# Patient Record
Sex: FEMALE | Race: WHITE | NOT HISPANIC OR LATINO | URBAN - METROPOLITAN AREA
[De-identification: names, ages, dates, MRNs, and addresses within clinical notes are randomized per-mention and may not be internally consistent; named-entity substitution may affect disease eponyms.]

---

## 2020-07-25 ENCOUNTER — TELEPHONE ENCOUNTER (OUTPATIENT)
Dept: URBAN - METROPOLITAN AREA CLINIC 13 | Facility: CLINIC | Age: 80
End: 2020-07-25

## 2020-07-25 RX ORDER — HYDROCODONE BITARTRATE AND ACETAMINOPHEN 7.5; 325 MG/1; MG/1
TAKE 1 TABLET EVERY 6 HOURS AS NEEDED FOR PAIN TABLET ORAL
Refills: 0 | OUTPATIENT
End: 2017-05-04

## 2020-07-25 RX ORDER — POLYETHYLENE GLYCOL 3350, SODIUM CHLORIDE, SODIUM BICARBONATE AND POTASSIUM CHLORIDE WITH LEMON FLAVOR 420; 11.2; 5.72; 1.48 G/4L; G/4L; G/4L; G/4L
TAKE AS DIRECTED POWDER, FOR SOLUTION ORAL
Qty: 1 | Refills: 0 | OUTPATIENT
Start: 2017-04-03 | End: 2017-05-04

## 2020-07-25 RX ORDER — DUREZOL 0.5 MG/ML
1 DROP EVERY NIGHT HS EMULSION OPHTHALMIC
Refills: 0 | OUTPATIENT
End: 2017-03-21

## 2020-07-25 RX ORDER — BESIFLOXACIN HCL 0.6 %
INSTILL 1 DROP 4 TIMES DAILY SUSPENSION, DROPS(FINAL DOSAGE FORM)(ML) OPHTHALMIC (EYE)
Refills: 0 | OUTPATIENT
End: 2017-03-21

## 2020-07-25 RX ORDER — PRIMIDONE 50 MG/1
TAKE 2 TABLETS TWICE DAILY TABLET ORAL
Refills: 0 | OUTPATIENT
End: 2017-05-04

## 2020-07-25 RX ORDER — CARVEDILOL 25 MG/1
TAKE 0.5 TABLET TWICE DAILY TABLET, FILM COATED ORAL
Refills: 0 | OUTPATIENT
End: 2017-03-21

## 2020-07-25 RX ORDER — PROMETHAZINE HYDROCHLORIDE 25 MG/1
TAKE 1 TABLET EVERY 6 HOURS AS NEEDED FOR NAUSEA TABLET ORAL
Qty: 60 | Refills: 1 | OUTPATIENT
End: 2017-03-21

## 2020-07-25 RX ORDER — CLINDAMYCIN HYDROCHLORIDE 300 MG/1
TAKE 1 CAPSULE 3 TIMES DAILY CAPSULE ORAL
Refills: 0 | OUTPATIENT
End: 2017-03-21

## 2020-07-25 RX ORDER — CHLORHEXIDINE GLUCONATE 0.12% ORAL RINSE 1.2 MG/ML
RINSE MOUTH WITH 15ML (1 CAPFUL) FOR 30 SECONDS AM AND PM AFTER TOOTHBRUSHING. EXPECTORATE AFTER RINSING, DO NOT SWALLOW SOLUTION ORAL
Refills: 0 | OUTPATIENT
End: 2017-03-21

## 2020-07-25 RX ORDER — DIPHENHYDRAMINE HCL 25 MG/1
TAKE 1 TABLET DAILY AS NEEDED TABLET ORAL
Refills: 0 | OUTPATIENT
End: 2017-05-04

## 2020-07-25 RX ORDER — OXYCODONE AND ACETAMINOPHEN 5; 325 MG/1; MG/1
TAKE 1 TABLET EVERY 6 HOURS PRN TABLET ORAL
Qty: 30 | Refills: 0 | OUTPATIENT
End: 2017-05-04

## 2020-07-26 ENCOUNTER — TELEPHONE ENCOUNTER (OUTPATIENT)
Dept: URBAN - METROPOLITAN AREA CLINIC 13 | Facility: CLINIC | Age: 80
End: 2020-07-26

## 2020-07-26 RX ORDER — FUROSEMIDE 40 MG/1
TAKE 1 TABLET TWICE DAILY TABLET ORAL
Qty: 60 | Refills: 3 | Status: ACTIVE | COMMUNITY

## 2020-07-26 RX ORDER — CHOLECALCIFEROL (VITAMIN D3) 50 MCG
TAKE 1 TABLET PO DAILY TABLET ORAL
Refills: 0 | Status: ACTIVE | COMMUNITY

## 2020-07-26 RX ORDER — ASPIRIN 81 MG/1
TAKE 1 TABLET DAILY TABLET, DELAYED RELEASE ORAL
Refills: 0 | Status: ACTIVE | COMMUNITY

## 2020-07-26 RX ORDER — LOSARTAN POTASSIUM 50 MG/1
TAKE 1 TABLET DAILY TABLET, FILM COATED ORAL
Refills: 0 | Status: ACTIVE | COMMUNITY

## 2020-07-26 RX ORDER — SIMVASTATIN 40 MG/1
TAKE 1 TABLET DAILY TABLET, FILM COATED ORAL
Refills: 0 | Status: ACTIVE | COMMUNITY

## 2020-07-26 RX ORDER — POTASSIUM CHLORIDE 750 MG/1
TAKE 2 TABLET 4 TIMES DAILY TABLET, EXTENDED RELEASE ORAL
Refills: 0 | Status: ACTIVE | COMMUNITY

## 2020-07-26 RX ORDER — APIXABAN 5 MG/1
TAKE 1 TABLET TWICE DAILY TABLET, FILM COATED ORAL
Refills: 0 | Status: ACTIVE | COMMUNITY

## 2020-07-26 RX ORDER — CARVEDILOL 25 MG/1
TAKE 0.5 TABLET TWICE DAILY TABLET, FILM COATED ORAL
Refills: 0 | Status: ACTIVE | COMMUNITY

## 2020-07-26 RX ORDER — CELECOXIB 200 MG/1
TAKE 1 CAPSULE DAILY AS NEEDED CAPSULE ORAL
Refills: 0 | Status: ACTIVE | COMMUNITY

## 2020-07-26 RX ORDER — FEBUXOSTAT 40 MG/1
TAKE 1 TABLET DAILY TABLET ORAL
Refills: 0 | Status: ACTIVE | COMMUNITY

## 2020-07-26 RX ORDER — LEVOTHYROXINE SODIUM 0.07 MG/1
TAKE 1 TABLET DAILY TABLET ORAL
Refills: 0 | Status: ACTIVE | COMMUNITY

## 2022-07-04 ENCOUNTER — HOSPITAL ENCOUNTER (EMERGENCY)
Facility: HOSPITAL | Age: 82
Discharge: HOME | End: 2022-07-04
Attending: EMERGENCY MEDICINE
Payer: MEDICARE

## 2022-07-04 VITALS
WEIGHT: 240 LBS | HEIGHT: 63 IN | RESPIRATION RATE: 16 BRPM | OXYGEN SATURATION: 94 % | TEMPERATURE: 98 F | HEART RATE: 108 BPM | SYSTOLIC BLOOD PRESSURE: 151 MMHG | DIASTOLIC BLOOD PRESSURE: 78 MMHG | BODY MASS INDEX: 42.52 KG/M2

## 2022-07-04 DIAGNOSIS — M54.50 BACK PAIN, LUMBOSACRAL: Primary | ICD-10-CM

## 2022-07-04 PROCEDURE — 99283 EMERGENCY DEPT VISIT LOW MDM: CPT

## 2022-07-04 RX ORDER — HYDROCODONE BITARTRATE AND ACETAMINOPHEN 5; 325 MG/1; MG/1
2 TABLET ORAL ONCE
Status: COMPLETED | OUTPATIENT
Start: 2022-07-04 | End: 2022-07-04

## 2022-07-04 RX ORDER — HYDROCODONE BITARTRATE AND ACETAMINOPHEN 5; 325 MG/1; MG/1
1-2 TABLET ORAL EVERY 6 HOURS PRN
Qty: 15 TABLET | Refills: 0 | Status: SHIPPED | OUTPATIENT
Start: 2022-07-04 | End: 2022-07-11

## 2022-07-04 NOTE — ED INITIAL ASSESSMENT (HPI)
Pt c/o chronic back spasms, pt states episode started this AM. Pt states she has tried lidocaine patch and 1000mg of tylenol with no relief.

## 2022-07-04 NOTE — ED QUICK NOTES
Pt visiting from out of town. Pt unsure of medications, unable to complete medication reconciliation, pt states \"cant [facility] just send it to you. \"

## 2022-07-04 NOTE — ED QUICK NOTES
Pt denies any pain at this time, reports that she is comfortable sitting in the wheelchair with her daughter's purse behind her back. Pt states she does not want to move at this time because if she does move then she will have pain.

## 2023-04-22 ENCOUNTER — HOSPITAL ENCOUNTER (INPATIENT)
Facility: HOSPITAL | Age: 83
LOS: 2 days | Discharge: HOME | DRG: 291 | End: 2023-04-24
Attending: EMERGENCY MEDICINE | Admitting: HOSPITALIST
Payer: MEDICARE

## 2023-04-22 ENCOUNTER — APPOINTMENT (OUTPATIENT)
Dept: GENERAL RADIOLOGY | Facility: HOSPITAL | Age: 83
DRG: 291 | End: 2023-04-22
Attending: EMERGENCY MEDICINE
Payer: MEDICARE

## 2023-04-22 DIAGNOSIS — I50.9 CONGESTIVE HEART FAILURE, UNSPECIFIED HF CHRONICITY, UNSPECIFIED HEART FAILURE TYPE (HCC): Primary | ICD-10-CM

## 2023-04-22 DIAGNOSIS — R09.02 HYPOXIA: ICD-10-CM

## 2023-04-22 DIAGNOSIS — J98.01 ACUTE BRONCHOSPASM: ICD-10-CM

## 2023-04-22 LAB
ALBUMIN SERPL-MCNC: 3.3 G/DL (ref 3.4–5)
ALBUMIN/GLOB SERPL: 0.8 {RATIO} (ref 1–2)
ALP LIVER SERPL-CCNC: 108 U/L
ALT SERPL-CCNC: 20 U/L
ANION GAP SERPL CALC-SCNC: 3 MMOL/L (ref 0–18)
AST SERPL-CCNC: 24 U/L (ref 15–37)
BASOPHILS # BLD AUTO: 0.08 X10(3) UL (ref 0–0.2)
BASOPHILS NFR BLD AUTO: 0.7 %
BILIRUB SERPL-MCNC: 0.8 MG/DL (ref 0.1–2)
BILIRUB UR QL STRIP.AUTO: NEGATIVE
BUN BLD-MCNC: 20 MG/DL (ref 7–18)
CALCIUM BLD-MCNC: 10.1 MG/DL (ref 8.5–10.1)
CHLORIDE SERPL-SCNC: 109 MMOL/L (ref 98–112)
CLARITY UR REFRACT.AUTO: CLEAR
CO2 SERPL-SCNC: 31 MMOL/L (ref 21–32)
CREAT BLD-MCNC: 1.32 MG/DL
EOSINOPHIL # BLD AUTO: 0.12 X10(3) UL (ref 0–0.7)
EOSINOPHIL NFR BLD AUTO: 1.1 %
ERYTHROCYTE [DISTWIDTH] IN BLOOD BY AUTOMATED COUNT: 13.6 %
EST. AVERAGE GLUCOSE BLD GHB EST-MCNC: 108 MG/DL (ref 68–126)
GFR SERPLBLD BASED ON 1.73 SQ M-ARVRAT: 41 ML/MIN/1.73M2 (ref 60–?)
GLOBULIN PLAS-MCNC: 3.9 G/DL (ref 2.8–4.4)
GLUCOSE BLD-MCNC: 120 MG/DL (ref 70–99)
GLUCOSE BLD-MCNC: 88 MG/DL (ref 70–99)
GLUCOSE UR STRIP.AUTO-MCNC: 50 MG/DL
HBA1C MFR BLD: 5.4 % (ref ?–5.7)
HCT VFR BLD AUTO: 39.9 %
HGB BLD-MCNC: 13 G/DL
IMM GRANULOCYTES # BLD AUTO: 0.04 X10(3) UL (ref 0–1)
IMM GRANULOCYTES NFR BLD: 0.4 %
KETONES UR STRIP.AUTO-MCNC: NEGATIVE MG/DL
LYMPHOCYTES # BLD AUTO: 1.34 X10(3) UL (ref 1–4)
LYMPHOCYTES NFR BLD AUTO: 12.3 %
MCH RBC QN AUTO: 31 PG (ref 26–34)
MCHC RBC AUTO-ENTMCNC: 32.6 G/DL (ref 31–37)
MCV RBC AUTO: 95 FL
MONOCYTES # BLD AUTO: 0.71 X10(3) UL (ref 0.1–1)
MONOCYTES NFR BLD AUTO: 6.5 %
NEUTROPHILS # BLD AUTO: 8.64 X10 (3) UL (ref 1.5–7.7)
NEUTROPHILS # BLD AUTO: 8.64 X10(3) UL (ref 1.5–7.7)
NEUTROPHILS NFR BLD AUTO: 79 %
NITRITE UR QL STRIP.AUTO: NEGATIVE
NT-PROBNP SERPL-MCNC: 4782 PG/ML (ref ?–450)
OSMOLALITY SERPL CALC.SUM OF ELEC: 298 MOSM/KG (ref 275–295)
PH UR STRIP.AUTO: 8 [PH] (ref 5–8)
PLATELET # BLD AUTO: 173 10(3)UL (ref 150–450)
POTASSIUM SERPL-SCNC: 3.8 MMOL/L (ref 3.5–5.1)
PROT SERPL-MCNC: 7.2 G/DL (ref 6.4–8.2)
PROT UR STRIP.AUTO-MCNC: NEGATIVE MG/DL
RBC # BLD AUTO: 4.2 X10(6)UL
RBC UR QL AUTO: NEGATIVE
SARS-COV-2 RNA RESP QL NAA+PROBE: NOT DETECTED
SODIUM SERPL-SCNC: 143 MMOL/L (ref 136–145)
SP GR UR STRIP.AUTO: 1.01 (ref 1–1.03)
TROPONIN I HIGH SENSITIVITY: 16 NG/L
UROBILINOGEN UR STRIP.AUTO-MCNC: <2 MG/DL
WBC # BLD AUTO: 10.9 X10(3) UL (ref 4–11)

## 2023-04-22 PROCEDURE — 99223 1ST HOSP IP/OBS HIGH 75: CPT | Performed by: HOSPITALIST

## 2023-04-22 PROCEDURE — 71045 X-RAY EXAM CHEST 1 VIEW: CPT | Performed by: EMERGENCY MEDICINE

## 2023-04-22 RX ORDER — NICOTINE POLACRILEX 4 MG
15 LOZENGE BUCCAL
Status: DISCONTINUED | OUTPATIENT
Start: 2023-04-22 | End: 2023-04-24

## 2023-04-22 RX ORDER — SENNOSIDES 8.6 MG
17.2 TABLET ORAL NIGHTLY PRN
Status: DISCONTINUED | OUTPATIENT
Start: 2023-04-22 | End: 2023-04-24

## 2023-04-22 RX ORDER — GABAPENTIN 300 MG/1
300 CAPSULE ORAL 3 TIMES DAILY
COMMUNITY

## 2023-04-22 RX ORDER — DEXTROSE MONOHYDRATE 25 G/50ML
50 INJECTION, SOLUTION INTRAVENOUS
Status: DISCONTINUED | OUTPATIENT
Start: 2023-04-22 | End: 2023-04-24

## 2023-04-22 RX ORDER — ATORVASTATIN CALCIUM 20 MG/1
20 TABLET, FILM COATED ORAL NIGHTLY
Status: DISCONTINUED | OUTPATIENT
Start: 2023-04-22 | End: 2023-04-24

## 2023-04-22 RX ORDER — LEVOTHYROXINE SODIUM 0.07 MG/1
75 TABLET ORAL
Status: DISCONTINUED | OUTPATIENT
Start: 2023-04-23 | End: 2023-04-24

## 2023-04-22 RX ORDER — POLYETHYLENE GLYCOL 3350 17 G/17G
17 POWDER, FOR SOLUTION ORAL DAILY PRN
Status: DISCONTINUED | OUTPATIENT
Start: 2023-04-22 | End: 2023-04-24

## 2023-04-22 RX ORDER — BISACODYL 10 MG
10 SUPPOSITORY, RECTAL RECTAL
Status: DISCONTINUED | OUTPATIENT
Start: 2023-04-22 | End: 2023-04-24

## 2023-04-22 RX ORDER — BACLOFEN 10 MG/1
10 TABLET ORAL 2 TIMES DAILY PRN
COMMUNITY

## 2023-04-22 RX ORDER — MELATONIN
3 NIGHTLY PRN
Status: DISCONTINUED | OUTPATIENT
Start: 2023-04-22 | End: 2023-04-24

## 2023-04-22 RX ORDER — FUROSEMIDE 10 MG/ML
40 INJECTION INTRAMUSCULAR; INTRAVENOUS ONCE
Status: COMPLETED | OUTPATIENT
Start: 2023-04-22 | End: 2023-04-22

## 2023-04-22 RX ORDER — CETIRIZINE HYDROCHLORIDE 10 MG/1
10 TABLET ORAL DAILY
Status: DISCONTINUED | OUTPATIENT
Start: 2023-04-23 | End: 2023-04-24

## 2023-04-22 RX ORDER — ONDANSETRON 2 MG/ML
4 INJECTION INTRAMUSCULAR; INTRAVENOUS EVERY 4 HOURS PRN
Status: ACTIVE | OUTPATIENT
Start: 2023-04-22 | End: 2023-04-23

## 2023-04-22 RX ORDER — NICOTINE POLACRILEX 4 MG
30 LOZENGE BUCCAL
Status: DISCONTINUED | OUTPATIENT
Start: 2023-04-22 | End: 2023-04-24

## 2023-04-22 RX ORDER — GABAPENTIN 300 MG/1
300 CAPSULE ORAL 2 TIMES DAILY
Status: DISCONTINUED | OUTPATIENT
Start: 2023-04-23 | End: 2023-04-24

## 2023-04-22 RX ORDER — CARVEDILOL 12.5 MG/1
12.5 TABLET ORAL 2 TIMES DAILY WITH MEALS
Status: DISCONTINUED | OUTPATIENT
Start: 2023-04-23 | End: 2023-04-23

## 2023-04-22 RX ORDER — FUROSEMIDE 10 MG/ML
40 INJECTION INTRAMUSCULAR; INTRAVENOUS DAILY
Status: DISCONTINUED | OUTPATIENT
Start: 2023-04-23 | End: 2023-04-24

## 2023-04-22 RX ORDER — HYDROCODONE BITARTRATE AND ACETAMINOPHEN 5; 325 MG/1; MG/1
1 TABLET ORAL EVERY 8 HOURS PRN
Status: DISCONTINUED | OUTPATIENT
Start: 2023-04-22 | End: 2023-04-24

## 2023-04-22 RX ORDER — ACETAMINOPHEN 500 MG
500 TABLET ORAL EVERY 4 HOURS PRN
Status: DISCONTINUED | OUTPATIENT
Start: 2023-04-22 | End: 2023-04-24

## 2023-04-22 RX ORDER — CARVEDILOL 12.5 MG/1
12.5 TABLET ORAL 2 TIMES DAILY WITH MEALS
COMMUNITY

## 2023-04-22 RX ORDER — FUROSEMIDE 20 MG/1
20 TABLET ORAL DAILY
COMMUNITY

## 2023-04-22 RX ORDER — LEVOTHYROXINE SODIUM 0.07 MG/1
75 TABLET ORAL
COMMUNITY

## 2023-04-22 RX ORDER — CETIRIZINE HYDROCHLORIDE 10 MG/1
10 TABLET ORAL DAILY
COMMUNITY

## 2023-04-22 RX ORDER — ALBUTEROL SULFATE 90 UG/1
8 AEROSOL, METERED RESPIRATORY (INHALATION) 4 TIMES DAILY
Status: DISCONTINUED | OUTPATIENT
Start: 2023-04-22 | End: 2023-04-23

## 2023-04-22 RX ORDER — IRBESARTAN 150 MG/1
150 TABLET ORAL NIGHTLY
COMMUNITY

## 2023-04-22 RX ORDER — ONDANSETRON 2 MG/ML
8 INJECTION INTRAMUSCULAR; INTRAVENOUS EVERY 6 HOURS PRN
Status: DISCONTINUED | OUTPATIENT
Start: 2023-04-22 | End: 2023-04-24

## 2023-04-22 RX ORDER — ACETAMINOPHEN 500 MG
1000 TABLET ORAL EVERY 6 HOURS PRN
COMMUNITY

## 2023-04-22 RX ORDER — HYDROCODONE BITARTRATE AND ACETAMINOPHEN 5; 325 MG/1; MG/1
1 TABLET ORAL EVERY 8 HOURS PRN
COMMUNITY

## 2023-04-22 RX ORDER — PRAVASTATIN SODIUM 80 MG/1
80 TABLET ORAL NIGHTLY
COMMUNITY

## 2023-04-22 RX ORDER — ESCITALOPRAM OXALATE 5 MG/1
5 TABLET ORAL DAILY
Status: DISCONTINUED | OUTPATIENT
Start: 2023-04-23 | End: 2023-04-24

## 2023-04-22 RX ORDER — BACLOFEN 10 MG/1
10 TABLET ORAL 2 TIMES DAILY PRN
Status: DISCONTINUED | OUTPATIENT
Start: 2023-04-22 | End: 2023-04-24

## 2023-04-22 RX ORDER — ESCITALOPRAM OXALATE 5 MG/1
5 TABLET ORAL DAILY
COMMUNITY

## 2023-04-22 RX ORDER — LOSARTAN POTASSIUM 50 MG/1
50 TABLET ORAL NIGHTLY
Status: DISCONTINUED | OUTPATIENT
Start: 2023-04-23 | End: 2023-04-24

## 2023-04-22 RX ORDER — METHYLPREDNISOLONE SODIUM SUCCINATE 125 MG/2ML
125 INJECTION, POWDER, LYOPHILIZED, FOR SOLUTION INTRAMUSCULAR; INTRAVENOUS ONCE
Status: COMPLETED | OUTPATIENT
Start: 2023-04-22 | End: 2023-04-22

## 2023-04-22 RX ORDER — ECHINACEA PURPUREA EXTRACT 125 MG
1 TABLET ORAL
Status: DISCONTINUED | OUTPATIENT
Start: 2023-04-22 | End: 2023-04-24

## 2023-04-22 RX ORDER — BENZONATATE 100 MG/1
200 CAPSULE ORAL 3 TIMES DAILY PRN
Status: DISCONTINUED | OUTPATIENT
Start: 2023-04-22 | End: 2023-04-24

## 2023-04-23 ENCOUNTER — APPOINTMENT (OUTPATIENT)
Dept: CV DIAGNOSTICS | Facility: HOSPITAL | Age: 83
DRG: 291 | End: 2023-04-23
Attending: HOSPITALIST
Payer: MEDICARE

## 2023-04-23 LAB
ANION GAP SERPL CALC-SCNC: 5 MMOL/L (ref 0–18)
BUN BLD-MCNC: 19 MG/DL (ref 7–18)
CALCIUM BLD-MCNC: 10 MG/DL (ref 8.5–10.1)
CHLORIDE SERPL-SCNC: 108 MMOL/L (ref 98–112)
CO2 SERPL-SCNC: 29 MMOL/L (ref 21–32)
CREAT BLD-MCNC: 1.25 MG/DL
ERYTHROCYTE [DISTWIDTH] IN BLOOD BY AUTOMATED COUNT: 13.4 %
GFR SERPLBLD BASED ON 1.73 SQ M-ARVRAT: 44 ML/MIN/1.73M2 (ref 60–?)
GLUCOSE BLD-MCNC: 148 MG/DL (ref 70–99)
GLUCOSE BLD-MCNC: 154 MG/DL (ref 70–99)
GLUCOSE BLD-MCNC: 162 MG/DL (ref 70–99)
GLUCOSE BLD-MCNC: 165 MG/DL (ref 70–99)
GLUCOSE BLD-MCNC: 188 MG/DL (ref 70–99)
HCT VFR BLD AUTO: 39.4 %
HGB BLD-MCNC: 12.8 G/DL
MAGNESIUM SERPL-MCNC: 2.5 MG/DL (ref 1.6–2.6)
MCH RBC QN AUTO: 30.9 PG (ref 26–34)
MCHC RBC AUTO-ENTMCNC: 32.5 G/DL (ref 31–37)
MCV RBC AUTO: 95.2 FL
OSMOLALITY SERPL CALC.SUM OF ELEC: 299 MOSM/KG (ref 275–295)
PHOSPHATE SERPL-MCNC: 2.5 MG/DL (ref 2.5–4.9)
PLATELET # BLD AUTO: 141 10(3)UL (ref 150–450)
POTASSIUM SERPL-SCNC: 3.6 MMOL/L (ref 3.5–5.1)
RBC # BLD AUTO: 4.14 X10(6)UL
SODIUM SERPL-SCNC: 142 MMOL/L (ref 136–145)
WBC # BLD AUTO: 8.7 X10(3) UL (ref 4–11)

## 2023-04-23 PROCEDURE — 99232 SBSQ HOSP IP/OBS MODERATE 35: CPT | Performed by: HOSPITALIST

## 2023-04-23 PROCEDURE — 93306 TTE W/DOPPLER COMPLETE: CPT | Performed by: HOSPITALIST

## 2023-04-23 RX ORDER — SPIRONOLACTONE 25 MG/1
12.5 TABLET ORAL DAILY
Status: DISCONTINUED | OUTPATIENT
Start: 2023-04-23 | End: 2023-04-24

## 2023-04-23 RX ORDER — CARVEDILOL 12.5 MG/1
12.5 TABLET ORAL 2 TIMES DAILY WITH MEALS
Status: DISCONTINUED | OUTPATIENT
Start: 2023-04-23 | End: 2023-04-24

## 2023-04-23 RX ORDER — GABAPENTIN 300 MG/1
600 CAPSULE ORAL EVERY EVENING
Status: DISCONTINUED | OUTPATIENT
Start: 2023-04-23 | End: 2023-04-24

## 2023-04-23 RX ORDER — POTASSIUM CHLORIDE 14.9 MG/ML
20 INJECTION INTRAVENOUS ONCE
Status: COMPLETED | OUTPATIENT
Start: 2023-04-23 | End: 2023-04-23

## 2023-04-23 NOTE — PROGRESS NOTES
04/22/23 2320 04/22/23 2321 04/22/23 2323   Vital Signs   BP (!) 162/96 (!) 158/107 (!) 157/107   MAP (mmHg) 111 117 117   BP Location Left arm Left arm  --    BP Method Automatic Automatic  --    Patient Position Lying Sitting Standing     Orthostatic BP

## 2023-04-23 NOTE — PROGRESS NOTES
NURSING ADMISSION NOTE      Patient admitted via Cart  Oriented to room. Safety precautions initiated. Bed in low position. Call light in reach. Admission navigator completed with patient. Patient is alert and oriented x4. Received on 5L, able to wean down to 3L, adequate O2 saturations. Afib on tele with rates controlled. Continent of bowels and bladder. Denies pain. States that her breathing is also feeling better. Up SBA. Bed locked and in lowest position, call light within reach, needs met at this time. Plan of care reviewed with patient, verbalized understanding.     Plan:  Cardiology to see  Echo  Diurese  Daily weight/I&O

## 2023-04-23 NOTE — ED QUICK NOTES
Orders for admission, patient is aware of plan and ready to go upstairs. Any questions, please call ED RN Mattie García at extension [phone].      Patient Covid vaccination status: Fully vaccinated     COVID Test Ordered in ED: Rapid SARS-CoV-2 by PCR    COVID Suspicion at Admission: N/A    Running Infusions:  None    Mental Status/LOC at time of transport: x4    Other pertinent information: Pt is on lasix, does not like to use bedpan  CIWA score: N/A   NIH score:  N/A

## 2023-04-23 NOTE — ED INITIAL ASSESSMENT (HPI)
LORENZO starting this morning, states its hard for her to catch her breath. On 5 liters via ems. Hx CHF.

## 2023-04-23 NOTE — PLAN OF CARE
Received pt at 0730. AxOx4. 3L O2 w stats maintaining >90%. Denies pain/SOB. Vitals stable. Afib w controlled rates on tele. Plan to wean O2 as able, IV lasix, echo today. Plan of care updated with pt, questions answered, verbalized understanding. Safety precautions in place. Instructed to call staff for help. Will continue to monitor.     Problem: Diabetes/Glucose Control  Goal: Glucose maintained within prescribed range  Description: INTERVENTIONS:  - Monitor Blood Glucose as ordered  - Assess for signs and symptoms of hyperglycemia and hypoglycemia  - Administer ordered medications to maintain glucose within target range  - Assess barriers to adequate nutritional intake and initiate nutrition consult as needed  - Instruct patient on self management of diabetes  Outcome: Progressing     Problem: Patient/Family Goals  Goal: Patient/Family Long Term Goal  Description: Patient's Long Term Goal: go home    Interventions:  - wean O2, IV lasix  - See additional Care Plan goals for specific interventions  Outcome: Progressing  Goal: Patient/Family Short Term Goal  Description: Patient's Short Term Goal: feel beter    Interventions:   - wean O2, IV lasix  - See additional Care Plan goals for specific interventions  Outcome: Progressing

## 2023-04-24 VITALS
SYSTOLIC BLOOD PRESSURE: 147 MMHG | OXYGEN SATURATION: 96 % | DIASTOLIC BLOOD PRESSURE: 69 MMHG | WEIGHT: 225.31 LBS | RESPIRATION RATE: 18 BRPM | BODY MASS INDEX: 40 KG/M2 | TEMPERATURE: 98 F | HEART RATE: 83 BPM

## 2023-04-24 LAB
ANION GAP SERPL CALC-SCNC: 3 MMOL/L (ref 0–18)
BUN BLD-MCNC: 30 MG/DL (ref 7–18)
CALCIUM BLD-MCNC: 10.5 MG/DL (ref 8.5–10.1)
CHLORIDE SERPL-SCNC: 110 MMOL/L (ref 98–112)
CO2 SERPL-SCNC: 29 MMOL/L (ref 21–32)
CREAT BLD-MCNC: 1.15 MG/DL
GFR SERPLBLD BASED ON 1.73 SQ M-ARVRAT: 48 ML/MIN/1.73M2 (ref 60–?)
GLUCOSE BLD-MCNC: 122 MG/DL (ref 70–99)
GLUCOSE BLD-MCNC: 124 MG/DL (ref 70–99)
OSMOLALITY SERPL CALC.SUM OF ELEC: 302 MOSM/KG (ref 275–295)
PHOSPHATE SERPL-MCNC: 2.9 MG/DL (ref 2.5–4.9)
POTASSIUM SERPL-SCNC: 4.3 MMOL/L (ref 3.5–5.1)
POTASSIUM SERPL-SCNC: 4.3 MMOL/L (ref 3.5–5.1)
Q-T INTERVAL: 324 MS
Q-T INTERVAL: 332 MS
QRS DURATION: 90 MS
QRS DURATION: 92 MS
QTC CALCULATION (BEZET): 406 MS
QTC CALCULATION (BEZET): 413 MS
R AXIS: 44 DEGREES
R AXIS: 46 DEGREES
SODIUM SERPL-SCNC: 142 MMOL/L (ref 136–145)
T AXIS: 45 DEGREES
T AXIS: 60 DEGREES
T3FREE SERPL-MCNC: 1.34 PG/ML (ref 2.4–4.2)
T4 FREE SERPL-MCNC: 1.1 NG/DL (ref 0.8–1.7)
TSI SER-ACNC: 0.35 MIU/ML (ref 0.36–3.74)
VENTRICULAR RATE: 90 BPM
VENTRICULAR RATE: 98 BPM

## 2023-04-24 PROCEDURE — 99239 HOSP IP/OBS DSCHRG MGMT >30: CPT | Performed by: HOSPITALIST

## 2023-04-24 RX ORDER — SPIRONOLACTONE 25 MG/1
12.5 TABLET ORAL DAILY
Qty: 30 TABLET | Refills: 3 | Status: SHIPPED | OUTPATIENT
Start: 2023-04-25

## 2023-04-24 NOTE — PLAN OF CARE
Patient tele D/C, IV discontinued with catheter intact. Patient denies CP, SOB, dizziness, or palpitations. Patient denies calf tenderness. Patient discharge instructions reviewed with patient, verbalize understanding. Patient medication and their side effects reviewed with patient and sent to pharmacy of choice. Patient escorted via wheelchair to lobby by transport. Problem: Diabetes/Glucose Control  Goal: Glucose maintained within prescribed range  Description: INTERVENTIONS:  - Monitor Blood Glucose as ordered  - Assess for signs and symptoms of hyperglycemia and hypoglycemia  - Administer ordered medications to maintain glucose within target range  - Assess barriers to adequate nutritional intake and initiate nutrition consult as needed  - Instruct patient on self management of diabetes  Outcome: Adequate for Discharge     Problem: Patient/Family Goals  Goal: Patient/Family Long Term Goal  Description: Patient's Long Term Goal: go home    Interventions:  - wean O2, IV lasix  - See additional Care Plan goals for specific interventions  Outcome: Adequate for Discharge  Goal: Patient/Family Short Term Goal  Description: Patient's Short Term Goal: feel beter    Interventions:   - wean O2, IV lasix  - See additional Care Plan goals for specific interventions  Outcome: Adequate for Discharge     Problem: CARDIOVASCULAR - ADULT  Goal: Maintains optimal cardiac output and hemodynamic stability  Description: INTERVENTIONS:  - Monitor vital signs, rhythm, and trends  - Monitor for bleeding, hypotension and signs of decreased cardiac output  - Evaluate effectiveness of vasoactive medications to optimize hemodynamic stability  - Monitor arterial and/or venous puncture sites for bleeding and/or hematoma  - Assess quality of pulses, skin color and temperature  - Assess for signs of decreased coronary artery perfusion - ex.  Angina  - Evaluate fluid balance, assess for edema, trend weights  Outcome: Adequate for Discharge  Goal: Absence of cardiac arrhythmias or at baseline  Description: INTERVENTIONS:  - Continuous cardiac monitoring, monitor vital signs, obtain 12 lead EKG if indicated  - Evaluate effectiveness of antiarrhythmic and heart rate control medications as ordered  - Initiate emergency measures for life threatening arrhythmias  - Monitor electrolytes and administer replacement therapy as ordered  Outcome: Adequate for Discharge

## 2023-04-24 NOTE — PLAN OF CARE
Patient here for increased shortness of breath. Pbnp elevated. Iv lasix BID. Daily weight. She remains in controlled afib on eliquis. Plan of care updated with patient. Problem: Diabetes/Glucose Control  Goal: Glucose maintained within prescribed range  Description: INTERVENTIONS:  - Monitor Blood Glucose as ordered  - Assess for signs and symptoms of hyperglycemia and hypoglycemia  - Administer ordered medications to maintain glucose within target range  - Assess barriers to adequate nutritional intake and initiate nutrition consult as needed  - Instruct patient on self management of diabetes  Outcome: Progressing     Problem: CARDIOVASCULAR - ADULT  Goal: Maintains optimal cardiac output and hemodynamic stability  Description: INTERVENTIONS:  - Monitor vital signs, rhythm, and trends  - Monitor for bleeding, hypotension and signs of decreased cardiac output  - Evaluate effectiveness of vasoactive medications to optimize hemodynamic stability  - Monitor arterial and/or venous puncture sites for bleeding and/or hematoma  - Assess quality of pulses, skin color and temperature  - Assess for signs of decreased coronary artery perfusion - ex.  Angina  - Evaluate fluid balance, assess for edema, trend weights  Outcome: Progressing  Goal: Absence of cardiac arrhythmias or at baseline  Description: INTERVENTIONS:  - Continuous cardiac monitoring, monitor vital signs, obtain 12 lead EKG if indicated  - Evaluate effectiveness of antiarrhythmic and heart rate control medications as ordered  - Initiate emergency measures for life threatening arrhythmias  - Monitor electrolytes and administer replacement therapy as ordered  Outcome: Progressing

## 2023-04-27 NOTE — PAYOR COMM NOTE
--------------  DISCHARGE REVIEW    Payor: Lurdes John #:  [id number]  Authorization Number: [id number]    Admit date: 4/22/23  Admit time:  10:55 PM  Discharge Date: 4/24/2023  5:49 PM     Admitting Physician: Drea Santos MD  Attending Physician:  No att. providers found  Primary Care Physician: Rasheeda Covington       Discharge Summary Notes    No notes of this type exist for this encounter.

## 2023-05-12 ENCOUNTER — LAB ENCOUNTER (OUTPATIENT)
Dept: LAB | Facility: HOSPITAL | Age: 83
End: 2023-05-12
Payer: MEDICARE

## 2023-05-12 DIAGNOSIS — Z11.1 SCREENING EXAMINATION FOR PULMONARY TUBERCULOSIS: Primary | ICD-10-CM

## 2023-05-12 PROCEDURE — 86480 TB TEST CELL IMMUN MEASURE: CPT

## 2023-05-12 PROCEDURE — 36415 COLL VENOUS BLD VENIPUNCTURE: CPT

## 2023-05-13 ENCOUNTER — APPOINTMENT (OUTPATIENT)
Dept: CT IMAGING | Facility: HOSPITAL | Age: 83
End: 2023-05-13
Attending: EMERGENCY MEDICINE
Payer: MEDICARE

## 2023-05-13 ENCOUNTER — HOSPITAL ENCOUNTER (OUTPATIENT)
Facility: HOSPITAL | Age: 83
Setting detail: OBSERVATION
Discharge: HOME | End: 2023-05-14
Attending: EMERGENCY MEDICINE | Admitting: INTERNAL MEDICINE
Payer: MEDICARE

## 2023-05-13 ENCOUNTER — APPOINTMENT (OUTPATIENT)
Dept: GENERAL RADIOLOGY | Facility: HOSPITAL | Age: 83
End: 2023-05-13
Attending: EMERGENCY MEDICINE
Payer: MEDICARE

## 2023-05-13 DIAGNOSIS — R00.1 BRADYCARDIA: ICD-10-CM

## 2023-05-13 DIAGNOSIS — R06.00 DYSPNEA, UNSPECIFIED TYPE: Primary | ICD-10-CM

## 2023-05-13 LAB
ALBUMIN SERPL-MCNC: 3.3 G/DL (ref 3.4–5)
ALBUMIN/GLOB SERPL: 0.9 {RATIO} (ref 1–2)
ALP LIVER SERPL-CCNC: 107 U/L
ALT SERPL-CCNC: 20 U/L
ANION GAP SERPL CALC-SCNC: 0 MMOL/L (ref 0–18)
AST SERPL-CCNC: 17 U/L (ref 15–37)
BASOPHILS # BLD AUTO: 0.08 X10(3) UL (ref 0–0.2)
BASOPHILS NFR BLD AUTO: 0.8 %
BILIRUB SERPL-MCNC: 0.8 MG/DL (ref 0.1–2)
BUN BLD-MCNC: 20 MG/DL (ref 7–18)
CALCIUM BLD-MCNC: 10.8 MG/DL (ref 8.5–10.1)
CHLORIDE SERPL-SCNC: 111 MMOL/L (ref 98–112)
CO2 SERPL-SCNC: 29 MMOL/L (ref 21–32)
CREAT BLD-MCNC: 1.16 MG/DL
EOSINOPHIL # BLD AUTO: 0.23 X10(3) UL (ref 0–0.7)
EOSINOPHIL NFR BLD AUTO: 2.3 %
ERYTHROCYTE [DISTWIDTH] IN BLOOD BY AUTOMATED COUNT: 13.7 %
GFR SERPLBLD BASED ON 1.73 SQ M-ARVRAT: 48 ML/MIN/1.73M2 (ref 60–?)
GLOBULIN PLAS-MCNC: 3.7 G/DL (ref 2.8–4.4)
GLUCOSE BLD-MCNC: 119 MG/DL (ref 70–99)
GLUCOSE BLD-MCNC: 122 MG/DL (ref 70–99)
HCT VFR BLD AUTO: 38.2 %
HGB BLD-MCNC: 12.6 G/DL
IMM GRANULOCYTES # BLD AUTO: 0.02 X10(3) UL (ref 0–1)
IMM GRANULOCYTES NFR BLD: 0.2 %
LYMPHOCYTES # BLD AUTO: 1.68 X10(3) UL (ref 1–4)
LYMPHOCYTES NFR BLD AUTO: 16.7 %
MCH RBC QN AUTO: 31 PG (ref 26–34)
MCHC RBC AUTO-ENTMCNC: 33 G/DL (ref 31–37)
MCV RBC AUTO: 93.9 FL
MONOCYTES # BLD AUTO: 0.62 X10(3) UL (ref 0.1–1)
MONOCYTES NFR BLD AUTO: 6.1 %
NEUTROPHILS # BLD AUTO: 7.46 X10 (3) UL (ref 1.5–7.7)
NEUTROPHILS # BLD AUTO: 7.46 X10(3) UL (ref 1.5–7.7)
NEUTROPHILS NFR BLD AUTO: 73.9 %
NT-PROBNP SERPL-MCNC: 2949 PG/ML (ref ?–450)
OSMOLALITY SERPL CALC.SUM OF ELEC: 294 MOSM/KG (ref 275–295)
PLATELET # BLD AUTO: 172 10(3)UL (ref 150–450)
POTASSIUM SERPL-SCNC: 3.7 MMOL/L (ref 3.5–5.1)
PROT SERPL-MCNC: 7 G/DL (ref 6.4–8.2)
Q-T INTERVAL: 348 MS
QRS DURATION: 86 MS
QTC CALCULATION (BEZET): 432 MS
R AXIS: 60 DEGREES
RBC # BLD AUTO: 4.07 X10(6)UL
SODIUM SERPL-SCNC: 140 MMOL/L (ref 136–145)
T AXIS: 102 DEGREES
TROPONIN I HIGH SENSITIVITY: 21 NG/L
VENTRICULAR RATE: 93 BPM
WBC # BLD AUTO: 10.1 X10(3) UL (ref 4–11)

## 2023-05-13 PROCEDURE — 71275 CT ANGIOGRAPHY CHEST: CPT | Performed by: EMERGENCY MEDICINE

## 2023-05-13 PROCEDURE — 99223 1ST HOSP IP/OBS HIGH 75: CPT | Performed by: INTERNAL MEDICINE

## 2023-05-13 PROCEDURE — 71045 X-RAY EXAM CHEST 1 VIEW: CPT | Performed by: EMERGENCY MEDICINE

## 2023-05-13 RX ORDER — ATORVASTATIN CALCIUM 20 MG/1
20 TABLET, FILM COATED ORAL NIGHTLY
Status: DISCONTINUED | OUTPATIENT
Start: 2023-05-13 | End: 2023-05-14

## 2023-05-13 RX ORDER — MELATONIN
3 NIGHTLY PRN
Status: DISCONTINUED | OUTPATIENT
Start: 2023-05-13 | End: 2023-05-14

## 2023-05-13 RX ORDER — LOSARTAN POTASSIUM 50 MG/1
50 TABLET ORAL NIGHTLY
Status: DISCONTINUED | OUTPATIENT
Start: 2023-05-13 | End: 2023-05-14

## 2023-05-13 RX ORDER — METOCLOPRAMIDE HYDROCHLORIDE 5 MG/ML
5 INJECTION INTRAMUSCULAR; INTRAVENOUS EVERY 8 HOURS PRN
Status: DISCONTINUED | OUTPATIENT
Start: 2023-05-13 | End: 2023-05-14

## 2023-05-13 RX ORDER — BENZONATATE 200 MG/1
200 CAPSULE ORAL 3 TIMES DAILY PRN
Status: DISCONTINUED | OUTPATIENT
Start: 2023-05-13 | End: 2023-05-14

## 2023-05-13 RX ORDER — ENEMA 19; 7 G/133ML; G/133ML
1 ENEMA RECTAL ONCE AS NEEDED
Status: DISCONTINUED | OUTPATIENT
Start: 2023-05-13 | End: 2023-05-14

## 2023-05-13 RX ORDER — SPIRONOLACTONE 25 MG/1
12.5 TABLET ORAL DAILY
Status: DISCONTINUED | OUTPATIENT
Start: 2023-05-13 | End: 2023-05-14

## 2023-05-13 RX ORDER — ESCITALOPRAM OXALATE 5 MG/1
5 TABLET ORAL DAILY
Status: DISCONTINUED | OUTPATIENT
Start: 2023-05-13 | End: 2023-05-14

## 2023-05-13 RX ORDER — CARVEDILOL 12.5 MG/1
12.5 TABLET ORAL 2 TIMES DAILY WITH MEALS
Status: DISCONTINUED | OUTPATIENT
Start: 2023-05-13 | End: 2023-05-13

## 2023-05-13 RX ORDER — CARVEDILOL 6.25 MG/1
6.25 TABLET ORAL 2 TIMES DAILY WITH MEALS
Status: DISCONTINUED | OUTPATIENT
Start: 2023-05-13 | End: 2023-05-14

## 2023-05-13 RX ORDER — FUROSEMIDE 20 MG/1
20 TABLET ORAL DAILY
Status: DISCONTINUED | OUTPATIENT
Start: 2023-05-14 | End: 2023-05-14

## 2023-05-13 RX ORDER — POTASSIUM CHLORIDE 20 MEQ/1
40 TABLET, EXTENDED RELEASE ORAL ONCE
Status: COMPLETED | OUTPATIENT
Start: 2023-05-13 | End: 2023-05-13

## 2023-05-13 RX ORDER — ONDANSETRON 2 MG/ML
4 INJECTION INTRAMUSCULAR; INTRAVENOUS EVERY 6 HOURS PRN
Status: DISCONTINUED | OUTPATIENT
Start: 2023-05-13 | End: 2023-05-14

## 2023-05-13 RX ORDER — SENNOSIDES 8.6 MG
17.2 TABLET ORAL NIGHTLY PRN
Status: DISCONTINUED | OUTPATIENT
Start: 2023-05-13 | End: 2023-05-14

## 2023-05-13 RX ORDER — BISACODYL 10 MG
10 SUPPOSITORY, RECTAL RECTAL
Status: DISCONTINUED | OUTPATIENT
Start: 2023-05-13 | End: 2023-05-14

## 2023-05-13 RX ORDER — ACETAMINOPHEN 500 MG
500 TABLET ORAL EVERY 4 HOURS PRN
Status: DISCONTINUED | OUTPATIENT
Start: 2023-05-13 | End: 2023-05-14

## 2023-05-13 RX ORDER — HYDROCODONE BITARTRATE AND ACETAMINOPHEN 5; 325 MG/1; MG/1
1 TABLET ORAL EVERY 8 HOURS PRN
Status: DISCONTINUED | OUTPATIENT
Start: 2023-05-13 | End: 2023-05-14

## 2023-05-13 RX ORDER — CETIRIZINE HYDROCHLORIDE 10 MG/1
10 TABLET ORAL DAILY
Status: DISCONTINUED | OUTPATIENT
Start: 2023-05-13 | End: 2023-05-14

## 2023-05-13 RX ORDER — POLYETHYLENE GLYCOL 3350 17 G/17G
17 POWDER, FOR SOLUTION ORAL DAILY PRN
Status: DISCONTINUED | OUTPATIENT
Start: 2023-05-13 | End: 2023-05-14

## 2023-05-13 RX ORDER — ECHINACEA PURPUREA EXTRACT 125 MG
1 TABLET ORAL
Status: DISCONTINUED | OUTPATIENT
Start: 2023-05-13 | End: 2023-05-14

## 2023-05-13 RX ORDER — FUROSEMIDE 10 MG/ML
40 INJECTION INTRAMUSCULAR; INTRAVENOUS ONCE
Status: COMPLETED | OUTPATIENT
Start: 2023-05-13 | End: 2023-05-13

## 2023-05-13 RX ORDER — BACLOFEN 10 MG/1
10 TABLET ORAL 2 TIMES DAILY PRN
Status: DISCONTINUED | OUTPATIENT
Start: 2023-05-13 | End: 2023-05-14

## 2023-05-13 RX ORDER — LEVOTHYROXINE SODIUM 0.07 MG/1
75 TABLET ORAL
Status: DISCONTINUED | OUTPATIENT
Start: 2023-05-13 | End: 2023-05-14

## 2023-05-13 RX ORDER — GABAPENTIN 300 MG/1
300 CAPSULE ORAL 3 TIMES DAILY
Status: DISCONTINUED | OUTPATIENT
Start: 2023-05-13 | End: 2023-05-14

## 2023-05-13 NOTE — PROGRESS NOTES
[de-identified] y/o female with HTN, recently hospitalized with HFpeF, visiting from [facility] presents with  CHF as she did not pack enough diuretics. Improving. Continue to diurese. Chronic afib and found to have 3 second paurse. monitor overnight and then 7 day monitor as outpatient when she returns to [facility]. Discharge in morning.  Chronic afib, CKD, HTN

## 2023-05-13 NOTE — ED INITIAL ASSESSMENT (HPI)
Pt is down from [facility] and staying with daughter temporarily while getting her  settled into a new memory care unit and ran out of her meds 2 days ago. She started becoming short of breath when she had to skip some of her meds including lasix. Daughter was able to get her lasix and gave 20 mg PO before coming here. O2 Sats were in the 80s upon EMS arrival but 94% on RA now.

## 2023-05-13 NOTE — ED QUICK NOTES
Rounding Completed    Plan of Care reviewed. Waiting for room assignment. Elimination needs assessed. Pt resting comfortably on cot. Bed is locked and in lowest position. Call light within reach.

## 2023-05-13 NOTE — PLAN OF CARE
Assumed care of patient @ 0730 patient resting in bed, AOX4, reports no pain. Lung sounds clear but diminished bilaterally, O2 saturation maintained on room air. Some dyspnea on exertion. No complaints of chest pain. Afib on tele. Bowel sounds present and active in all quadrants. Patient voiding with no issue. Pt ambulating with steady gait. 2+ edema to BL lower extremities. Plan of Care: Diuretics. Ambulate as tolerated. Discussed plan of care with patient, verbalized understanding. Call light within reach. Approx 1140: Pt had 3.46 sec pause while asleep, asymptomatic, MD made aware, see new orders. Approx 1200 cardiology directed hold coreg.        Problem: Diabetes/Glucose Control  Goal: Glucose maintained within prescribed range  Description: INTERVENTIONS:  - Monitor Blood Glucose as ordered  - Assess for signs and symptoms of hyperglycemia and hypoglycemia  - Administer ordered medications to maintain glucose within target range  - Assess barriers to adequate nutritional intake and initiate nutrition consult as needed  - Instruct patient on self management of diabetes  Outcome: Progressing     Problem: Patient/Family Goals  Goal: Patient/Family Long Term Goal  Description: Patient's Long Term Goal: Stay out of the hospital    Interventions:  - Attend follow up appointments  - Take medications as ordered  - See additional Care Plan goals for specific interventions  Outcome: Progressing  Goal: Patient/Family Short Term Goal  Description: Patient's Short Term Goal: Get rid of swelling    Interventions:   - Labs, tele, medications as ordered  - Consults, imaging  - See additional Care Plan goals for specific interventions  Outcome: Progressing

## 2023-05-13 NOTE — RESPIRATORY THERAPY NOTE
Patient does have a history of ELY but states she does not use CPAP but instead uses a nasal strip to help here. ELY protocol in place.

## 2023-05-13 NOTE — ED QUICK NOTES
Orders for admission, patient is aware of plan and ready to go upstairs. Any questions, please call ED RN [address] at extension [phone].      Patient Covid vaccination status: Fully vaccinated     COVID Test Ordered in ED: None    COVID Suspicion at Admission: N/A    Running Infusions:  None    Mental Status/LOC at time of transport: A&Ox4    Other pertinent information:   CIWA score: N/A   NIH score:  N/A

## 2023-05-14 VITALS
BODY MASS INDEX: 40 KG/M2 | OXYGEN SATURATION: 91 % | DIASTOLIC BLOOD PRESSURE: 84 MMHG | RESPIRATION RATE: 18 BRPM | WEIGHT: 228.19 LBS | TEMPERATURE: 98 F | HEART RATE: 101 BPM | SYSTOLIC BLOOD PRESSURE: 144 MMHG

## 2023-05-14 LAB
ANION GAP SERPL CALC-SCNC: <0 MMOL/L (ref 0–18)
BASOPHILS # BLD AUTO: 0.08 X10(3) UL (ref 0–0.2)
BASOPHILS NFR BLD AUTO: 1 %
BUN BLD-MCNC: 23 MG/DL (ref 7–18)
CALCIUM BLD-MCNC: 10.4 MG/DL (ref 8.5–10.1)
CHLORIDE SERPL-SCNC: 110 MMOL/L (ref 98–112)
CO2 SERPL-SCNC: 29 MMOL/L (ref 21–32)
CREAT BLD-MCNC: 1.2 MG/DL
EOSINOPHIL # BLD AUTO: 0.26 X10(3) UL (ref 0–0.7)
EOSINOPHIL NFR BLD AUTO: 3.3 %
ERYTHROCYTE [DISTWIDTH] IN BLOOD BY AUTOMATED COUNT: 13.6 %
GFR SERPLBLD BASED ON 1.73 SQ M-ARVRAT: 46 ML/MIN/1.73M2 (ref 60–?)
GLUCOSE BLD-MCNC: 105 MG/DL (ref 70–99)
GLUCOSE BLD-MCNC: 97 MG/DL (ref 70–99)
HCT VFR BLD AUTO: 38.7 %
HGB BLD-MCNC: 12.3 G/DL
IMM GRANULOCYTES # BLD AUTO: 0.03 X10(3) UL (ref 0–1)
IMM GRANULOCYTES NFR BLD: 0.4 %
LYMPHOCYTES # BLD AUTO: 1.92 X10(3) UL (ref 1–4)
LYMPHOCYTES NFR BLD AUTO: 24.4 %
MCH RBC QN AUTO: 30.7 PG (ref 26–34)
MCHC RBC AUTO-ENTMCNC: 31.8 G/DL (ref 31–37)
MCV RBC AUTO: 96.5 FL
MONOCYTES # BLD AUTO: 0.59 X10(3) UL (ref 0.1–1)
MONOCYTES NFR BLD AUTO: 7.5 %
NEUTROPHILS # BLD AUTO: 4.99 X10 (3) UL (ref 1.5–7.7)
NEUTROPHILS # BLD AUTO: 4.99 X10(3) UL (ref 1.5–7.7)
NEUTROPHILS NFR BLD AUTO: 63.4 %
OSMOLALITY SERPL CALC.SUM OF ELEC: 290 MOSM/KG (ref 275–295)
PLATELET # BLD AUTO: 168 10(3)UL (ref 150–450)
POTASSIUM SERPL-SCNC: 3.9 MMOL/L (ref 3.5–5.1)
POTASSIUM SERPL-SCNC: 3.9 MMOL/L (ref 3.5–5.1)
RBC # BLD AUTO: 4.01 X10(6)UL
SODIUM SERPL-SCNC: 138 MMOL/L (ref 136–145)
WBC # BLD AUTO: 7.9 X10(3) UL (ref 4–11)

## 2023-05-14 RX ORDER — CARVEDILOL 6.25 MG/1
6.25 TABLET ORAL 2 TIMES DAILY WITH MEALS
Qty: 60 TABLET | Refills: 3 | Status: SHIPPED | OUTPATIENT
Start: 2023-05-14

## 2023-05-14 NOTE — PLAN OF CARE
Assumed pt care at [address] [facility]. A&O x4. Tele rhythm Atrial fibrillation. SPO2 92% on room air. Breath sounds clear and diminished bilaterally. Pt voiding with no issues. No c/o chest pain or shortness of breath. Skin dry and intact. Bed is locked and in low position. Call light and personal items within reach. Reviewed plan of care and patient verbalizes understanding. POC: Possible discharge tomorrow 5/14.   Problem: Diabetes/Glucose Control  Goal: Glucose maintained within prescribed range  Description: INTERVENTIONS:  - Monitor Blood Glucose as ordered  - Assess for signs and symptoms of hyperglycemia and hypoglycemia  - Administer ordered medications to maintain glucose within target range  - Assess barriers to adequate nutritional intake and initiate nutrition consult as needed  - Instruct patient on self management of diabetes  5/14/2023 0250 by Lala Rosenberg RN  Outcome: Progressing  5/14/2023 0249 by Lala Rosenberg RN  Outcome: Progressing

## 2023-05-14 NOTE — PLAN OF CARE
Assumed care of patient @ 0730 patient resting in bed, AOX4, reports no pain. Lung sounds clear but diminished bilaterally, O2 saturation maintained on room air. Some dyspnea on exertion. No complaints of chest pain. Afib on tele. Bowel sounds present and active in all quadrants. Patient voiding with no issue. Pt ambulating with steady gait. 2+ edema to BL lower extremities. Plan of Care: Diuretics. Ambulate as tolerated. Discharge home today. Discussed plan of care with patient, verbalized understanding. Call light within reach.          Problem: Diabetes/Glucose Control  Goal: Glucose maintained within prescribed range  Description: INTERVENTIONS:  - Monitor Blood Glucose as ordered  - Assess for signs and symptoms of hyperglycemia and hypoglycemia  - Administer ordered medications to maintain glucose within target range  - Assess barriers to adequate nutritional intake and initiate nutrition consult as needed  - Instruct patient on self management of diabetes  Outcome: Progressing     Problem: Patient/Family Goals  Goal: Patient/Family Long Term Goal  Description: Patient's Long Term Goal: Stay out of the hospital    Interventions:  - Attend follow up appointments  - Take medications as ordered  - See additional Care Plan goals for specific interventions  Outcome: Progressing  Goal: Patient/Family Short Term Goal  Description: Patient's Short Term Goal: Get rid of swelling    Interventions:   - Labs, tele, medications as ordered  - Consults, imaging  - See additional Care Plan goals for specific interventions  Outcome: Progressing

## 2023-05-14 NOTE — DISCHARGE INSTRUCTIONS
WE would like you to get a 14 day heart monitor. You can get from our office or your own local cardiologist.   Please reduce your Coreg dose, as your heart rate was too slow. [address] [place] [address] Avenue: [phone].

## 2023-05-14 NOTE — PLAN OF CARE
Pt okay to discharge per hospitalist and consults. Discharge AVS including medication information, follow-ups, and prescriptions given, including information about MCT monitor and coreg dosing. Patient verbalized understanding and is agreeable with discharge. IV removed, telemetry discontinued. All belongings taken with patient. Transported off unit via wheelchair.      Problem: Diabetes/Glucose Control  Goal: Glucose maintained within prescribed range  Description: INTERVENTIONS:  - Monitor Blood Glucose as ordered  - Assess for signs and symptoms of hyperglycemia and hypoglycemia  - Administer ordered medications to maintain glucose within target range  - Assess barriers to adequate nutritional intake and initiate nutrition consult as needed  - Instruct patient on self management of diabetes  5/14/2023 1515 by Jaleesa Allen, RN  Outcome: Completed  5/14/2023 1155 by Jaleesa Allen, RN  Outcome: Progressing

## 2023-05-15 LAB
M TB IFN-G CD4+ T-CELLS BLD-ACNC: 0.49 IU/ML
M TB TUBERC IFN-G BLD QL: NEGATIVE
M TB TUBERC IGNF/MITOGEN IGNF CONTROL: >10 IU/ML
QFT TB1 AG MINUS NIL: 0.05 IU/ML
QFT TB2 AG MINUS NIL: 0.07 IU/ML

## 2023-05-15 NOTE — CONSULTS
[facility]    PATIENT'S NAME: Aleksandra Galan I   ATTENDING PHYSICIAN: Krishna Spencer D.O.   CONSULTING PHYSICIAN: Kelle Eid M.D. PATIENT ACCOUNT#:   [de-identified]    LOCATION:  [address] [place] [address]  MEDICAL RECORD #:   [id number]       YOB: 1940  ADMISSION DATE:       05/13/2023      CONSULT DATE:  05/13/2023    REPORT OF CONSULTATION    HISTORY OF PRESENT ILLNESS:  An 51-year-old female with hypertension, recently hospitalized with heart failure with preserved ejection fraction. She is visiting from [facility]. Patient has had 2 days of increasing shortness of breath. She did not bring enough diuretics with her and she is visiting from [facility]. Patient was admitted and diuresis initiated. The patient has known chronic atrial fibrillation. She was noticed to have a 3-second pause. She denies any history of syncope, lightheadedness or dizziness. PAST MEDICAL HISTORY:  Atrial fibrillation, hypertension, heart failure with preserved ejection fraction, pulmonary embolism, chronic atrial fibrillation, chronic kidney disease. MEDICATIONS:  As listed. SOCIAL HISTORY:  Patient is . REVIEW OF SYSTEMS:  Denies hemoptysis. Denies hematemesis. Denies hematuria. Rest of review of systems negative except as per HPI. PHYSICAL EXAMINATION:    VITAL SIGNS:  Blood pressure 120/70, pulse 68, irregularly, irregular. HEENT:  Pupils equal, round and reactive to light and accommodation. LUNGS:  Clear. HEART:  S1, S2. Faint systolic murmur. ABDOMEN:  Soft. No hepatosplenomegaly. EXTREMITIES:  No clubbing, cyanosis, or edema. NEUROLOGIC:  Alert to person, place and time. SKIN:  No rashes. LABORATORY DATA:  BUN 20, creatinine 1.6. BNP 2900. IMPRESSION:    1. Congestive heart failure with preserved ejection fraction. At this time, will continue diuresis. Resume all home meds. 2.   Atrial fibrillation with a 3-second pause.   At the present time, I am not concerned about the pause. Will observe her on telemetry overnight and if no further pauses, then can go home and have a monitor as an outpatient in [facility]. 3.   Chronic atrial fibrillation, on anticoagulation. 4.   History of heart failure with preserved ejection fraction. 5.   Chronic kidney disease.      Dictated By Elizabeth Gilbert M.D.  d: 05/13/2023 15:12:34  t: 05/13/2023 16:51:56  [facility] [id number]  [facility]/

## 2023-06-02 ENCOUNTER — OFFICE VISIT (OUTPATIENT)
Dept: FAMILY MEDICINE CLINIC | Facility: CLINIC | Age: 83
End: 2023-06-02
Payer: MEDICARE

## 2023-06-02 VITALS
SYSTOLIC BLOOD PRESSURE: 122 MMHG | DIASTOLIC BLOOD PRESSURE: 68 MMHG | TEMPERATURE: 98 F | WEIGHT: 229.25 LBS | BODY MASS INDEX: 40.62 KG/M2 | HEIGHT: 63 IN | RESPIRATION RATE: 18 BRPM | HEART RATE: 92 BPM

## 2023-06-02 DIAGNOSIS — E83.52 HYPERCALCEMIA: ICD-10-CM

## 2023-06-02 DIAGNOSIS — M51.36 LUMBAR DEGENERATIVE DISC DISEASE: ICD-10-CM

## 2023-06-02 DIAGNOSIS — I10 ESSENTIAL HYPERTENSION: Chronic | ICD-10-CM

## 2023-06-02 DIAGNOSIS — N95.2 ATROPHY OF VAGINA: ICD-10-CM

## 2023-06-02 DIAGNOSIS — N25.81 SECONDARY HYPERPARATHYROIDISM (HCC): ICD-10-CM

## 2023-06-02 DIAGNOSIS — B35.1 ONYCHOMYCOSIS: ICD-10-CM

## 2023-06-02 DIAGNOSIS — M85.852 OSTEOPENIA OF NECK OF LEFT FEMUR: ICD-10-CM

## 2023-06-02 DIAGNOSIS — I10 HYPERTENSION, ESSENTIAL, BENIGN: ICD-10-CM

## 2023-06-02 DIAGNOSIS — M25.511 CHRONIC RIGHT SHOULDER PAIN: ICD-10-CM

## 2023-06-02 DIAGNOSIS — E03.9 PRIMARY HYPOTHYROIDISM: ICD-10-CM

## 2023-06-02 DIAGNOSIS — H43.391 VITREOUS FLOATERS OF RIGHT EYE: ICD-10-CM

## 2023-06-02 DIAGNOSIS — L29.9 PRURITUS: ICD-10-CM

## 2023-06-02 DIAGNOSIS — I50.23 ACUTE ON CHRONIC SYSTOLIC CONGESTIVE HEART FAILURE (HCC): ICD-10-CM

## 2023-06-02 DIAGNOSIS — E11.22 TYPE 2 DIABETES MELLITUS WITH STAGE 3A CHRONIC KIDNEY DISEASE, WITHOUT LONG-TERM CURRENT USE OF INSULIN (HCC): ICD-10-CM

## 2023-06-02 DIAGNOSIS — M48.061 SPINAL STENOSIS AT L4-L5 LEVEL: ICD-10-CM

## 2023-06-02 DIAGNOSIS — E78.6 LOW HDL (UNDER 40): ICD-10-CM

## 2023-06-02 DIAGNOSIS — R82.998 HYPERURICURIA: ICD-10-CM

## 2023-06-02 DIAGNOSIS — F32.A ANXIETY AND DEPRESSION: ICD-10-CM

## 2023-06-02 DIAGNOSIS — E66.01 CLASS 3 SEVERE OBESITY DUE TO EXCESS CALORIES WITH SERIOUS COMORBIDITY AND BODY MASS INDEX (BMI) OF 40.0 TO 44.9 IN ADULT (HCC): ICD-10-CM

## 2023-06-02 DIAGNOSIS — Z97.3 WEARS GLASSES: ICD-10-CM

## 2023-06-02 DIAGNOSIS — Z86.718 HISTORY OF DVT (DEEP VEIN THROMBOSIS): ICD-10-CM

## 2023-06-02 DIAGNOSIS — F41.9 ANXIETY: ICD-10-CM

## 2023-06-02 DIAGNOSIS — E78.00 HYPERCHOLESTEROLEMIA: ICD-10-CM

## 2023-06-02 DIAGNOSIS — F41.9 ANXIETY AND DEPRESSION: ICD-10-CM

## 2023-06-02 DIAGNOSIS — G89.29 CHRONIC RIGHT SHOULDER PAIN: ICD-10-CM

## 2023-06-02 DIAGNOSIS — N18.31 TYPE 2 DIABETES MELLITUS WITH STAGE 3A CHRONIC KIDNEY DISEASE, WITHOUT LONG-TERM CURRENT USE OF INSULIN (HCC): ICD-10-CM

## 2023-06-02 DIAGNOSIS — K80.20 GALLSTONES: ICD-10-CM

## 2023-06-02 DIAGNOSIS — I48.21 PERMANENT ATRIAL FIBRILLATION (HCC): ICD-10-CM

## 2023-06-02 DIAGNOSIS — M16.11 PRIMARY OSTEOARTHRITIS OF RIGHT HIP: ICD-10-CM

## 2023-06-02 DIAGNOSIS — Z86.73 HISTORY OF TIA (TRANSIENT ISCHEMIC ATTACK): ICD-10-CM

## 2023-06-02 DIAGNOSIS — Z86.711 HISTORY OF PULMONARY EMBOLUS (PE): ICD-10-CM

## 2023-06-02 DIAGNOSIS — J30.9 ALLERGIC RHINITIS, UNSPECIFIED SEASONALITY, UNSPECIFIED TRIGGER: ICD-10-CM

## 2023-06-02 DIAGNOSIS — I89.0 LYMPHEDEMA: ICD-10-CM

## 2023-06-02 DIAGNOSIS — M75.111 NONTRAUMATIC INCOMPLETE TEAR OF RIGHT ROTATOR CUFF: ICD-10-CM

## 2023-06-02 DIAGNOSIS — Z00.00 ENCOUNTER FOR MEDICARE ANNUAL WELLNESS EXAM: Primary | ICD-10-CM

## 2023-06-02 DIAGNOSIS — R54 SENILE TREMOR: ICD-10-CM

## 2023-06-02 DIAGNOSIS — G47.33 OSA (OBSTRUCTIVE SLEEP APNEA): ICD-10-CM

## 2023-06-02 DIAGNOSIS — Z78.0 POSTMENOPAUSAL: ICD-10-CM

## 2023-06-02 DIAGNOSIS — N18.31 CHRONIC RENAL IMPAIRMENT, STAGE 3A (HCC): ICD-10-CM

## 2023-06-02 PROBLEM — M81.0 AGE-RELATED OSTEOPOROSIS WITHOUT CURRENT PATHOLOGICAL FRACTURE: Status: ACTIVE | Noted: 2019-05-30

## 2023-06-02 PROBLEM — G45.9 TIA (TRANSIENT ISCHEMIC ATTACK): Status: ACTIVE | Noted: 2022-11-18

## 2023-06-02 PROBLEM — N95.0 POSTMENOPAUSAL BLEEDING: Status: ACTIVE | Noted: 2023-06-02

## 2023-06-02 PROBLEM — G45.9 TIA (TRANSIENT ISCHEMIC ATTACK): Status: RESOLVED | Noted: 2022-11-18 | Resolved: 2023-06-02

## 2023-06-02 PROBLEM — M48.062 SPINAL STENOSIS OF LUMBAR REGION WITH NEUROGENIC CLAUDICATION: Status: ACTIVE | Noted: 2019-11-14

## 2023-06-02 PROBLEM — E66.9 OBESITY: Status: ACTIVE | Noted: 2018-03-16

## 2023-06-02 PROBLEM — J98.01 ACUTE BRONCHOSPASM: Status: RESOLVED | Noted: 2023-04-22 | Resolved: 2023-06-02

## 2023-06-02 PROBLEM — E11.9 TYPE 2 DIABETES MELLITUS (HCC): Status: ACTIVE | Noted: 2019-10-21

## 2023-06-02 PROBLEM — N18.30 CHRONIC RENAL INSUFFICIENCY, STAGE III (MODERATE) (HCC): Status: ACTIVE | Noted: 2018-05-29

## 2023-06-02 PROBLEM — E11.9 DIET-CONTROLLED DIABETES MELLITUS (HCC): Status: ACTIVE | Noted: 2019-10-21

## 2023-06-02 PROBLEM — M51.369 LUMBAR DEGENERATIVE DISC DISEASE: Status: ACTIVE | Noted: 2021-08-20

## 2023-06-02 PROCEDURE — 1126F AMNT PAIN NOTED NONE PRSNT: CPT | Performed by: STUDENT IN AN ORGANIZED HEALTH CARE EDUCATION/TRAINING PROGRAM

## 2023-06-02 PROCEDURE — 1160F RVW MEDS BY RX/DR IN RCRD: CPT | Performed by: STUDENT IN AN ORGANIZED HEALTH CARE EDUCATION/TRAINING PROGRAM

## 2023-06-02 PROCEDURE — 3008F BODY MASS INDEX DOCD: CPT | Performed by: STUDENT IN AN ORGANIZED HEALTH CARE EDUCATION/TRAINING PROGRAM

## 2023-06-02 PROCEDURE — 3074F SYST BP LT 130 MM HG: CPT | Performed by: STUDENT IN AN ORGANIZED HEALTH CARE EDUCATION/TRAINING PROGRAM

## 2023-06-02 PROCEDURE — 1170F FXNL STATUS ASSESSED: CPT | Performed by: STUDENT IN AN ORGANIZED HEALTH CARE EDUCATION/TRAINING PROGRAM

## 2023-06-02 PROCEDURE — 1159F MED LIST DOCD IN RCRD: CPT | Performed by: STUDENT IN AN ORGANIZED HEALTH CARE EDUCATION/TRAINING PROGRAM

## 2023-06-02 PROCEDURE — 3078F DIAST BP <80 MM HG: CPT | Performed by: STUDENT IN AN ORGANIZED HEALTH CARE EDUCATION/TRAINING PROGRAM

## 2023-06-02 PROCEDURE — 99214 OFFICE O/P EST MOD 30 MIN: CPT | Performed by: STUDENT IN AN ORGANIZED HEALTH CARE EDUCATION/TRAINING PROGRAM

## 2023-06-02 PROCEDURE — 96160 PT-FOCUSED HLTH RISK ASSMT: CPT | Performed by: STUDENT IN AN ORGANIZED HEALTH CARE EDUCATION/TRAINING PROGRAM

## 2023-06-02 PROCEDURE — 1111F DSCHRG MED/CURRENT MED MERGE: CPT | Performed by: STUDENT IN AN ORGANIZED HEALTH CARE EDUCATION/TRAINING PROGRAM

## 2023-06-02 PROCEDURE — G0439 PPPS, SUBSEQ VISIT: HCPCS | Performed by: STUDENT IN AN ORGANIZED HEALTH CARE EDUCATION/TRAINING PROGRAM

## 2023-06-02 RX ORDER — ALENDRONATE SODIUM 35 MG/1
35 TABLET ORAL
Qty: 25 TABLET | Refills: 1 | Status: SHIPPED | OUTPATIENT
Start: 2023-06-02

## 2023-06-02 RX ORDER — DULAGLUTIDE 0.75 MG/.5ML
0.75 INJECTION, SOLUTION SUBCUTANEOUS WEEKLY
Qty: 2 ML | Refills: 1 | Status: SHIPPED | OUTPATIENT
Start: 2023-06-02

## 2023-06-02 RX ORDER — CETIRIZINE HYDROCHLORIDE 10 MG/1
10 TABLET ORAL DAILY
COMMUNITY

## 2023-06-02 RX ORDER — MELATONIN
1 AS DIRECTED
COMMUNITY

## 2023-06-02 RX ORDER — INSULIN GLARGINE 100 [IU]/ML
100 INJECTION, SOLUTION SUBCUTANEOUS 2 TIMES DAILY
COMMUNITY
Start: 2023-05-26 | End: 2023-06-02

## 2023-06-02 RX ORDER — INSULIN GLARGINE 100 [IU]/ML
15 INJECTION, SOLUTION SUBCUTANEOUS 2 TIMES DAILY
Qty: 15 ML | Refills: 0 | Status: SHIPPED | OUTPATIENT
Start: 2023-06-02

## 2023-06-02 RX ORDER — BLOOD SUGAR DIAGNOSTIC
STRIP MISCELLANEOUS
COMMUNITY
Start: 2021-01-19

## 2023-06-05 ENCOUNTER — TELEPHONE (OUTPATIENT)
Dept: FAMILY MEDICINE CLINIC | Facility: CLINIC | Age: 83
End: 2023-06-05

## 2023-06-05 NOTE — TELEPHONE ENCOUNTER
Pt daughter calling to speak with doctor nurse. Her mom have  Congestive heart failure. She's, lethargic, short of breathe and have  fluid in legs. Daughter is concerned and want to see if she need a higher dosage of medication and will like for her be looked at.

## 2023-06-05 NOTE — TELEPHONE ENCOUNTER
If legs are swollen she can take an additional dose of lasix but only if BP is >110/50. To ER if lethargy, confusion, shortness of breath occurs.

## 2023-06-05 NOTE — TELEPHONE ENCOUNTER
Called daughter back. Per dtr, pt was lethargic and SOB earlier, legs a little bit swollen. Asked if they are getting pt's weight daily, per dtr, their weighing scale is broken. Educated on importance of taking the pt's weight daily. Pt is more alert now, she's conversant, alert/oriented x3, and currently \"paying her bills\". Pulse ox at home 93% on room air. Not short of breath. BP today= 134/95, pulse=74. Cardiology appointment scheduled on 6/9/23. Took Furosemide 20 mg today and takes it once a day. Daughter wondering if Dr. Lesley Gonzalez can she the pt and adjust her medications. Instructed daughter to call 911 or bring the pt to the nearest ER if symptoms persist. Daughter understood teachings.

## 2023-06-05 NOTE — TELEPHONE ENCOUNTER
S/w Cherdina/ daughter. On HIPAA consent. Advised her that Joni may take an additional dose of lasix if legs are swollen, but only if BP is >110/50. Reinforced to go to the ER if lethargy, confusion, shortness of breath occurs. Charito verbalized understanding and is agreeable to plan. No further questions at this time.

## 2023-06-09 ENCOUNTER — OFFICE VISIT (OUTPATIENT)
Dept: FAMILY MEDICINE CLINIC | Facility: CLINIC | Age: 83
End: 2023-06-09
Payer: MEDICARE

## 2023-06-09 VITALS
TEMPERATURE: 98 F | DIASTOLIC BLOOD PRESSURE: 84 MMHG | HEART RATE: 72 BPM | HEIGHT: 63 IN | BODY MASS INDEX: 40.33 KG/M2 | RESPIRATION RATE: 16 BRPM | WEIGHT: 227.63 LBS | SYSTOLIC BLOOD PRESSURE: 128 MMHG

## 2023-06-09 DIAGNOSIS — I50.22 CHRONIC SYSTOLIC CONGESTIVE HEART FAILURE (HCC): ICD-10-CM

## 2023-06-09 DIAGNOSIS — N18.31 CHRONIC RENAL IMPAIRMENT, STAGE 3A (HCC): ICD-10-CM

## 2023-06-09 DIAGNOSIS — Z02.89 ENCOUNTER FOR COMPLETION OF FORM WITH PATIENT: ICD-10-CM

## 2023-06-09 DIAGNOSIS — M85.852 OSTEOPENIA OF NECK OF LEFT FEMUR: ICD-10-CM

## 2023-06-09 DIAGNOSIS — M16.11 PRIMARY OSTEOARTHRITIS OF RIGHT HIP: ICD-10-CM

## 2023-06-09 DIAGNOSIS — Z71.89 ADVANCED DIRECTIVES, COUNSELING/DISCUSSION: Primary | ICD-10-CM

## 2023-06-09 PROCEDURE — 3008F BODY MASS INDEX DOCD: CPT | Performed by: STUDENT IN AN ORGANIZED HEALTH CARE EDUCATION/TRAINING PROGRAM

## 2023-06-09 PROCEDURE — 3079F DIAST BP 80-89 MM HG: CPT | Performed by: STUDENT IN AN ORGANIZED HEALTH CARE EDUCATION/TRAINING PROGRAM

## 2023-06-09 PROCEDURE — 99497 ADVNCD CARE PLAN 30 MIN: CPT | Performed by: STUDENT IN AN ORGANIZED HEALTH CARE EDUCATION/TRAINING PROGRAM

## 2023-06-09 PROCEDURE — 3074F SYST BP LT 130 MM HG: CPT | Performed by: STUDENT IN AN ORGANIZED HEALTH CARE EDUCATION/TRAINING PROGRAM

## 2023-06-09 PROCEDURE — 99213 OFFICE O/P EST LOW 20 MIN: CPT | Performed by: STUDENT IN AN ORGANIZED HEALTH CARE EDUCATION/TRAINING PROGRAM

## 2023-06-09 PROCEDURE — 1111F DSCHRG MED/CURRENT MED MERGE: CPT | Performed by: STUDENT IN AN ORGANIZED HEALTH CARE EDUCATION/TRAINING PROGRAM

## 2023-06-09 PROCEDURE — 1160F RVW MEDS BY RX/DR IN RCRD: CPT | Performed by: STUDENT IN AN ORGANIZED HEALTH CARE EDUCATION/TRAINING PROGRAM

## 2023-06-09 PROCEDURE — 1159F MED LIST DOCD IN RCRD: CPT | Performed by: STUDENT IN AN ORGANIZED HEALTH CARE EDUCATION/TRAINING PROGRAM

## 2023-06-09 RX ORDER — SPIRONOLACTONE 25 MG/1
25 TABLET ORAL DAILY
COMMUNITY
Start: 2023-06-07

## 2023-06-11 PROBLEM — M85.852 OSTEOPENIA OF NECK OF LEFT FEMUR: Status: ACTIVE | Noted: 2023-06-11

## 2023-06-17 ENCOUNTER — APPOINTMENT (OUTPATIENT)
Dept: GENERAL RADIOLOGY | Facility: HOSPITAL | Age: 83
End: 2023-06-17
Payer: MEDICARE

## 2023-06-17 ENCOUNTER — HOSPITAL ENCOUNTER (INPATIENT)
Facility: HOSPITAL | Age: 83
LOS: 2 days | Discharge: HOME | End: 2023-06-19
Attending: EMERGENCY MEDICINE | Admitting: HOSPITALIST
Payer: MEDICARE

## 2023-06-17 DIAGNOSIS — R06.00 DYSPNEA, UNSPECIFIED TYPE: ICD-10-CM

## 2023-06-17 DIAGNOSIS — I50.9 ACUTE ON CHRONIC CONGESTIVE HEART FAILURE, UNSPECIFIED HEART FAILURE TYPE (HCC): Primary | ICD-10-CM

## 2023-06-17 LAB
ALBUMIN SERPL-MCNC: 3.3 G/DL (ref 3.4–5)
ALBUMIN/GLOB SERPL: 0.9 {RATIO} (ref 1–2)
ALP LIVER SERPL-CCNC: 106 U/L
ALT SERPL-CCNC: 17 U/L
ANION GAP SERPL CALC-SCNC: 4 MMOL/L (ref 0–18)
AST SERPL-CCNC: 23 U/L (ref 15–37)
BASOPHILS # BLD AUTO: 0.06 X10(3) UL (ref 0–0.2)
BASOPHILS NFR BLD AUTO: 0.8 %
BILIRUB SERPL-MCNC: 1 MG/DL (ref 0.1–2)
BUN BLD-MCNC: 18 MG/DL (ref 7–18)
CALCIUM BLD-MCNC: 9.8 MG/DL (ref 8.5–10.1)
CHLORIDE SERPL-SCNC: 113 MMOL/L (ref 98–112)
CO2 SERPL-SCNC: 27 MMOL/L (ref 21–32)
CREAT BLD-MCNC: 1.17 MG/DL
D DIMER PPP FEU-MCNC: 0.36 UG/ML FEU (ref ?–0.8)
EOSINOPHIL # BLD AUTO: 0.24 X10(3) UL (ref 0–0.7)
EOSINOPHIL NFR BLD AUTO: 3.1 %
ERYTHROCYTE [DISTWIDTH] IN BLOOD BY AUTOMATED COUNT: 13.6 %
FLUAV + FLUBV RNA SPEC NAA+PROBE: NEGATIVE
FLUAV + FLUBV RNA SPEC NAA+PROBE: NEGATIVE
GFR SERPLBLD BASED ON 1.73 SQ M-ARVRAT: 47 ML/MIN/1.73M2 (ref 60–?)
GLOBULIN PLAS-MCNC: 3.6 G/DL (ref 2.8–4.4)
GLUCOSE BLD-MCNC: 107 MG/DL (ref 70–99)
GLUCOSE BLD-MCNC: 82 MG/DL (ref 70–99)
GLUCOSE BLD-MCNC: 94 MG/DL (ref 70–99)
HCT VFR BLD AUTO: 37.7 %
HGB BLD-MCNC: 12.2 G/DL
IMM GRANULOCYTES # BLD AUTO: 0.02 X10(3) UL (ref 0–1)
IMM GRANULOCYTES NFR BLD: 0.3 %
LYMPHOCYTES # BLD AUTO: 1.61 X10(3) UL (ref 1–4)
LYMPHOCYTES NFR BLD AUTO: 21.1 %
MCH RBC QN AUTO: 30.3 PG (ref 26–34)
MCHC RBC AUTO-ENTMCNC: 32.4 G/DL (ref 31–37)
MCV RBC AUTO: 93.8 FL
MONOCYTES # BLD AUTO: 0.51 X10(3) UL (ref 0.1–1)
MONOCYTES NFR BLD AUTO: 6.7 %
NEUTROPHILS # BLD AUTO: 5.2 X10 (3) UL (ref 1.5–7.7)
NEUTROPHILS # BLD AUTO: 5.2 X10(3) UL (ref 1.5–7.7)
NEUTROPHILS NFR BLD AUTO: 68 %
NT-PROBNP SERPL-MCNC: 2542 PG/ML (ref ?–450)
OSMOLALITY SERPL CALC.SUM OF ELEC: 300 MOSM/KG (ref 275–295)
PLATELET # BLD AUTO: 171 10(3)UL (ref 150–450)
POTASSIUM SERPL-SCNC: 3.7 MMOL/L (ref 3.5–5.1)
PROT SERPL-MCNC: 6.9 G/DL (ref 6.4–8.2)
Q-T INTERVAL: 374 MS
QRS DURATION: 98 MS
QTC CALCULATION (BEZET): 439 MS
R AXIS: 45 DEGREES
RBC # BLD AUTO: 4.02 X10(6)UL
RSV RNA SPEC NAA+PROBE: NEGATIVE
SARS-COV-2 RNA RESP QL NAA+PROBE: NOT DETECTED
SODIUM SERPL-SCNC: 144 MMOL/L (ref 136–145)
T AXIS: 15 DEGREES
TROPONIN I HIGH SENSITIVITY: 23 NG/L
VENTRICULAR RATE: 83 BPM
WBC # BLD AUTO: 7.6 X10(3) UL (ref 4–11)

## 2023-06-17 PROCEDURE — 99222 1ST HOSP IP/OBS MODERATE 55: CPT | Performed by: STUDENT IN AN ORGANIZED HEALTH CARE EDUCATION/TRAINING PROGRAM

## 2023-06-17 PROCEDURE — 71045 X-RAY EXAM CHEST 1 VIEW: CPT

## 2023-06-17 RX ORDER — SENNOSIDES 8.6 MG
17.2 TABLET ORAL NIGHTLY PRN
Status: DISCONTINUED | OUTPATIENT
Start: 2023-06-17 | End: 2023-06-19

## 2023-06-17 RX ORDER — FUROSEMIDE 10 MG/ML
40 INJECTION INTRAMUSCULAR; INTRAVENOUS
Status: DISCONTINUED | OUTPATIENT
Start: 2023-06-17 | End: 2023-06-19

## 2023-06-17 RX ORDER — CETIRIZINE HYDROCHLORIDE 5 MG/1
5 TABLET ORAL DAILY
Status: DISCONTINUED | OUTPATIENT
Start: 2023-06-17 | End: 2023-06-19

## 2023-06-17 RX ORDER — METOCLOPRAMIDE HYDROCHLORIDE 5 MG/ML
5 INJECTION INTRAMUSCULAR; INTRAVENOUS EVERY 8 HOURS PRN
Status: DISCONTINUED | OUTPATIENT
Start: 2023-06-17 | End: 2023-06-19

## 2023-06-17 RX ORDER — ESCITALOPRAM OXALATE 5 MG/1
5 TABLET ORAL DAILY
Status: DISCONTINUED | OUTPATIENT
Start: 2023-06-18 | End: 2023-06-19

## 2023-06-17 RX ORDER — CARVEDILOL 12.5 MG/1
12.5 TABLET ORAL 2 TIMES DAILY WITH MEALS
Status: DISCONTINUED | OUTPATIENT
Start: 2023-06-17 | End: 2023-06-19

## 2023-06-17 RX ORDER — GABAPENTIN 300 MG/1
300 CAPSULE ORAL 3 TIMES DAILY
Status: DISCONTINUED | OUTPATIENT
Start: 2023-06-17 | End: 2023-06-19

## 2023-06-17 RX ORDER — FUROSEMIDE 10 MG/ML
40 INJECTION INTRAMUSCULAR; INTRAVENOUS ONCE
Status: COMPLETED | OUTPATIENT
Start: 2023-06-17 | End: 2023-06-17

## 2023-06-17 RX ORDER — MELATONIN
1000 DAILY
Status: DISCONTINUED | OUTPATIENT
Start: 2023-06-18 | End: 2023-06-19

## 2023-06-17 RX ORDER — ATORVASTATIN CALCIUM 20 MG/1
20 TABLET, FILM COATED ORAL NIGHTLY
Status: DISCONTINUED | OUTPATIENT
Start: 2023-06-17 | End: 2023-06-19

## 2023-06-17 RX ORDER — POLYETHYLENE GLYCOL 3350 17 G/17G
17 POWDER, FOR SOLUTION ORAL DAILY PRN
Status: DISCONTINUED | OUTPATIENT
Start: 2023-06-17 | End: 2023-06-19

## 2023-06-17 RX ORDER — MELATONIN
3 NIGHTLY PRN
Status: DISCONTINUED | OUTPATIENT
Start: 2023-06-17 | End: 2023-06-19

## 2023-06-17 RX ORDER — CETIRIZINE HYDROCHLORIDE 10 MG/1
10 TABLET ORAL DAILY
Status: DISCONTINUED | OUTPATIENT
Start: 2023-06-17 | End: 2023-06-17 | Stop reason: DRUGHIGH

## 2023-06-17 RX ORDER — BISACODYL 10 MG
10 SUPPOSITORY, RECTAL RECTAL
Status: DISCONTINUED | OUTPATIENT
Start: 2023-06-17 | End: 2023-06-19

## 2023-06-17 RX ORDER — ECHINACEA PURPUREA EXTRACT 125 MG
1 TABLET ORAL
Status: DISCONTINUED | OUTPATIENT
Start: 2023-06-17 | End: 2023-06-19

## 2023-06-17 RX ORDER — ACETAMINOPHEN 500 MG
500 TABLET ORAL EVERY 4 HOURS PRN
Status: DISCONTINUED | OUTPATIENT
Start: 2023-06-17 | End: 2023-06-19

## 2023-06-17 RX ORDER — LEVOTHYROXINE SODIUM 0.07 MG/1
75 TABLET ORAL
Status: DISCONTINUED | OUTPATIENT
Start: 2023-06-18 | End: 2023-06-19

## 2023-06-17 RX ORDER — ENEMA 19; 7 G/133ML; G/133ML
1 ENEMA RECTAL ONCE AS NEEDED
Status: DISCONTINUED | OUTPATIENT
Start: 2023-06-17 | End: 2023-06-19

## 2023-06-17 RX ORDER — LOSARTAN POTASSIUM 50 MG/1
50 TABLET ORAL DAILY
Status: DISCONTINUED | OUTPATIENT
Start: 2023-06-17 | End: 2023-06-19

## 2023-06-17 RX ORDER — ONDANSETRON 2 MG/ML
4 INJECTION INTRAMUSCULAR; INTRAVENOUS EVERY 6 HOURS PRN
Status: DISCONTINUED | OUTPATIENT
Start: 2023-06-17 | End: 2023-06-19

## 2023-06-17 RX ORDER — SPIRONOLACTONE 25 MG/1
25 TABLET ORAL DAILY
Status: DISCONTINUED | OUTPATIENT
Start: 2023-06-17 | End: 2023-06-18

## 2023-06-17 NOTE — PROGRESS NOTES
NURSING ADMISSION NOTE      Patient admitted via Cart  Oriented to room. Safety precautions initiated. Bed in low position. Call light in reach. Pt. Alert and oriented times 4. 2L of oxygen. Afib on tele. Pt. Denies chest pain. Some shortness of breath with ambulation.  Pt. Is standby assist.

## 2023-06-17 NOTE — CONSULTS
[facility] Note:  Renal Dose Adjustment for Cetirizine (ZYRTEC)    Joni Christiansen has been prescribed Cetirizine (Zyrtec) 10 mg orally daily. Estimated Creatinine Clearance: 30.3 mL/min (A) (based on SCr of 1.17 mg/dL (H)). The dose has been changed to 5 mg orally daily per P&T approved protocol. Pharmacy will follow and resume original order if renal function improves.       Thank you,  Anita Martinez, PharmD  6/17/2023  2:53 PM  [address] [place] [address]: [phone]

## 2023-06-17 NOTE — ED QUICK NOTES
Assisted pt on bedside camode, provided pt with 2 more warm blankets for comfort. Pt back in bed, on monitor, call light within reach and family member at bedside. Patient is mostly isolated and withdrawn to her room much of the shift. She was out of her room for meals, and phone calls. Patient is taking her medication and she denies side effects. Her insight is limited, affect is somewhat blunted, mood appears sad and depressed. She denies SI/SIB.

## 2023-06-17 NOTE — ED QUICK NOTES
Orders for admission, patient is aware of plan and ready to go upstairs. Any questions, please call ED RN Roly Barreto at extension [phone].      Patient Covid vaccination status: Fully vaccinated     COVID Test Ordered in ED: SARS-CoV-2/Flu A and B/RSV by PCR ([facility]pert)    COVID Suspicion at Admission: N/A    Running Infusions:  None    Mental Status/LOC at time of transport: Alert     Other pertinent information:   CIWA score: N/A   NIH score:  N/A

## 2023-06-17 NOTE — ED INITIAL ASSESSMENT (HPI)
PT states that she began to feel \"short of breath\", unable to complete a sentence, chest pain this morning prior to arrival. PT states that she is currently being evaluated for possible CHF by a cardiologist but he gave her a referral for a pulmonology consult.

## 2023-06-18 LAB
ANION GAP SERPL CALC-SCNC: 3 MMOL/L (ref 0–18)
BUN BLD-MCNC: 18 MG/DL (ref 7–18)
CALCIUM BLD-MCNC: 10.2 MG/DL (ref 8.5–10.1)
CHLORIDE SERPL-SCNC: 109 MMOL/L (ref 98–112)
CO2 SERPL-SCNC: 31 MMOL/L (ref 21–32)
CREAT BLD-MCNC: 1 MG/DL
GFR SERPLBLD BASED ON 1.73 SQ M-ARVRAT: 57 ML/MIN/1.73M2 (ref 60–?)
GLUCOSE BLD-MCNC: 105 MG/DL (ref 70–99)
GLUCOSE BLD-MCNC: 106 MG/DL (ref 70–99)
GLUCOSE BLD-MCNC: 85 MG/DL (ref 70–99)
GLUCOSE BLD-MCNC: 89 MG/DL (ref 70–99)
GLUCOSE BLD-MCNC: 96 MG/DL (ref 70–99)
OSMOLALITY SERPL CALC.SUM OF ELEC: 298 MOSM/KG (ref 275–295)
POTASSIUM SERPL-SCNC: 3.2 MMOL/L (ref 3.5–5.1)
POTASSIUM SERPL-SCNC: 4.4 MMOL/L (ref 3.5–5.1)
SODIUM SERPL-SCNC: 143 MMOL/L (ref 136–145)

## 2023-06-18 PROCEDURE — 99232 SBSQ HOSP IP/OBS MODERATE 35: CPT | Performed by: STUDENT IN AN ORGANIZED HEALTH CARE EDUCATION/TRAINING PROGRAM

## 2023-06-18 RX ORDER — SPIRONOLACTONE 25 MG/1
50 TABLET ORAL DAILY
Status: DISCONTINUED | OUTPATIENT
Start: 2023-06-19 | End: 2023-06-19

## 2023-06-18 RX ORDER — POTASSIUM CHLORIDE 20 MEQ/1
40 TABLET, EXTENDED RELEASE ORAL EVERY 4 HOURS
Status: COMPLETED | OUTPATIENT
Start: 2023-06-18 | End: 2023-06-18

## 2023-06-18 RX ORDER — SPIRONOLACTONE 25 MG/1
12.5 TABLET ORAL DAILY
COMMUNITY

## 2023-06-18 NOTE — PLAN OF CARE
Patient is alert and oriented x 4. Maintaining O2 saturation WNL on 2L/min vias NC. Afib on tele monitor HR controlled. Patient ambulating with stand by assist. No complains of pain. Safety precautions in place, call light within reach, bed alarm on. Will continue to monitor.      Problem: Patient/Family Goals  Goal: Patient/Family Long Term Goal  Description: Patient's Long Term Goal: Stay out of hospital     Interventions:  - Medications and follow up appointments  - Lifestyle changes   - See additional Care Plan goals for specific interventions  Outcome: Progressing  Goal: Patient/Family Short Term Goal  Description: Patient's Short Term Goal: go home     Interventions:   - Medications   - See additional Care Plan goals for specific interventions  Outcome: Progressing     Problem: Diabetes/Glucose Control  Goal: Glucose maintained within prescribed range  Description: INTERVENTIONS:  - Monitor Blood Glucose as ordered  - Assess for signs and symptoms of hyperglycemia and hypoglycemia  - Administer ordered medications to maintain glucose within target range  - Assess barriers to adequate nutritional intake and initiate nutrition consult as needed  - Instruct patient on self management of diabetes  Outcome: Progressing

## 2023-06-18 NOTE — PROGRESS NOTES
Feels better today    Good diuresis overnight    Afebrile  129/82  70    Lungs clear  Ht irreg  abd soft, obese  Ext - no real edema  Neuro intact    K+ 3.2      A/P: Acute exacerbation of chronic HFpEF    Replace K+    Ongoing medication adjustment as required to control blood pressure    Would appear prudent to keep at least another 24 hours to assure stability given recent readmission    CPAP therapy would really help if she is willing to try again      L2

## 2023-06-18 NOTE — PLAN OF CARE
Assumed care of patient 0730 resting in bed. AO x4. Afib on tele monitor. O2 sat adequate on 2L nasal cannula. Attempted to wean to RA, but desatted to low 80s when sleeping. Denies chest pain and shortness of breath. Plan of care updated. Bed locked in lowest position. Call light and personal items in reach. All needs met.     Problem: Diabetes/Glucose Control  Goal: Glucose maintained within prescribed range  Description: INTERVENTIONS:  - Monitor Blood Glucose as ordered  - Assess for signs and symptoms of hyperglycemia and hypoglycemia  - Administer ordered medications to maintain glucose within target range  - Assess barriers to adequate nutritional intake and initiate nutrition consult as needed  - Instruct patient on self management of diabetes  Outcome: Progressing     Problem: Patient/Family Goals  Goal: Patient/Family Long Term Goal  Description: Patient's Long Term Goal: Stay out of hospital     Interventions:  - Medications and follow up appointments  - Lifestyle changes   - See additional Care Plan goals for specific interventions  Outcome: Progressing  Goal: Patient/Family Short Term Goal  Description: Patient's Short Term Goal: go home     Interventions:   - Medications   - See additional Care Plan goals for specific interventions  Outcome: Progressing

## 2023-06-19 VITALS
HEART RATE: 77 BPM | SYSTOLIC BLOOD PRESSURE: 98 MMHG | OXYGEN SATURATION: 88 % | RESPIRATION RATE: 18 BRPM | WEIGHT: 219 LBS | DIASTOLIC BLOOD PRESSURE: 54 MMHG | TEMPERATURE: 98 F | HEIGHT: 62 IN | BODY MASS INDEX: 40.3 KG/M2

## 2023-06-19 LAB
ANION GAP SERPL CALC-SCNC: 4 MMOL/L (ref 0–18)
BUN BLD-MCNC: 30 MG/DL (ref 7–18)
CALCIUM BLD-MCNC: 10.2 MG/DL (ref 8.5–10.1)
CHLORIDE SERPL-SCNC: 107 MMOL/L (ref 98–112)
CO2 SERPL-SCNC: 27 MMOL/L (ref 21–32)
CREAT BLD-MCNC: 1.22 MG/DL
GFR SERPLBLD BASED ON 1.73 SQ M-ARVRAT: 45 ML/MIN/1.73M2 (ref 60–?)
GLUCOSE BLD-MCNC: 112 MG/DL (ref 70–99)
GLUCOSE BLD-MCNC: 89 MG/DL (ref 70–99)
GLUCOSE BLD-MCNC: 91 MG/DL (ref 70–99)
OSMOLALITY SERPL CALC.SUM OF ELEC: 292 MOSM/KG (ref 275–295)
POTASSIUM SERPL-SCNC: 4 MMOL/L (ref 3.5–5.1)
SODIUM SERPL-SCNC: 138 MMOL/L (ref 136–145)

## 2023-06-19 PROCEDURE — 99239 HOSP IP/OBS DSCHRG MGMT >30: CPT | Performed by: STUDENT IN AN ORGANIZED HEALTH CARE EDUCATION/TRAINING PROGRAM

## 2023-06-19 RX ORDER — TORSEMIDE 20 MG/1
20 TABLET ORAL DAILY
Qty: 30 TABLET | Refills: 3 | Status: ON HOLD | OUTPATIENT
Start: 2023-06-20 | End: 2023-06-23

## 2023-06-19 RX ORDER — TORSEMIDE 20 MG/1
20 TABLET ORAL DAILY
Status: DISCONTINUED | OUTPATIENT
Start: 2023-06-20 | End: 2023-06-19

## 2023-06-19 RX ORDER — TORSEMIDE 20 MG/1
20 TABLET ORAL DAILY
Status: DISCONTINUED | OUTPATIENT
Start: 2023-06-19 | End: 2023-06-19

## 2023-06-19 RX ORDER — CARVEDILOL 12.5 MG/1
12.5 TABLET ORAL 2 TIMES DAILY WITH MEALS
Qty: 30 TABLET | Refills: 3 | Status: SHIPPED | OUTPATIENT
Start: 2023-06-19 | End: 2023-06-19

## 2023-06-19 RX ORDER — SPIRONOLACTONE 25 MG/1
25 TABLET ORAL DAILY
Status: DISCONTINUED | OUTPATIENT
Start: 2023-06-20 | End: 2023-06-19

## 2023-06-19 RX ORDER — CARVEDILOL 6.25 MG/1
6.25 TABLET ORAL 2 TIMES DAILY WITH MEALS
Status: DISCONTINUED | OUTPATIENT
Start: 2023-06-19 | End: 2023-06-19

## 2023-06-19 NOTE — PLAN OF CARE
Assumed care of pt at [address] [place] [address]. Pt A&Ox 4. On 1-2L NC; weaning as tolerated. A-fib on tele; no c/o cardiac symptoms. Pt denies chest pain or discomfort; seems to be resting comfortably at this time. Pt up with SBA, tolerating well. Plan of care reviewed with pt; all questions answered at this time. Bed in lowest position; call light within reach. High fall risk precautions are in place per unit protocol. Problem: Diabetes/Glucose Control  Goal: Glucose maintained within prescribed range  Description: INTERVENTIONS:  - Monitor Blood Glucose as ordered  - Assess for signs and symptoms of hyperglycemia and hypoglycemia  - Administer ordered medications to maintain glucose within target range  - Assess barriers to adequate nutritional intake and initiate nutrition consult as needed  - Instruct patient on self management of diabetes  Outcome: Progressing     Problem: CARDIOVASCULAR - ADULT  Goal: Maintains optimal cardiac output and hemodynamic stability  Description: INTERVENTIONS:  - Monitor vital signs, rhythm, and trends  - Monitor for bleeding, hypotension and signs of decreased cardiac output  - Evaluate effectiveness of vasoactive medications to optimize hemodynamic stability  - Monitor arterial and/or venous puncture sites for bleeding and/or hematoma  - Assess quality of pulses, skin color and temperature  - Assess for signs of decreased coronary artery perfusion - ex.  Angina  - Evaluate fluid balance, assess for edema, trend weights  Outcome: Progressing  Goal: Absence of cardiac arrhythmias or at baseline  Description: INTERVENTIONS:  - Continuous cardiac monitoring, monitor vital signs, obtain 12 lead EKG if indicated  - Evaluate effectiveness of antiarrhythmic and heart rate control medications as ordered  - Initiate emergency measures for life threatening arrhythmias  - Monitor electrolytes and administer replacement therapy as ordered  Outcome: Progressing     Problem: RESPIRATORY - ADULT  Goal: Achieves optimal ventilation and oxygenation  Description: INTERVENTIONS:  - Assess for changes in respiratory status  - Assess for changes in mentation and behavior  - Position to facilitate oxygenation and minimize respiratory effort  - Oxygen supplementation based on oxygen saturation or ABGs  - Provide Smoking Cessation handout, if applicable  - Encourage broncho-pulmonary hygiene including cough, deep breathe, Incentive Spirometry  - Assess the need for suctioning and perform as needed  - Assess and instruct to report SOB or any respiratory difficulty  - Respiratory Therapy support as indicated  - Manage/alleviate anxiety  - Monitor for signs/symptoms of CO2 retention  Outcome: Progressing     Problem: METABOLIC/FLUID AND ELECTROLYTES - ADULT  Goal: Glucose maintained within prescribed range  Description: INTERVENTIONS:  - Monitor Blood Glucose as ordered  - Assess for signs and symptoms of hyperglycemia and hypoglycemia  - Administer ordered medications to maintain glucose within target range  - Assess barriers to adequate nutritional intake and initiate nutrition consult as needed  - Instruct patient on self management of diabetes  Outcome: Progressing  Goal: Electrolytes maintained within normal limits  Description: INTERVENTIONS:  - Monitor labs and rhythm and assess patient for signs and symptoms of electrolyte imbalances  - Administer electrolyte replacement as ordered  - Monitor response to electrolyte replacements, including rhythm and repeat lab results as appropriate  - Fluid restriction as ordered  - Instruct patient on fluid and nutrition restrictions as appropriate  Outcome: Progressing     Problem: SKIN/TISSUE INTEGRITY - ADULT  Goal: Skin integrity remains intact  Description: INTERVENTIONS  - Assess and document risk factors for pressure ulcer development  - Assess and document skin integrity  - Monitor for areas of redness and/or skin breakdown  - Initiate interventions, skin care algorithm/standards of care as needed  Outcome: Progressing     Problem: PAIN - ADULT  Goal: Verbalizes/displays adequate comfort level or patient's stated pain goal  Description: INTERVENTIONS:  - Encourage pt to monitor pain and request assistance  - Assess pain using appropriate pain scale  - Administer analgesics based on type and severity of pain and evaluate response  - Implement non-pharmacological measures as appropriate and evaluate response  - Consider cultural and social influences on pain and pain management  - Manage/alleviate anxiety  - Utilize distraction and/or relaxation techniques  - Monitor for opioid side effects  - Notify MD/LIP if interventions unsuccessful or patient reports new pain  - Anticipate increased pain with activity and pre-medicate as appropriate  Outcome: Progressing     Problem: PAIN - ADULT  Goal: Verbalizes/displays adequate comfort level or patient's stated pain goal  Description: INTERVENTIONS:  - Encourage pt to monitor pain and request assistance  - Assess pain using appropriate pain scale  - Administer analgesics based on type and severity of pain and evaluate response  - Implement non-pharmacological measures as appropriate and evaluate response  - Consider cultural and social influences on pain and pain management  - Manage/alleviate anxiety  - Utilize distraction and/or relaxation techniques  - Monitor for opioid side effects  - Notify MD/LIP if interventions unsuccessful or patient reports new pain  - Anticipate increased pain with activity and pre-medicate as appropriate  Outcome: Progressing     Problem: RISK FOR INFECTION - ADULT  Goal: Absence of fever/infection during anticipated neutropenic period  Description: INTERVENTIONS  - Monitor WBC  - Administer growth factors as ordered  - Implement neutropenic guidelines  Outcome: Progressing     Problem: SAFETY ADULT - FALL  Goal: Free from fall injury  Description: INTERVENTIONS:  - Assess pt frequently for physical needs  - Identify cognitive and physical deficits and behaviors that affect risk of falls.   - [place] fall precautions as indicated by assessment.  - Educate pt/family on patient safety including physical limitations  - Instruct pt to call for assistance with activity based on assessment  - Modify environment to reduce risk of injury  - Provide assistive devices as appropriate  - Consider OT/PT consult to assist with strengthening/mobility  - Encourage toileting schedule  Outcome: Progressing     Problem: DISCHARGE PLANNING  Goal: Discharge to home or other facility with appropriate resources  Description: INTERVENTIONS:  - Identify barriers to discharge w/pt and caregiver  - Include patient/family/discharge partner in discharge planning  - Arrange for needed discharge resources and transportation as appropriate  - Identify discharge learning needs (meds, wound care, etc)  - Arrange for interpreters to assist at discharge as needed  - Consider post-discharge preferences of patient/family/discharge partner  - Complete POLST form as appropriate  - Assess patient's ability to be responsible for managing their own health  - Refer to Case Management Department for coordinating discharge planning if the patient needs post-hospital services based on physician/LIP order or complex needs related to functional status, cognitive ability or social support system  Outcome: Progressing

## 2023-06-19 NOTE — CARDIAC REHAB
CHF teaching complete with Pt. States she has had education in past and has CHF binder. At home emergency management sheet given for reinforcement.

## 2023-06-19 NOTE — PROGRESS NOTES
Nursing discharge note  Pt discharge home, IV removed, Tele DC and returned to monitor tech. F/U instructions provided and discussed. Pt and family verbalized understanding. Discussed adverse reactions and side effects of all new medications, and provided appropriate handouts. Pt and family verbalized understanding. Pt wheeled down with all belongings. Pt denies C/O pain, malaise , or cardiac S/S. All needs met by staff.

## 2023-06-19 NOTE — PLAN OF CARE
Patient BP low this morning. She remained asymptomatic, Had received coreg 12.5 mg, lasix 40 mg IV, losartan 50 mg. Will decrease coreg to 6. .25 mg BID. If BP is stable, will consider discharge home on lower dose coreg 6.25 mg BID and follow up in office.

## 2023-06-19 NOTE — CM/SW NOTE
06/19/23 1300   CM/SW Referral Data   Referral Source Nurse   Reason for Referral Discharge planning   Informant Patient;EMR;Clinical Staff Member   Medical Hx   Does patient have an established PCP? Yes   Patient Info   Patient's Current Mental Status at Time of Assessment Alert;Oriented   Patient's [address]   Patient lives with Daughter   Patient Status Prior to Admission   Independent with ADLs and Mobility Yes   Services in place prior to admission DME/Supplies at home   Type of DME/Supplies Straight [facility]; [address]   Discharge Needs   Anticipated D/C needs No anticipated discharge needs     Pt is a [de-identified] y/o female admitted with CHF. SW received order for discharge planning. Pt is A/Ox4 and able to answer questions appropriately. SW met with pt at bedside to discuss discharge planning. Pt is currently staying with her dtr, but is originally from [place] with her spouse. Pt stated her  is moving into [facility] today, and she will be residing with her dtr for a while. Pt has a standard walker and cane at home. Pt stated she is interested in a rollator, SW referred pt to [facility]. Pt stated she is normally independent with ADLs and denied any further needs at this time. SW will continue to remain available for any additional discharge needs.     YOVANA Vences  Discharge Planner

## 2023-06-19 NOTE — PLAN OF CARE
Assumed pt care at 0730. Pt is A & O x4. Lung sounds are clear on RA. A-fib on tele. Pt has no chest pain and shortness of breath. Abdomen is soft, non-tendered, bowel sounds are active in all four quadrants. Fall precaution in place, pt needs are met by staff. POC: possible DC later today. 1200: Pt BP was low in sitting position, NP kizzy went to the room and talked to pt. RN did assessments and rechecked BP. Pt had no dizziness or lightheaded. Pt was sitting in the chair and eating lunch comfortably.      Problem: Diabetes/Glucose Control  Goal: Glucose maintained within prescribed range  Description: INTERVENTIONS:  - Monitor Blood Glucose as ordered  - Assess for signs and symptoms of hyperglycemia and hypoglycemia  - Administer ordered medications to maintain glucose within target range  - Assess barriers to adequate nutritional intake and initiate nutrition consult as needed  - Instruct patient on self management of diabetes  Outcome: Progressing     Problem: Patient/Family Goals  Goal: Patient/Family Long Term Goal  Description: Patient's Long Term Goal: Stay out of hospital     Interventions:  - Medications and follow up appointments  - Lifestyle changes   - See additional Care Plan goals for specific interventions  Outcome: Progressing  Goal: Patient/Family Short Term Goal  Description: Patient's Short Term Goal: go home     Interventions:   - Medications   - See additional Care Plan goals for specific interventions  Outcome: Progressing     Problem: CARDIOVASCULAR - ADULT  Goal: Maintains optimal cardiac output and hemodynamic stability  Description: INTERVENTIONS:  - Monitor vital signs, rhythm, and trends  - Monitor for bleeding, hypotension and signs of decreased cardiac output  - Evaluate effectiveness of vasoactive medications to optimize hemodynamic stability  - Monitor arterial and/or venous puncture sites for bleeding and/or hematoma  - Assess quality of pulses, skin color and temperature  - Assess for signs of decreased coronary artery perfusion - ex.  Angina  - Evaluate fluid balance, assess for edema, trend weights  Outcome: Progressing  Goal: Absence of cardiac arrhythmias or at baseline  Description: INTERVENTIONS:  - Continuous cardiac monitoring, monitor vital signs, obtain 12 lead EKG if indicated  - Evaluate effectiveness of antiarrhythmic and heart rate control medications as ordered  - Initiate emergency measures for life threatening arrhythmias  - Monitor electrolytes and administer replacement therapy as ordered  Outcome: Progressing     Problem: RESPIRATORY - ADULT  Goal: Achieves optimal ventilation and oxygenation  Description: INTERVENTIONS:  - Assess for changes in respiratory status  - Assess for changes in mentation and behavior  - Position to facilitate oxygenation and minimize respiratory effort  - Oxygen supplementation based on oxygen saturation or ABGs  - Provide Smoking Cessation handout, if applicable  - Encourage broncho-pulmonary hygiene including cough, deep breathe, Incentive Spirometry  - Assess the need for suctioning and perform as needed  - Assess and instruct to report SOB or any respiratory difficulty  - Respiratory Therapy support as indicated  - Manage/alleviate anxiety  - Monitor for signs/symptoms of CO2 retention  Outcome: Progressing     Problem: METABOLIC/FLUID AND ELECTROLYTES - ADULT  Goal: Glucose maintained within prescribed range  Description: INTERVENTIONS:  - Monitor Blood Glucose as ordered  - Assess for signs and symptoms of hyperglycemia and hypoglycemia  - Administer ordered medications to maintain glucose within target range  - Assess barriers to adequate nutritional intake and initiate nutrition consult as needed  - Instruct patient on self management of diabetes  Outcome: Progressing  Goal: Electrolytes maintained within normal limits  Description: INTERVENTIONS:  - Monitor labs and rhythm and assess patient for signs and symptoms of electrolyte imbalances  - Administer electrolyte replacement as ordered  - Monitor response to electrolyte replacements, including rhythm and repeat lab results as appropriate  - Fluid restriction as ordered  - Instruct patient on fluid and nutrition restrictions as appropriate  Outcome: Progressing     Problem: SKIN/TISSUE INTEGRITY - ADULT  Goal: Skin integrity remains intact  Description: INTERVENTIONS  - Assess and document risk factors for pressure ulcer development  - Assess and document skin integrity  - Monitor for areas of redness and/or skin breakdown  - Initiate interventions, skin care algorithm/standards of care as needed  Outcome: Progressing     Problem: PAIN - ADULT  Goal: Verbalizes/displays adequate comfort level or patient's stated pain goal  Description: INTERVENTIONS:  - Encourage pt to monitor pain and request assistance  - Assess pain using appropriate pain scale  - Administer analgesics based on type and severity of pain and evaluate response  - Implement non-pharmacological measures as appropriate and evaluate response  - Consider cultural and social influences on pain and pain management  - Manage/alleviate anxiety  - Utilize distraction and/or relaxation techniques  - Monitor for opioid side effects  - Notify MD/LIP if interventions unsuccessful or patient reports new pain  - Anticipate increased pain with activity and pre-medicate as appropriate  Outcome: Progressing     Problem: RISK FOR INFECTION - ADULT  Goal: Absence of fever/infection during anticipated neutropenic period  Description: INTERVENTIONS  - Monitor WBC  - Administer growth factors as ordered  - Implement neutropenic guidelines  Outcome: Progressing     Problem: SAFETY ADULT - FALL  Goal: Free from fall injury  Description: INTERVENTIONS:  - Assess pt frequently for physical needs  - Identify cognitive and physical deficits and behaviors that affect risk of falls.   - [place] fall precautions as indicated by assessment.  - Educate pt/family on patient safety including physical limitations  - Instruct pt to call for assistance with activity based on assessment  - Modify environment to reduce risk of injury  - Provide assistive devices as appropriate  - Consider OT/PT consult to assist with strengthening/mobility  - Encourage toileting schedule  Outcome: Progressing     Problem: DISCHARGE PLANNING  Goal: Discharge to home or other facility with appropriate resources  Description: INTERVENTIONS:  - Identify barriers to discharge w/pt and caregiver  - Include patient/family/discharge partner in discharge planning  - Arrange for needed discharge resources and transportation as appropriate  - Identify discharge learning needs (meds, wound care, etc)  - Arrange for interpreters to assist at discharge as needed  - Consider post-discharge preferences of patient/family/discharge partner  - Complete POLST form as appropriate  - Assess patient's ability to be responsible for managing their own health  - Refer to Case Management Department for coordinating discharge planning if the patient needs post-hospital services based on physician/LIP order or complex needs related to functional status, cognitive ability or social support system  Outcome: Progressing

## 2023-06-20 ENCOUNTER — PATIENT OUTREACH (OUTPATIENT)
Dept: CASE MANAGEMENT | Age: 83
End: 2023-06-20

## 2023-06-20 NOTE — PROGRESS NOTES
AUBREYJOSE G for post hospital follow up. [facility] contact information provided as well as [facility] office number, [phone].

## 2023-06-20 NOTE — PROGRESS NOTES
[facility]. [facility] contact information provided as well as [facility] office number, [phone].

## 2023-06-20 NOTE — PAYOR COMM NOTE
Discharge Notification    Patient Name: Nevaeh Litter: Meryle Acton #: [id number]  Authorization Number: [id number]  Admit Date/Time: 6/17/2023 10:14 AM  Discharge Date/Time: 6/19/2023 5:44 PM

## 2023-06-21 ENCOUNTER — APPOINTMENT (OUTPATIENT)
Dept: GENERAL RADIOLOGY | Facility: HOSPITAL | Age: 83
End: 2023-06-21
Attending: EMERGENCY MEDICINE
Payer: MEDICARE

## 2023-06-21 ENCOUNTER — HOSPITAL ENCOUNTER (INPATIENT)
Facility: HOSPITAL | Age: 83
LOS: 2 days | Discharge: HOME | End: 2023-06-23
Attending: EMERGENCY MEDICINE | Admitting: HOSPITALIST
Payer: MEDICARE

## 2023-06-21 ENCOUNTER — TELEPHONE (OUTPATIENT)
Dept: FAMILY MEDICINE CLINIC | Facility: CLINIC | Age: 83
End: 2023-06-21

## 2023-06-21 ENCOUNTER — HOSPITAL ENCOUNTER (OUTPATIENT)
Facility: HOSPITAL | Age: 83
Setting detail: OBSERVATION
Discharge: HOME | DRG: 641 | End: 2023-06-23
Attending: EMERGENCY MEDICINE | Admitting: HOSPITALIST
Payer: MEDICARE

## 2023-06-21 ENCOUNTER — APPOINTMENT (OUTPATIENT)
Dept: GENERAL RADIOLOGY | Facility: HOSPITAL | Age: 83
DRG: 641 | End: 2023-06-21
Attending: EMERGENCY MEDICINE
Payer: MEDICARE

## 2023-06-21 DIAGNOSIS — I50.9 ACUTE ON CHRONIC CONGESTIVE HEART FAILURE, UNSPECIFIED HEART FAILURE TYPE (HCC): ICD-10-CM

## 2023-06-21 DIAGNOSIS — I95.9 HYPOTENSION, UNSPECIFIED HYPOTENSION TYPE: Primary | ICD-10-CM

## 2023-06-21 DIAGNOSIS — R06.00 DYSPNEA, UNSPECIFIED TYPE: ICD-10-CM

## 2023-06-21 PROBLEM — I48.91 ATRIAL FIBRILLATION (HCC): Status: ACTIVE | Noted: 2019-10-21

## 2023-06-21 PROBLEM — I50.32 CHRONIC DIASTOLIC HEART FAILURE (HCC): Status: ACTIVE | Noted: 2023-06-21

## 2023-06-21 PROBLEM — N28.9 ACUTE RENAL INSUFFICIENCY: Status: ACTIVE | Noted: 2023-06-21

## 2023-06-21 LAB
ALBUMIN SERPL-MCNC: 3.1 G/DL (ref 3.4–5)
ALBUMIN/GLOB SERPL: 0.8 {RATIO} (ref 1–2)
ALP LIVER SERPL-CCNC: 103 U/L
ALT SERPL-CCNC: 18 U/L
ANION GAP SERPL CALC-SCNC: 2 MMOL/L (ref 0–18)
AST SERPL-CCNC: 19 U/L (ref 15–37)
BASOPHILS # BLD AUTO: 0.1 X10(3) UL (ref 0–0.2)
BASOPHILS NFR BLD AUTO: 1.2 %
BILIRUB SERPL-MCNC: 0.6 MG/DL (ref 0.1–2)
BILIRUB UR QL STRIP.AUTO: NEGATIVE
BUN BLD-MCNC: 44 MG/DL (ref 7–18)
CALCIUM BLD-MCNC: 10.2 MG/DL (ref 8.5–10.1)
CHLORIDE SERPL-SCNC: 106 MMOL/L (ref 98–112)
CLARITY UR REFRACT.AUTO: CLEAR
CO2 SERPL-SCNC: 29 MMOL/L (ref 21–32)
COLOR UR AUTO: YELLOW
CREAT BLD-MCNC: 1.67 MG/DL
EOSINOPHIL # BLD AUTO: 0.29 X10(3) UL (ref 0–0.7)
EOSINOPHIL NFR BLD AUTO: 3.4 %
ERYTHROCYTE [DISTWIDTH] IN BLOOD BY AUTOMATED COUNT: 13.6 %
GFR SERPLBLD BASED ON 1.73 SQ M-ARVRAT: 31 ML/MIN/1.73M2 (ref 60–?)
GLOBULIN PLAS-MCNC: 3.9 G/DL (ref 2.8–4.4)
GLUCOSE BLD-MCNC: 104 MG/DL (ref 70–99)
GLUCOSE BLD-MCNC: 110 MG/DL (ref 70–99)
GLUCOSE BLD-MCNC: 79 MG/DL (ref 70–99)
GLUCOSE UR STRIP.AUTO-MCNC: NEGATIVE MG/DL
HCT VFR BLD AUTO: 40.7 %
HGB BLD-MCNC: 12.9 G/DL
IMM GRANULOCYTES # BLD AUTO: 0.03 X10(3) UL (ref 0–1)
IMM GRANULOCYTES NFR BLD: 0.4 %
KETONES UR STRIP.AUTO-MCNC: NEGATIVE MG/DL
LACTATE SERPL-SCNC: 1.1 MMOL/L (ref 0.4–2)
LEUKOCYTE ESTERASE UR QL STRIP.AUTO: NEGATIVE
LYMPHOCYTES # BLD AUTO: 1.93 X10(3) UL (ref 1–4)
LYMPHOCYTES NFR BLD AUTO: 22.7 %
MCH RBC QN AUTO: 30 PG (ref 26–34)
MCHC RBC AUTO-ENTMCNC: 31.7 G/DL (ref 31–37)
MCV RBC AUTO: 94.7 FL
MONOCYTES # BLD AUTO: 0.65 X10(3) UL (ref 0.1–1)
MONOCYTES NFR BLD AUTO: 7.6 %
NEUTROPHILS # BLD AUTO: 5.52 X10 (3) UL (ref 1.5–7.7)
NEUTROPHILS # BLD AUTO: 5.52 X10(3) UL (ref 1.5–7.7)
NEUTROPHILS NFR BLD AUTO: 64.7 %
NITRITE UR QL STRIP.AUTO: NEGATIVE
NT-PROBNP SERPL-MCNC: 894 PG/ML (ref ?–450)
OSMOLALITY SERPL CALC.SUM OF ELEC: 296 MOSM/KG (ref 275–295)
PH UR STRIP.AUTO: 5 [PH] (ref 5–8)
PLATELET # BLD AUTO: 185 10(3)UL (ref 150–450)
POTASSIUM SERPL-SCNC: 3.6 MMOL/L (ref 3.5–5.1)
PROT SERPL-MCNC: 7 G/DL (ref 6.4–8.2)
PROT UR STRIP.AUTO-MCNC: NEGATIVE MG/DL
Q-T INTERVAL: 412 MS
QRS DURATION: 100 MS
QTC CALCULATION (BEZET): 451 MS
R AXIS: 34 DEGREES
RBC # BLD AUTO: 4.3 X10(6)UL
RBC UR QL AUTO: NEGATIVE
SODIUM SERPL-SCNC: 137 MMOL/L (ref 136–145)
SP GR UR STRIP.AUTO: 1.01 (ref 1–1.03)
T AXIS: 46 DEGREES
TROPONIN I HIGH SENSITIVITY: 18 NG/L
TROPONIN I HIGH SENSITIVITY: 20 NG/L
UROBILINOGEN UR STRIP.AUTO-MCNC: <2 MG/DL
VENTRICULAR RATE: 72 BPM
WBC # BLD AUTO: 8.5 X10(3) UL (ref 4–11)

## 2023-06-21 PROCEDURE — 71045 X-RAY EXAM CHEST 1 VIEW: CPT | Performed by: EMERGENCY MEDICINE

## 2023-06-21 PROCEDURE — 99223 1ST HOSP IP/OBS HIGH 75: CPT | Performed by: INTERNAL MEDICINE

## 2023-06-21 RX ORDER — NICOTINE POLACRILEX 4 MG
30 LOZENGE BUCCAL
Status: DISCONTINUED | OUTPATIENT
Start: 2023-06-21 | End: 2023-06-23

## 2023-06-21 RX ORDER — ATORVASTATIN CALCIUM 20 MG/1
20 TABLET, FILM COATED ORAL NIGHTLY
Status: DISCONTINUED | OUTPATIENT
Start: 2023-06-21 | End: 2023-06-23

## 2023-06-21 RX ORDER — ONDANSETRON 2 MG/ML
4 INJECTION INTRAMUSCULAR; INTRAVENOUS EVERY 4 HOURS PRN
Status: DISCONTINUED | OUTPATIENT
Start: 2023-06-21 | End: 2023-06-21 | Stop reason: ALTCHOICE

## 2023-06-21 RX ORDER — NICOTINE POLACRILEX 4 MG
15 LOZENGE BUCCAL
Status: DISCONTINUED | OUTPATIENT
Start: 2023-06-21 | End: 2023-06-23

## 2023-06-21 RX ORDER — GABAPENTIN 300 MG/1
300 CAPSULE ORAL 3 TIMES DAILY
Status: DISCONTINUED | OUTPATIENT
Start: 2023-06-21 | End: 2023-06-23

## 2023-06-21 RX ORDER — SODIUM CHLORIDE 9 MG/ML
INJECTION, SOLUTION INTRAVENOUS CONTINUOUS
Status: DISCONTINUED | OUTPATIENT
Start: 2023-06-21 | End: 2023-06-22

## 2023-06-21 RX ORDER — DEXTROSE MONOHYDRATE 25 G/50ML
50 INJECTION, SOLUTION INTRAVENOUS
Status: DISCONTINUED | OUTPATIENT
Start: 2023-06-21 | End: 2023-06-23

## 2023-06-21 RX ORDER — CARVEDILOL 6.25 MG/1
6.25 TABLET ORAL 2 TIMES DAILY WITH MEALS
Status: DISCONTINUED | OUTPATIENT
Start: 2023-06-21 | End: 2023-06-23

## 2023-06-21 RX ORDER — SODIUM CHLORIDE 9 MG/ML
INJECTION, SOLUTION INTRAVENOUS CONTINUOUS
Status: DISCONTINUED | OUTPATIENT
Start: 2023-06-21 | End: 2023-06-21

## 2023-06-21 RX ORDER — ONDANSETRON 2 MG/ML
4 INJECTION INTRAMUSCULAR; INTRAVENOUS EVERY 6 HOURS PRN
Status: DISCONTINUED | OUTPATIENT
Start: 2023-06-21 | End: 2023-06-23

## 2023-06-21 RX ORDER — LEVOTHYROXINE SODIUM 0.07 MG/1
75 TABLET ORAL
Status: DISCONTINUED | OUTPATIENT
Start: 2023-06-22 | End: 2023-06-23

## 2023-06-21 RX ORDER — ACETAMINOPHEN 500 MG
500 TABLET ORAL EVERY 4 HOURS PRN
Status: DISCONTINUED | OUTPATIENT
Start: 2023-06-21 | End: 2023-06-23

## 2023-06-21 RX ORDER — BACLOFEN 10 MG/1
10 TABLET ORAL 2 TIMES DAILY PRN
Status: DISCONTINUED | OUTPATIENT
Start: 2023-06-21 | End: 2023-06-23

## 2023-06-21 RX ORDER — POTASSIUM CHLORIDE 20 MEQ/1
40 TABLET, EXTENDED RELEASE ORAL EVERY 4 HOURS
Status: COMPLETED | OUTPATIENT
Start: 2023-06-21 | End: 2023-06-21

## 2023-06-21 RX ORDER — ESCITALOPRAM OXALATE 5 MG/1
5 TABLET ORAL DAILY
Status: DISCONTINUED | OUTPATIENT
Start: 2023-06-21 | End: 2023-06-23

## 2023-06-21 RX ORDER — METOCLOPRAMIDE HYDROCHLORIDE 5 MG/ML
5 INJECTION INTRAMUSCULAR; INTRAVENOUS EVERY 8 HOURS PRN
Status: DISCONTINUED | OUTPATIENT
Start: 2023-06-21 | End: 2023-06-23

## 2023-06-21 RX ORDER — CETIRIZINE HYDROCHLORIDE 10 MG/1
10 TABLET ORAL NIGHTLY
Status: DISCONTINUED | OUTPATIENT
Start: 2023-06-21 | End: 2023-06-23

## 2023-06-21 NOTE — TELEPHONE ENCOUNTER
Daughter calling for patient regarding blood pressure results. Daughter states she was advised to call with blood pressure reading. 81/67 at [address] on 6/21/2023  101/77 at 9:13am 6/21/2023  87/65 at 9:20am 6/21/2023. Daughter wondering which blood pressure medication should be given. Please advise. Thank you.         [id number] - [address] , [phone], [phone]

## 2023-06-21 NOTE — TELEPHONE ENCOUNTER
Called Pt's daughter Rylie Rodriguez and she reported that she is about to take her mother to the ER. Agreed with Pt's daughter and advised to send to ED for further evaluation. No further questions at this time.

## 2023-06-21 NOTE — ED QUICK NOTES
mulitple attempts for IV AC line; MD Hanh Zimmerman attempting line via [address]

## 2023-06-21 NOTE — PLAN OF CARE
Vitals stable, no complaints of pain or shortness of breath, weaned off oxygen. Gentle IVF until midnight. CRE 1.67, baseline 1-1.2. Potassium replaced. Holding diuretics. CXR doesn't show edema. Continue home eliquis. Orthostatics negative. No complaints of dizziness or lightheadedness. Gets up okay with one to standby assist and FWW for safety. Patient states that at home only uses walker for long distances. Cards consulted and saw. Pulm consulted, hasn't seen yet. All questions answered, bed alarm on, call light within reach.   Family here during admission and fully updated on POC        Problem: Diabetes/Glucose Control  Goal: Glucose maintained within prescribed range  Description: INTERVENTIONS:  - Monitor Blood Glucose as ordered  - Assess for signs and symptoms of hyperglycemia and hypoglycemia  - Administer ordered medications to maintain glucose within target range  - Assess barriers to adequate nutritional intake and initiate nutrition consult as needed  - Instruct patient on self management of diabetes  Outcome: Progressing

## 2023-06-21 NOTE — ED INITIAL ASSESSMENT (HPI)
C/o of weakness and SOB; dc'd from hospital 2xdays ago for low BP; hx of CHF, blood thinners; no trauma or fall

## 2023-06-21 NOTE — ED QUICK NOTES
Orders for admission, patient is aware of plan and ready to go upstairs. Any questions, please call ED RN Isabelle Gregory at extension [phone].      Patient Covid vaccination status: Fully vaccinated     COVID Test Ordered in ED: None    COVID Suspicion at Admission: N/A    Running Infusions:  None    NC 2L when needed for O2 in low 90s    Mental Status/LOC at time of transport: aox4    Other pertinent information: Daughter and PAPA at bedside; pt hx of CHF; DC'd 2 days ago for hypotension; blood thinners for hx of Afib    PT'S  IS ADMITTED CURRENTLY IN ROOM 8618 UNDER SAME LAST NAME    CIWA score: N/A   NIH score:  0

## 2023-06-22 ENCOUNTER — TELEPHONE (OUTPATIENT)
Dept: FAMILY MEDICINE CLINIC | Facility: CLINIC | Age: 83
End: 2023-06-22

## 2023-06-22 LAB
ANION GAP SERPL CALC-SCNC: <0 MMOL/L (ref 0–18)
BASOPHILS # BLD AUTO: 0.09 X10(3) UL (ref 0–0.2)
BASOPHILS NFR BLD AUTO: 1.3 %
BUN BLD-MCNC: 31 MG/DL (ref 7–18)
CALCIUM BLD-MCNC: 10 MG/DL (ref 8.5–10.1)
CHLORIDE SERPL-SCNC: 114 MMOL/L (ref 98–112)
CO2 SERPL-SCNC: 26 MMOL/L (ref 21–32)
CREAT BLD-MCNC: 1.22 MG/DL
EOSINOPHIL # BLD AUTO: 0.23 X10(3) UL (ref 0–0.7)
EOSINOPHIL NFR BLD AUTO: 3.3 %
ERYTHROCYTE [DISTWIDTH] IN BLOOD BY AUTOMATED COUNT: 13.6 %
GFR SERPLBLD BASED ON 1.73 SQ M-ARVRAT: 45 ML/MIN/1.73M2 (ref 60–?)
GLUCOSE BLD-MCNC: 107 MG/DL (ref 70–99)
GLUCOSE BLD-MCNC: 90 MG/DL (ref 70–99)
GLUCOSE BLD-MCNC: 91 MG/DL (ref 70–99)
GLUCOSE BLD-MCNC: 92 MG/DL (ref 70–99)
GLUCOSE BLD-MCNC: 93 MG/DL (ref 70–99)
HCT VFR BLD AUTO: 39.3 %
HGB BLD-MCNC: 12.5 G/DL
IMM GRANULOCYTES # BLD AUTO: 0.01 X10(3) UL (ref 0–1)
IMM GRANULOCYTES NFR BLD: 0.1 %
LYMPHOCYTES # BLD AUTO: 1.92 X10(3) UL (ref 1–4)
LYMPHOCYTES NFR BLD AUTO: 27.6 %
MCH RBC QN AUTO: 30.3 PG (ref 26–34)
MCHC RBC AUTO-ENTMCNC: 31.8 G/DL (ref 31–37)
MCV RBC AUTO: 95.2 FL
MONOCYTES # BLD AUTO: 0.61 X10(3) UL (ref 0.1–1)
MONOCYTES NFR BLD AUTO: 8.8 %
NEUTROPHILS # BLD AUTO: 4.1 X10 (3) UL (ref 1.5–7.7)
NEUTROPHILS # BLD AUTO: 4.1 X10(3) UL (ref 1.5–7.7)
NEUTROPHILS NFR BLD AUTO: 58.9 %
OSMOLALITY SERPL CALC.SUM OF ELEC: 294 MOSM/KG (ref 275–295)
PLATELET # BLD AUTO: 178 10(3)UL (ref 150–450)
POTASSIUM SERPL-SCNC: 4.8 MMOL/L (ref 3.5–5.1)
POTASSIUM SERPL-SCNC: 4.8 MMOL/L (ref 3.5–5.1)
RBC # BLD AUTO: 4.13 X10(6)UL
SODIUM SERPL-SCNC: 139 MMOL/L (ref 136–145)
WBC # BLD AUTO: 7 X10(3) UL (ref 4–11)

## 2023-06-22 PROCEDURE — 99232 SBSQ HOSP IP/OBS MODERATE 35: CPT | Performed by: HOSPITALIST

## 2023-06-22 RX ORDER — LOSARTAN POTASSIUM 25 MG/1
25 TABLET ORAL DAILY
Status: DISCONTINUED | OUTPATIENT
Start: 2023-06-22 | End: 2023-06-23

## 2023-06-22 RX ORDER — TORSEMIDE 5 MG/1
10 TABLET ORAL DAILY
Status: DISCONTINUED | OUTPATIENT
Start: 2023-06-22 | End: 2023-06-23

## 2023-06-22 NOTE — TELEPHONE ENCOUNTER
Bianca GÓMEZ called from [address] [place] [address]. They need to know how many times you want the pt to check her blood sugars. It can not say 3-4 times daily. It must be specific. They also need parameters for her BP meds.

## 2023-06-22 NOTE — PLAN OF CARE
Problem: Diabetes/Glucose Control  Goal: Glucose maintained within prescribed range  Description: INTERVENTIONS:  - Monitor Blood Glucose as ordered  - Assess for signs and symptoms of hyperglycemia and hypoglycemia  - Administer ordered medications to maintain glucose within target range  - Assess barriers to adequate nutritional intake and initiate nutrition consult as needed  - Instruct patient on self management of diabetes  Outcome: Progressing     Problem: CARDIOVASCULAR - ADULT  Goal: Maintains optimal cardiac output and hemodynamic stability  Description: INTERVENTIONS:  - Monitor vital signs, rhythm, and trends  - Monitor for bleeding, hypotension and signs of decreased cardiac output  - Evaluate effectiveness of vasoactive medications to optimize hemodynamic stability  - Monitor arterial and/or venous puncture sites for bleeding and/or hematoma  - Assess quality of pulses, skin color and temperature  - Assess for signs of decreased coronary artery perfusion - ex.  Angina  - Evaluate fluid balance, assess for edema, trend weights  Outcome: Progressing  Goal: Absence of cardiac arrhythmias or at baseline  Description: INTERVENTIONS:  - Continuous cardiac monitoring, monitor vital signs, obtain 12 lead EKG if indicated  - Evaluate effectiveness of antiarrhythmic and heart rate control medications as ordered  - Initiate emergency measures for life threatening arrhythmias  - Monitor electrolytes and administer replacement therapy as ordered  Outcome: Progressing

## 2023-06-22 NOTE — TELEPHONE ENCOUNTER
Check blood sugar fasting, 2 hours after lunch and at bedtime. Hold BP meds if BP <110/<50. Thank you.

## 2023-06-22 NOTE — TELEPHONE ENCOUNTER
I called Bianca and gave her the instructions below per Dr. Derrick Raymond. Bianca verbalized understanding.

## 2023-06-22 NOTE — PAYOR COMM NOTE
--------------  DISCHARGE REVIEW    Payor: Liliana Hassan #:  [id number]  Authorization Number: [id number]    Admit date: 6/17/23  Admit time:   1:26 PM  Discharge Date: 6/19/2023  5:44 PM     Admitting Physician: Tomas Elizondo MD  Attending Physician:  Rommel Spivey MD  Primary Care Physician: Sadny Moody MD       Discharge Summary Notes    No notes of this type exist for this encounter.

## 2023-06-22 NOTE — PLAN OF CARE
Received pt at 0730. A/Ox4, Afib on monitor, Eliquis controlled. Lung sounds diminished bilaterally, room air, 2L PRN. Active bowel sounds to all 4 quadrants. Edema in lower extremities, SCDs on. Call light within reach. 1514 - pt taken to CTU 8 via wheelchair to see  admitted on floor. MD aware and approved visit    1600 - pt returned to CTU 2    Problem: Diabetes/Glucose Control  Goal: Glucose maintained within prescribed range  Description: INTERVENTIONS:  - Monitor Blood Glucose as ordered  - Assess for signs and symptoms of hyperglycemia and hypoglycemia  - Administer ordered medications to maintain glucose within target range  - Assess barriers to adequate nutritional intake and initiate nutrition consult as needed  - Instruct patient on self management of diabetes  Outcome: Progressing     Problem: Patient/Family Goals  Goal: Patient/Family Long Term Goal  Description: Patient's Long Term Goal: go home    Interventions:  - MD to see, medication  - See additional Care Plan goals for specific interventions  Outcome: Progressing  Goal: Patient/Family Short Term Goal  Description: Patient's Short Term Goal: stable vital signs    Interventions:   - medication, ambulation  - See additional Care Plan goals for specific interventions  Outcome: Progressing     Problem: CARDIOVASCULAR - ADULT  Goal: Maintains optimal cardiac output and hemodynamic stability  Description: INTERVENTIONS:  - Monitor vital signs, rhythm, and trends  - Monitor for bleeding, hypotension and signs of decreased cardiac output  - Evaluate effectiveness of vasoactive medications to optimize hemodynamic stability  - Monitor arterial and/or venous puncture sites for bleeding and/or hematoma  - Assess quality of pulses, skin color and temperature  - Assess for signs of decreased coronary artery perfusion - ex.  Angina  - Evaluate fluid balance, assess for edema, trend weights  Outcome: Progressing  Goal: Absence of cardiac arrhythmias or at baseline  Description: INTERVENTIONS:  - Continuous cardiac monitoring, monitor vital signs, obtain 12 lead EKG if indicated  - Evaluate effectiveness of antiarrhythmic and heart rate control medications as ordered  - Initiate emergency measures for life threatening arrhythmias  - Monitor electrolytes and administer replacement therapy as ordered  Outcome: Progressing

## 2023-06-23 VITALS
DIASTOLIC BLOOD PRESSURE: 65 MMHG | TEMPERATURE: 98 F | WEIGHT: 218.69 LBS | HEART RATE: 56 BPM | BODY MASS INDEX: 40.25 KG/M2 | SYSTOLIC BLOOD PRESSURE: 121 MMHG | OXYGEN SATURATION: 94 % | HEIGHT: 62 IN | RESPIRATION RATE: 17 BRPM

## 2023-06-23 LAB
ANION GAP SERPL CALC-SCNC: 1 MMOL/L (ref 0–18)
BUN BLD-MCNC: 34 MG/DL (ref 7–18)
CALCIUM BLD-MCNC: 10.5 MG/DL (ref 8.5–10.1)
CHLORIDE SERPL-SCNC: 111 MMOL/L (ref 98–112)
CO2 SERPL-SCNC: 28 MMOL/L (ref 21–32)
CREAT BLD-MCNC: 1.36 MG/DL
GFR SERPLBLD BASED ON 1.73 SQ M-ARVRAT: 39 ML/MIN/1.73M2 (ref 60–?)
GLUCOSE BLD-MCNC: 77 MG/DL (ref 70–99)
GLUCOSE BLD-MCNC: 87 MG/DL (ref 70–99)
GLUCOSE BLD-MCNC: 87 MG/DL (ref 70–99)
OSMOLALITY SERPL CALC.SUM OF ELEC: 296 MOSM/KG (ref 275–295)
POTASSIUM SERPL-SCNC: 4.3 MMOL/L (ref 3.5–5.1)
SODIUM SERPL-SCNC: 140 MMOL/L (ref 136–145)

## 2023-06-23 PROCEDURE — 99239 HOSP IP/OBS DSCHRG MGMT >30: CPT | Performed by: HOSPITALIST

## 2023-06-23 RX ORDER — CETIRIZINE HYDROCHLORIDE 5 MG/1
5 TABLET ORAL NIGHTLY
Status: DISCONTINUED | OUTPATIENT
Start: 2023-06-23 | End: 2023-06-23

## 2023-06-23 RX ORDER — TORSEMIDE 20 MG/1
10 TABLET ORAL DAILY
Qty: 30 TABLET | Refills: 3 | Status: SHIPPED | OUTPATIENT
Start: 2023-06-23

## 2023-06-23 NOTE — PLAN OF CARE
Received pt at 0730. A/Ox4. NSR on monitor on room air. Pt on torsemide to diurese. Active bowel sounds to all 4 quadrants. No complaints of pain, shortness of breath or cardiac symptoms. Continent to bladder and bowel. Call light within reach. Problem: Diabetes/Glucose Control  Goal: Glucose maintained within prescribed range  Description: INTERVENTIONS:  - Monitor Blood Glucose as ordered  - Assess for signs and symptoms of hyperglycemia and hypoglycemia  - Administer ordered medications to maintain glucose within target range  - Assess barriers to adequate nutritional intake and initiate nutrition consult as needed  - Instruct patient on self management of diabetes  Outcome: Progressing     Problem: Patient/Family Goals  Goal: Patient/Family Long Term Goal  Description: Patient's Long Term Goal: go home    Interventions:  - MD to see, medications  - See additional Care Plan goals for specific interventions  Outcome: Progressing  Goal: Patient/Family Short Term Goal  Description: Patient's Short Term Goal: stable vital signs    Interventions:   - medications, ambulation  - See additional Care Plan goals for specific interventions  Outcome: Progressing     Problem: CARDIOVASCULAR - ADULT  Goal: Maintains optimal cardiac output and hemodynamic stability  Description: INTERVENTIONS:  - Monitor vital signs, rhythm, and trends  - Monitor for bleeding, hypotension and signs of decreased cardiac output  - Evaluate effectiveness of vasoactive medications to optimize hemodynamic stability  - Monitor arterial and/or venous puncture sites for bleeding and/or hematoma  - Assess quality of pulses, skin color and temperature  - Assess for signs of decreased coronary artery perfusion - ex.  Angina  - Evaluate fluid balance, assess for edema, trend weights  Outcome: Progressing  Goal: Absence of cardiac arrhythmias or at baseline  Description: INTERVENTIONS:  - Continuous cardiac monitoring, monitor vital signs, obtain 12 lead EKG if indicated  - Evaluate effectiveness of antiarrhythmic and heart rate control medications as ordered  - Initiate emergency measures for life threatening arrhythmias  - Monitor electrolytes and administer replacement therapy as ordered  Outcome: Progressing

## 2023-06-23 NOTE — PLAN OF CARE
Patient received in chair, alert and oriented x 4. Up x1 and walker. On RA. Afib on tele. Continent of bowel and bladder. No complaints of pain, shortness of breath or chest pain/discomfort. Patient updated on plan of care. Fall precautions in place. Call light within reach. Problem: Diabetes/Glucose Control  Goal: Glucose maintained within prescribed range  Description: INTERVENTIONS:  - Monitor Blood Glucose as ordered  - Assess for signs and symptoms of hyperglycemia and hypoglycemia  - Administer ordered medications to maintain glucose within target range  - Assess barriers to adequate nutritional intake and initiate nutrition consult as needed  - Instruct patient on self management of diabetes  Outcome: Progressing      Problem: CARDIOVASCULAR - ADULT  Goal: Maintains optimal cardiac output and hemodynamic stability  Description: INTERVENTIONS:  - Monitor vital signs, rhythm, and trends  - Monitor for bleeding, hypotension and signs of decreased cardiac output  - Evaluate effectiveness of vasoactive medications to optimize hemodynamic stability  - Monitor arterial and/or venous puncture sites for bleeding and/or hematoma  - Assess quality of pulses, skin color and temperature  - Assess for signs of decreased coronary artery perfusion - ex.  Angina  - Evaluate fluid balance, assess for edema, trend weights  Outcome: Progressing  Goal: Absence of cardiac arrhythmias or at baseline  Description: INTERVENTIONS:  - Continuous cardiac monitoring, monitor vital signs, obtain 12 lead EKG if indicated  - Evaluate effectiveness of antiarrhythmic and heart rate control medications as ordered  - Initiate emergency measures for life threatening arrhythmias  - Monitor electrolytes and administer replacement therapy as ordered  Outcome: Progressing

## 2023-06-23 NOTE — CONSULTS
[facility] Pharmacy Note:  Renal Dose Adjustment for Cetirizine (ZYRTEC)    Joni Christiansen has been prescribed Cetirizine (Zyrtec) 10 mg orally daily. Estimated Creatinine Clearance: 26.1 mL/min (A) (based on SCr of 1.36 mg/dL (H)). The dose has been changed to 5 mg orally daily per P&T approved protocol. Pharmacy will follow and resume original order if renal function improves.       Thank you,  Brandon Worrell, PharmD  6/23/2023  2:54 PM  [address] [place] [address] Extension: [phone]

## 2023-06-23 NOTE — PLAN OF CARE
Problem: Diabetes/Glucose Control  Goal: Glucose maintained within prescribed range  Description: INTERVENTIONS:  - Monitor Blood Glucose as ordered  - Assess for signs and symptoms of hyperglycemia and hypoglycemia  - Administer ordered medications to maintain glucose within target range  - Assess barriers to adequate nutritional intake and initiate nutrition consult as needed  - Instruct patient on self management of diabetes  6/23/2023 1723 by Adithya Alves RN  Outcome: Adequate for Discharge  6/23/2023 0925 by Adithya Alves RN  Outcome: Progressing     Problem: Patient/Family Goals  Goal: Patient/Family Long Term Goal  Description: Patient's Long Term Goal: discharge home    Interventions:  - MD to see, medications  - See additional Care Plan goals for specific interventions  6/23/2023 1723 by Adithya Alves RN  Outcome: Adequate for Discharge  6/23/2023 0925 by Adithya Alves RN  Outcome: Progressing  Goal: Patient/Family Short Term Goal  Description: Patient's Short Term Goal: stable vital signs    Interventions:   - medications, ambulation  - See additional Care Plan goals for specific interventions  6/23/2023 1723 by Adithya Alves RN  Outcome: Adequate for Discharge  6/23/2023 0925 by Adithya Alves RN  Outcome: Progressing     Problem: CARDIOVASCULAR - ADULT  Goal: Maintains optimal cardiac output and hemodynamic stability  Description: INTERVENTIONS:  - Monitor vital signs, rhythm, and trends  - Monitor for bleeding, hypotension and signs of decreased cardiac output  - Evaluate effectiveness of vasoactive medications to optimize hemodynamic stability  - Monitor arterial and/or venous puncture sites for bleeding and/or hematoma  - Assess quality of pulses, skin color and temperature  - Assess for signs of decreased coronary artery perfusion - ex.  Angina  - Evaluate fluid balance, assess for edema, trend weights  6/23/2023 1723 by Adithya Alves RN  Outcome: Adequate for Discharge  6/23/2023 0925 by Roselyn Habermann, RN  Outcome: Progressing  Goal: Absence of cardiac arrhythmias or at baseline  Description: INTERVENTIONS:  - Continuous cardiac monitoring, monitor vital signs, obtain 12 lead EKG if indicated  - Evaluate effectiveness of antiarrhythmic and heart rate control medications as ordered  - Initiate emergency measures for life threatening arrhythmias  - Monitor electrolytes and administer replacement therapy as ordered  6/23/2023 1723 by Roselyn Habermann, RN  Outcome: Adequate for Discharge  6/23/2023 0925 by Roselyn Habermann, RN  Outcome: Progressing

## 2023-06-24 NOTE — PLAN OF CARE
Pt discharged to CTU 8  with  who is admitted. Wheeled up. IV removed, tele d/c'd and returned to monitor tech. Follow up instructions provided and discussed. Pt and family verbalized understanding. Rx given. Discussed adverse reactions and side effects of all new medications and provided appropriate handouts. Pt and family voiced understanding. Pt wheeled down by staff with all belongings. Pt denies pain, malaise, or cardiac symptoms. All needs met by staff.

## 2023-06-26 ENCOUNTER — PATIENT OUTREACH (OUTPATIENT)
Dept: CASE MANAGEMENT | Age: 83
End: 2023-06-26

## 2023-06-26 DIAGNOSIS — I95.9 HYPOTENSION, UNSPECIFIED HYPOTENSION TYPE: ICD-10-CM

## 2023-06-26 DIAGNOSIS — Z02.9 ENCOUNTERS FOR ADMINISTRATIVE PURPOSE: Primary | ICD-10-CM

## 2023-06-26 PROCEDURE — 1159F MED LIST DOCD IN RCRD: CPT

## 2023-06-26 PROCEDURE — 1111F DSCHRG MED/CURRENT MED MERGE: CPT

## 2023-06-26 NOTE — PROGRESS NOTES
Initial Post Discharge Follow Up   Discharge Date: 6/23/23  Contact Date: 6/26/2023    Consent Verification:  Assessment Completed With: Patient  HIPAA Verified? Yes    Discharge Dx:   Hypotension, unspecified hypotension type     Was TCC ordered: yes, pt had appt schedule but cancelled as she is seeing PCP on 6/28        General:   How have you been since your discharge from the hospital?  I'm better than I was. Breathing is fine. Do you have any pain since discharge?  yes   If Yes:  Where back-not new   Rating on pain scale 1-10, 10 being the worst pain you have ever experienced -8/10, just took tylenol. Alleviating factors none  Aggravating factors none  Is the pain manageable at home? yes  How well was your pain managed while in the hospital?   On a scale of 1-5   1- Very Poor and 5- Very well   Very well  When you were leaving the hospital were your discharge instructions reviewed with you? yes  How well were your discharge instructions explained to you? On a scale of 1-5   1- Very Poor and 5- Very well   Very well  Do you have any questions about your discharge instructions? No  Before leaving the hospital was your diagnoses explained to you? Yes  Do you have any questions about your diagnoses? No  Are you able to perform normal daily activities of living as you have prior to your hospital stay (dressing, bathing, ambulating to the bathroom, etc)? yes  (NCM) Was patient given a different diet per AVS? no      Medications:   Current Outpatient Medications   Medication Sig Dispense Refill    torsemide 20 MG Oral Tab Take 0.5 tablets (10 mg total) by mouth daily. 30 tablet 3    spironolactone 25 MG Oral Tab Take 0.5 tablets (12.5 mg total) by mouth daily. escitalopram 5 MG Oral Tab Take 1 tablet (5 mg total) by mouth daily.  90 tablet 1    Glucose Blood (ONETOUCH VERIO) In Vitro Strip Check 3-4 times/day for sugar check      Cyanocobalamin ER 1000 MCG Oral Tab CR Take 1 tablet (1,000 mcg total) by mouth daily. Cholecalciferol (VITAMIN D3) 25 MCG (1000 UT) Oral Cap Take 1 capsule by mouth daily. alendronate 35 MG Oral Tab Take 1 tablet (35 mg total) by mouth every 7 days. 25 tablet 1    Dulaglutide (TRULICITY) 6.23 QH/3.0UQ Subcutaneous Solution Pen-injector Inject 0.75 mg into the skin once a week. 2 mL 1    LANTUS SOLOSTAR 100 UNIT/ML Subcutaneous Solution Pen-injector Inject 15 Units into the skin in the morning and 15 Units before bedtime. 15 mL 0    carvedilol 6.25 MG Oral Tab Take 1 tablet (6.25 mg total) by mouth 2 (two) times daily with meals. 60 tablet 3    acetaminophen 500 MG Oral Tab Take 2 tablets (1,000 mg total) by mouth every 6 (six) hours as needed for Pain. baclofen 10 MG Oral Tab Take 1 tablet (10 mg total) by mouth 2 (two) times daily as needed (for muscle spasms). cetirizine 10 MG Oral Tab Take 1 tablet (10 mg total) by mouth daily. gabapentin 300 MG Oral Cap Take 1 capsule (300 mg total) by mouth in the morning, at noon, and at bedtime. HYDROcodone-acetaminophen 5-325 MG Oral Tab Take 1 tablet by mouth every 8 (eight) hours as needed for Pain. Irbesartan 150 MG Oral Tab Take 1 tablet (150 mg total) by mouth nightly. levothyroxine 75 MCG Oral Tab Take 1 tablet (75 mcg total) by mouth before breakfast.      Pravastatin Sodium 80 MG Oral Tab Take 1 tablet (80 mg total) by mouth nightly. apixaban 5 MG Oral Tab Take 1 tablet (5 mg total) by mouth 2 (two) times daily.       (NCM)  Were there any medication changes noted on AVS?  yes  If so, were these medication changes discussed with you prior to leaving the hospital? yes  (NCM) If a new medication was prescribed:  na  May I go over your medications with you to make sure we are not missing anything?yes  Are there any reasons that keep you from taking your medication as prescribed? No  Are you having any concerns with constipation? No    Referrals/orders at D/C:  Home Health/Services ordered at D/C? No, Pt is moving to [facility] in [place]      DME ordered at D/C? No           Needs post D/C:   Now that you are home, are there any needs or concerns you need addressed before your next visit with your PCP?  (DME, meds, disease concerns, Etc): No     Follow up appointments:      Your appointments       Date & Time Appointment Department [facility] [place])    Jun 28, 2023  9:20 AM [facility] Follow Up with Justino Crigler., MD [address] ([address] [facility] [address])          Jul 05, 2023  1:00 PM [facility] Follow Up with Cathryn Maravilla NP [facility] ([place] [address])                [address], [facility] [address]  [address]  Samantha Mckeon 33 [phone] 31 [facility]  [address] [address] [phone]            PCP TCM/HFU appointment: scheduled at D/C within 7-14 days  yes     NCM Reviewed/scheduled/rescheduled PCP TCM/HFU appointment with pt:  Yes      Have you made all of your follow up appointments? no, pt states that they will schedule with Cards and Pulm    Is there any reason as to why you cannot make your appointments? No     NCM Reviewed upcoming Specialist Appt with patient     Not Applicable       Interventions by NCM: NCM reviewed discharge instructions and when to seek medical attention with the patient. Se states that she is feeling better. Only complaint is back pain which is not new. She states that Tylenol usually does the trick but will take Norco if it does not. She states that her breathing is fine. She has not checked her bp or bs stating that her son in law is a nurse and will check it when he gets home. She states that the swelling in her legs is gone and she can now see her ankles.  She denies having any fever, n/v/c/d, HA, lightheadedness or any other new or worsening symptoms. Med review completed. She denied having any questions or concerns at this time     CCM referral placed:  No        [x]  Discharge Summary, Discharge medications reviewed/discussed/and reconciled against outpatient medications with patient,  and orders reviewed and discussed. Any changes or updates to medications and or orders sent to PCP.

## 2023-06-27 ENCOUNTER — LAB ENCOUNTER (OUTPATIENT)
Dept: LAB | Facility: HOSPITAL | Age: 83
End: 2023-06-27
Attending: NURSE PRACTITIONER
Payer: MEDICARE

## 2023-06-27 DIAGNOSIS — R06.02 SHORTNESS OF BREATH: ICD-10-CM

## 2023-06-27 LAB
ALBUMIN SERPL-MCNC: 3.8 G/DL (ref 3.4–5)
ALBUMIN/GLOB SERPL: 0.9 {RATIO} (ref 1–2)
ALP LIVER SERPL-CCNC: 103 U/L
ALT SERPL-CCNC: 23 U/L
ANION GAP SERPL CALC-SCNC: 6 MMOL/L (ref 0–18)
AST SERPL-CCNC: 28 U/L (ref 15–37)
BASOPHILS # BLD AUTO: 0.09 X10(3) UL (ref 0–0.2)
BASOPHILS NFR BLD AUTO: 0.9 %
BILIRUB SERPL-MCNC: 0.6 MG/DL (ref 0.1–2)
BUN BLD-MCNC: 33 MG/DL (ref 7–18)
CALCIUM BLD-MCNC: 10.2 MG/DL (ref 8.5–10.1)
CHLORIDE SERPL-SCNC: 109 MMOL/L (ref 98–112)
CO2 SERPL-SCNC: 25 MMOL/L (ref 21–32)
CREAT BLD-MCNC: 1.57 MG/DL
EOSINOPHIL # BLD AUTO: 0.15 X10(3) UL (ref 0–0.7)
EOSINOPHIL NFR BLD AUTO: 1.5 %
ERYTHROCYTE [DISTWIDTH] IN BLOOD BY AUTOMATED COUNT: 13.4 %
FASTING STATUS PATIENT QL REPORTED: YES
GFR SERPLBLD BASED ON 1.73 SQ M-ARVRAT: 33 ML/MIN/1.73M2 (ref 60–?)
GLOBULIN PLAS-MCNC: 4.2 G/DL (ref 2.8–4.4)
GLUCOSE BLD-MCNC: 74 MG/DL (ref 70–99)
HCT VFR BLD AUTO: 42.9 %
HGB BLD-MCNC: 13.5 G/DL
IMM GRANULOCYTES # BLD AUTO: 0.03 X10(3) UL (ref 0–1)
IMM GRANULOCYTES NFR BLD: 0.3 %
LYMPHOCYTES # BLD AUTO: 1.87 X10(3) UL (ref 1–4)
LYMPHOCYTES NFR BLD AUTO: 18.7 %
MCH RBC QN AUTO: 30.5 PG (ref 26–34)
MCHC RBC AUTO-ENTMCNC: 31.5 G/DL (ref 31–37)
MCV RBC AUTO: 97.1 FL
MONOCYTES # BLD AUTO: 0.48 X10(3) UL (ref 0.1–1)
MONOCYTES NFR BLD AUTO: 4.8 %
NEUTROPHILS # BLD AUTO: 7.38 X10 (3) UL (ref 1.5–7.7)
NEUTROPHILS # BLD AUTO: 7.38 X10(3) UL (ref 1.5–7.7)
NEUTROPHILS NFR BLD AUTO: 73.8 %
NT-PROBNP SERPL-MCNC: 2204 PG/ML (ref ?–450)
OSMOLALITY SERPL CALC.SUM OF ELEC: 296 MOSM/KG (ref 275–295)
PLATELET # BLD AUTO: 180 10(3)UL (ref 150–450)
POTASSIUM SERPL-SCNC: 4.3 MMOL/L (ref 3.5–5.1)
PROT SERPL-MCNC: 8 G/DL (ref 6.4–8.2)
RBC # BLD AUTO: 4.42 X10(6)UL
SODIUM SERPL-SCNC: 140 MMOL/L (ref 136–145)
WBC # BLD AUTO: 10 X10(3) UL (ref 4–11)

## 2023-06-27 PROCEDURE — 36415 COLL VENOUS BLD VENIPUNCTURE: CPT

## 2023-06-27 PROCEDURE — 80053 COMPREHEN METABOLIC PANEL: CPT

## 2023-06-27 PROCEDURE — 83880 ASSAY OF NATRIURETIC PEPTIDE: CPT

## 2023-06-27 PROCEDURE — 85025 COMPLETE CBC W/AUTO DIFF WBC: CPT

## 2023-06-28 ENCOUNTER — APPOINTMENT (OUTPATIENT)
Dept: CT IMAGING | Facility: HOSPITAL | Age: 83
End: 2023-06-28
Attending: EMERGENCY MEDICINE
Payer: MEDICARE

## 2023-06-28 ENCOUNTER — HOSPITAL ENCOUNTER (EMERGENCY)
Facility: HOSPITAL | Age: 83
Discharge: HOME | End: 2023-06-28
Attending: EMERGENCY MEDICINE
Payer: MEDICARE

## 2023-06-28 ENCOUNTER — TELEPHONE (OUTPATIENT)
Dept: FAMILY MEDICINE CLINIC | Facility: CLINIC | Age: 83
End: 2023-06-28

## 2023-06-28 VITALS
SYSTOLIC BLOOD PRESSURE: 98 MMHG | TEMPERATURE: 98 F | DIASTOLIC BLOOD PRESSURE: 45 MMHG | BODY MASS INDEX: 40.48 KG/M2 | OXYGEN SATURATION: 87 % | HEART RATE: 52 BPM | HEIGHT: 62 IN | WEIGHT: 220 LBS | RESPIRATION RATE: 20 BRPM

## 2023-06-28 DIAGNOSIS — M54.9 BACK PAIN WITHOUT RADIATION: Primary | ICD-10-CM

## 2023-06-28 LAB
ALBUMIN SERPL-MCNC: 3.7 G/DL (ref 3.4–5)
ALBUMIN/GLOB SERPL: 0.9 {RATIO} (ref 1–2)
ALP LIVER SERPL-CCNC: 103 U/L
ALT SERPL-CCNC: 24 U/L
ANION GAP SERPL CALC-SCNC: 3 MMOL/L (ref 0–18)
AST SERPL-CCNC: 32 U/L (ref 15–37)
BASOPHILS # BLD AUTO: 0.1 X10(3) UL (ref 0–0.2)
BASOPHILS NFR BLD AUTO: 1 %
BILIRUB SERPL-MCNC: 0.6 MG/DL (ref 0.1–2)
BILIRUB UR QL STRIP.AUTO: NEGATIVE
BUN BLD-MCNC: 36 MG/DL (ref 7–18)
CALCIUM BLD-MCNC: 11.2 MG/DL (ref 8.5–10.1)
CHLORIDE SERPL-SCNC: 108 MMOL/L (ref 98–112)
CLARITY UR REFRACT.AUTO: CLEAR
CO2 SERPL-SCNC: 30 MMOL/L (ref 21–32)
COLOR UR AUTO: YELLOW
CREAT BLD-MCNC: 1.52 MG/DL
EOSINOPHIL # BLD AUTO: 0.24 X10(3) UL (ref 0–0.7)
EOSINOPHIL NFR BLD AUTO: 2.5 %
ERYTHROCYTE [DISTWIDTH] IN BLOOD BY AUTOMATED COUNT: 13.2 %
GFR SERPLBLD BASED ON 1.73 SQ M-ARVRAT: 34 ML/MIN/1.73M2 (ref 60–?)
GLOBULIN PLAS-MCNC: 3.9 G/DL (ref 2.8–4.4)
GLUCOSE BLD-MCNC: 96 MG/DL (ref 70–99)
GLUCOSE UR STRIP.AUTO-MCNC: NEGATIVE MG/DL
HCT VFR BLD AUTO: 42.6 %
HGB BLD-MCNC: 13.7 G/DL
HYALINE CASTS #/AREA URNS AUTO: PRESENT /LPF
IMM GRANULOCYTES # BLD AUTO: 0.03 X10(3) UL (ref 0–1)
IMM GRANULOCYTES NFR BLD: 0.3 %
KETONES UR STRIP.AUTO-MCNC: NEGATIVE MG/DL
LIPASE SERPL-CCNC: 42 U/L (ref 13–75)
LYMPHOCYTES # BLD AUTO: 2.23 X10(3) UL (ref 1–4)
LYMPHOCYTES NFR BLD AUTO: 23 %
MCH RBC QN AUTO: 30.4 PG (ref 26–34)
MCHC RBC AUTO-ENTMCNC: 32.2 G/DL (ref 31–37)
MCV RBC AUTO: 94.7 FL
MONOCYTES # BLD AUTO: 0.72 X10(3) UL (ref 0.1–1)
MONOCYTES NFR BLD AUTO: 7.4 %
NEUTROPHILS # BLD AUTO: 6.39 X10 (3) UL (ref 1.5–7.7)
NEUTROPHILS # BLD AUTO: 6.39 X10(3) UL (ref 1.5–7.7)
NEUTROPHILS NFR BLD AUTO: 65.8 %
NITRITE UR QL STRIP.AUTO: NEGATIVE
OSMOLALITY SERPL CALC.SUM OF ELEC: 300 MOSM/KG (ref 275–295)
PH UR STRIP.AUTO: 6 [PH] (ref 5–8)
PLATELET # BLD AUTO: 177 10(3)UL (ref 150–450)
POTASSIUM SERPL-SCNC: 4.4 MMOL/L (ref 3.5–5.1)
PROT SERPL-MCNC: 7.6 G/DL (ref 6.4–8.2)
PROT UR STRIP.AUTO-MCNC: NEGATIVE MG/DL
RBC # BLD AUTO: 4.5 X10(6)UL
RBC UR QL AUTO: NEGATIVE
SODIUM SERPL-SCNC: 141 MMOL/L (ref 136–145)
SP GR UR STRIP.AUTO: 1.01 (ref 1–1.03)
UROBILINOGEN UR STRIP.AUTO-MCNC: <2 MG/DL
WBC # BLD AUTO: 9.7 X10(3) UL (ref 4–11)

## 2023-06-28 PROCEDURE — 80053 COMPREHEN METABOLIC PANEL: CPT | Performed by: EMERGENCY MEDICINE

## 2023-06-28 PROCEDURE — 85025 COMPLETE CBC W/AUTO DIFF WBC: CPT | Performed by: EMERGENCY MEDICINE

## 2023-06-28 PROCEDURE — 87086 URINE CULTURE/COLONY COUNT: CPT | Performed by: EMERGENCY MEDICINE

## 2023-06-28 PROCEDURE — 99284 EMERGENCY DEPT VISIT MOD MDM: CPT

## 2023-06-28 PROCEDURE — 96365 THER/PROPH/DIAG IV INF INIT: CPT

## 2023-06-28 PROCEDURE — 83690 ASSAY OF LIPASE: CPT | Performed by: EMERGENCY MEDICINE

## 2023-06-28 PROCEDURE — 74176 CT ABD & PELVIS W/O CONTRAST: CPT | Performed by: EMERGENCY MEDICINE

## 2023-06-28 PROCEDURE — 96375 TX/PRO/DX INJ NEW DRUG ADDON: CPT

## 2023-06-28 PROCEDURE — 81001 URINALYSIS AUTO W/SCOPE: CPT | Performed by: EMERGENCY MEDICINE

## 2023-06-28 PROCEDURE — 99285 EMERGENCY DEPT VISIT HI MDM: CPT

## 2023-06-28 RX ORDER — CEPHALEXIN 500 MG/1
500 CAPSULE ORAL 3 TIMES DAILY
Qty: 21 CAPSULE | Refills: 0 | Status: SHIPPED | OUTPATIENT
Start: 2023-06-28 | End: 2023-06-29

## 2023-06-28 RX ORDER — ONDANSETRON 2 MG/ML
4 INJECTION INTRAMUSCULAR; INTRAVENOUS ONCE
Status: COMPLETED | OUTPATIENT
Start: 2023-06-28 | End: 2023-06-28

## 2023-06-28 RX ORDER — HYDROMORPHONE HYDROCHLORIDE 1 MG/ML
0.5 INJECTION, SOLUTION INTRAMUSCULAR; INTRAVENOUS; SUBCUTANEOUS EVERY 30 MIN PRN
Status: DISCONTINUED | OUTPATIENT
Start: 2023-06-28 | End: 2023-06-28

## 2023-06-28 RX ORDER — TRAMADOL HYDROCHLORIDE 50 MG/1
TABLET ORAL EVERY 6 HOURS PRN
Qty: 10 TABLET | Refills: 0 | Status: SHIPPED | OUTPATIENT
Start: 2023-06-28 | End: 2023-07-03

## 2023-06-28 NOTE — ED INITIAL ASSESSMENT (HPI)
PT ARRIVED VIA EMS, CALLED FOR LOW, LEFT BACK PAIN/SPASMS. TYLENOL WITH LITTLE RELIEF. REPORTS BEING HERE FOR SAME FEW DAYS AGO. REPORTS THIS IS CHRONIC PAIN.

## 2023-06-28 NOTE — ED QUICK NOTES
PT SATS 86%, STATES SHE HAS SLEEP APNEA AND USES NASAL STRIPS. OXYGEN 2L NC APPLIED AFTER MEDS GIVEN.

## 2023-06-29 ENCOUNTER — APPOINTMENT (OUTPATIENT)
Dept: GENERAL RADIOLOGY | Facility: HOSPITAL | Age: 83
End: 2023-06-29
Attending: STUDENT IN AN ORGANIZED HEALTH CARE EDUCATION/TRAINING PROGRAM
Payer: MEDICARE

## 2023-06-29 ENCOUNTER — APPOINTMENT (OUTPATIENT)
Dept: CT IMAGING | Facility: HOSPITAL | Age: 83
End: 2023-06-29
Attending: STUDENT IN AN ORGANIZED HEALTH CARE EDUCATION/TRAINING PROGRAM
Payer: MEDICARE

## 2023-06-29 ENCOUNTER — HOSPITAL ENCOUNTER (INPATIENT)
Facility: HOSPITAL | Age: 83
LOS: 2 days | Discharge: HOME | End: 2023-07-01
Attending: STUDENT IN AN ORGANIZED HEALTH CARE EDUCATION/TRAINING PROGRAM | Admitting: INTERNAL MEDICINE
Payer: MEDICARE

## 2023-06-29 DIAGNOSIS — R09.02 HYPOXIA: Primary | ICD-10-CM

## 2023-06-29 LAB
ALBUMIN SERPL-MCNC: 3.2 G/DL (ref 3.4–5)
ALBUMIN/GLOB SERPL: 1 {RATIO} (ref 1–2)
ALP LIVER SERPL-CCNC: 89 U/L
ALT SERPL-CCNC: 16 U/L
ANION GAP SERPL CALC-SCNC: 2 MMOL/L (ref 0–18)
ARTERIAL PATENCY WRIST A: POSITIVE
AST SERPL-CCNC: 23 U/L (ref 15–37)
BASE EXCESS BLDA CALC-SCNC: 5.4 MMOL/L (ref ?–2)
BASOPHILS # BLD AUTO: 0.07 X10(3) UL (ref 0–0.2)
BASOPHILS NFR BLD AUTO: 1 %
BILIRUB SERPL-MCNC: 0.6 MG/DL (ref 0.1–2)
BILIRUB UR QL STRIP.AUTO: NEGATIVE
BODY TEMPERATURE: 98.6 F
BUN BLD-MCNC: 30 MG/DL (ref 7–18)
CA-I BLD-SCNC: 1.38 MMOL/L (ref 0.95–1.32)
CALCIUM BLD-MCNC: 10.1 MG/DL (ref 8.5–10.1)
CHLORIDE SERPL-SCNC: 108 MMOL/L (ref 98–112)
CLARITY UR REFRACT.AUTO: CLEAR
CO2 SERPL-SCNC: 32 MMOL/L (ref 21–32)
COHGB MFR BLD: 2 % SAT (ref 0–3)
CREAT BLD-MCNC: 1.43 MG/DL
EOSINOPHIL # BLD AUTO: 0.14 X10(3) UL (ref 0–0.7)
EOSINOPHIL NFR BLD AUTO: 2 %
ERYTHROCYTE [DISTWIDTH] IN BLOOD BY AUTOMATED COUNT: 13.2 %
GFR SERPLBLD BASED ON 1.73 SQ M-ARVRAT: 37 ML/MIN/1.73M2 (ref 60–?)
GLOBULIN PLAS-MCNC: 3.2 G/DL (ref 2.8–4.4)
GLUCOSE BLD-MCNC: 73 MG/DL (ref 70–99)
GLUCOSE BLD-MCNC: 74 MG/DL (ref 70–99)
GLUCOSE BLD-MCNC: 81 MG/DL (ref 70–99)
GLUCOSE UR STRIP.AUTO-MCNC: NEGATIVE MG/DL
HCO3 BLDA-SCNC: 29.1 MEQ/L (ref 21–27)
HCT VFR BLD AUTO: 39.9 %
HGB BLD-MCNC: 12.7 G/DL
HGB BLD-MCNC: 12.9 G/DL
HYALINE CASTS #/AREA URNS AUTO: PRESENT /LPF
IMM GRANULOCYTES # BLD AUTO: 0.01 X10(3) UL (ref 0–1)
IMM GRANULOCYTES NFR BLD: 0.1 %
KETONES UR STRIP.AUTO-MCNC: NEGATIVE MG/DL
LACTATE BLD-SCNC: 0.5 MMOL/L (ref 0.5–2)
LYMPHOCYTES # BLD AUTO: 2.27 X10(3) UL (ref 1–4)
LYMPHOCYTES NFR BLD AUTO: 32.3 %
MCH RBC QN AUTO: 31 PG (ref 26–34)
MCHC RBC AUTO-ENTMCNC: 31.8 G/DL (ref 31–37)
MCV RBC AUTO: 97.3 FL
METHGB MFR BLD: 0.5 % SAT (ref 0.4–1.5)
MONOCYTES # BLD AUTO: 0.54 X10(3) UL (ref 0.1–1)
MONOCYTES NFR BLD AUTO: 7.7 %
NEUTROPHILS # BLD AUTO: 3.99 X10 (3) UL (ref 1.5–7.7)
NEUTROPHILS # BLD AUTO: 3.99 X10(3) UL (ref 1.5–7.7)
NEUTROPHILS NFR BLD AUTO: 56.9 %
NITRITE UR QL STRIP.AUTO: NEGATIVE
NT-PROBNP SERPL-MCNC: 2199 PG/ML (ref ?–450)
OSMOLALITY SERPL CALC.SUM OF ELEC: 299 MOSM/KG (ref 275–295)
OXYHGB MFR BLDA: 96.1 % (ref 92–100)
PCO2 BLDA: 54 MM HG (ref 35–45)
PH BLDA: 7.38 [PH] (ref 7.35–7.45)
PH UR STRIP.AUTO: 6 [PH] (ref 5–8)
PLATELET # BLD AUTO: 156 10(3)UL (ref 150–450)
PO2 BLDA: 95 MM HG (ref 80–100)
POTASSIUM BLD-SCNC: 4.3 MMOL/L (ref 3.6–5.1)
POTASSIUM SERPL-SCNC: 4.6 MMOL/L (ref 3.5–5.1)
PROT SERPL-MCNC: 6.4 G/DL (ref 6.4–8.2)
PROT UR STRIP.AUTO-MCNC: NEGATIVE MG/DL
RBC # BLD AUTO: 4.1 X10(6)UL
RBC UR QL AUTO: NEGATIVE
SARS-COV-2 RNA RESP QL NAA+PROBE: NOT DETECTED
SODIUM BLD-SCNC: 138 MMOL/L (ref 135–145)
SODIUM SERPL-SCNC: 142 MMOL/L (ref 136–145)
SP GR UR STRIP.AUTO: 1.01 (ref 1–1.03)
TROPONIN I HIGH SENSITIVITY: 16 NG/L
UROBILINOGEN UR STRIP.AUTO-MCNC: <2 MG/DL
WBC # BLD AUTO: 7 X10(3) UL (ref 4–11)

## 2023-06-29 PROCEDURE — 71045 X-RAY EXAM CHEST 1 VIEW: CPT | Performed by: STUDENT IN AN ORGANIZED HEALTH CARE EDUCATION/TRAINING PROGRAM

## 2023-06-29 PROCEDURE — 70450 CT HEAD/BRAIN W/O DYE: CPT | Performed by: STUDENT IN AN ORGANIZED HEALTH CARE EDUCATION/TRAINING PROGRAM

## 2023-06-29 PROCEDURE — 99223 1ST HOSP IP/OBS HIGH 75: CPT | Performed by: INTERNAL MEDICINE

## 2023-06-29 RX ORDER — NICOTINE POLACRILEX 4 MG
30 LOZENGE BUCCAL
Status: DISCONTINUED | OUTPATIENT
Start: 2023-06-29 | End: 2023-07-01

## 2023-06-29 RX ORDER — TORSEMIDE 5 MG/1
10 TABLET ORAL DAILY
Status: DISCONTINUED | OUTPATIENT
Start: 2023-06-30 | End: 2023-07-01

## 2023-06-29 RX ORDER — HYDROCODONE BITARTRATE AND ACETAMINOPHEN 5; 325 MG/1; MG/1
1 TABLET ORAL EVERY 8 HOURS PRN
Status: DISCONTINUED | OUTPATIENT
Start: 2023-06-29 | End: 2023-07-01

## 2023-06-29 RX ORDER — POLYETHYLENE GLYCOL 3350 17 G/17G
17 POWDER, FOR SOLUTION ORAL DAILY PRN
Status: DISCONTINUED | OUTPATIENT
Start: 2023-06-29 | End: 2023-07-01

## 2023-06-29 RX ORDER — ONDANSETRON 2 MG/ML
4 INJECTION INTRAMUSCULAR; INTRAVENOUS EVERY 6 HOURS PRN
Status: DISCONTINUED | OUTPATIENT
Start: 2023-06-29 | End: 2023-07-01

## 2023-06-29 RX ORDER — NICOTINE POLACRILEX 4 MG
15 LOZENGE BUCCAL
Status: DISCONTINUED | OUTPATIENT
Start: 2023-06-29 | End: 2023-07-01

## 2023-06-29 RX ORDER — LOSARTAN POTASSIUM 50 MG/1
50 TABLET ORAL NIGHTLY
Status: DISCONTINUED | OUTPATIENT
Start: 2023-06-29 | End: 2023-07-01

## 2023-06-29 RX ORDER — CARVEDILOL 6.25 MG/1
6.25 TABLET ORAL 2 TIMES DAILY WITH MEALS
Status: DISCONTINUED | OUTPATIENT
Start: 2023-06-29 | End: 2023-07-01

## 2023-06-29 RX ORDER — PRAVASTATIN SODIUM 20 MG
80 TABLET ORAL NIGHTLY
Status: DISCONTINUED | OUTPATIENT
Start: 2023-06-29 | End: 2023-07-01

## 2023-06-29 RX ORDER — SPIRONOLACTONE 25 MG/1
12.5 TABLET ORAL DAILY
Status: DISCONTINUED | OUTPATIENT
Start: 2023-06-30 | End: 2023-07-01

## 2023-06-29 RX ORDER — BENZONATATE 200 MG/1
200 CAPSULE ORAL 3 TIMES DAILY PRN
Status: DISCONTINUED | OUTPATIENT
Start: 2023-06-29 | End: 2023-07-01

## 2023-06-29 RX ORDER — GABAPENTIN 300 MG/1
300 CAPSULE ORAL 3 TIMES DAILY
Status: DISCONTINUED | OUTPATIENT
Start: 2023-06-29 | End: 2023-07-01

## 2023-06-29 RX ORDER — DEXTROSE MONOHYDRATE 25 G/50ML
50 INJECTION, SOLUTION INTRAVENOUS
Status: DISCONTINUED | OUTPATIENT
Start: 2023-06-29 | End: 2023-07-01

## 2023-06-29 RX ORDER — ECHINACEA PURPUREA EXTRACT 125 MG
1 TABLET ORAL
Status: DISCONTINUED | OUTPATIENT
Start: 2023-06-29 | End: 2023-07-01

## 2023-06-29 RX ORDER — ESCITALOPRAM OXALATE 5 MG/1
5 TABLET ORAL DAILY
Status: DISCONTINUED | OUTPATIENT
Start: 2023-06-30 | End: 2023-07-01

## 2023-06-29 RX ORDER — LEVOTHYROXINE SODIUM 0.07 MG/1
75 TABLET ORAL
Status: DISCONTINUED | OUTPATIENT
Start: 2023-06-30 | End: 2023-07-01

## 2023-06-29 RX ORDER — SENNOSIDES 8.6 MG
17.2 TABLET ORAL NIGHTLY PRN
Status: DISCONTINUED | OUTPATIENT
Start: 2023-06-29 | End: 2023-07-01

## 2023-06-29 RX ORDER — MELATONIN
3 NIGHTLY PRN
Status: DISCONTINUED | OUTPATIENT
Start: 2023-06-29 | End: 2023-07-01

## 2023-06-29 RX ORDER — CETIRIZINE HYDROCHLORIDE 5 MG/1
5 TABLET ORAL DAILY
Status: DISCONTINUED | OUTPATIENT
Start: 2023-06-30 | End: 2023-07-01

## 2023-06-29 RX ORDER — FUROSEMIDE 10 MG/ML
40 INJECTION INTRAMUSCULAR; INTRAVENOUS ONCE
Status: COMPLETED | OUTPATIENT
Start: 2023-06-29 | End: 2023-06-29

## 2023-06-29 RX ORDER — BISACODYL 10 MG
10 SUPPOSITORY, RECTAL RECTAL
Status: DISCONTINUED | OUTPATIENT
Start: 2023-06-29 | End: 2023-07-01

## 2023-06-29 RX ORDER — METOCLOPRAMIDE HYDROCHLORIDE 5 MG/ML
5 INJECTION INTRAMUSCULAR; INTRAVENOUS EVERY 8 HOURS PRN
Status: DISCONTINUED | OUTPATIENT
Start: 2023-06-29 | End: 2023-07-01

## 2023-06-29 RX ORDER — ACETAMINOPHEN 500 MG
500 TABLET ORAL EVERY 4 HOURS PRN
Status: DISCONTINUED | OUTPATIENT
Start: 2023-06-29 | End: 2023-07-01

## 2023-06-29 NOTE — ED QUICK NOTES
Pt ambulate to bathroom, pt did desat, and was placed back on her O2 upon returning to bed. Pt states she did feel SOB while ambulating but she did appear to be SOB.

## 2023-06-29 NOTE — ED QUICK NOTES
Orders for admission, patient is aware of plan and ready to go upstairs. Any questions, please call ED RN Helen Moreau at extension [phone].      Patient Covid vaccination status: Fully vaccinated     COVID Test Ordered in ED: None    COVID Suspicion at Admission: N/A    Running Infusions:  None    Mental Status/LOC at time of transport: A&Ox4    Other pertinent information:   CIWA score: N/A   NIH score:  N/A

## 2023-06-30 ENCOUNTER — APPOINTMENT (OUTPATIENT)
Dept: MRI IMAGING | Facility: HOSPITAL | Age: 83
End: 2023-06-30
Attending: INTERNAL MEDICINE
Payer: MEDICARE

## 2023-06-30 PROBLEM — R53.1 GENERAL WEAKNESS: Status: ACTIVE | Noted: 2023-06-30

## 2023-06-30 PROBLEM — I50.9 ACUTE ON CHRONIC HEART FAILURE (HCC): Status: ACTIVE | Noted: 2023-06-17

## 2023-06-30 PROBLEM — R29.6 FREQUENT FALLS: Status: ACTIVE | Noted: 2023-06-30

## 2023-06-30 LAB
ANION GAP SERPL CALC-SCNC: 4 MMOL/L (ref 0–18)
ARTERIAL PATENCY WRIST A: POSITIVE
BASE EXCESS BLDA CALC-SCNC: 7.5 MMOL/L (ref ?–2)
BASOPHILS # BLD AUTO: 0.08 X10(3) UL (ref 0–0.2)
BASOPHILS NFR BLD AUTO: 1.3 %
BODY TEMPERATURE: 98.6 F
BUN BLD-MCNC: 30 MG/DL (ref 7–18)
CALCIUM BLD-MCNC: 10.3 MG/DL (ref 8.5–10.1)
CHLORIDE SERPL-SCNC: 106 MMOL/L (ref 98–112)
CO2 SERPL-SCNC: 30 MMOL/L (ref 21–32)
COHGB MFR BLD: 2.9 % SAT (ref 0–3)
CREAT BLD-MCNC: 1.46 MG/DL
EOSINOPHIL # BLD AUTO: 0.16 X10(3) UL (ref 0–0.7)
EOSINOPHIL NFR BLD AUTO: 2.6 %
ERYTHROCYTE [DISTWIDTH] IN BLOOD BY AUTOMATED COUNT: 13.1 %
GFR SERPLBLD BASED ON 1.73 SQ M-ARVRAT: 36 ML/MIN/1.73M2 (ref 60–?)
GLUCOSE BLD-MCNC: 100 MG/DL (ref 70–99)
GLUCOSE BLD-MCNC: 112 MG/DL (ref 70–99)
GLUCOSE BLD-MCNC: 114 MG/DL (ref 70–99)
GLUCOSE BLD-MCNC: 119 MG/DL (ref 70–99)
GLUCOSE BLD-MCNC: 82 MG/DL (ref 70–99)
HCO3 BLDA-SCNC: 30.6 MEQ/L (ref 21–27)
HCT VFR BLD AUTO: 40.8 %
HGB BLD-MCNC: 12.8 G/DL
HGB BLD-MCNC: 12.9 G/DL
IMM GRANULOCYTES # BLD AUTO: 0.02 X10(3) UL (ref 0–1)
IMM GRANULOCYTES NFR BLD: 0.3 %
LYMPHOCYTES # BLD AUTO: 1.66 X10(3) UL (ref 1–4)
LYMPHOCYTES NFR BLD AUTO: 26.9 %
MCH RBC QN AUTO: 29.9 PG (ref 26–34)
MCHC RBC AUTO-ENTMCNC: 31.6 G/DL (ref 31–37)
MCV RBC AUTO: 94.4 FL
METHGB MFR BLD: 0.8 % SAT (ref 0.4–1.5)
MONOCYTES # BLD AUTO: 0.49 X10(3) UL (ref 0.1–1)
MONOCYTES NFR BLD AUTO: 7.9 %
NEUTROPHILS # BLD AUTO: 3.77 X10 (3) UL (ref 1.5–7.7)
NEUTROPHILS # BLD AUTO: 3.77 X10(3) UL (ref 1.5–7.7)
NEUTROPHILS NFR BLD AUTO: 61 %
OSMOLALITY SERPL CALC.SUM OF ELEC: 297 MOSM/KG (ref 275–295)
OXYHGB MFR BLDA: 88.7 % (ref 92–100)
PCO2 BLDA: 50 MM HG (ref 35–45)
PH BLDA: 7.43 [PH] (ref 7.35–7.45)
PLATELET # BLD AUTO: 155 10(3)UL (ref 150–450)
PO2 BLDA: 60 MM HG (ref 80–100)
POTASSIUM SERPL-SCNC: 3.6 MMOL/L (ref 3.5–5.1)
Q-T INTERVAL: 354 MS
QRS DURATION: 94 MS
QTC CALCULATION (BEZET): 403 MS
R AXIS: 7 DEGREES
RBC # BLD AUTO: 4.32 X10(6)UL
SODIUM SERPL-SCNC: 140 MMOL/L (ref 136–145)
T AXIS: 32 DEGREES
VENTRICULAR RATE: 78 BPM
WBC # BLD AUTO: 6.2 X10(3) UL (ref 4–11)

## 2023-06-30 PROCEDURE — 99232 SBSQ HOSP IP/OBS MODERATE 35: CPT | Performed by: INTERNAL MEDICINE

## 2023-06-30 PROCEDURE — 70551 MRI BRAIN STEM W/O DYE: CPT | Performed by: INTERNAL MEDICINE

## 2023-06-30 NOTE — PROGRESS NOTES
[facility] Pharmacy Note:  Renal Dose Adjustment for Cetirizine (ZYRTEC)    Joni Christiansen has been prescribed Cetirizine (Zyrtec) 10 mg orally daily. Estimated Creatinine Clearance: 24.8 mL/min (A) (based on SCr of 1.43 mg/dL (H)). The dose has been changed to 5 mg orally daily per P&T approved protocol. Pharmacy will follow and resume original order if renal function improves.       Thank you,  Jony Gregory, PharmD  6/29/2023  9:56 PM  [address] [place] [address] Extension: [phone]

## 2023-06-30 NOTE — PROGRESS NOTES
NURSING ADMISSION NOTE      Patient admitted via Cart  Oriented to room 524  Safety precautions initiated. Bed in low position. Admission navigator completed    Alert x4. Room air. NSR/ controlled afib on tele. Eliquis. Voids. Accuchecks QID. Up SBA. Denies any c/o pain. No n/v/d. Updated on POC, verbalized understanding, no questions at this time. All needs met. Call light in reach.

## 2023-06-30 NOTE — PLAN OF CARE
Pt is a&ox4. Glasses. RA sating >90%. Tele controlled a-fib on eliquis. EP (non-cardiac). Voids per restroom, up SBA. Daily weights. Tolerating a diabetic diet w/ QID accuchecks. Cards and pulm consulted. Pt and dtr updated on poc. Safety precautions in place. No further needs at this time.     Problem: RISK FOR INFECTION - ADULT  Goal: Absence of fever/infection during anticipated neutropenic period  Description: INTERVENTIONS  - Monitor WBC  - Administer growth factors as ordered  - Implement neutropenic guidelines  Outcome: Progressing

## 2023-06-30 NOTE — PHYSICAL THERAPY NOTE
PT order received, chart reviewed - discussed case with OT - no skilled IPPT needs at this time, will sign off.

## 2023-07-01 VITALS
WEIGHT: 214.06 LBS | OXYGEN SATURATION: 97 % | HEART RATE: 87 BPM | HEIGHT: 62 IN | TEMPERATURE: 98 F | DIASTOLIC BLOOD PRESSURE: 55 MMHG | SYSTOLIC BLOOD PRESSURE: 124 MMHG | BODY MASS INDEX: 39.39 KG/M2 | RESPIRATION RATE: 17 BRPM

## 2023-07-01 LAB
ANION GAP SERPL CALC-SCNC: 5 MMOL/L (ref 0–18)
BASOPHILS # BLD AUTO: 0.09 X10(3) UL (ref 0–0.2)
BASOPHILS NFR BLD AUTO: 1.3 %
BUN BLD-MCNC: 30 MG/DL (ref 7–18)
CALCIUM BLD-MCNC: 10.4 MG/DL (ref 8.5–10.1)
CHLORIDE SERPL-SCNC: 106 MMOL/L (ref 98–112)
CO2 SERPL-SCNC: 28 MMOL/L (ref 21–32)
CREAT BLD-MCNC: 1.27 MG/DL
EOSINOPHIL # BLD AUTO: 0.23 X10(3) UL (ref 0–0.7)
EOSINOPHIL NFR BLD AUTO: 3.4 %
ERYTHROCYTE [DISTWIDTH] IN BLOOD BY AUTOMATED COUNT: 13 %
GFR SERPLBLD BASED ON 1.73 SQ M-ARVRAT: 43 ML/MIN/1.73M2 (ref 60–?)
GLUCOSE BLD-MCNC: 81 MG/DL (ref 70–99)
GLUCOSE BLD-MCNC: 82 MG/DL (ref 70–99)
GLUCOSE BLD-MCNC: 84 MG/DL (ref 70–99)
HCT VFR BLD AUTO: 40.2 %
HGB BLD-MCNC: 12.8 G/DL
IMM GRANULOCYTES # BLD AUTO: 0.02 X10(3) UL (ref 0–1)
IMM GRANULOCYTES NFR BLD: 0.3 %
LYMPHOCYTES # BLD AUTO: 2.29 X10(3) UL (ref 1–4)
LYMPHOCYTES NFR BLD AUTO: 33.9 %
MCH RBC QN AUTO: 30.3 PG (ref 26–34)
MCHC RBC AUTO-ENTMCNC: 31.8 G/DL (ref 31–37)
MCV RBC AUTO: 95.3 FL
MONOCYTES # BLD AUTO: 0.64 X10(3) UL (ref 0.1–1)
MONOCYTES NFR BLD AUTO: 9.5 %
NEUTROPHILS # BLD AUTO: 3.49 X10 (3) UL (ref 1.5–7.7)
NEUTROPHILS # BLD AUTO: 3.49 X10(3) UL (ref 1.5–7.7)
NEUTROPHILS NFR BLD AUTO: 51.6 %
OSMOLALITY SERPL CALC.SUM OF ELEC: 293 MOSM/KG (ref 275–295)
PLATELET # BLD AUTO: 158 10(3)UL (ref 150–450)
POTASSIUM SERPL-SCNC: 3.8 MMOL/L (ref 3.5–5.1)
RBC # BLD AUTO: 4.22 X10(6)UL
SODIUM SERPL-SCNC: 139 MMOL/L (ref 136–145)
WBC # BLD AUTO: 6.8 X10(3) UL (ref 4–11)

## 2023-07-01 PROCEDURE — 99239 HOSP IP/OBS DSCHRG MGMT >30: CPT | Performed by: INTERNAL MEDICINE

## 2023-07-01 NOTE — DISCHARGE SUMMARY
[facility] HOSPITALIST  DISCHARGE SUMMARY     Regulo Christiansen Patient Status:  Inpatient    1940 MRN [id number]   [facility] 5NW-A Attending Don Rodas MD   Hosp Day # 2 PCP Glenn Garcia MD     Date of Admission: 2023  Date of Discharge:   2023      Discharge Disposition: Home or Self Care    Discharge Diagnosis:  Unsteadiness  Falls  Dyspnea  Presumed HFpEF with mild exacerbation  DVT/PE  Perm Afib  Ess HTN    History of Present Illness: Emily Jain is a [de-identified]year old female with PMHx DVT/PE on eliquis, permanent atrial fibrillation, HFpEF, untreated ELY, hypertension, hyperlipidemia, DM type II, CKD stage III and obesity who presents to the hospital with increased falls, generalized weakness, and SOB. Patient fell when visiting her  who is currently in the ICU. Daughter reports patient did not hit her head. When she arrived home from the hospital, she was unsteady and had a near fall. Family reports increased tremors. She had been taking baclofen as needed but was stopped a few days ago. She appeared dyspneic in ER with conversation. She was saturating 90% on room air and placed on 2L oxygen. Currently denies any shortness of breath CT head pending. She is wanting to go home from ER because she is in the process of moving and because her  is in the ICU. Daughter is at bedside. She reports they had a PCP appointment today planned today and were going to discuss neurology follow-up as an outpatient. Brief Synopsis: Pt was admitted and monitored on telemery. She had imaging that was negative for acute causes for her frequent falls. She plans to follow up with Neurology as outpatient. She was seen by Cardiology and Pulm for her dyspnea that is likely due to her untreated ELY. She wanted to DC given her  is also sick in the hospital. Pt will follow up with PCP as outpatient.      Lace+ Score: 72  59-90 High Risk  29-58 Medium Risk  0-28   Low Risk  Patient was referred to the [facility]. TCM Follow-Up Recommendation:  LACE > 58: High Risk of readmission after discharge from the hospital.      Procedures during hospitalization:   none    Incidental or significant findings and recommendations (brief descriptions):  none    Lab/Test results pending at Discharge:   none    Consultants:  Melissa Durand, Cardiology    Discharge Medication List:     Discharge Medications        CONTINUE taking these medications        Instructions Prescription details   acetaminophen 500 MG Tabs  Commonly known as: Tylenol Extra Strength      Take 2 tablets (1,000 mg total) by mouth every 6 (six) hours as needed for Pain. Refills: 0     alendronate 35 MG Tabs  Commonly known as: Fosamax      Take 1 tablet (35 mg total) by mouth every 7 days. Quantity: 25 tablet  Refills: 1     apixaban 5 MG Tabs  Commonly known as: Eliquis      Take 1 tablet (5 mg total) by mouth 2 (two) times daily. Refills: 0     carvedilol 6.25 MG Tabs  Commonly known as: Coreg      Take 1 tablet (6.25 mg total) by mouth 2 (two) times daily with meals. Quantity: 60 tablet  Refills: 3     cetirizine 10 MG Tabs  Commonly known as: ZyrTEC      Take 1 tablet (10 mg total) by mouth daily. Refills: 0     Cyanocobalamin ER 1000 MCG Tbcr      Take 1 tablet (1,000 mcg total) by mouth daily. Refills: 0     escitalopram 5 MG Tabs  Commonly known as: Lexapro      Take 1 tablet (5 mg total) by mouth daily. Quantity: 90 tablet  Refills: 1     gabapentin 300 MG Caps  Commonly known as: Neurontin      Take 1 capsule (300 mg total) by mouth in the morning, at noon, and at bedtime. Refills: 0     HYDROcodone-acetaminophen 5-325 MG Tabs  Commonly known as: Norco      Take 1 tablet by mouth every 8 (eight) hours as needed for Pain. Refills: 0     Irbesartan 150 MG Tabs      Take 1 tablet (150 mg total) by mouth nightly.    Refills: 0     levothyroxine 75 MCG Tabs  Commonly known as: Synthroid Take 1 tablet (75 mcg total) by mouth before breakfast.   Refills: 0     OneTouch Verio Strp      Check 3-4 times/day for sugar check   Refills: 0     Pravastatin Sodium 80 MG Tabs  Commonly known as: PRAVACHOL      Take 1 tablet (80 mg total) by mouth nightly. Refills: 0     spironolactone 25 MG Tabs  Commonly known as: Aldactone      Take 0.5 tablets (12.5 mg total) by mouth daily. Refills: 0     torsemide 20 MG Tabs  Commonly known as: Demadex      Take 0.5 tablets (10 mg total) by mouth daily. Quantity: 30 tablet  Refills: 3     Trulicity 5.26 XX/4.1OZ Sopn  Generic drug: Dulaglutide      Inject 0.75 mg into the skin once a week. Quantity: 2 mL  Refills: 1     Vitamin D3 25 MCG (1000 UT) Caps      Take 1 capsule by mouth daily. Refills: 0            ASK your doctor about these medications        Instructions Prescription details   traMADol 50 MG Tabs  Commonly known as: Ultram  Ask about: Should I take this medication? Take 1-2 tablets ( mg total) by mouth every 6 (six) hours as needed for Pain.    Stop taking on: July 3, 2023  Quantity: 10 tablet  Refills: 0              [place]    Follow-up appointment:   Hollie Jackson, [address] [place] [address]  [phone]    Follow up in 1 week(s)  Follow up    MD Gaye Armenta [place] [address]  [phone]    Follow up in 1 week(s)  Follow up 1-2 weeks    Angelica Horton MD  [facility]  [phone]    Follow up in 2 week(s)      Appointments for Next 30 Days 2023 - 2023      None            Vital signs:           -----------------------------------------------------------------------------------------------  PATIENT DISCHARGE INSTRUCTIONS: See electronic chart    Benson Bloch, MD    Total time spent on discharge plannin minutes     The [facility] makes medical notes like these available to patients in the interest of transparency. Please be advised this is a medical document. Medical documents are intended to carry relevant information, facts as evident, and the clinical opinion of the practitioner. The medical note is intended as peer to peer communication and may appear blunt or direct. It is written in medical language and may contain abbreviations or verbiage that are unfamiliar.

## 2023-07-01 NOTE — PLAN OF CARE
Problem: RISK FOR INFECTION - ADULT  Goal: Absence of fever/infection during anticipated neutropenic period  Description: INTERVENTIONS  - Monitor WBC  - Administer growth factors as ordered  - Implement neutropenic guidelines  Outcome: Progressing     Problem: SAFETY ADULT - FALL  Goal: Free from fall injury  Description: INTERVENTIONS:  - Assess pt frequently for physical needs  - Identify cognitive and physical deficits and behaviors that affect risk of falls.   - [place] fall precautions as indicated by assessment.  - Educate pt/family on patient safety including physical limitations  - Instruct pt to call for assistance with activity based on assessment  - Modify environment to reduce risk of injury  - Provide assistive devices as appropriate  - Consider OT/PT consult to assist with strengthening/mobility  - Encourage toileting schedule  Outcome: Progressing     Problem: DISCHARGE PLANNING  Goal: Discharge to home or other facility with appropriate resources  Description: INTERVENTIONS:  - Identify barriers to discharge w/pt and caregiver  - Include patient/family/discharge partner in discharge planning  - Arrange for needed discharge resources and transportation as appropriate  - Identify discharge learning needs (meds, wound care, etc)  - Arrange for interpreters to assist at discharge as needed  - Consider post-discharge preferences of patient/family/discharge partner  - Complete POLST form as appropriate  - Assess patient's ability to be responsible for managing their own health  - Refer to Case Management Department for coordinating discharge planning if the patient needs post-hospital services based on physician/LIP order or complex needs related to functional status, cognitive ability or social support system  Outcome: Progressing

## 2023-07-01 NOTE — PLAN OF CARE
Pt is A&Ox4, tremors noted in face. Wears glasses. VSS, afebrile. Maintaining O2 sats WDL on RA. ELY- no cpap Encouraged IS. Tele, a-fib/NSR. Eliquis. JANNIE. EP. Last BM 6/30. Carb controlled diet, QID accucheck. Voids. Up SB. Denies pain. PIV, SL. No further needs at this time, continue POC. Safety precautions in place. Problem: RISK FOR INFECTION - ADULT  Goal: Absence of fever/infection during anticipated neutropenic period  Description: INTERVENTIONS  - Monitor WBC  - Administer growth factors as ordered  - Implement neutropenic guidelines  Outcome: Progressing     Problem: SAFETY ADULT - FALL  Goal: Free from fall injury  Description: INTERVENTIONS:  - Assess pt frequently for physical needs  - Identify cognitive and physical deficits and behaviors that affect risk of falls.   - [place] fall precautions as indicated by assessment.  - Educate pt/family on patient safety including physical limitations  - Instruct pt to call for assistance with activity based on assessment  - Modify environment to reduce risk of injury  - Provide assistive devices as appropriate  - Consider OT/PT consult to assist with strengthening/mobility  - Encourage toileting schedule  Outcome: Progressing     Problem: DISCHARGE PLANNING  Goal: Discharge to home or other facility with appropriate resources  Description: INTERVENTIONS:  - Identify barriers to discharge w/pt and caregiver  - Include patient/family/discharge partner in discharge planning  - Arrange for needed discharge resources and transportation as appropriate  - Identify discharge learning needs (meds, wound care, etc)  - Arrange for interpreters to assist at discharge as needed  - Consider post-discharge preferences of patient/family/discharge partner  - Complete POLST form as appropriate  - Assess patient's ability to be responsible for managing their own health  - Refer to Case Management Department for coordinating discharge planning if the patient needs post-hospital services based on physician/LIP order or complex needs related to functional status, cognitive ability or social support system  Outcome: Progressing     Problem: Altered Communication/Language Barrier  Goal: Patient/Family is able to understand and participate in their care  Description: Interventions:  - Assess communication ability and preferred communication style  - Implement communication aides and strategies  - Use visual cues when possible  - Listen attentively, be patient, do not interrupt  - Minimize distractions  - Allow time for understanding and response  - Establish method for patient to ask for assistance (call light)  - Provide an  as needed  - Communicate barriers and strategies to overcome with those who interact with patient  Outcome: Progressing

## 2023-07-01 NOTE — PROGRESS NOTES
Discharged home via wheelchair. Accompanied by support staff and daughter. Belongings taken by patient/family. PIV removed. Tele box removed and placed in the drawer. Discharge Navigator completed. Discharge instructions reviewed with patient and daughter at bedside. All questions and concerns addressed at this time.

## 2023-07-03 ENCOUNTER — OFFICE VISIT (OUTPATIENT)
Dept: FAMILY MEDICINE CLINIC | Facility: CLINIC | Age: 83
End: 2023-07-03
Payer: MEDICARE

## 2023-07-03 ENCOUNTER — PATIENT OUTREACH (OUTPATIENT)
Dept: CASE MANAGEMENT | Age: 83
End: 2023-07-03

## 2023-07-03 VITALS
SYSTOLIC BLOOD PRESSURE: 122 MMHG | DIASTOLIC BLOOD PRESSURE: 64 MMHG | TEMPERATURE: 98 F | HEART RATE: 80 BPM | RESPIRATION RATE: 22 BRPM | WEIGHT: 214 LBS | HEIGHT: 62 IN | BODY MASS INDEX: 39.38 KG/M2

## 2023-07-03 DIAGNOSIS — E11.22 TYPE 2 DIABETES MELLITUS WITH STAGE 3A CHRONIC KIDNEY DISEASE, WITHOUT LONG-TERM CURRENT USE OF INSULIN (HCC): ICD-10-CM

## 2023-07-03 DIAGNOSIS — M41.86 LEVOSCOLIOSIS OF LUMBAR SPINE: ICD-10-CM

## 2023-07-03 DIAGNOSIS — F41.9 ANXIETY: ICD-10-CM

## 2023-07-03 DIAGNOSIS — Z02.9 ENCOUNTERS FOR UNSPECIFIED ADMINISTRATIVE PURPOSE: Primary | ICD-10-CM

## 2023-07-03 DIAGNOSIS — G47.33 OSA (OBSTRUCTIVE SLEEP APNEA): Primary | ICD-10-CM

## 2023-07-03 DIAGNOSIS — N18.31 TYPE 2 DIABETES MELLITUS WITH STAGE 3A CHRONIC KIDNEY DISEASE, WITHOUT LONG-TERM CURRENT USE OF INSULIN (HCC): ICD-10-CM

## 2023-07-03 DIAGNOSIS — M51.36 LUMBAR DEGENERATIVE DISC DISEASE: ICD-10-CM

## 2023-07-03 DIAGNOSIS — M70.61 TROCHANTERIC BURSITIS, RIGHT HIP: ICD-10-CM

## 2023-07-03 DIAGNOSIS — I10 ESSENTIAL HYPERTENSION: ICD-10-CM

## 2023-07-03 PROCEDURE — 99214 OFFICE O/P EST MOD 30 MIN: CPT | Performed by: STUDENT IN AN ORGANIZED HEALTH CARE EDUCATION/TRAINING PROGRAM

## 2023-07-03 PROCEDURE — 3008F BODY MASS INDEX DOCD: CPT | Performed by: STUDENT IN AN ORGANIZED HEALTH CARE EDUCATION/TRAINING PROGRAM

## 2023-07-03 PROCEDURE — 3074F SYST BP LT 130 MM HG: CPT | Performed by: STUDENT IN AN ORGANIZED HEALTH CARE EDUCATION/TRAINING PROGRAM

## 2023-07-03 PROCEDURE — 3078F DIAST BP <80 MM HG: CPT | Performed by: STUDENT IN AN ORGANIZED HEALTH CARE EDUCATION/TRAINING PROGRAM

## 2023-07-03 PROCEDURE — 1160F RVW MEDS BY RX/DR IN RCRD: CPT | Performed by: STUDENT IN AN ORGANIZED HEALTH CARE EDUCATION/TRAINING PROGRAM

## 2023-07-03 PROCEDURE — 1159F MED LIST DOCD IN RCRD: CPT | Performed by: STUDENT IN AN ORGANIZED HEALTH CARE EDUCATION/TRAINING PROGRAM

## 2023-07-03 RX ORDER — GABAPENTIN 100 MG/1
CAPSULE ORAL
Qty: 405 CAPSULE | Refills: 0 | Status: SHIPPED | OUTPATIENT
Start: 2023-07-03 | End: 2023-11-30

## 2023-07-03 RX ORDER — INSULIN GLARGINE 100 [IU]/ML
10 INJECTION, SOLUTION SUBCUTANEOUS DAILY
Qty: 15 ML | Refills: 0 | Status: SHIPPED | OUTPATIENT
Start: 2023-07-03

## 2023-07-14 ENCOUNTER — OFFICE VISIT (OUTPATIENT)
Dept: FAMILY MEDICINE CLINIC | Facility: CLINIC | Age: 83
End: 2023-07-14
Payer: MEDICARE

## 2023-07-14 VITALS
DIASTOLIC BLOOD PRESSURE: 58 MMHG | HEIGHT: 63.58 IN | TEMPERATURE: 97 F | BODY MASS INDEX: 38.32 KG/M2 | HEART RATE: 64 BPM | SYSTOLIC BLOOD PRESSURE: 100 MMHG | WEIGHT: 219 LBS | RESPIRATION RATE: 16 BRPM

## 2023-07-14 DIAGNOSIS — E11.22 TYPE 2 DIABETES MELLITUS WITH STAGE 3A CHRONIC KIDNEY DISEASE, WITHOUT LONG-TERM CURRENT USE OF INSULIN (HCC): ICD-10-CM

## 2023-07-14 DIAGNOSIS — N18.31 TYPE 2 DIABETES MELLITUS WITH STAGE 3A CHRONIC KIDNEY DISEASE, WITHOUT LONG-TERM CURRENT USE OF INSULIN (HCC): ICD-10-CM

## 2023-07-14 DIAGNOSIS — H91.93 DECREASED HEARING OF BOTH EARS: ICD-10-CM

## 2023-07-14 DIAGNOSIS — R09.89 GLOBUS SENSATION: Primary | ICD-10-CM

## 2023-07-14 DIAGNOSIS — R41.3 MEMORY CHANGES: ICD-10-CM

## 2023-07-14 PROBLEM — D69.6 THROMBOCYTOPENIA (HCC): Chronic | Status: ACTIVE | Noted: 2023-07-14

## 2023-07-14 PROBLEM — D69.6 THROMBOCYTOPENIA: Chronic | Status: ACTIVE | Noted: 2023-07-14

## 2023-07-14 PROCEDURE — 1111F DSCHRG MED/CURRENT MED MERGE: CPT | Performed by: STUDENT IN AN ORGANIZED HEALTH CARE EDUCATION/TRAINING PROGRAM

## 2023-07-14 PROCEDURE — 1126F AMNT PAIN NOTED NONE PRSNT: CPT | Performed by: STUDENT IN AN ORGANIZED HEALTH CARE EDUCATION/TRAINING PROGRAM

## 2023-07-14 PROCEDURE — 3078F DIAST BP <80 MM HG: CPT | Performed by: STUDENT IN AN ORGANIZED HEALTH CARE EDUCATION/TRAINING PROGRAM

## 2023-07-14 PROCEDURE — 1160F RVW MEDS BY RX/DR IN RCRD: CPT | Performed by: STUDENT IN AN ORGANIZED HEALTH CARE EDUCATION/TRAINING PROGRAM

## 2023-07-14 PROCEDURE — 3008F BODY MASS INDEX DOCD: CPT | Performed by: STUDENT IN AN ORGANIZED HEALTH CARE EDUCATION/TRAINING PROGRAM

## 2023-07-14 PROCEDURE — 1159F MED LIST DOCD IN RCRD: CPT | Performed by: STUDENT IN AN ORGANIZED HEALTH CARE EDUCATION/TRAINING PROGRAM

## 2023-07-14 PROCEDURE — 99214 OFFICE O/P EST MOD 30 MIN: CPT | Performed by: STUDENT IN AN ORGANIZED HEALTH CARE EDUCATION/TRAINING PROGRAM

## 2023-07-14 PROCEDURE — 3074F SYST BP LT 130 MM HG: CPT | Performed by: STUDENT IN AN ORGANIZED HEALTH CARE EDUCATION/TRAINING PROGRAM

## 2023-07-14 RX ORDER — HYDROCODONE BITARTRATE AND ACETAMINOPHEN 5; 325 MG/1; MG/1
1 TABLET ORAL EVERY 8 HOURS PRN
Refills: 0 | Status: CANCELLED | OUTPATIENT
Start: 2023-07-14

## 2023-07-18 ENCOUNTER — PATIENT MESSAGE (OUTPATIENT)
Dept: FAMILY MEDICINE CLINIC | Facility: CLINIC | Age: 83
End: 2023-07-18

## 2023-07-18 RX ORDER — IRBESARTAN 150 MG/1
150 TABLET ORAL NIGHTLY
Qty: 90 TABLET | Refills: 0 | Status: SHIPPED | OUTPATIENT
Start: 2023-07-18 | End: 2023-10-16

## 2023-07-18 NOTE — TELEPHONE ENCOUNTER
LOV: 07/14//2023  Next OV: None  Last Refill:  Medication Quantity Refills Start End   Irbesartan 150 MG Oral Tab       Sig:   Take 1 tablet (150 mg total) by mouth nightly.      Route:   Oral     Class:   Historical

## 2023-07-28 NOTE — TELEPHONE ENCOUNTER
Did she stop the Alendronate and have improvement in her symptoms? If yes, then we can stop that medication and ensure she is taking daily vitamin D 2000iu and calcium 400mg daily. Thank you.

## 2023-07-28 NOTE — TELEPHONE ENCOUNTER
I have faxed the pt's reviewed/signed medication list to [address] [place] [address] at fax # [phone] per Dr. Renee Mota request. Confirmation of success fax page was received.

## 2023-07-31 ENCOUNTER — TELEPHONE (OUTPATIENT)
Dept: FAMILY MEDICINE CLINIC | Facility: CLINIC | Age: 83
End: 2023-07-31

## 2023-07-31 ENCOUNTER — MED REC SCAN ONLY (OUTPATIENT)
Dept: FAMILY MEDICINE CLINIC | Facility: CLINIC | Age: 83
End: 2023-07-31

## 2023-07-31 NOTE — TELEPHONE ENCOUNTER
I would like to discontinue her insulin and see where her sugar land - because she will benefit from the weight loss effects of the Trulicity I would like to continue the Trulicity. I agree with the letter it can be faxed. Thank you.

## 2023-07-31 NOTE — TELEPHONE ENCOUNTER
Kenzie called from [employer]. Pt.s blood sugars have been on the low side. Her Lantus has been held if blood sugars are less than 100. Her lowest has been 67. The past week she hs been 102- the highest 109. She is due for her trulicity 1.20 mg today and Ulysses Catalina is concerned about her taking it because of her low blood sugar readings and she is not eating very well. Please advise. Kenzie also requested a letter stating Alendronate is discontinued and take vitamin D 2,000 units daily with Calcium 400 mg daily. Letter pended to you. I will fax to her of you are in agreement.

## 2023-07-31 NOTE — TELEPHONE ENCOUNTER
07/31 AUBREY Espino to [facility]. Letter was faxed to her regarding the discontinuation of the Alendronate and starting vitamin D 2,000 units and calcium 400 mg daily. Regarding the Trulicity Please see Dr. Coni Hernandez orders below.

## 2023-08-02 NOTE — TELEPHONE ENCOUNTER
Kenzie nurse from [facility] called back on this. Advised of notes below regarding insulin/Trulicity. She voiced understanding. She is questioning the recommendation for 400mg of calcium daily. Said the pharmacy told her the calcium does not come in that dose? Gilma Rojo is wondering if you meant 500 or 600mg??     Please advise thx port a cath left chest wall

## 2023-08-03 ENCOUNTER — PATIENT MESSAGE (OUTPATIENT)
Dept: ADMINISTRATIVE | Facility: HOSPITAL | Age: 83
End: 2023-08-03

## 2023-08-03 NOTE — TELEPHONE ENCOUNTER
I have faxed the pt's reviewed/signed Telephone Medication Order to [facility] ([address] [facility]) at fax # [phone] per Dr. Jaycee Paula request. Confirmation of success fax page was received. I also sent a copy of the original record to scanning so that it can be uploaded into the pt's chart.

## 2023-08-04 ENCOUNTER — MED REC SCAN ONLY (OUTPATIENT)
Dept: FAMILY MEDICINE CLINIC | Facility: CLINIC | Age: 83
End: 2023-08-04

## 2023-08-14 ENCOUNTER — IMMUNIZATION (OUTPATIENT)
Dept: LAB | Age: 83
End: 2023-08-14
Attending: EMERGENCY MEDICINE
Payer: MEDICARE

## 2023-08-14 DIAGNOSIS — Z23 NEED FOR VACCINATION: Primary | ICD-10-CM

## 2023-08-14 PROCEDURE — 0134A SARSCOV2 VAC BVL 50MCG/0.5ML: CPT

## 2023-08-16 ENCOUNTER — APPOINTMENT (OUTPATIENT)
Dept: GENERAL RADIOLOGY | Facility: HOSPITAL | Age: 83
End: 2023-08-16
Attending: EMERGENCY MEDICINE
Payer: MEDICARE

## 2023-08-16 ENCOUNTER — APPOINTMENT (OUTPATIENT)
Dept: ULTRASOUND IMAGING | Facility: HOSPITAL | Age: 83
End: 2023-08-16
Attending: EMERGENCY MEDICINE
Payer: MEDICARE

## 2023-08-16 ENCOUNTER — HOSPITAL ENCOUNTER (EMERGENCY)
Facility: HOSPITAL | Age: 83
Discharge: HOME | End: 2023-08-16
Attending: EMERGENCY MEDICINE
Payer: MEDICARE

## 2023-08-16 VITALS
TEMPERATURE: 98 F | SYSTOLIC BLOOD PRESSURE: 125 MMHG | OXYGEN SATURATION: 96 % | RESPIRATION RATE: 22 BRPM | DIASTOLIC BLOOD PRESSURE: 71 MMHG | HEART RATE: 72 BPM

## 2023-08-16 DIAGNOSIS — R29.898 BILATERAL LEG WEAKNESS: Primary | ICD-10-CM

## 2023-08-16 LAB
ALBUMIN SERPL-MCNC: 3.3 G/DL (ref 3.4–5)
ALBUMIN/GLOB SERPL: 0.8 {RATIO} (ref 1–2)
ALP LIVER SERPL-CCNC: 108 U/L
ALT SERPL-CCNC: 24 U/L
ANION GAP SERPL CALC-SCNC: 3 MMOL/L (ref 0–18)
AST SERPL-CCNC: 25 U/L (ref 15–37)
BASOPHILS # BLD AUTO: 0.07 X10(3) UL (ref 0–0.2)
BASOPHILS NFR BLD AUTO: 0.8 %
BILIRUB SERPL-MCNC: 0.4 MG/DL (ref 0.1–2)
BUN BLD-MCNC: 24 MG/DL (ref 7–18)
CALCIUM BLD-MCNC: 10.1 MG/DL (ref 8.5–10.1)
CHLORIDE SERPL-SCNC: 107 MMOL/L (ref 98–112)
CK SERPL-CCNC: 76 U/L
CO2 SERPL-SCNC: 28 MMOL/L (ref 21–32)
CREAT BLD-MCNC: 1.74 MG/DL
EGFRCR SERPLBLD CKD-EPI 2021: 29 ML/MIN/1.73M2 (ref 60–?)
EOSINOPHIL # BLD AUTO: 0.41 X10(3) UL (ref 0–0.7)
EOSINOPHIL NFR BLD AUTO: 4.4 %
ERYTHROCYTE [DISTWIDTH] IN BLOOD BY AUTOMATED COUNT: 13.1 %
GLOBULIN PLAS-MCNC: 4 G/DL (ref 2.8–4.4)
GLUCOSE BLD-MCNC: 80 MG/DL (ref 70–99)
HCT VFR BLD AUTO: 40.6 %
HGB BLD-MCNC: 13.4 G/DL
IMM GRANULOCYTES # BLD AUTO: 0.02 X10(3) UL (ref 0–1)
IMM GRANULOCYTES NFR BLD: 0.2 %
LYMPHOCYTES # BLD AUTO: 2.43 X10(3) UL (ref 1–4)
LYMPHOCYTES NFR BLD AUTO: 26.2 %
MCH RBC QN AUTO: 30.9 PG (ref 26–34)
MCHC RBC AUTO-ENTMCNC: 33 G/DL (ref 31–37)
MCV RBC AUTO: 93.8 FL
MONOCYTES # BLD AUTO: 0.73 X10(3) UL (ref 0.1–1)
MONOCYTES NFR BLD AUTO: 7.9 %
NEUTROPHILS # BLD AUTO: 5.61 X10 (3) UL (ref 1.5–7.7)
NEUTROPHILS # BLD AUTO: 5.61 X10(3) UL (ref 1.5–7.7)
NEUTROPHILS NFR BLD AUTO: 60.5 %
OSMOLALITY SERPL CALC.SUM OF ELEC: 289 MOSM/KG (ref 275–295)
PLATELET # BLD AUTO: 184 10(3)UL (ref 150–450)
POTASSIUM SERPL-SCNC: 3.9 MMOL/L (ref 3.5–5.1)
PROT SERPL-MCNC: 7.3 G/DL (ref 6.4–8.2)
RBC # BLD AUTO: 4.33 X10(6)UL
SODIUM SERPL-SCNC: 138 MMOL/L (ref 136–145)
TROPONIN I HIGH SENSITIVITY: 12 NG/L
WBC # BLD AUTO: 9.3 X10(3) UL (ref 4–11)

## 2023-08-16 PROCEDURE — 93970 EXTREMITY STUDY: CPT | Performed by: EMERGENCY MEDICINE

## 2023-08-16 PROCEDURE — 99285 EMERGENCY DEPT VISIT HI MDM: CPT

## 2023-08-16 PROCEDURE — 36415 COLL VENOUS BLD VENIPUNCTURE: CPT

## 2023-08-16 PROCEDURE — 73523 X-RAY EXAM HIPS BI 5/> VIEWS: CPT | Performed by: EMERGENCY MEDICINE

## 2023-08-16 PROCEDURE — 82550 ASSAY OF CK (CPK): CPT | Performed by: EMERGENCY MEDICINE

## 2023-08-16 PROCEDURE — 99284 EMERGENCY DEPT VISIT MOD MDM: CPT

## 2023-08-16 PROCEDURE — 80053 COMPREHEN METABOLIC PANEL: CPT | Performed by: EMERGENCY MEDICINE

## 2023-08-16 PROCEDURE — 84484 ASSAY OF TROPONIN QUANT: CPT | Performed by: EMERGENCY MEDICINE

## 2023-08-16 PROCEDURE — 85025 COMPLETE CBC W/AUTO DIFF WBC: CPT | Performed by: EMERGENCY MEDICINE

## 2023-08-16 PROCEDURE — 93010 ELECTROCARDIOGRAM REPORT: CPT

## 2023-08-16 PROCEDURE — 93005 ELECTROCARDIOGRAM TRACING: CPT

## 2023-08-16 NOTE — ED INITIAL ASSESSMENT (HPI)
Patient to the ER c/o leg heaviness or the last week. Patient reports feeling legs are heavy and sob. Takes all medications.  No fever no n/v/d no cough

## 2023-08-17 ENCOUNTER — OFFICE VISIT (OUTPATIENT)
Facility: CLINIC | Age: 83
End: 2023-08-17
Payer: MEDICARE

## 2023-08-17 VITALS
HEIGHT: 62 IN | WEIGHT: 220 LBS | BODY MASS INDEX: 40.48 KG/M2 | RESPIRATION RATE: 16 BRPM | HEART RATE: 72 BPM | SYSTOLIC BLOOD PRESSURE: 118 MMHG | OXYGEN SATURATION: 96 % | DIASTOLIC BLOOD PRESSURE: 60 MMHG

## 2023-08-17 DIAGNOSIS — J96.22 ACUTE AND CHRONIC RESPIRATORY FAILURE WITH HYPERCAPNIA (HCC): Primary | ICD-10-CM

## 2023-08-17 LAB
Q-T INTERVAL: 368 MS
QRS DURATION: 94 MS
QTC CALCULATION (BEZET): 434 MS
R AXIS: 15 DEGREES
T AXIS: 47 DEGREES
VENTRICULAR RATE: 84 BPM

## 2023-08-17 PROCEDURE — 3078F DIAST BP <80 MM HG: CPT | Performed by: INTERNAL MEDICINE

## 2023-08-17 PROCEDURE — 3074F SYST BP LT 130 MM HG: CPT | Performed by: INTERNAL MEDICINE

## 2023-08-17 PROCEDURE — 1160F RVW MEDS BY RX/DR IN RCRD: CPT | Performed by: INTERNAL MEDICINE

## 2023-08-17 PROCEDURE — 99204 OFFICE O/P NEW MOD 45 MIN: CPT | Performed by: INTERNAL MEDICINE

## 2023-08-17 PROCEDURE — 1159F MED LIST DOCD IN RCRD: CPT | Performed by: INTERNAL MEDICINE

## 2023-08-17 PROCEDURE — 3008F BODY MASS INDEX DOCD: CPT | Performed by: INTERNAL MEDICINE

## 2023-08-17 NOTE — PATIENT INSTRUCTIONS
Plan--to get PFTS           -- to get set up-- with machine            - see me in 6 weeks     Ryanne Rousseau MD  Pulmonary Medicine  8/17/2023

## 2023-08-17 NOTE — DISCHARGE INSTRUCTIONS
If leg weakness worsens return for reevaluation. Could consider diagnoses such as polymyalgia rheumatica, myositis. Follow-up with your primary care doctor.   Return if worsening symptoms or new complaints

## 2023-08-18 ENCOUNTER — HOSPITAL ENCOUNTER (OUTPATIENT)
Dept: MAMMOGRAPHY | Facility: HOSPITAL | Age: 83
Discharge: HOME | End: 2023-08-18
Attending: STUDENT IN AN ORGANIZED HEALTH CARE EDUCATION/TRAINING PROGRAM
Payer: MEDICARE

## 2023-08-18 DIAGNOSIS — Z12.31 ENCOUNTER FOR SCREENING MAMMOGRAM FOR MALIGNANT NEOPLASM OF BREAST: ICD-10-CM

## 2023-08-18 PROCEDURE — 77063 BREAST TOMOSYNTHESIS BI: CPT | Performed by: STUDENT IN AN ORGANIZED HEALTH CARE EDUCATION/TRAINING PROGRAM

## 2023-08-18 PROCEDURE — 77067 SCR MAMMO BI INCL CAD: CPT | Performed by: STUDENT IN AN ORGANIZED HEALTH CARE EDUCATION/TRAINING PROGRAM

## 2023-08-22 ENCOUNTER — TELEPHONE (OUTPATIENT)
Facility: CLINIC | Age: 83
End: 2023-08-22

## 2023-08-22 NOTE — TELEPHONE ENCOUNTER
Pt notified NIV (NON INVASIVE DEVICE)  machine ordered through [facility] . DME will verify insurance and once approved will contact pt to arrange delivery and instructions. Pt instructed to notify our office once machine is received.

## 2023-08-24 ENCOUNTER — TELEPHONE (OUTPATIENT)
Dept: FAMILY MEDICINE CLINIC | Facility: CLINIC | Age: 83
End: 2023-08-24

## 2023-08-24 NOTE — TELEPHONE ENCOUNTER
Received paperwork from [facility] regarding orders for patient's stay at the [facility] location. Phone #[phone] and the fax #[phone].  is Segundo Segovia #[phone]. Filled out. Will be faxed by MA.

## 2023-08-26 ENCOUNTER — LAB ENCOUNTER (OUTPATIENT)
Dept: LAB | Facility: HOSPITAL | Age: 83
End: 2023-08-26
Attending: STUDENT IN AN ORGANIZED HEALTH CARE EDUCATION/TRAINING PROGRAM
Payer: MEDICARE

## 2023-08-26 ENCOUNTER — OFFICE VISIT (OUTPATIENT)
Dept: FAMILY MEDICINE CLINIC | Facility: CLINIC | Age: 83
End: 2023-08-26
Payer: MEDICARE

## 2023-08-26 VITALS
RESPIRATION RATE: 16 BRPM | DIASTOLIC BLOOD PRESSURE: 54 MMHG | HEART RATE: 78 BPM | WEIGHT: 217 LBS | HEIGHT: 62.01 IN | TEMPERATURE: 98 F | SYSTOLIC BLOOD PRESSURE: 102 MMHG | BODY MASS INDEX: 39.43 KG/M2

## 2023-08-26 DIAGNOSIS — F43.21 GRIEF: ICD-10-CM

## 2023-08-26 DIAGNOSIS — R07.89 ATYPICAL CHEST PAIN: Primary | ICD-10-CM

## 2023-08-26 DIAGNOSIS — E11.22 TYPE 2 DIABETES MELLITUS WITH STAGE 3A CHRONIC KIDNEY DISEASE, WITHOUT LONG-TERM CURRENT USE OF INSULIN (HCC): ICD-10-CM

## 2023-08-26 DIAGNOSIS — M79.604 BILATERAL LOWER EXTREMITY PAIN: ICD-10-CM

## 2023-08-26 DIAGNOSIS — M79.605 BILATERAL LOWER EXTREMITY PAIN: ICD-10-CM

## 2023-08-26 DIAGNOSIS — F41.9 ANXIETY: ICD-10-CM

## 2023-08-26 DIAGNOSIS — N18.31 TYPE 2 DIABETES MELLITUS WITH STAGE 3A CHRONIC KIDNEY DISEASE, WITHOUT LONG-TERM CURRENT USE OF INSULIN (HCC): ICD-10-CM

## 2023-08-26 PROBLEM — N18.4 CKD (CHRONIC KIDNEY DISEASE) STAGE 4, GFR 15-29 ML/MIN (HCC): Chronic | Status: ACTIVE | Noted: 2023-08-26

## 2023-08-26 LAB
ALBUMIN SERPL-MCNC: 3.4 G/DL (ref 3.4–5)
ALBUMIN/GLOB SERPL: 0.9 {RATIO} (ref 1–2)
ALP LIVER SERPL-CCNC: 108 U/L
ALT SERPL-CCNC: 20 U/L
ANION GAP SERPL CALC-SCNC: 0 MMOL/L (ref 0–18)
AST SERPL-CCNC: 15 U/L (ref 15–37)
BILIRUB SERPL-MCNC: 0.9 MG/DL (ref 0.1–2)
BUN BLD-MCNC: 20 MG/DL (ref 7–18)
CALCIUM BLD-MCNC: 10.6 MG/DL (ref 8.5–10.1)
CHLORIDE SERPL-SCNC: 107 MMOL/L (ref 98–112)
CHOLEST SERPL-MCNC: 106 MG/DL (ref ?–200)
CO2 SERPL-SCNC: 32 MMOL/L (ref 21–32)
CREAT BLD-MCNC: 1.46 MG/DL
CREAT UR-SCNC: 62.4 MG/DL
EGFRCR SERPLBLD CKD-EPI 2021: 36 ML/MIN/1.73M2 (ref 60–?)
EST. AVERAGE GLUCOSE BLD GHB EST-MCNC: 111 MG/DL (ref 68–126)
FASTING PATIENT LIPID ANSWER: NO
FASTING STATUS PATIENT QL REPORTED: NO
GLOBULIN PLAS-MCNC: 3.8 G/DL (ref 2.8–4.4)
GLUCOSE BLD-MCNC: 74 MG/DL (ref 70–99)
HBA1C MFR BLD: 5.5 % (ref ?–5.7)
HDLC SERPL-MCNC: 39 MG/DL (ref 40–59)
LDLC SERPL CALC-MCNC: 46 MG/DL (ref ?–100)
MICROALBUMIN UR-MCNC: <0.5 MG/DL
NONHDLC SERPL-MCNC: 67 MG/DL (ref ?–130)
OSMOLALITY SERPL CALC.SUM OF ELEC: 289 MOSM/KG (ref 275–295)
POTASSIUM SERPL-SCNC: 4.3 MMOL/L (ref 3.5–5.1)
PROT SERPL-MCNC: 7.2 G/DL (ref 6.4–8.2)
SODIUM SERPL-SCNC: 139 MMOL/L (ref 136–145)
TRIGL SERPL-MCNC: 112 MG/DL (ref 30–149)
VLDLC SERPL CALC-MCNC: 16 MG/DL (ref 0–30)

## 2023-08-26 PROCEDURE — 80053 COMPREHEN METABOLIC PANEL: CPT

## 2023-08-26 PROCEDURE — 3074F SYST BP LT 130 MM HG: CPT | Performed by: STUDENT IN AN ORGANIZED HEALTH CARE EDUCATION/TRAINING PROGRAM

## 2023-08-26 PROCEDURE — 1126F AMNT PAIN NOTED NONE PRSNT: CPT | Performed by: STUDENT IN AN ORGANIZED HEALTH CARE EDUCATION/TRAINING PROGRAM

## 2023-08-26 PROCEDURE — 80061 LIPID PANEL: CPT

## 2023-08-26 PROCEDURE — 82043 UR ALBUMIN QUANTITATIVE: CPT

## 2023-08-26 PROCEDURE — 83036 HEMOGLOBIN GLYCOSYLATED A1C: CPT

## 2023-08-26 PROCEDURE — 1159F MED LIST DOCD IN RCRD: CPT | Performed by: STUDENT IN AN ORGANIZED HEALTH CARE EDUCATION/TRAINING PROGRAM

## 2023-08-26 PROCEDURE — 99214 OFFICE O/P EST MOD 30 MIN: CPT | Performed by: STUDENT IN AN ORGANIZED HEALTH CARE EDUCATION/TRAINING PROGRAM

## 2023-08-26 PROCEDURE — 1160F RVW MEDS BY RX/DR IN RCRD: CPT | Performed by: STUDENT IN AN ORGANIZED HEALTH CARE EDUCATION/TRAINING PROGRAM

## 2023-08-26 PROCEDURE — 82570 ASSAY OF URINE CREATININE: CPT

## 2023-08-26 PROCEDURE — 3078F DIAST BP <80 MM HG: CPT | Performed by: STUDENT IN AN ORGANIZED HEALTH CARE EDUCATION/TRAINING PROGRAM

## 2023-08-26 PROCEDURE — 3008F BODY MASS INDEX DOCD: CPT | Performed by: STUDENT IN AN ORGANIZED HEALTH CARE EDUCATION/TRAINING PROGRAM

## 2023-08-26 PROCEDURE — 36415 COLL VENOUS BLD VENIPUNCTURE: CPT

## 2023-08-27 DIAGNOSIS — N18.31 TYPE 2 DIABETES MELLITUS WITH STAGE 3A CHRONIC KIDNEY DISEASE, WITHOUT LONG-TERM CURRENT USE OF INSULIN (HCC): Primary | ICD-10-CM

## 2023-08-27 DIAGNOSIS — E11.22 TYPE 2 DIABETES MELLITUS WITH STAGE 3A CHRONIC KIDNEY DISEASE, WITHOUT LONG-TERM CURRENT USE OF INSULIN (HCC): Primary | ICD-10-CM

## 2023-08-30 ENCOUNTER — TELEPHONE (OUTPATIENT)
Dept: FAMILY MEDICINE CLINIC | Facility: CLINIC | Age: 83
End: 2023-08-30

## 2023-08-31 ENCOUNTER — APPOINTMENT (OUTPATIENT)
Dept: LAB | Facility: HOSPITAL | Age: 83
End: 2023-08-31
Attending: NURSE PRACTITIONER
Payer: MEDICARE

## 2023-08-31 ENCOUNTER — HOSPITAL ENCOUNTER (OUTPATIENT)
Dept: CV DIAGNOSTICS | Facility: HOSPITAL | Age: 83
Discharge: HOME | End: 2023-08-31
Attending: STUDENT IN AN ORGANIZED HEALTH CARE EDUCATION/TRAINING PROGRAM
Payer: MEDICARE

## 2023-08-31 ENCOUNTER — TELEPHONE (OUTPATIENT)
Dept: FAMILY MEDICINE CLINIC | Facility: CLINIC | Age: 83
End: 2023-08-31

## 2023-08-31 DIAGNOSIS — R07.89 ATYPICAL CHEST PAIN: ICD-10-CM

## 2023-08-31 PROCEDURE — 93017 CV STRESS TEST TRACING ONLY: CPT | Performed by: STUDENT IN AN ORGANIZED HEALTH CARE EDUCATION/TRAINING PROGRAM

## 2023-08-31 PROCEDURE — 93018 CV STRESS TEST I&R ONLY: CPT | Performed by: STUDENT IN AN ORGANIZED HEALTH CARE EDUCATION/TRAINING PROGRAM

## 2023-08-31 PROCEDURE — 78452 HT MUSCLE IMAGE SPECT MULT: CPT | Performed by: STUDENT IN AN ORGANIZED HEALTH CARE EDUCATION/TRAINING PROGRAM

## 2023-08-31 RX ORDER — REGADENOSON 0.08 MG/ML
INJECTION, SOLUTION INTRAVENOUS
Status: COMPLETED
Start: 2023-08-31 | End: 2023-08-31

## 2023-08-31 RX ADMIN — REGADENOSON 0.4 MG: 0.08 INJECTION, SOLUTION INTRAVENOUS at 10:45:00

## 2023-09-06 ENCOUNTER — PATIENT MESSAGE (OUTPATIENT)
Dept: FAMILY MEDICINE CLINIC | Facility: CLINIC | Age: 83
End: 2023-09-06

## 2023-09-06 NOTE — TELEPHONE ENCOUNTER
No need to check blood sugars daily since no longer on insulin or Trulicity. We will repeat the A1c in 3 months to re-assess the need for medication for diabetes. Thank you.

## 2023-09-09 ENCOUNTER — HOSPITAL ENCOUNTER (EMERGENCY)
Facility: HOSPITAL | Age: 83
Discharge: HOME | End: 2023-09-09
Attending: EMERGENCY MEDICINE
Payer: MEDICARE

## 2023-09-09 VITALS
WEIGHT: 220 LBS | HEART RATE: 92 BPM | RESPIRATION RATE: 18 BRPM | HEIGHT: 63 IN | SYSTOLIC BLOOD PRESSURE: 117 MMHG | DIASTOLIC BLOOD PRESSURE: 70 MMHG | BODY MASS INDEX: 38.98 KG/M2 | OXYGEN SATURATION: 97 % | TEMPERATURE: 99 F

## 2023-09-09 DIAGNOSIS — L02.91 ABSCESS: Primary | ICD-10-CM

## 2023-09-09 PROCEDURE — 99284 EMERGENCY DEPT VISIT MOD MDM: CPT

## 2023-09-09 PROCEDURE — 10060 I&D ABSCESS SIMPLE/SINGLE: CPT

## 2023-09-09 PROCEDURE — 99283 EMERGENCY DEPT VISIT LOW MDM: CPT

## 2023-09-09 RX ORDER — SULFAMETHOXAZOLE AND TRIMETHOPRIM 800; 160 MG/1; MG/1
1 TABLET ORAL 2 TIMES DAILY
Qty: 20 TABLET | Refills: 0 | Status: SHIPPED | OUTPATIENT
Start: 2023-09-09 | End: 2023-09-19

## 2023-09-09 NOTE — ED INITIAL ASSESSMENT (HPI)
Patient c/o possible sting or bug bite noted to right ear, hard, red and swollen and hot to touch. Noticed bug bite on Thursday.

## 2023-09-10 NOTE — DISCHARGE INSTRUCTIONS
KEEP AREA CLEAN AT HOME  NEOSPORIN TO WOUND AT HOME  RETURN TO THE ED IF SYMPTOMS WORSEN OR IF ANY OTHER PROBLEMS ARISE

## 2023-09-11 ENCOUNTER — MED REC SCAN ONLY (OUTPATIENT)
Dept: FAMILY MEDICINE CLINIC | Facility: CLINIC | Age: 83
End: 2023-09-11

## 2023-09-13 ENCOUNTER — TELEPHONE (OUTPATIENT)
Dept: FAMILY MEDICINE CLINIC | Facility: CLINIC | Age: 83
End: 2023-09-13

## 2023-09-25 ENCOUNTER — PATIENT MESSAGE (OUTPATIENT)
Facility: CLINIC | Age: 83
End: 2023-09-25

## 2023-09-26 ENCOUNTER — TELEPHONE (OUTPATIENT)
Dept: FAMILY MEDICINE CLINIC | Facility: CLINIC | Age: 83
End: 2023-09-26

## 2023-09-26 DIAGNOSIS — M70.61 TROCHANTERIC BURSITIS, RIGHT HIP: ICD-10-CM

## 2023-09-26 DIAGNOSIS — M41.86 LEVOSCOLIOSIS OF LUMBAR SPINE: Primary | ICD-10-CM

## 2023-09-26 DIAGNOSIS — M51.36 LUMBAR DEGENERATIVE DISC DISEASE: ICD-10-CM

## 2023-09-26 NOTE — TELEPHONE ENCOUNTER
Daughter paged. Mom has had hydrocodone previously for back pain (last written by previous doc in [place] it appears). Mom is having back pain and requesting hydrocodone. Advised I would send message to you to advise. To ER tonight if worsening pain.

## 2023-09-26 NOTE — TELEPHONE ENCOUNTER
PFT order is in chart. Called pt daughter and confirmed order in chart and provided central scheduling phone #. Daughter will attempt to schedule again and if not successful will call our office again and our office will reach out to central scheduling.

## 2023-09-27 ENCOUNTER — RT VISIT (OUTPATIENT)
Dept: RESPIRATORY THERAPY | Facility: HOSPITAL | Age: 83
End: 2023-09-27
Attending: INTERNAL MEDICINE
Payer: MEDICARE

## 2023-09-27 DIAGNOSIS — J96.22 ACUTE AND CHRONIC RESPIRATORY FAILURE WITH HYPERCAPNIA (HCC): ICD-10-CM

## 2023-09-27 PROCEDURE — 94729 DIFFUSING CAPACITY: CPT

## 2023-09-27 PROCEDURE — 94010 BREATHING CAPACITY TEST: CPT

## 2023-09-27 PROCEDURE — 94726 PLETHYSMOGRAPHY LUNG VOLUMES: CPT

## 2023-09-27 RX ORDER — HYDROCODONE BITARTRATE AND ACETAMINOPHEN 5; 325 MG/1; MG/1
1 TABLET ORAL EVERY 8 HOURS PRN
Qty: 60 TABLET | Refills: 0 | Status: SHIPPED | OUTPATIENT
Start: 2023-09-27

## 2023-09-27 NOTE — TELEPHONE ENCOUNTER
Daughter called back. Advised of below. She voiced understanding but sts it wasn't a refill it just needed to go to a different pharmacy.

## 2023-09-27 NOTE — TELEPHONE ENCOUNTER
Rx sent, please let patient/family know that after hours and weekends we do not send refills. Thank you.

## 2023-09-29 ENCOUNTER — OFFICE VISIT (OUTPATIENT)
Facility: CLINIC | Age: 83
End: 2023-09-29
Payer: MEDICARE

## 2023-09-29 VITALS
SYSTOLIC BLOOD PRESSURE: 118 MMHG | HEIGHT: 63 IN | HEART RATE: 70 BPM | RESPIRATION RATE: 12 BRPM | OXYGEN SATURATION: 95 % | BODY MASS INDEX: 38.98 KG/M2 | WEIGHT: 220 LBS | DIASTOLIC BLOOD PRESSURE: 60 MMHG

## 2023-09-29 DIAGNOSIS — J96.22 ACUTE AND CHRONIC RESPIRATORY FAILURE WITH HYPERCAPNIA (HCC): Primary | ICD-10-CM

## 2023-09-29 PROCEDURE — 3074F SYST BP LT 130 MM HG: CPT | Performed by: INTERNAL MEDICINE

## 2023-09-29 PROCEDURE — 3078F DIAST BP <80 MM HG: CPT | Performed by: INTERNAL MEDICINE

## 2023-09-29 PROCEDURE — 99214 OFFICE O/P EST MOD 30 MIN: CPT | Performed by: INTERNAL MEDICINE

## 2023-09-29 PROCEDURE — 3008F BODY MASS INDEX DOCD: CPT | Performed by: INTERNAL MEDICINE

## 2023-09-29 PROCEDURE — 1159F MED LIST DOCD IN RCRD: CPT | Performed by: INTERNAL MEDICINE

## 2023-09-29 PROCEDURE — 1160F RVW MEDS BY RX/DR IN RCRD: CPT | Performed by: INTERNAL MEDICINE

## 2023-09-29 NOTE — PATIENT INSTRUCTIONS
Plan--- we will change the pressures and try nasal pillows             - call with any changes              - vaccines - COVID and RSV --            - see me in 2 months     Billy Bhardwaj MD  Pulmonary Medicine  9/29/2023

## 2023-09-30 NOTE — PROCEDURES
Pulmonary Function Test:   Findings:  Spirometry: FEV1 is 1.84L, 79% predicted. FVC is 1.42L, 80% predicted and FEV1/ FVC ratio is 0.77. The flow-volume loop demonstrates a normal pattern. MVV is 43, 53% predicted  Lung Volumes: The TLC is 4.65L, 101% predicted. The residual volume 2.67L, 124% predicted. Diffusion Capacity:  The diffusion capacity is 10.83, 62% predicted and 76% predicted when corrected for alveolar volume. Impression:  There is no airway obstruction on spirometry and visualized on flow-volume loop. MVV is reduced that  may indicate insufficient neuromuscular reserve, abnormal respiratory mechanics, or an inadequate effort, clinical correlation is suggested    Lung volumes : show normal range total lung capacity. Despite the absence of airway obstruction, there is evidence of air trapping (residual volume of 2.67L, 124% predicted). Diffusion capacity is mildly reduced , This can be seen in emphysema, interstitial lung disease, pulmonary vascular disease (such as pulmonary hypertension), atelectasis and anemia. If not already performed, would suggest further evaluation as determined clinically. and anemia. If not already performed, would suggest further evaluation as determined clinically. There are no previous pulmonary function tests available for comparison.    Cory Sky MD

## 2023-10-03 RX ORDER — CARVEDILOL 6.25 MG/1
TABLET ORAL
Qty: 60 TABLET | Refills: 0 | Status: SHIPPED | OUTPATIENT
Start: 2023-10-03

## 2023-10-03 NOTE — TELEPHONE ENCOUNTER
Requested Prescriptions     Pending Prescriptions Disp Refills    CARVEDILOL 6.25 MG Oral Tab [Pharmacy Med Name: carvedilol 6.25 mg tablet] 60 tablet 0     Sig: TAKE 1 TABLET BY MOUTH TWICE DAILY WITH MEALS - HOLD FOR SBP < 110 OR DBP < 50     Last refill 5/14/23 #60 x 3  LOV 8/26/23  Future Appointments   Date Time Provider Sindy Emily   10/4/2023 11:00 AM Colton Whitehead DO [facility] [place]   10/11/2023 11:00 AM Marky Gray MD [id number]   12/1/2023  1:00 PM Gely Hess MD [facility] [place]       Hypertension Medications Protocol [id number]/02/2023 01:49 PM   Protocol Details CMP or BMP in past 12 months    Last serum creatinine< 2.0    Appointment in past 6 or next 3 months

## 2023-10-04 ENCOUNTER — OFFICE VISIT (OUTPATIENT)
Facility: CLINIC | Age: 83
End: 2023-10-04
Payer: MEDICARE

## 2023-10-04 VITALS
OXYGEN SATURATION: 96 % | HEIGHT: 63 IN | WEIGHT: 220 LBS | RESPIRATION RATE: 18 BRPM | HEART RATE: 76 BPM | SYSTOLIC BLOOD PRESSURE: 120 MMHG | BODY MASS INDEX: 38.98 KG/M2 | DIASTOLIC BLOOD PRESSURE: 70 MMHG

## 2023-10-04 DIAGNOSIS — J96.22 ACUTE AND CHRONIC RESPIRATORY FAILURE WITH HYPERCAPNIA (HCC): ICD-10-CM

## 2023-10-04 DIAGNOSIS — G47.33 OSA (OBSTRUCTIVE SLEEP APNEA): Primary | ICD-10-CM

## 2023-10-04 DIAGNOSIS — I50.22 CHRONIC SYSTOLIC CONGESTIVE HEART FAILURE (HCC): ICD-10-CM

## 2023-10-04 PROCEDURE — 3078F DIAST BP <80 MM HG: CPT | Performed by: OTHER

## 2023-10-04 PROCEDURE — 1160F RVW MEDS BY RX/DR IN RCRD: CPT | Performed by: OTHER

## 2023-10-04 PROCEDURE — 3074F SYST BP LT 130 MM HG: CPT | Performed by: OTHER

## 2023-10-04 PROCEDURE — 3008F BODY MASS INDEX DOCD: CPT | Performed by: OTHER

## 2023-10-04 PROCEDURE — 99204 OFFICE O/P NEW MOD 45 MIN: CPT | Performed by: OTHER

## 2023-10-04 PROCEDURE — 1159F MED LIST DOCD IN RCRD: CPT | Performed by: OTHER

## 2023-10-04 NOTE — PATIENT INSTRUCTIONS
liners ([email or web address] com)    Discussed the addition of a mask liner. Options reviewed including REMzzz, Snugz, or permanent mask liners ([email or web address] com). This should help address leak and skin irritation. Please be advised:   Do not drive while sleepy   Take CPAP/BiPAP machine to any procedure that requires sedation     When should I clean my machine & supplies? Clean mask cushions or nasal pillow, headgear, tubing, and humidifier chamber with mild soap (Yudelka) and water   If water chamber has hard water buildup (white crust), soak in warm water & vinegar mix 50/50. Rinse and hang dry     DAILY   Wipe mask cushions or nasal pillow   Empty & rinse water chamber- refill with distilled water     WEEKLY   Clean mask cushions or nasal pillow, headgear, tubing, and humidifier chamber with mild soap (Yudelka) and water   If water chamber has hard water buildup (white crust), soak in warm water & vinegar mix 50/50. Rinse and hang dry     When should supplies be replaced? Contact your home care company for replacement supplies, or if your machine is malfunctioning   *Below is a general guideline of what we recommend. Replacement of supplies differs depending on your insurance company*   MONTHLY: Replace filter and mask cushion   6 MONTHS: Replace headgear and tubing     Travel Tips   Keep CPAP/BiPAP in original bag when traveling, and place luggage tag on bag   Most airlines consider CPAP/BiPAP to be a medical device, therefore it is a free carry-on item   If unable to get distilled water, bottled water is safe while traveling.  DO NOT use tap water   When traveling outside the [place] only a power adapter is necessary (CPAP can operate without a converter), bring an extension cord   Consider purchasing or renting a travel CPAP (not covered by insurance)     Dry Mouth/Nose   Turn up the humidity on your machine (select \"Options\" from the home screen)   Place a cool mist humidifier at your bedside   Over-the-counter remedies: Biotene, XyliMelts, NasoGel     Air Leak   Try adjusting your mask/headgear while laying in sleeping position vs. sitting up   Wash and dry your face prior to putting your mask on   If applicable: shave facial hair at night (or before wearing CPAP)   Purchase \"RemZzzs\" (through [employer], or remzzzs. com)   100% cotton knit barrier that goes between your mask cushion and your skin   Replace your mask cushion (at least once per month) and/or headgear (every 3-6 months)     Nasal Congestion   CPAP therapy can cause nasal passages to dry out, & mucous membranes try to protect the nasal passages by producing excess mucous, so congestion results. Over-the counter remedies: Flonase, Nasacort, Sinus Rinses (Neti-Pot), DO NOT USE Afrin   Try a mask that goes over the nose and mouth     Skin Irritation   Clean supplies regularly (Citrus II Mask Wipes, Control III disinfectant solution)   Try over the counter creams such as hydrocortisone 1% (apply in the morning after showering)   Your headgear may be too tight, replace supplies so you don't need to adjust so tightly   Try RemZzzs (100% cotton knit barrier that goes between your mask cushion and your skin)     Gas/Bloating   Try a different sleeping position to keep air out of the stomach. Lay on the left side or rotate to the right side. Incline with pillows or lay flat.    Over-the-counter remedies: Simethicone

## 2023-10-04 NOTE — PROGRESS NOTES
This is a 82year old female who presents with the following symptoms, risk factors, behaviors or other items associated with sleep problems. Sleep Apnea:   overweight; high blood pressure; diabetes; age 72 or older; previous sleep study  Insomnia:  No data recorded  Restless Leg:  No data recorded  Parasomnias:   no symptoms of parasomnias  Daytime Problems:  napping on purpose    The patient's [place] Sleepiness score is 9/24.

## 2023-10-07 ENCOUNTER — MED REC SCAN ONLY (OUTPATIENT)
Dept: FAMILY MEDICINE CLINIC | Facility: CLINIC | Age: 83
End: 2023-10-07

## 2023-10-11 ENCOUNTER — OFFICE VISIT (OUTPATIENT)
Dept: FAMILY MEDICINE CLINIC | Facility: CLINIC | Age: 83
End: 2023-10-11
Payer: MEDICARE

## 2023-10-11 VITALS
BODY MASS INDEX: 38.62 KG/M2 | HEIGHT: 63 IN | SYSTOLIC BLOOD PRESSURE: 92 MMHG | WEIGHT: 218 LBS | HEART RATE: 60 BPM | RESPIRATION RATE: 16 BRPM | DIASTOLIC BLOOD PRESSURE: 52 MMHG | TEMPERATURE: 97 F

## 2023-10-11 DIAGNOSIS — M25.512 CHRONIC LEFT SHOULDER PAIN: ICD-10-CM

## 2023-10-11 DIAGNOSIS — R07.89 ATYPICAL CHEST PAIN: ICD-10-CM

## 2023-10-11 DIAGNOSIS — T84.84XA PAIN DUE TO RIGHT HIP JOINT PROSTHESIS, INITIAL ENCOUNTER: ICD-10-CM

## 2023-10-11 DIAGNOSIS — G89.29 CHRONIC RIGHT SHOULDER PAIN: ICD-10-CM

## 2023-10-11 DIAGNOSIS — E11.9 TYPE 2 DIABETES MELLITUS WITHOUT COMPLICATION, WITH LONG-TERM CURRENT USE OF INSULIN (HCC): ICD-10-CM

## 2023-10-11 DIAGNOSIS — Z96.641 PAIN DUE TO RIGHT HIP JOINT PROSTHESIS, INITIAL ENCOUNTER: ICD-10-CM

## 2023-10-11 DIAGNOSIS — Z23 NEED FOR VACCINATION: ICD-10-CM

## 2023-10-11 DIAGNOSIS — M25.511 CHRONIC RIGHT SHOULDER PAIN: ICD-10-CM

## 2023-10-11 DIAGNOSIS — G89.29 CHRONIC LEFT SHOULDER PAIN: ICD-10-CM

## 2023-10-11 DIAGNOSIS — R07.89 INTERMITTENT LEFT-SIDED CHEST PAIN: Primary | ICD-10-CM

## 2023-10-11 DIAGNOSIS — Z79.4 TYPE 2 DIABETES MELLITUS WITHOUT COMPLICATION, WITH LONG-TERM CURRENT USE OF INSULIN (HCC): ICD-10-CM

## 2023-10-11 PROCEDURE — 90662 IIV NO PRSV INCREASED AG IM: CPT | Performed by: STUDENT IN AN ORGANIZED HEALTH CARE EDUCATION/TRAINING PROGRAM

## 2023-10-11 PROCEDURE — 3078F DIAST BP <80 MM HG: CPT | Performed by: STUDENT IN AN ORGANIZED HEALTH CARE EDUCATION/TRAINING PROGRAM

## 2023-10-11 PROCEDURE — 1170F FXNL STATUS ASSESSED: CPT | Performed by: STUDENT IN AN ORGANIZED HEALTH CARE EDUCATION/TRAINING PROGRAM

## 2023-10-11 PROCEDURE — 1160F RVW MEDS BY RX/DR IN RCRD: CPT | Performed by: STUDENT IN AN ORGANIZED HEALTH CARE EDUCATION/TRAINING PROGRAM

## 2023-10-11 PROCEDURE — 3008F BODY MASS INDEX DOCD: CPT | Performed by: STUDENT IN AN ORGANIZED HEALTH CARE EDUCATION/TRAINING PROGRAM

## 2023-10-11 PROCEDURE — G0008 ADMIN INFLUENZA VIRUS VAC: HCPCS | Performed by: STUDENT IN AN ORGANIZED HEALTH CARE EDUCATION/TRAINING PROGRAM

## 2023-10-11 PROCEDURE — 3074F SYST BP LT 130 MM HG: CPT | Performed by: STUDENT IN AN ORGANIZED HEALTH CARE EDUCATION/TRAINING PROGRAM

## 2023-10-11 PROCEDURE — 1159F MED LIST DOCD IN RCRD: CPT | Performed by: STUDENT IN AN ORGANIZED HEALTH CARE EDUCATION/TRAINING PROGRAM

## 2023-10-11 PROCEDURE — 99214 OFFICE O/P EST MOD 30 MIN: CPT | Performed by: STUDENT IN AN ORGANIZED HEALTH CARE EDUCATION/TRAINING PROGRAM

## 2023-10-16 ENCOUNTER — LAB ENCOUNTER (OUTPATIENT)
Dept: LAB | Facility: HOSPITAL | Age: 83
End: 2023-10-16
Attending: INTERNAL MEDICINE
Payer: MEDICARE

## 2023-10-16 DIAGNOSIS — I10 HYPERTENSION: Primary | ICD-10-CM

## 2023-10-16 LAB
ANION GAP SERPL CALC-SCNC: 6 MMOL/L (ref 0–18)
BUN BLD-MCNC: 26 MG/DL (ref 7–18)
CALCIUM BLD-MCNC: 10.1 MG/DL (ref 8.5–10.1)
CHLORIDE SERPL-SCNC: 110 MMOL/L (ref 98–112)
CO2 SERPL-SCNC: 24 MMOL/L (ref 21–32)
CREAT BLD-MCNC: 1.55 MG/DL
EGFRCR SERPLBLD CKD-EPI 2021: 33 ML/MIN/1.73M2 (ref 60–?)
FASTING STATUS PATIENT QL REPORTED: NO
GLUCOSE BLD-MCNC: 117 MG/DL (ref 70–99)
OSMOLALITY SERPL CALC.SUM OF ELEC: 296 MOSM/KG (ref 275–295)
POTASSIUM SERPL-SCNC: 3.9 MMOL/L (ref 3.5–5.1)
SODIUM SERPL-SCNC: 140 MMOL/L (ref 136–145)

## 2023-10-16 PROCEDURE — 36415 COLL VENOUS BLD VENIPUNCTURE: CPT

## 2023-10-16 PROCEDURE — 80048 BASIC METABOLIC PNL TOTAL CA: CPT

## 2023-11-02 RX ORDER — CARVEDILOL 6.25 MG/1
TABLET ORAL
Qty: 60 TABLET | Refills: 0 | Status: SHIPPED | OUTPATIENT
Start: 2023-11-02

## 2023-11-02 NOTE — TELEPHONE ENCOUNTER
LOV: 10/11/23 Pain 7/14/23 (Med check)    Last Refill:  Medication Quantity Refills Start End   CARVEDILOL 6.25 MG Oral Tab 60 tablet 0 10/3/2023    Sig:   TAKE 1 TABLET BY MOUTH TWICE DAILY WITH MEALS - HOLD FOR SBP < 110 OR DBP < 50       Next Appointment: None scheduled. Rx sent.   Hypertension Medications Protocol Passed

## 2023-11-03 DIAGNOSIS — I10 HYPERTENSION, ESSENTIAL, BENIGN: ICD-10-CM

## 2023-11-03 RX ORDER — IRBESARTAN 150 MG/1
TABLET ORAL
Qty: 90 TABLET | Refills: 1 | OUTPATIENT
Start: 2023-11-03

## 2023-11-03 NOTE — TELEPHONE ENCOUNTER
LOV: 07/14/2023  for: Medication Follow-Up   Patient advised to RTC on: None stated     Medication Quantity Refills Start End   IRBESARTAN 150 MG Oral Tab 90 tablet 1 8/16/2023    Sig:   TAKE 1 TABLET BY MOUTH EVERY EVENING

## 2023-11-04 ENCOUNTER — HOSPITAL ENCOUNTER (OUTPATIENT)
Dept: GENERAL RADIOLOGY | Facility: HOSPITAL | Age: 83
Discharge: HOME | End: 2023-11-04
Attending: STUDENT IN AN ORGANIZED HEALTH CARE EDUCATION/TRAINING PROGRAM
Payer: MEDICARE

## 2023-11-04 DIAGNOSIS — R07.89 ATYPICAL CHEST PAIN: ICD-10-CM

## 2023-11-04 DIAGNOSIS — R07.89 INTERMITTENT LEFT-SIDED CHEST PAIN: ICD-10-CM

## 2023-11-04 PROCEDURE — 73030 X-RAY EXAM OF SHOULDER: CPT | Performed by: STUDENT IN AN ORGANIZED HEALTH CARE EDUCATION/TRAINING PROGRAM

## 2023-11-06 DIAGNOSIS — R07.89 ATYPICAL CHEST PAIN: ICD-10-CM

## 2023-11-06 DIAGNOSIS — M19.012 OSTEOARTHRITIS OF LEFT GLENOHUMERAL JOINT: ICD-10-CM

## 2023-11-06 DIAGNOSIS — M25.712 OSTEOPHYTE OF LEFT SHOULDER: ICD-10-CM

## 2023-11-06 DIAGNOSIS — M19.019 AC JOINT ARTHROPATHY: Primary | ICD-10-CM

## 2023-11-06 DIAGNOSIS — R07.89 INTERMITTENT LEFT-SIDED CHEST PAIN: ICD-10-CM

## 2023-11-07 ENCOUNTER — IMMUNIZATION (OUTPATIENT)
Dept: LAB | Age: 83
End: 2023-11-07
Attending: EMERGENCY MEDICINE
Payer: MEDICARE

## 2023-11-07 DIAGNOSIS — Z23 NEED FOR VACCINATION: Primary | ICD-10-CM

## 2023-11-07 PROCEDURE — 90480 ADMN SARSCOV2 VAC 1/ONLY CMP: CPT

## 2023-11-20 DIAGNOSIS — N18.31 CHRONIC RENAL IMPAIRMENT, STAGE 3A (HCC): ICD-10-CM

## 2023-11-20 DIAGNOSIS — M85.852 OSTEOPENIA OF NECK OF LEFT FEMUR: ICD-10-CM

## 2023-11-20 DIAGNOSIS — F41.9 ANXIETY: ICD-10-CM

## 2023-11-20 DIAGNOSIS — N18.31 TYPE 2 DIABETES MELLITUS WITH STAGE 3A CHRONIC KIDNEY DISEASE, WITHOUT LONG-TERM CURRENT USE OF INSULIN (HCC): ICD-10-CM

## 2023-11-20 DIAGNOSIS — E11.22 TYPE 2 DIABETES MELLITUS WITH STAGE 3A CHRONIC KIDNEY DISEASE, WITHOUT LONG-TERM CURRENT USE OF INSULIN (HCC): ICD-10-CM

## 2023-11-20 DIAGNOSIS — E03.9 PRIMARY HYPOTHYROIDISM: ICD-10-CM

## 2023-11-20 DIAGNOSIS — E78.00 HYPERCHOLESTEROLEMIA: ICD-10-CM

## 2023-11-20 DIAGNOSIS — I50.22 CHRONIC SYSTOLIC CONGESTIVE HEART FAILURE (HCC): ICD-10-CM

## 2023-11-20 DIAGNOSIS — I48.19 PERSISTENT ATRIAL FIBRILLATION (HCC): ICD-10-CM

## 2023-11-20 DIAGNOSIS — Z86.711 HISTORY OF PULMONARY EMBOLUS (PE): ICD-10-CM

## 2023-11-20 DIAGNOSIS — I10 HYPERTENSION, ESSENTIAL, BENIGN: ICD-10-CM

## 2023-11-20 DIAGNOSIS — R41.3 MEMORY CHANGES: ICD-10-CM

## 2023-11-20 DIAGNOSIS — Z86.718 HISTORY OF DVT (DEEP VEIN THROMBOSIS): ICD-10-CM

## 2023-11-20 DIAGNOSIS — J30.9 ALLERGIC RHINITIS, UNSPECIFIED SEASONALITY, UNSPECIFIED TRIGGER: ICD-10-CM

## 2023-11-21 NOTE — TELEPHONE ENCOUNTER
LOV:7-14-23    Last Refill:  Medication Quantity Refills Start End   CALCIUM CARBONATE 1500 (600 Ca) MG Oral Tab (Discontinued) 90 tablet 1 8/16/2023 10/11/2023   Sig:   TAKE 1 TABLET BY MOUTH DAILY       Protocol N/A    Last Refill :   Medication Quantity Refills Start End   apixaban (ELIQUIS) 5 MG Oral Tab 180 tablet 1 8/16/2023      Protocol: N/A    Last Refill:   Medication Quantity Refills Start End   ESCITALOPRAM 5 MG Oral Tab 90 tablet 1 8/16/2023      Protocol : N/A    Medication Quantity Refills Start End   B-12 1000 MCG Oral Tab CR 90 tablet 1 8/16/2023    Protocol:N/A    Medication Quantity Refills Start End   cholecalciferol 50 MCG (2000 UT) Oral Cap (Discontinued) 90 capsule 1 8/16/2023 10/11/2023     Protocol N/A

## 2023-11-22 RX ORDER — APIXABAN 5 MG/1
5 TABLET, FILM COATED ORAL 2 TIMES DAILY
Qty: 180 TABLET | Refills: 1 | Status: SHIPPED | OUTPATIENT
Start: 2023-11-22

## 2023-11-22 RX ORDER — ESCITALOPRAM OXALATE 5 MG/1
5 TABLET ORAL DAILY
Qty: 90 TABLET | Refills: 1 | Status: SHIPPED | OUTPATIENT
Start: 2023-11-22

## 2023-11-22 RX ORDER — TORSEMIDE 10 MG/1
10 TABLET ORAL DAILY
Qty: 90 TABLET | Refills: 1 | Status: SHIPPED | OUTPATIENT
Start: 2023-11-22

## 2023-11-22 RX ORDER — CYANOCOBALAMIN (VITAMIN B-12) 1000 MCG
1 TABLET, EXTENDED RELEASE ORAL DAILY
Qty: 90 TABLET | Refills: 1 | Status: SHIPPED | OUTPATIENT
Start: 2023-11-22

## 2023-11-22 RX ORDER — LEVOTHYROXINE SODIUM 0.07 MG/1
75 TABLET ORAL EVERY MORNING
Qty: 90 TABLET | Refills: 1 | Status: SHIPPED | OUTPATIENT
Start: 2023-11-22

## 2023-11-22 RX ORDER — PRAVASTATIN SODIUM 80 MG/1
80 TABLET ORAL EVERY EVENING
Qty: 90 TABLET | Refills: 1 | Status: SHIPPED | OUTPATIENT
Start: 2023-11-22

## 2023-11-22 RX ORDER — SPIRONOLACTONE 25 MG/1
12.5 TABLET ORAL DAILY
Qty: 45 TABLET | Refills: 1 | Status: SHIPPED | OUTPATIENT
Start: 2023-11-22

## 2023-11-22 RX ORDER — CETIRIZINE HYDROCHLORIDE 10 MG/1
10 TABLET ORAL DAILY
Qty: 90 TABLET | Refills: 1 | Status: SHIPPED | OUTPATIENT
Start: 2023-11-22 | End: 2023-12-06 | Stop reason: ALTCHOICE

## 2023-11-22 RX ORDER — ACETAMINOPHEN 160 MG
2000 TABLET,DISINTEGRATING ORAL DAILY
Qty: 90 CAPSULE | Refills: 1 | Status: SHIPPED | OUTPATIENT
Start: 2023-11-22

## 2023-12-01 ENCOUNTER — OFFICE VISIT (OUTPATIENT)
Facility: CLINIC | Age: 83
End: 2023-12-01
Payer: MEDICARE

## 2023-12-01 VITALS
SYSTOLIC BLOOD PRESSURE: 118 MMHG | OXYGEN SATURATION: 96 % | HEIGHT: 63 IN | HEART RATE: 89 BPM | RESPIRATION RATE: 16 BRPM | BODY MASS INDEX: 39 KG/M2 | DIASTOLIC BLOOD PRESSURE: 70 MMHG

## 2023-12-01 DIAGNOSIS — J96.22 ACUTE AND CHRONIC RESPIRATORY FAILURE WITH HYPERCAPNIA (HCC): Primary | ICD-10-CM

## 2023-12-01 PROCEDURE — 3078F DIAST BP <80 MM HG: CPT | Performed by: INTERNAL MEDICINE

## 2023-12-01 PROCEDURE — 3074F SYST BP LT 130 MM HG: CPT | Performed by: INTERNAL MEDICINE

## 2023-12-01 PROCEDURE — 3008F BODY MASS INDEX DOCD: CPT | Performed by: INTERNAL MEDICINE

## 2023-12-01 PROCEDURE — 1159F MED LIST DOCD IN RCRD: CPT | Performed by: INTERNAL MEDICINE

## 2023-12-01 PROCEDURE — 1160F RVW MEDS BY RX/DR IN RCRD: CPT | Performed by: INTERNAL MEDICINE

## 2023-12-01 PROCEDURE — 99214 OFFICE O/P EST MOD 30 MIN: CPT | Performed by: INTERNAL MEDICINE

## 2023-12-01 RX ORDER — GABAPENTIN 100 MG/1
CAPSULE ORAL
COMMUNITY
Start: 2023-11-15

## 2023-12-01 RX ORDER — ALENDRONATE SODIUM 35 MG/1
TABLET ORAL
COMMUNITY
Start: 2023-07-27

## 2023-12-01 NOTE — PATIENT INSTRUCTIONS
Plan---          -                        -Repeat sleep study as planned                        - see me in 4 months                            Lavetta Bumpers, MD  Pulmonary Medicine  12/1/2023

## 2023-12-04 RX ORDER — CARVEDILOL 6.25 MG/1
TABLET ORAL
Qty: 60 TABLET | Refills: 0 | Status: SHIPPED | OUTPATIENT
Start: 2023-12-04

## 2023-12-05 ENCOUNTER — TELEPHONE (OUTPATIENT)
Dept: FAMILY MEDICINE CLINIC | Facility: CLINIC | Age: 83
End: 2023-12-05

## 2023-12-05 DIAGNOSIS — J30.9 ALLERGIC RHINITIS, UNSPECIFIED SEASONALITY, UNSPECIFIED TRIGGER: ICD-10-CM

## 2023-12-05 DIAGNOSIS — M41.86 LEVOSCOLIOSIS OF LUMBAR SPINE: ICD-10-CM

## 2023-12-05 DIAGNOSIS — M48.061 SPINAL STENOSIS AT L4-L5 LEVEL: ICD-10-CM

## 2023-12-05 DIAGNOSIS — M51.36 LUMBAR DEGENERATIVE DISC DISEASE: Primary | ICD-10-CM

## 2023-12-05 DIAGNOSIS — M48.062 SPINAL STENOSIS OF LUMBAR REGION WITH NEUROGENIC CLAUDICATION: ICD-10-CM

## 2023-12-06 RX ORDER — FEXOFENADINE HCL 180 MG/1
180 TABLET ORAL DAILY
Qty: 90 TABLET | Refills: 0 | Status: SHIPPED | OUTPATIENT
Start: 2023-12-06

## 2023-12-06 RX ORDER — LIDOCAINE 50 MG/G
1 PATCH TOPICAL EVERY 24 HOURS
Qty: 30 EACH | Refills: 2 | Status: SHIPPED | OUTPATIENT
Start: 2023-12-06

## 2023-12-06 NOTE — TELEPHONE ENCOUNTER
Paged by nurse at the long term care facility. Patient is requesting a 5% lidocaine patch for her back and requesting a med other than cetirizine for her chronic hand itching since cetirizine no longer seems to be working well. Advised nurse I would have Dr. Xiomara Malave review tomorrow and advise.   Contact phone number for facility is

## 2023-12-11 DIAGNOSIS — M51.36 LUMBAR DEGENERATIVE DISC DISEASE: Primary | ICD-10-CM

## 2023-12-13 NOTE — TELEPHONE ENCOUNTER
LOV:n/a     Last Refill:11-15-23  Medication Quantity Refills Start End   gabapentin 100 MG Oral Cap -- -- 11/15/2023 --         RTC:n/a       Pharmacy [facility]       Protocol n/a         Please sign if appropriate

## 2023-12-14 ENCOUNTER — TELEPHONE (OUTPATIENT)
Dept: FAMILY MEDICINE CLINIC | Facility: CLINIC | Age: 83
End: 2023-12-14

## 2023-12-14 DIAGNOSIS — M48.061 SPINAL STENOSIS AT L4-L5 LEVEL: ICD-10-CM

## 2023-12-14 DIAGNOSIS — M51.36 LUMBAR DEGENERATIVE DISC DISEASE: ICD-10-CM

## 2023-12-14 DIAGNOSIS — M48.062 SPINAL STENOSIS OF LUMBAR REGION WITH NEUROGENIC CLAUDICATION: ICD-10-CM

## 2023-12-14 DIAGNOSIS — M41.86 LEVOSCOLIOSIS OF LUMBAR SPINE: ICD-10-CM

## 2023-12-14 RX ORDER — LIDOCAINE 50 MG/G
1 PATCH TOPICAL DAILY PRN
Qty: 30 EACH | Refills: 2 | Status: SHIPPED | OUTPATIENT
Start: 2023-12-14

## 2023-12-14 RX ORDER — GABAPENTIN 100 MG/1
100 CAPSULE ORAL EVERY OTHER DAY
Qty: 15 CAPSULE | Refills: 0 | Status: SHIPPED | OUTPATIENT
Start: 2023-12-14 | End: 2024-01-10

## 2023-12-14 NOTE — TELEPHONE ENCOUNTER
Cherry from [address] [place] [address] living called on pt's lidocaine prescribed last week. Lacho Sepulveda pt is refusing to wear it stating that she does not have any pain. Asking if we can change the order from one patch daily to daily prn? I advised this would most likely be fine, they said if so---we need to fax to them vs sending to pharmacy. I pended as RX printed. Please sign if you agree and I can fax?     Thx

## 2023-12-15 ENCOUNTER — TELEPHONE (OUTPATIENT)
Dept: FAMILY MEDICINE CLINIC | Facility: CLINIC | Age: 83
End: 2023-12-15

## 2023-12-15 NOTE — TELEPHONE ENCOUNTER
Dana Shah from pt's assisted living home requesting call back from nurses regarding pt's medication list and discontinuation of Cetirizine.

## 2023-12-15 NOTE — TELEPHONE ENCOUNTER
Called and spoke to Johann Daniel from assisted living. I informed her pt.was advised to discontinue Cetirizine and try Fexofenadine 180 mg 1 daily. Johann Daniel going forward (if asking about med changes )will have us speak with them directly regarding medication changes as they are currently managing pt. s meds. I advised Johann Daniel to make sure she specifies that when she calls so we are aware.  She agreed to plan and verbalized understanding

## 2024-01-03 ENCOUNTER — TELEPHONE (OUTPATIENT)
Dept: FAMILY MEDICINE CLINIC | Facility: CLINIC | Age: 84
End: 2024-01-03

## 2024-01-03 RX ORDER — CARVEDILOL 6.25 MG/1
TABLET ORAL
Qty: 60 TABLET | Refills: 0 | Status: SHIPPED | OUTPATIENT
Start: 2024-01-03

## 2024-01-03 NOTE — TELEPHONE ENCOUNTER
Last Refill: 12/04/23 #60tablets 0Refill    LOV: 7/14/23 Med follow up  NOV: 1/10/23    Hypertension Medications Protocol [id number]/03/2024 11:32 AM   Protocol Details CMP or BMP in past 12 months    Last serum creatinine< 2.0    Appointment in past 6 or next 3 months      Refill sent to pharmacy.

## 2024-01-03 NOTE — TELEPHONE ENCOUNTER
Called Christine Nurse ([facility]) and he is requesting us to process refill request for Carvedilol since patient completely ran out of medication.  Informed him Rx sent to pharmacy. He voiced understanding.

## 2024-01-03 NOTE — TELEPHONE ENCOUNTER
Received page from \"Christine\": [phone]  Page states\" trying to get refill for blood pressure medication carvedilol 6.25 mg twice a day, and pharmacy is waiting for the doctor to call in\"

## 2024-01-10 ENCOUNTER — OFFICE VISIT (OUTPATIENT)
Dept: FAMILY MEDICINE CLINIC | Facility: CLINIC | Age: 84
End: 2024-01-10
Payer: MEDICARE

## 2024-01-10 ENCOUNTER — OFFICE VISIT (OUTPATIENT)
Facility: LOCATION | Age: 84
End: 2024-01-10
Payer: MEDICARE

## 2024-01-10 VITALS
HEIGHT: 63 IN | BODY MASS INDEX: 38.98 KG/M2 | SYSTOLIC BLOOD PRESSURE: 112 MMHG | TEMPERATURE: 97 F | WEIGHT: 220 LBS | DIASTOLIC BLOOD PRESSURE: 60 MMHG | RESPIRATION RATE: 16 BRPM | HEART RATE: 72 BPM

## 2024-01-10 DIAGNOSIS — E11.22 TYPE 2 DIABETES MELLITUS WITH STAGE 4 CHRONIC KIDNEY DISEASE, WITHOUT LONG-TERM CURRENT USE OF INSULIN (HCC): ICD-10-CM

## 2024-01-10 DIAGNOSIS — F40.232 PHOBIA OF DENTAL PROCEDURE: ICD-10-CM

## 2024-01-10 DIAGNOSIS — R05.3 PERSISTENT COUGH FOR 3 WEEKS OR LONGER: ICD-10-CM

## 2024-01-10 DIAGNOSIS — N18.4 TYPE 2 DIABETES MELLITUS WITH STAGE 4 CHRONIC KIDNEY DISEASE, WITHOUT LONG-TERM CURRENT USE OF INSULIN (HCC): ICD-10-CM

## 2024-01-10 DIAGNOSIS — F11.90 CHRONIC, CONTINUOUS USE OF OPIOIDS: ICD-10-CM

## 2024-01-10 DIAGNOSIS — D69.6 THROMBOCYTOPENIA (HCC): ICD-10-CM

## 2024-01-10 DIAGNOSIS — L29.9 PRURITUS: ICD-10-CM

## 2024-01-10 DIAGNOSIS — M48.062 SPINAL STENOSIS OF LUMBAR REGION WITH NEUROGENIC CLAUDICATION: ICD-10-CM

## 2024-01-10 DIAGNOSIS — E03.9 PRIMARY HYPOTHYROIDISM: ICD-10-CM

## 2024-01-10 DIAGNOSIS — L85.3 DRY SKIN DERMATITIS: ICD-10-CM

## 2024-01-10 DIAGNOSIS — N18.4 CKD (CHRONIC KIDNEY DISEASE) STAGE 4, GFR 15-29 ML/MIN (HCC): ICD-10-CM

## 2024-01-10 DIAGNOSIS — R07.89 INTERMITTENT LEFT-SIDED CHEST PAIN: Primary | ICD-10-CM

## 2024-01-10 DIAGNOSIS — F41.9 ANXIETY: ICD-10-CM

## 2024-01-10 DIAGNOSIS — M48.061 SPINAL STENOSIS AT L4-L5 LEVEL: ICD-10-CM

## 2024-01-10 DIAGNOSIS — H90.3 SENSORINEURAL HEARING LOSS (SNHL) OF BOTH EARS: Primary | ICD-10-CM

## 2024-01-10 DIAGNOSIS — M41.86 LEVOSCOLIOSIS OF LUMBAR SPINE: ICD-10-CM

## 2024-01-10 DIAGNOSIS — H93.293 ABNORMAL AUDITORY PERCEPTION OF BOTH EARS: Primary | ICD-10-CM

## 2024-01-10 DIAGNOSIS — I50.32 CHRONIC DIASTOLIC HEART FAILURE (HCC): ICD-10-CM

## 2024-01-10 PROCEDURE — 1170F FXNL STATUS ASSESSED: CPT | Performed by: STUDENT IN AN ORGANIZED HEALTH CARE EDUCATION/TRAINING PROGRAM

## 2024-01-10 PROCEDURE — 99203 OFFICE O/P NEW LOW 30 MIN: CPT | Performed by: OTOLARYNGOLOGY

## 2024-01-10 PROCEDURE — 3078F DIAST BP <80 MM HG: CPT | Performed by: STUDENT IN AN ORGANIZED HEALTH CARE EDUCATION/TRAINING PROGRAM

## 2024-01-10 PROCEDURE — 99214 OFFICE O/P EST MOD 30 MIN: CPT | Performed by: STUDENT IN AN ORGANIZED HEALTH CARE EDUCATION/TRAINING PROGRAM

## 2024-01-10 PROCEDURE — 1160F RVW MEDS BY RX/DR IN RCRD: CPT | Performed by: OTOLARYNGOLOGY

## 2024-01-10 PROCEDURE — 1159F MED LIST DOCD IN RCRD: CPT | Performed by: OTOLARYNGOLOGY

## 2024-01-10 PROCEDURE — 92567 TYMPANOMETRY: CPT | Performed by: AUDIOLOGIST

## 2024-01-10 PROCEDURE — 99499 UNLISTED E&M SERVICE: CPT | Performed by: STUDENT IN AN ORGANIZED HEALTH CARE EDUCATION/TRAINING PROGRAM

## 2024-01-10 PROCEDURE — 3008F BODY MASS INDEX DOCD: CPT | Performed by: STUDENT IN AN ORGANIZED HEALTH CARE EDUCATION/TRAINING PROGRAM

## 2024-01-10 PROCEDURE — 1160F RVW MEDS BY RX/DR IN RCRD: CPT | Performed by: STUDENT IN AN ORGANIZED HEALTH CARE EDUCATION/TRAINING PROGRAM

## 2024-01-10 PROCEDURE — 3074F SYST BP LT 130 MM HG: CPT | Performed by: STUDENT IN AN ORGANIZED HEALTH CARE EDUCATION/TRAINING PROGRAM

## 2024-01-10 PROCEDURE — 92557 COMPREHENSIVE HEARING TEST: CPT | Performed by: AUDIOLOGIST

## 2024-01-10 PROCEDURE — 1159F MED LIST DOCD IN RCRD: CPT | Performed by: STUDENT IN AN ORGANIZED HEALTH CARE EDUCATION/TRAINING PROGRAM

## 2024-01-10 RX ORDER — DIGOXIN 125 MCG
1 TABLET ORAL DAILY
COMMUNITY

## 2024-01-10 RX ORDER — MONTELUKAST SODIUM 10 MG/1
10 TABLET ORAL NIGHTLY
Qty: 90 TABLET | Refills: 0 | Status: SHIPPED | OUTPATIENT
Start: 2024-01-10

## 2024-01-10 RX ORDER — TRIAMCINOLONE ACETONIDE 1 MG/G
CREAM TOPICAL 2 TIMES DAILY PRN
Qty: 80 G | Refills: 2 | Status: SHIPPED | OUTPATIENT
Start: 2024-01-10

## 2024-01-10 RX ORDER — DIAZEPAM 2 MG/1
TABLET ORAL
Qty: 2 TABLET | Refills: 0 | Status: SHIPPED | OUTPATIENT
Start: 2024-01-10

## 2024-01-10 NOTE — PROGRESS NOTES
[facility] Family Medicine Note  01/10/24    Chief complaint:   Chief Complaint   Patient presents with    Cough     Persistent cough, pain in pectoral muscle or chest, itchy skin on hands, fingers and arms  MRI of shoulder was denied by insurance.  Pt asking if order could be entered a different way.   Patient's daughter, Charito, is here and provides part of the history.  HPI:   Joni Christiansen is a 83 year old female who presents for follow up.    Tried allegra for itch without improvement.    Failed hearing test.    Still has left sided chest pain. Had negative cardiac workup mostly in the evening or with physical movements.     Has dry cough, has a spot that she feels that is dry. Will take a drink of water.     No fevers.    Does not feel like there is something in the way when she swallows.    Able to eat okay.    Staying hydrated overall.    Has a bottle of fish oil. Was wondering is she should take it.     She has chronic back pain, will flare at times.     Taking lexapro 5mg daily.     Patient has anxiety before dental appointments would like medication prior to the appointment      Wt Readings from Last 6 Encounters:   01/10/24 220 lb (99.8 kg)   10/11/23 218 lb (98.9 kg)   10/04/23 220 lb (99.8 kg)   09/29/23 220 lb (99.8 kg)   09/09/23 220 lb (99.8 kg)   08/26/23 217 lb (98.4 kg)       Past Medical History:   Diagnosis Date    (HFpEF) heart failure with preserved ejection fraction (HCC)     Atrial fibrillation (HCC)     Back pain     CKD (chronic kidney disease)     Colon cancer (HCC)     Congestive heart disease (HCC)     Deep vein thrombosis (HCC)     Diabetes (HCC)     Essential hypertension     History of blood clots     HTN (hypertension)     Hyperlipidemia     Hypothyroidism     Obesity     Pulmonary embolism (HCC)     Sleep apnea     TIA (transient ischemic attack) 11/18/2022    Visual impairment      Past Surgical History:   Procedure Laterality Date    BACK SURGERY       COLECTOMY      COLONOSCOPY      EXCIS LUMBAR DISK,ONE LEVEL      HIP REPLACEMENT SURGERY      KNEE REPLACEMENT SURGERY            TOTAL HIP REPLACEMENT      TOTAL KNEE REPLACEMENT Bilateral      Allergies   Allergen Reactions    Penicillins UNKNOWN and OTHER (SEE COMMENTS)     Other reaction(s): Unknown    Was told as a child it was an allergy     No current facility-administered medications for this visit.  Social History     Socioeconomic History    Marital status:    Tobacco Use    Smoking status: Never     Passive exposure: Never    Smokeless tobacco: Never   Vaping Use    Vaping Use: Never used   Substance and Sexual Activity    Alcohol use: Not Currently    Drug use: Never   Social History Narrative    Retired  for >30 years.  Carlos. Daughter Charito lives in [place].      Counseling given: Not Answered    Family History   Problem Relation Age of Onset    Other (other) Father         congestive heart failure    Hypertension Maternal Grandmother      Family Status   Relation Status    Fa (Not Specified)    MGMA (Not Specified)        REVIEW OF SYSTEMS:   See HPI    EXAM:   /60 (BP Location: Right arm, Patient Position: Sitting, Cuff Size: large)   Pulse 72   Temp 97 °F (36.1 °C) (Temporal)   Resp 16   Ht 5' 3\" (1.6 m)   Wt 220 lb (99.8 kg)   BMI 38.97 kg/m²  Estimated body mass index is 38.97 kg/m² as calculated from the following:    Height as of this encounter: 5' 3\" (1.6 m).    Weight as of this encounter: 220 lb (99.8 kg).   Vital signs reviewed. Appears stated age, well groomed.  Physical Exam:  GEN:  Patient is alert and oriented x3, no apparent distress  HEAD:  Normocephalic, atraumatic  HEENT:  no scleral icterus, conjunctivae clear bilaterally, EOMI, PERRLA, OP clear  LUNGS: clear to auscultation bilaterally, no rales/rhonchi/wheezing  HEART:  Regular rate and rhythm, normal S1/S2, no murmurs, rubs or gallops  ABDOMEN:  Bowel sounds normal, soft,  nondistended, nontender, no hepatosplenomegaly  EXTREMITIES:  Moves all extremities well  SKIN: dry skin on legs and hands   NEURO:  CN 2 - 12 grossly intact      ASSESSMENT AND PLAN:   1. Intermittent left-sided chest pain  Patient presents for follow up of left sided chest pain. Cardiac etiology ruled out. Will check MRI chest to evaluate for structural causes of pain.   - MRI CHEST (CPT=71550); Future    2. Pruritus  Will trial singulair 10mg at bedtime in addition to allegra daily.  - montelukast 10 MG Oral Tab; Take 1 tablet (10 mg total) by mouth nightly.  Dispense: 90 tablet; Refill: 0    3. Persistent cough for 3 weeks or longer  Anticipate improvement with singulair    4. Primary hypothyroidism  Will recheck labs, taking levothyroxine 75mcg daily  - TSH and Free T4 [E]; Future    5. Chronic diastolic heart failure (HCC)  Following with cardiology, appreciate evaluation and recommendations    6. Type 2 diabetes mellitus with stage 4 chronic kidney disease, without long-term current use of insulin ([facility])  Doing well with diet control. Will follow.  - Microalb/Creat Ratio, Random Urine Now; Future  - Lipids Now; Future    7. CKD (chronic kidney disease) stage 4, GFR 15-29 ml/min ([facility])  Will follow  - Microalb/Creat Ratio, Random Urine Now; Future  - CBC W Differential W Platelet [E]; Future    8. Thrombocytopenia (HCC)  Will recheck CBC  - CBC W Differential W Platelet [E]; Future    9. Spinal stenosis of lumbar region with neurogenic claudication  Will refer to pain management. Discussed risks associated with long term opioid use and the goal is to trial other pain management options for safer pain control options, appreciate evaluation and recommendations.  - Pain Management Referral - In Network    10. Levoscoliosis of lumbar spine  - Pain Management Referral - In Network    11. Spinal stenosis at L4-L5 level  - Pain Management Referral - In Network    12. Chronic, continuous use of opioids  - Pain  Management Referral - In Network    13. Anxiety  Stable on lexapro 5mg daily    14. Phobia of dental procedure  Patient has upcoming dental work, will send valium to take beforehand. To not take with opioids or other sedating medications.  - diazePAM (VALIUM) 2 MG Oral Tab; Take one tablet 60 minutes prior to procedure, may take and additional one tablet in 30 minutes if needed due to incomplete response.  Dispense: 2 tablet; Refill: 0    15. Dry skin dermatitis  Patient has dry skin on legs and hands, trial of triamcinolone cream twice daily for 1-2 weeks then take a 1-2 week break in between application cycles. Follow up if no improvement.   - triamcinolone 0.1 % External Cream; Apply topically 2 (two) times daily as needed. Can use on legs and hands  Dispense: 80 g; Refill: 2        Meds & Refills for this Visit:  Requested Prescriptions     Signed Prescriptions Disp Refills    montelukast 10 MG Oral Tab 90 tablet 0     Sig: Take 1 tablet (10 mg total) by mouth nightly.    diazePAM (VALIUM) 2 MG Oral Tab 2 tablet 0     Sig: Take one tablet 60 minutes prior to procedure, may take and additional one tablet in 30 minutes if needed due to incomplete response.    triamcinolone 0.1 % External Cream 80 g 2     Sig: Apply topically 2 (two) times daily as needed. Can use on legs and hands       Health Maintenance:  Health Maintenance Due   Topic Date Due    Diabetes Care Dilated Eye Exam  Never done    MA Annual Health Assessment  01/01/2024    Fall Risk Screening (Annual)  01/01/2024    Diabetes Care Foot Exam (Annual)  Never done       Patient/Caregiver Education: There are no barriers to learning. Medical education done.   Outcome: Patient verbalizes understanding. Patient is notified to call with any questions, complications, allergies, or worsening or changing symptoms.  Patient is to call with any side effects or complications from the treatments as a result of today.     Problem List:  Patient Active Problem List    Diagnosis    Congestive heart failure (HCC)    Congestive heart failure, unspecified HF chronicity, unspecified heart failure type (HCC)    Hypoxia    Dyspnea, unspecified type    Diabetes ([facility])    Essential hypertension    Hypercholesterolemia    Age-related osteoporosis without current pathological fracture    Allergic rhinitis    Atrophy of vagina    Chronic renal insufficiency, stage III (moderate) ([facility])    Chronic right shoulder pain    Gallstones    Hypercalcemia    Hyperuricuria    Low HDL (under 40)    Lumbar degenerative disc disease    Lymphedema    Nontraumatic incomplete tear of right rotator cuff    Obesity    Onychomycosis    ELY (obstructive sleep apnea)    Atrial fibrillation ([facility])    Postmenopausal bleeding    Primary hypothyroidism    Primary osteoarthritis of right hip    Secondary hyperparathyroidism ([facility])    Senile tremor    Spinal stenosis at L4-L5 level    Spinal stenosis of lumbar region with neurogenic claudication    Vitreous floaters of right eye    Diet-controlled diabetes mellitus ([facility])    Hypertension, essential, benign    History of TIA (transient ischemic attack)    History of DVT (deep vein thrombosis)    History of pulmonary embolus (PE)    Type 2 diabetes mellitus with diabetic chronic kidney disease ([facility])    Morbid (severe) obesity due to excess calories ([facility])    Body mass index (BMI) 40.0-44.9, adult ([facility])    Osteopenia of neck of left femur    Acute on chronic heart failure ([facility])    Hypotension    Chronic diastolic heart failure (HCC)    Acute renal insufficiency    Hypotension, unspecified hypotension type    General weakness    Frequent falls    Levoscoliosis of lumbar spine    Thrombocytopenia ([facility])    CKD (chronic kidney disease) stage 4, GFR 15-29 ml/min ([facility])    Acute and chronic respiratory failure with hypercapnia ([facility])    AC joint arthropathy    Osteophyte of left shoulder    Osteoarthritis of left glenohumeral joint       Return in about 3 months (around 4/10/2024)  for medicare annual wellness supervisit.    Amy Amaro MD  [facility] Family Medicine  01/10/24      Please note that portions of this note may have been completed with a voice recognition program. Efforts were made to edit the dictations but occasionally words are mis-transcribed. Thank you for your understanding.

## 2024-01-10 NOTE — PROGRESS NOTES
Joni Christiansen was seen for audiometric evaluation today.  Referred back to physician.     Diana Hassan

## 2024-01-10 NOTE — PROGRESS NOTES
Joni Christiansen is a 83 year old female.   Chief Complaint   Patient presents with    Hearing Check     HPI:   She has a history of slowly progressive hearing loss.  She denies any tinnitus or ear pain or vertigo.  She did work for years as a teacher.  Current Outpatient Medications   Medication Sig Dispense Refill    CARVEDILOL 6.25 MG Oral Tab TAKE 1 TABLET BY MOUTH TWICE DAILY WITH MEALS - HOLD FOR SBP < 110 OR DBP < 50 60 tablet 0    gabapentin 100 MG Oral Cap Take 1 capsule (100 mg total) by mouth every other day. Until completed then may stop use 15 capsule 0    lidocaine 5 % External Patch Place 1 patch onto the skin daily as needed. 30 each 2    fexofenadine 180 MG Oral Tab Take 1 tablet (180 mg total) by mouth daily. 90 tablet 0    alendronate 35 MG Oral Tab every 7 days.      ELIQUIS 5 MG Oral Tab TAKE 1 TABLET BY MOUTH TWICE DAILY 180 tablet 1    ESCITALOPRAM 5 MG Oral Tab TAKE 1 TABLET BY MOUTH DAILY 90 tablet 1    CALCIUM CARBONATE 1500 (600 Ca) MG Oral Tab TAKE 1 TABLET BY MOUTH DAILY 90 tablet 1    LEVOTHYROXINE 75 MCG Oral Tab TAKE 1 TABLET BY MOUTH EVERY MORNING 90 tablet 1    VITAMIN B-12 ER 1000 MCG Oral Tab CR TAKE 1 TABLET BY MOUTH DAILY 90 tablet 1    PRAVASTATIN SODIUM 80 MG Oral Tab TAKE 1 TABLET BY MOUTH EVERY EVENING 90 tablet 1    CHOLECALCIFEROL 50 MCG (2000 UT) Oral Cap TAKE 1 CAPSULE BY MOUTH DAILY 90 capsule 1    SPIRONOLACTONE 25 MG Oral Tab TAKE 1/2 TABLET BY MOUTH DAILY 45 tablet 1    TORSEMIDE 10 MG Oral Tab TAKE 1 TABLET BY MOUTH DAILY 90 tablet 1    HYDROcodone-acetaminophen 5-325 MG Oral Tab Take 1 tablet by mouth every 8 (eight) hours as needed for Pain. 60 tablet 0    IRBESARTAN 150 MG Oral Tab TAKE 1 TABLET BY MOUTH EVERY EVENING 90 tablet 1    Glucose Blood (ONETOUCH VERIO) In Vitro Strip Check 3-4 times/day for sugar check      Cholecalciferol (VITAMIN D3) 25 MCG (1000 UT) Oral Cap Take 1 capsule by mouth daily.      acetaminophen 500 MG Oral Tab Take 2 tablets (1,000 mg  total) by mouth every 6 (six) hours as needed for Pain.        Past Medical History:   Diagnosis Date    (HFpEF) heart failure with preserved ejection fraction (HCC)     Atrial fibrillation (HCC)     Back pain     CKD (chronic kidney disease)     Colon cancer (HCC)     Congestive heart disease (HCC)     Deep vein thrombosis (HCC)     Diabetes (HCC)     Essential hypertension     History of blood clots     HTN (hypertension)     Hyperlipidemia     Hypothyroidism     Obesity     Pulmonary embolism (HCC)     Sleep apnea     TIA (transient ischemic attack) 2022    Visual impairment       Social History:  Social History     Socioeconomic History    Marital status:    Tobacco Use    Smoking status: Never     Passive exposure: Never    Smokeless tobacco: Never   Vaping Use    Vaping Use: Never used   Substance and Sexual Activity    Alcohol use: Not Currently    Drug use: Never   Social History Narrative    Retired  for >30 years.  Carlos. Daughter Charito lives in [place].       Past Surgical History:   Procedure Laterality Date    BACK SURGERY      COLECTOMY      COLONOSCOPY      EXCIS LUMBAR DISK,ONE LEVEL      HIP REPLACEMENT SURGERY      KNEE REPLACEMENT SURGERY            TOTAL HIP REPLACEMENT      TOTAL KNEE REPLACEMENT           REVIEW OF SYSTEMS:   GENERAL HEALTH: feels well otherwise  GENERAL : denies fever, chills, sweats, weight loss, weight gain  SKIN: denies any unusual skin lesions or rashes  RESPIRATORY: denies shortness of breath with exertion  NEURO: denies headaches    EXAM:   There were no vitals taken for this visit.    System Findings Details   Constitutional  Overall appearance - Normal.   Psychiatric  Orientation - Oriented to time, place, person & situation. Appropriate mood and affect.   Head/Face  Facial features -- Normal. Skull - Normal.   Eyes  Pupils equal ,round ,react to light and accomidate   Ears, Nose, Throat, Neck  Ears clear pharynx clear  neck no masses   Neurological  Memory - Normal. Cranial nerves - Cranial nerves II through XII grossly intact.   Lymph Detail  Submental. Submandibular. Anterior cervical. Posterior cervical. Supraclavicular.     Latest Audiogram Result (Hz) Exam performed: 1/10/2024 11:40 AM Last edited by Yudelka Fried Au.D on 1/10/2024 11:54 AM        125 250  1500 2000 3000 4000 6000 8000    Right air:  20 15  20  30 55 65  85    Left air:  20 15  15  35 60 70  85    Right mastoid bone:   10  15  30  65      Left mastoid bone:   10  15  30  65         Reliability:  Good    Transducer:  Headphones    Technique:  Conventional Audiometry    Comments:            Latest Speech Audiometry  Last edited by Yudelka Fried Au.D on 1/10/2024 11:52 AM       Ear Method PTA SAT SRT [facility] Test/list Score (%) Intensity Mask/noise Notes    right live voice   20   W-22 84 60      left live voice   20   W-22 76 60                    Latest Tympanogram Result       Probe Tone (Hz): 226 Exam performed: 1/10/2024 11:40 AM Last edited by Yudelka Fried Au.D on 1/10/2024 11:54 AM      Tympanograms  These were drawn by a user, not generated from device data      Right Ear Left Ear                     Right Ear Left Ear    Tympanogram type: Type A Type A    Canal volume (mL): 1.1 0.9    Peak pressure (daPa):      Peak amplitude (mL):      Tympanogram width (daPa):        Comments:                    Latest Audiogram and Tympanogram Result Text  Last edited by Yudelka Fried Au.D on 1/10/2024 11:54 AM      Study Result                 Narrative & Impression  History: Pt reports reduced hearing sensitivity, bilaterally. Pt denies symptoms of tinnitus or dizziness.     Results:  Mild to severe SNHL 2-8kHz, bilaterally.     Rec:  Follow up with MD.   Binaural amplification pending MD clearance.                  ASSESSMENT AND PLAN:   1. Sensorineural hearing loss (SNHL) of both ears  Audiogram reviewed with the patient.  This shows high-frequency hearing  loss.  She is medically cleared for hearing aids.      The patient indicates understanding of these issues and agrees to the plan.    No follow-ups on file.    Dejuan Del Rio MD  1/10/2024  1:15 PM

## 2024-01-11 ENCOUNTER — TELEPHONE (OUTPATIENT)
Dept: FAMILY MEDICINE CLINIC | Facility: CLINIC | Age: 84
End: 2024-01-11

## 2024-01-12 NOTE — TELEPHONE ENCOUNTER
Patient received script for montelukast.  Nurse questioning if they are to continue Allegra also.  Advised that since different mechanism of action, most likely Dr. Amaro wants both continued but they have asked for Dr. Amaro to clarify with them - phone [phone].  They  also asked for an order to be faxed to them for montelukast - advised we couldn't do this until Monday.  They will try to obtain order from the pharmacy so med can be started over the weekend.

## 2024-01-12 NOTE — TELEPHONE ENCOUNTER
Called [facility] and s/w Nurse Mali.  Informed her of PCP's recommendation to have patient start Montelukast and continue Allegra.   She voiced understanding and agreed with plan.

## 2024-01-13 DIAGNOSIS — L29.9 PRURITUS: ICD-10-CM

## 2024-01-15 ENCOUNTER — TELEPHONE (OUTPATIENT)
Dept: FAMILY MEDICINE CLINIC | Facility: CLINIC | Age: 84
End: 2024-01-15

## 2024-01-15 RX ORDER — MONTELUKAST SODIUM 10 MG/1
10 TABLET ORAL EVERY EVENING
Qty: 90 TABLET | Refills: 0 | OUTPATIENT
Start: 2024-01-15

## 2024-01-15 NOTE — TELEPHONE ENCOUNTER
Faxed signed order for triamcinolone and instructions for use.    Faxed to [facility] where pt is a resident.

## 2024-01-17 ENCOUNTER — LAB ENCOUNTER (OUTPATIENT)
Dept: LAB | Facility: HOSPITAL | Age: 84
End: 2024-01-17
Attending: STUDENT IN AN ORGANIZED HEALTH CARE EDUCATION/TRAINING PROGRAM
Payer: MEDICARE

## 2024-01-17 ENCOUNTER — MED REC SCAN ONLY (OUTPATIENT)
Dept: FAMILY MEDICINE CLINIC | Facility: CLINIC | Age: 84
End: 2024-01-17

## 2024-01-17 DIAGNOSIS — E11.22 TYPE 2 DIABETES MELLITUS WITH STAGE 4 CHRONIC KIDNEY DISEASE, WITHOUT LONG-TERM CURRENT USE OF INSULIN (HCC): ICD-10-CM

## 2024-01-17 DIAGNOSIS — N18.4 TYPE 2 DIABETES MELLITUS WITH STAGE 4 CHRONIC KIDNEY DISEASE, WITHOUT LONG-TERM CURRENT USE OF INSULIN (HCC): ICD-10-CM

## 2024-01-17 DIAGNOSIS — E03.9 PRIMARY HYPOTHYROIDISM: ICD-10-CM

## 2024-01-17 DIAGNOSIS — D69.6 THROMBOCYTOPENIA (HCC): ICD-10-CM

## 2024-01-17 DIAGNOSIS — N18.4 CKD (CHRONIC KIDNEY DISEASE) STAGE 4, GFR 15-29 ML/MIN (HCC): ICD-10-CM

## 2024-01-17 DIAGNOSIS — I50.32 CHRONIC DIASTOLIC HEART FAILURE (HCC): Primary | ICD-10-CM

## 2024-01-17 DIAGNOSIS — R06.09 DYSPNEA ON EXERTION: ICD-10-CM

## 2024-01-17 LAB
ANION GAP SERPL CALC-SCNC: 5 MMOL/L (ref 0–18)
BASOPHILS # BLD AUTO: 0.06 X10(3) UL (ref 0–0.2)
BASOPHILS NFR BLD AUTO: 0.7 %
BUN BLD-MCNC: 20 MG/DL (ref 9–23)
CALCIUM BLD-MCNC: 10.5 MG/DL (ref 8.5–10.1)
CHLORIDE SERPL-SCNC: 108 MMOL/L (ref 98–112)
CHOLEST SERPL-MCNC: 114 MG/DL (ref ?–200)
CO2 SERPL-SCNC: 29 MMOL/L (ref 21–32)
CREAT BLD-MCNC: 1.22 MG/DL
CREAT UR-SCNC: 13 MG/DL
EGFRCR SERPLBLD CKD-EPI 2021: 45 ML/MIN/1.73M2 (ref 60–?)
EOSINOPHIL # BLD AUTO: 0.18 X10(3) UL (ref 0–0.7)
EOSINOPHIL NFR BLD AUTO: 2.2 %
ERYTHROCYTE [DISTWIDTH] IN BLOOD BY AUTOMATED COUNT: 12.2 %
FASTING PATIENT LIPID ANSWER: YES
FASTING STATUS PATIENT QL REPORTED: YES
GLUCOSE BLD-MCNC: 88 MG/DL (ref 70–99)
HCT VFR BLD AUTO: 40.4 %
HDLC SERPL-MCNC: 45 MG/DL (ref 40–59)
HGB BLD-MCNC: 13.4 G/DL
IMM GRANULOCYTES # BLD AUTO: 0.01 X10(3) UL (ref 0–1)
IMM GRANULOCYTES NFR BLD: 0.1 %
LDLC SERPL CALC-MCNC: 48 MG/DL (ref ?–100)
LYMPHOCYTES # BLD AUTO: 1.73 X10(3) UL (ref 1–4)
LYMPHOCYTES NFR BLD AUTO: 21.6 %
MCH RBC QN AUTO: 31.5 PG (ref 26–34)
MCHC RBC AUTO-ENTMCNC: 33.2 G/DL (ref 31–37)
MCV RBC AUTO: 95.1 FL
MICROALBUMIN UR-MCNC: <0.5 MG/DL
MONOCYTES # BLD AUTO: 0.59 X10(3) UL (ref 0.1–1)
MONOCYTES NFR BLD AUTO: 7.4 %
NEUTROPHILS # BLD AUTO: 5.45 X10 (3) UL (ref 1.5–7.7)
NEUTROPHILS # BLD AUTO: 5.45 X10(3) UL (ref 1.5–7.7)
NEUTROPHILS NFR BLD AUTO: 68 %
NONHDLC SERPL-MCNC: 69 MG/DL (ref ?–130)
OSMOLALITY SERPL CALC.SUM OF ELEC: 296 MOSM/KG (ref 275–295)
PLATELET # BLD AUTO: 173 10(3)UL (ref 150–450)
POTASSIUM SERPL-SCNC: 4.1 MMOL/L (ref 3.5–5.1)
RBC # BLD AUTO: 4.25 X10(6)UL
SODIUM SERPL-SCNC: 142 MMOL/L (ref 136–145)
T4 FREE SERPL-MCNC: 1.2 NG/DL (ref 0.8–1.7)
TRIGL SERPL-MCNC: 116 MG/DL (ref 30–149)
TSI SER-ACNC: 1.58 MIU/ML (ref 0.36–3.74)
VLDLC SERPL CALC-MCNC: 16 MG/DL (ref 0–30)
WBC # BLD AUTO: 8 X10(3) UL (ref 4–11)

## 2024-01-17 PROCEDURE — 82043 UR ALBUMIN QUANTITATIVE: CPT

## 2024-01-17 PROCEDURE — 36415 COLL VENOUS BLD VENIPUNCTURE: CPT

## 2024-01-17 PROCEDURE — 84443 ASSAY THYROID STIM HORMONE: CPT

## 2024-01-17 PROCEDURE — 80061 LIPID PANEL: CPT

## 2024-01-17 PROCEDURE — 84439 ASSAY OF FREE THYROXINE: CPT

## 2024-01-17 PROCEDURE — 80048 BASIC METABOLIC PNL TOTAL CA: CPT

## 2024-01-17 PROCEDURE — 82570 ASSAY OF URINE CREATININE: CPT

## 2024-01-17 PROCEDURE — 85025 COMPLETE CBC W/AUTO DIFF WBC: CPT

## 2024-01-18 RX ORDER — GABAPENTIN 100 MG/1
CAPSULE ORAL
Qty: 15 CAPSULE | Refills: 0 | OUTPATIENT
Start: 2024-01-18

## 2024-01-25 ENCOUNTER — TELEPHONE (OUTPATIENT)
Dept: FAMILY MEDICINE CLINIC | Facility: CLINIC | Age: 84
End: 2024-01-25

## 2024-01-25 DIAGNOSIS — R07.89 INTERMITTENT LEFT-SIDED CHEST PAIN: Primary | ICD-10-CM

## 2024-01-25 DIAGNOSIS — R59.9 ENLARGED LYMPH NODE: ICD-10-CM

## 2024-01-25 NOTE — TELEPHONE ENCOUNTER
Diana MRI tech called from [facility]    She has a few questions on this pt and her MRI chest order.    She wonders if she lives in assisted living? Reports review of chart indicates possibly. If so--pt needs to be scheduled at the hospital vs [facility] location? She reports this is scheduling issues, the  on their end can address.    2. She is wondering the reason for the MRI chest and wondering what you are looking for? Also notes pt has to be able to hold her breath and that this is not a quick exam.    3. Sts she wants to know you aren't looking for something that a CT can catch that an MRI wont?    I reviewed notes from 1/10 vs and advised her of:    \" Intermittent left-sided chest pain  Patient presents for follow up of left sided chest pain. Cardiac etiology ruled out. Will check MRI chest to evaluate for structural causes of pain.\"     She wonders if you are looking for something pulmonary??    I informed her you are out this week, she is OK for this awaiting your return, test is not booked until 2/13    Sts indicates she is not going to have pt rescheduled yet to hospital until there is more info on this exam.    Please advise, she advised no cb needed, she will follow the notes on her end.    thx

## 2024-01-28 NOTE — TELEPHONE ENCOUNTER
1.  Patient does live in an assisted living facility.  2.  The MRI is to evaluate for other causes of pain in the left upper chest.  Patient had a prior CTA on 5/13/2023 to rule out PE. It showed Ill-defined nodular area of   consolidation is also noted left upper lobe.  In light of this, lets actually switch to a CT of the chest with and without contrast to further evaluate because there was also an enlarged lymph node on that CT and patchy groundglass areas in the right lower lobe.  Please cancel the MRI chest order and notify patient of new CT chest ordered instead.  As we get more information, we may follow-up with an MRI but the CT would be a quicker exam in a good place to start.    Thank you for your help in coordinating this patient's care.

## 2024-01-29 NOTE — TELEPHONE ENCOUNTER
Pt calked back. I informed her the MRI of the cheat was cancelled. The order was changed to a CT of the chest with contrast. Dr. Amaro cancelled the order for the MRI and placed a new order for the CT of the chest with contrast. Pt was given the number for central scheduling to call and make the appt. Pt. Agreed to plan and verbalized understanding

## 2024-01-29 NOTE — TELEPHONE ENCOUNTER
I want to clarify the order for the CT of the chest. If you want it with and without contrast it falls under CT of the chest HI resolution with and without contrast , otherwise it is a CT of the chest with contrast. Please clarify. Thank you

## 2024-01-29 NOTE — TELEPHONE ENCOUNTER
LM for pt to cb.   I have cancelled her MRI chest she had originally scheduled and removed the order.    Pt just needs to set up CT instead. Order placed.    See below. thx

## 2024-02-08 DIAGNOSIS — I10 HYPERTENSION, ESSENTIAL, BENIGN: ICD-10-CM

## 2024-02-08 RX ORDER — CARVEDILOL 6.25 MG/1
TABLET ORAL
Qty: 60 TABLET | Refills: 1 | Status: SHIPPED | OUTPATIENT
Start: 2024-02-08 | End: 2024-04-22

## 2024-02-08 RX ORDER — IRBESARTAN 150 MG/1
TABLET ORAL
Qty: 90 TABLET | Refills: 1 | Status: SHIPPED | OUTPATIENT
Start: 2024-02-08

## 2024-02-08 NOTE — TELEPHONE ENCOUNTER
LOV:1-10-24 (sick visit)    Last Refill:  CARVEDILOL 6.25 MG Oral Tab 60 tablet 0 1/3/2024     IRBESARTAN 150 MG Oral Tab 90 tablet 1 8/16/2023       RTC:4/10/2024       Hypertension Medications Protocol [id number]/08/2024 02:00 PM   Protocol Details EGFRCR or GFRNAA > 50    CMP or BMP in past 12 months    Last BP reading less than 140/90    In person appointment or virtual visit in the past 12 mos or         Please sign if appropriate

## 2024-02-20 ENCOUNTER — TELEPHONE (OUTPATIENT)
Dept: FAMILY MEDICINE CLINIC | Facility: CLINIC | Age: 84
End: 2024-02-20

## 2024-02-20 DIAGNOSIS — J30.9 ALLERGIC RHINITIS, UNSPECIFIED SEASONALITY, UNSPECIFIED TRIGGER: ICD-10-CM

## 2024-02-20 RX ORDER — FEXOFENADINE HYDROCHLORIDE 180 MG/1
180 TABLET ORAL DAILY
Qty: 90 TABLET | Refills: 0 | Status: SHIPPED | OUTPATIENT
Start: 2024-02-20

## 2024-02-20 NOTE — TELEPHONE ENCOUNTER
Appointment For: Joni Christiansen ([id number])   Visit Type: MYCHART EXAM (2964)      3/21/2024    1:30 PM  30 mins.  Amy Amaro       [facility] [address]      Patient Comments:   Persistent cough     Ok for 3/21 or would you like to see sooner? Schedule w/ different provider?

## 2024-03-04 ENCOUNTER — TELEPHONE (OUTPATIENT)
Dept: FAMILY MEDICINE CLINIC | Facility: CLINIC | Age: 84
End: 2024-03-04

## 2024-03-04 NOTE — TELEPHONE ENCOUNTER
[facility] [employer] wants to come  the ventilator but was told they need an order from the doctor to pick it up.      The only number pt had to the company is the billing number which is [phone] ext [phone].

## 2024-03-05 NOTE — TELEPHONE ENCOUNTER
Thank you for the update.  Will await pulmonology and sleep medicine evaluations for further recommendations.

## 2024-03-05 NOTE — TELEPHONE ENCOUNTER
I called and spoke to the pt and her daughter, Charito. Pt stated \" I am not using the Bi pap because it is uncomfortable\" pt has appt for pulmonology and sleep medicine for 04/02 and 04/03. They are cancelling those appointments because the pt can not go at that time. I informed them that Dr. Amaro will not write a letter to discontinue the Bi-Pap because pt is under the care of pulmonology and sleep medicine and was to stay on the Bi-pap and repeat the sleep study. It is against medical advice to stop using the Bi-pap. I advised the pt and her daughter to call the sleep doctor and the pulmonologist for follow up. Pt. Agreed to plan and verbalized understanding

## 2024-03-05 NOTE — TELEPHONE ENCOUNTER
Patient has been following with pulm and sleep medicine regarding ELY.  Is patient not using the BiPAP anymore?  It was advised from pulmonology to continue to use the BiPAP and repeat the sleep study.  Has any of this happened yet?  I do not want to discontinue respiratory support overnight since it can improve patient's quality of life.  Thank you.

## 2024-03-05 NOTE — TELEPHONE ENCOUNTER
I called and spoke to Cora in respiratory with [facility]. They need a letter faxed to them stating pt no longer needs the ventilator and it has been discontinued so they can go  machine. Please fax the letter to [phone]

## 2024-03-11 ENCOUNTER — TELEPHONE (OUTPATIENT)
Dept: FAMILY MEDICINE CLINIC | Facility: CLINIC | Age: 84
End: 2024-03-11

## 2024-03-11 NOTE — TELEPHONE ENCOUNTER
[facility] Dr. Am[facility]

## 2024-03-11 NOTE — TELEPHONE ENCOUNTER
Pt is requesting \"pain killers\" prior to dental procedure on Wednesday, 3/13.    Please send to:  [facility], [facility] - [place], [place] - [address] [place] [address] [phone], [phone]    Please process if appropriate, thank you.

## 2024-03-13 NOTE — TELEPHONE ENCOUNTER
Called Pt and informed her of provider's recommendation. She states she only had Xray today but will have dental procedure in April. Advised to ask the dentistry for pain medication prior to procedure. Pt voiced understanding and agreed with plan of care.

## 2024-03-21 ENCOUNTER — OFFICE VISIT (OUTPATIENT)
Dept: FAMILY MEDICINE CLINIC | Facility: CLINIC | Age: 84
End: 2024-03-21
Payer: MEDICARE

## 2024-03-21 VITALS
RESPIRATION RATE: 16 BRPM | HEART RATE: 78 BPM | SYSTOLIC BLOOD PRESSURE: 114 MMHG | DIASTOLIC BLOOD PRESSURE: 68 MMHG | HEIGHT: 63 IN | BODY MASS INDEX: 38.98 KG/M2 | WEIGHT: 220 LBS | TEMPERATURE: 97 F

## 2024-03-21 DIAGNOSIS — N18.31 TYPE 2 DIABETES MELLITUS WITH STAGE 3A CHRONIC KIDNEY DISEASE, WITHOUT LONG-TERM CURRENT USE OF INSULIN (HCC): Primary | ICD-10-CM

## 2024-03-21 DIAGNOSIS — F41.9 ANXIETY: ICD-10-CM

## 2024-03-21 DIAGNOSIS — E03.9 PRIMARY HYPOTHYROIDISM: ICD-10-CM

## 2024-03-21 DIAGNOSIS — G47.33 OSA (OBSTRUCTIVE SLEEP APNEA): ICD-10-CM

## 2024-03-21 DIAGNOSIS — R05.3 CHRONIC COUGH: ICD-10-CM

## 2024-03-21 DIAGNOSIS — I10 HYPERTENSION, ESSENTIAL, BENIGN: ICD-10-CM

## 2024-03-21 DIAGNOSIS — E11.22 TYPE 2 DIABETES MELLITUS WITH STAGE 3A CHRONIC KIDNEY DISEASE, WITHOUT LONG-TERM CURRENT USE OF INSULIN (HCC): Primary | ICD-10-CM

## 2024-03-21 LAB
CARTRIDGE LOT#: 677 NUMERIC
HEMOGLOBIN A1C: 5.6 % (ref 4.3–5.6)

## 2024-03-21 RX ORDER — ALENDRONATE SODIUM 35 MG/1
35 TABLET ORAL
COMMUNITY
Start: 2023-07-27

## 2024-03-21 RX ORDER — ATORVASTATIN CALCIUM 40 MG/1
1 TABLET, FILM COATED ORAL DAILY
COMMUNITY
End: 2024-03-21

## 2024-03-21 RX ORDER — CHOLECALCIFEROL (VITAMIN D3) 10(400)/ML
DROPS ORAL
COMMUNITY
Start: 2023-07-27 | End: 2024-03-21

## 2024-03-21 NOTE — PROGRESS NOTES
[facility] Note  03/21/24    Chief complaint:   Chief Complaint   Patient presents with    Follow - Up     diabetes     HPI:   Joni Christiansen is a 83 year old female who presents for follow up.    Got new hearing aids. Has not needed pain medication in a while.    Following up with cardiology.    Patient has chronic cough, happens infrequently but responds well to drinking something. No heartburn. Does not have humidifier. Has fan going on for the dog overnight. Dry cough.    Mood has been fine. Doing well on lexapro 5mg.     Doing well on levothyroxine 75mcg daily.     Her dog helps boost morale.    Not using CPAP. Has to do sleep study. Would like it removed. Using breathe right strips.     In the process of moving.     Patient has to see oral surgeon for last molar.    Cholesterol Meds: Pravastatin Sodium Tabs - 80 MG     BP Meds: carvedilol Tabs - 6.25 MG; Irbesartan Tabs - 150 MG; spironolactone Tabs - 25 MG; torsemide Tabs - 10 MG Following with cardiology.     DM Meds:  none at present    Lab Results   Component Value Date    A1C 5.6 03/21/2024    A1C 5.5 08/26/2023    A1C 5.4 04/22/2023      Cholesterol: 114, done on 1/17/2024.  HDL Cholesterol: 45, done on 1/17/2024.  LDL Cholesterol: 48, done on 1/17/2024.  TriGlycerides 116, done on 1/17/2024.    Last Diabetic Eye Exam:   No data recorded  No data recorded    Wt Readings from Last 6 Encounters:   03/21/24 220 lb (99.8 kg)   01/10/24 220 lb (99.8 kg)   10/11/23 218 lb (98.9 kg)   10/04/23 220 lb (99.8 kg)   09/29/23 220 lb (99.8 kg)   09/09/23 220 lb (99.8 kg)       Past Medical History:   Diagnosis Date    (HFpEF) heart failure with preserved ejection fraction (HCC)     Atrial fibrillation (HCC)     Back pain     CKD (chronic kidney disease)     Colon cancer (HCC)     Congestive heart disease (HCC)     Deep vein thrombosis (HCC)     Diabetes (HCC)     Essential hypertension     History of blood clots     HTN  (hypertension)     Hyperlipidemia     Hypothyroidism     Obesity     Pulmonary embolism (HCC)     Sleep apnea     TIA (transient ischemic attack) 2022    Visual impairment      Past Surgical History:   Procedure Laterality Date    BACK SURGERY      COLECTOMY      COLONOSCOPY      EXCIS LUMBAR DISK,ONE LEVEL      HIP REPLACEMENT SURGERY      KNEE REPLACEMENT SURGERY            TOTAL HIP REPLACEMENT      TOTAL KNEE REPLACEMENT Bilateral      Allergies   Allergen Reactions    Penicillins UNKNOWN and OTHER (SEE COMMENTS)     Other reaction(s): Unknown    Was told as a child it was an allergy      alendronate 35 MG Oral Tab 1 tablet (35 mg total).      24HR ALLERGY RELIEF 180 MG Oral Tab TAKE 1 TABLET BY MOUTH DAILY 90 tablet 0    IRBESARTAN 150 MG Oral Tab TAKE 1 TABLET BY MOUTH EVERY EVENING -HOLD IF SBP LESS THAN 110 90 tablet 1    CARVEDILOL 6.25 MG Oral Tab TAKE 1 TABLET BY MOUTH TWICE DAILY WITH MEALS - HOLD FOR SBP < 110 OR DBP < 50 60 tablet 1    digoxin 0.125 MG Oral Tab Take 1 tablet (125 mcg total) by mouth daily.      montelukast 10 MG Oral Tab Take 1 tablet (10 mg total) by mouth nightly. 90 tablet 0    diazePAM (VALIUM) 2 MG Oral Tab Take one tablet 60 minutes prior to procedure, may take and additional one tablet in 30 minutes if needed due to incomplete response. 2 tablet 0    triamcinolone 0.1 % External Cream Apply topically 2 (two) times daily as needed. Can use on legs and hands 80 g 2    lidocaine 5 % External Patch Place 1 patch onto the skin daily as needed. 30 each 2    ELIQUIS 5 MG Oral Tab TAKE 1 TABLET BY MOUTH TWICE DAILY 180 tablet 1    ESCITALOPRAM 5 MG Oral Tab TAKE 1 TABLET BY MOUTH DAILY 90 tablet 1    CALCIUM CARBONATE 1500 (600 Ca) MG Oral Tab TAKE 1 TABLET BY MOUTH DAILY 90 tablet 1    LEVOTHYROXINE 75 MCG Oral Tab TAKE 1 TABLET BY MOUTH EVERY MORNING 90 tablet 1    VITAMIN B-12 ER 1000 MCG Oral Tab CR TAKE 1 TABLET BY MOUTH DAILY 90 tablet 1    PRAVASTATIN SODIUM 80 MG Oral  Tab TAKE 1 TABLET BY MOUTH EVERY EVENING 90 tablet 1    CHOLECALCIFEROL 50 MCG (2000 UT) Oral Cap TAKE 1 CAPSULE BY MOUTH DAILY 90 capsule 1    SPIRONOLACTONE 25 MG Oral Tab TAKE 1/2 TABLET BY MOUTH DAILY 45 tablet 1    TORSEMIDE 10 MG Oral Tab TAKE 1 TABLET BY MOUTH DAILY 90 tablet 1    HYDROcodone-acetaminophen 5-325 MG Oral Tab Take 1 tablet by mouth every 8 (eight) hours as needed for Pain. 60 tablet 0    Glucose Blood (ONETOUCH VERIO) In Vitro Strip Check 3-4 times/day for sugar check      acetaminophen 500 MG Oral Tab Take 2 tablets (1,000 mg total) by mouth every 6 (six) hours as needed for Pain.       Social History     Socioeconomic History    Marital status:    Tobacco Use    Smoking status: Never     Passive exposure: Never    Smokeless tobacco: Never   Vaping Use    Vaping Use: Never used   Substance and Sexual Activity    Alcohol use: Not Currently    Drug use: Never   Social History Narrative    Retired  for >30 years.  [employer]. Daughter Charito lives in [place].      Counseling given: Not Answered    Family History   Problem Relation Age of Onset    Other (other) Father         congestive heart failure    Hypertension Maternal Grandmother      Family Status   Relation Status    Fa (Not Specified)    MGMA (Not Specified)        REVIEW OF SYSTEMS:   See HPI    EXAM:   /68 (BP Location: Right arm, Patient Position: Sitting, Cuff Size: large)   Pulse 78   Temp 97 °F (36.1 °C) (Temporal)   Resp 16   Ht 5' 3\" (1.6 m)   Wt 220 lb (99.8 kg)   BMI 38.97 kg/m²  Estimated body mass index is 38.97 kg/m² as calculated from the following:    Height as of this encounter: 5' 3\" (1.6 m).    Weight as of this encounter: 220 lb (99.8 kg).   Vital signs reviewed. Appears stated age, well groomed.  Physical Exam:  GEN:  Patient is alert and oriented x3, no apparent distress  HEAD:  Normocephalic, atraumatic  HEENT:  no scleral icterus, conjunctivae clear bilaterally  LUNGS:  clear to auscultation bilaterally, no rales/rhonchi/wheezing  HEART:  Regular rate and rhythm, normal S1/S2, no murmurs, rubs or gallops  EXTREMITIES:  Moves all extremities well  NEURO:  CN 2 - 12 grossly intact, gait normal    Last A1c value was 5.6% done 3/21/2024.    ASSESSMENT AND PLAN:   1. Type 2 diabetes mellitus with stage 3a chronic kidney disease, without long-term current use of insulin (HCC)  Patient presents for diabetes follow up. Doing well. A1c is in the normal range. Will continue with diet controlled management. Eye exam referral ordered, appreciate evaluation and recommendations. Will recheck labs in 6 months.   - POC Hgb A1C  - Ophthalmology Referral - In Network  - CMP in 6 months; Future  - Lipid in 6 months; Future  - Hemoglobin A1C in 6 months; Future  - Microalb/Creat Ratio, Random Urine in 6 months; Future  - TSH W Reflex To Free T4; Future    2. Hypertension, essential, benign  Pt presents for follow up of HTN  - no red flag symptoms  - BP controlled  - continue current regimen  - RTC 6mo or sooner if needed    3. Chronic cough  Improving. Keep follow up with pulm, appreciate evaluation and recommendations    4. ELY (obstructive sleep apnea)  Not currently using CPAP. Discussed importance of CPAP to improve alertness during the day as well. Keep follow up with pulmonology, appreciate evaluation and recommendations.    5. Primary hypothyroidism  Patient presents for follow up of hypothyroidism  - doing well, TSH was normal 1/2024  - will continue current regimen of levothyroxine 75mcg daily  - repeat labs in 6mo  - TSH W Reflex To Free T4; Future    6. Anxiety  Doing well on lexapro 5mg daily, will continue at this time. Follow up in 6mo to reassess.        Meds & Refills for this Visit:  Requested Prescriptions      No prescriptions requested or ordered in this encounter       Stop Taking                Cholecalciferol (VITAMIN D) 10 MCG/ML Oral Liquid    cholecalciferol (vitamin D3) 25  mcg (1,000 unit) capsule, [RxNorm: 632474]     atorvastatin 40 MG Oral Tab    Take 1 tablet (40 mg total) by mouth daily.            Health Maintenance:  Health Maintenance Due   Topic Date Due    Diabetes Care Foot Exam  Never done    Diabetes Care Dilated Eye Exam  Never done    MA Annual Health Assessment  01/01/2024    Fall Risk Screening (Annual)  01/01/2024    Diabetes Care A1C  02/26/2024       Patient/Caregiver Education: There are no barriers to learning. Medical education done.   Outcome: Patient verbalizes understanding. Patient is notified to call with any questions, complications, allergies, or worsening or changing symptoms.  Patient is to call with any side effects or complications from the treatments as a result of today.     Problem List:  Patient Active Problem List   Diagnosis    Congestive heart failure (HCC)    Congestive heart failure, unspecified HF chronicity, unspecified heart failure type (HCC)    Hypoxia    Dyspnea, unspecified type    Diabetes (HCC)    Essential hypertension    Hypercholesterolemia    Age-related osteoporosis without current pathological fracture    Allergic rhinitis    Atrophy of vagina    Chronic renal insufficiency, stage III (moderate) (HCC)    Chronic right shoulder pain    Gallstones    Hypercalcemia    Hyperuricuria    Low HDL (under 40)    Lumbar degenerative disc disease    Lymphedema    Nontraumatic incomplete tear of right rotator cuff    Obesity    Onychomycosis    ELY (obstructive sleep apnea)    Atrial fibrillation (HCC)    Postmenopausal bleeding    Primary hypothyroidism    Primary osteoarthritis of right hip    Secondary hyperparathyroidism (HCC)    Senile tremor    Spinal stenosis at L4-L5 level    Spinal stenosis of lumbar region with neurogenic claudication    Vitreous floaters of right eye    Diet-controlled diabetes mellitus (HCC)    Hypertension, essential, benign    History of TIA (transient ischemic attack)    History of DVT (deep vein thrombosis)     History of pulmonary embolus (PE)    Type 2 diabetes mellitus with diabetic chronic kidney disease (HCC)    Morbid (severe) obesity due to excess calories (HCC)    Body mass index (BMI) 40.0-44.9, adult (HCC)    Osteopenia of neck of left femur    Acute on chronic heart failure (HCC)    Hypotension    Chronic diastolic heart failure (HCC)    Acute renal insufficiency    Hypotension, unspecified hypotension type    General weakness    Frequent falls    Levoscoliosis of lumbar spine    Thrombocytopenia (HCC)    CKD (chronic kidney disease) stage 4, GFR 15-29 ml/min (HCC)    Acute and chronic respiratory failure with hypercapnia (HCC)    AC joint arthropathy    Osteophyte of left shoulder    Osteoarthritis of left glenohumeral joint       Return in about 6 months (around 9/21/2024) for medication follow up, or sooner if needed.    Amy Amaro MD  [facility]  03/21/24      Please note that portions of this note may have been completed with a voice recognition program. Efforts were made to edit the dictations but occasionally words are mis-transcribed. Thank you for your understanding.

## 2024-04-08 ENCOUNTER — TELEPHONE (OUTPATIENT)
Facility: CLINIC | Age: 84
End: 2024-04-08

## 2024-04-08 NOTE — TELEPHONE ENCOUNTER
Spoke to pt regarding using her AVAPS -NIV machine.   I inquried why she was not using it.  She said she could not use the machine .  The pressure was too much and she could not sleep with it on.  Machine keeps her up when she tried using it.  I informed her to scchedule an appt with Dr Zambrano to discuss using NIV before returning machine.   She said she does not want to use at all and will still return machine.   Informed her if she returns the NIV machine that this will be against Dr Zambrano's advise.  She understood and has made up her mind about returning the NIV machine and is willing to sign AMA form from DME provider.    Informed her this message will be forwarded to Dr Zambrano.

## 2024-04-08 NOTE — TELEPHONE ENCOUNTER
Correspondence from [facility] is that   \"(Pt) keeps wanting to have her machine Noninvasive Ventilator picked up and we told her she needs a doctor's order.  She has been non-compliant for quite some time...\"    Please discuss with Dr. Zambrano and have someone from clinical contact the patient.    Steven would appreciate knowing how to proceed as well.

## 2024-04-09 DIAGNOSIS — L29.9 PRURITUS: ICD-10-CM

## 2024-04-10 RX ORDER — MONTELUKAST SODIUM 10 MG/1
10 TABLET ORAL EVERY EVENING
Qty: 90 TABLET | Refills: 0 | Status: SHIPPED | OUTPATIENT
Start: 2024-04-10 | End: 2024-07-15

## 2024-04-10 NOTE — TELEPHONE ENCOUNTER
Requested Prescriptions     Pending Prescriptions Disp Refills    MONTELUKAST 10 MG Oral Tab [Pharmacy Med Name: montelukast 10 mg tablet] 90 tablet 0     Sig: TAKE 1 TABLET BY MOUTH EVERY EVENING     Last refill 1/10/24 #90  LOV 3/21/24  RTC 9/21/24  No future appointments.    Asthma & COPD Medication Protocol [id number]/09/2024 02:32 PM   Protocol Details Asthma Action Score greater than or equal to 20    AAP/ACT given in last 12 months    Appointment in past 6 or next 3 months

## 2024-04-18 ENCOUNTER — PATIENT MESSAGE (OUTPATIENT)
Facility: CLINIC | Age: 84
End: 2024-04-18

## 2024-04-18 RX ORDER — ALBUTEROL SULFATE 90 UG/1
2 AEROSOL, METERED RESPIRATORY (INHALATION)
Qty: 1 EACH | Refills: 3 | Status: SHIPPED | OUTPATIENT
Start: 2024-04-18

## 2024-04-18 NOTE — TELEPHONE ENCOUNTER
Pt's dtr called. Per dtr Charito,  patient has been moving things around, pt's  past away. Has noticed pt become more sob. Pt hasn't been compliant with NIV machine, pt's dtr reports pt verbalized feeling like she suffocates. Pt open to trying other devices. Per dtr, no reports of s/s of respiratory infections, just wants albuterol inhaler prescribed so that pt can use it prn for shortness of breath. Dtr made aware I will discuss with Dr. Zambrano and will get back to her.

## 2024-04-18 NOTE — TELEPHONE ENCOUNTER
From: Joni Christiansen  To: Sofya Zambrano  Sent: 4/18/2024 3:36 PM CDT  Subject: Breathing    Hi Dr. Zambrano,    I just made an appointment for my mom because I’m wondering if she needs a prescription for a rescue inhaler when she’s short of breath. With mild daily activity she becomes short of breath. Not sure if she needs additional testing.     Thank you,  Charito Christiansen

## 2024-04-19 ENCOUNTER — TELEPHONE (OUTPATIENT)
Dept: FAMILY MEDICINE CLINIC | Facility: CLINIC | Age: 84
End: 2024-04-19

## 2024-04-19 NOTE — TELEPHONE ENCOUNTER
Discussed with Dr. Zambrano. MD agrees to send in albuterol rx 2 puffs q 4-6 hrs PRN for sob. MD wants pt to be seen sooner than June. [facility] sent to pt and dtr making aware of MD's request.

## 2024-04-19 NOTE — TELEPHONE ENCOUNTER
Daughter called on behalf of her mother wanting to see if her mom can come in for sick visit this upcoming Monday.    Please Advise.  Thank you

## 2024-04-20 ENCOUNTER — OFFICE VISIT (OUTPATIENT)
Dept: FAMILY MEDICINE CLINIC | Facility: CLINIC | Age: 84
End: 2024-04-20
Payer: MEDICARE

## 2024-04-20 ENCOUNTER — APPOINTMENT (OUTPATIENT)
Dept: GENERAL RADIOLOGY | Facility: HOSPITAL | Age: 84
End: 2024-04-20
Payer: MEDICARE

## 2024-04-20 ENCOUNTER — HOSPITAL ENCOUNTER (OUTPATIENT)
Facility: HOSPITAL | Age: 84
Setting detail: OBSERVATION
Discharge: HOME | End: 2024-04-22
Attending: EMERGENCY MEDICINE | Admitting: STUDENT IN AN ORGANIZED HEALTH CARE EDUCATION/TRAINING PROGRAM
Payer: MEDICARE

## 2024-04-20 VITALS
WEIGHT: 220 LBS | RESPIRATION RATE: 18 BRPM | HEIGHT: 63 IN | DIASTOLIC BLOOD PRESSURE: 72 MMHG | BODY MASS INDEX: 38.98 KG/M2 | OXYGEN SATURATION: 93 % | HEART RATE: 87 BPM | SYSTOLIC BLOOD PRESSURE: 137 MMHG | TEMPERATURE: 100 F

## 2024-04-20 DIAGNOSIS — J06.9 URI WITH COUGH AND CONGESTION: Primary | ICD-10-CM

## 2024-04-20 DIAGNOSIS — R79.81 BORDERLINE LOW OXYGEN SATURATION LEVEL: ICD-10-CM

## 2024-04-20 DIAGNOSIS — I50.9 ACUTE ON CHRONIC CONGESTIVE HEART FAILURE, UNSPECIFIED HEART FAILURE TYPE (HCC): Primary | ICD-10-CM

## 2024-04-20 DIAGNOSIS — R06.4 LABORED BREATHING: ICD-10-CM

## 2024-04-20 PROBLEM — I50.33 ACUTE ON CHRONIC HEART FAILURE WITH PRESERVED EJECTION FRACTION (HCC): Status: ACTIVE | Noted: 2024-04-20

## 2024-04-20 LAB
ALBUMIN SERPL-MCNC: 3.7 G/DL (ref 3.4–5)
ALBUMIN/GLOB SERPL: 0.9 {RATIO} (ref 1–2)
ALP LIVER SERPL-CCNC: 84 U/L
ALT SERPL-CCNC: 22 U/L
ANION GAP SERPL CALC-SCNC: 6 MMOL/L (ref 0–18)
AST SERPL-CCNC: 17 U/L (ref 15–37)
BASOPHILS # BLD AUTO: 0.05 X10(3) UL (ref 0–0.2)
BASOPHILS NFR BLD AUTO: 0.5 %
BILIRUB SERPL-MCNC: 0.8 MG/DL (ref 0.1–2)
BUN BLD-MCNC: 31 MG/DL (ref 9–23)
CALCIUM BLD-MCNC: 11.4 MG/DL (ref 8.5–10.1)
CHLORIDE SERPL-SCNC: 102 MMOL/L (ref 98–112)
CO2 SERPL-SCNC: 30 MMOL/L (ref 21–32)
CREAT BLD-MCNC: 1.43 MG/DL
EGFRCR SERPLBLD CKD-EPI 2021: 37 ML/MIN/1.73M2 (ref 60–?)
EOSINOPHIL # BLD AUTO: 0.03 X10(3) UL (ref 0–0.7)
EOSINOPHIL NFR BLD AUTO: 0.3 %
ERYTHROCYTE [DISTWIDTH] IN BLOOD BY AUTOMATED COUNT: 12.8 %
FLUAV + FLUBV RNA SPEC NAA+PROBE: NEGATIVE
FLUAV + FLUBV RNA SPEC NAA+PROBE: NEGATIVE
GLOBULIN PLAS-MCNC: 4.1 G/DL (ref 2.8–4.4)
GLUCOSE BLD-MCNC: 101 MG/DL (ref 70–99)
GLUCOSE BLD-MCNC: 86 MG/DL (ref 70–99)
HCT VFR BLD AUTO: 41.3 %
HGB BLD-MCNC: 13.7 G/DL
IMM GRANULOCYTES # BLD AUTO: 0.03 X10(3) UL (ref 0–1)
IMM GRANULOCYTES NFR BLD: 0.3 %
LYMPHOCYTES # BLD AUTO: 1.32 X10(3) UL (ref 1–4)
LYMPHOCYTES NFR BLD AUTO: 14.3 %
MCH RBC QN AUTO: 31.4 PG (ref 26–34)
MCHC RBC AUTO-ENTMCNC: 33.2 G/DL (ref 31–37)
MCV RBC AUTO: 94.5 FL
MONOCYTES # BLD AUTO: 1.3 X10(3) UL (ref 0.1–1)
MONOCYTES NFR BLD AUTO: 14.1 %
NEUTROPHILS # BLD AUTO: 6.48 X10 (3) UL (ref 1.5–7.7)
NEUTROPHILS # BLD AUTO: 6.48 X10(3) UL (ref 1.5–7.7)
NEUTROPHILS NFR BLD AUTO: 70.5 %
NT-PROBNP SERPL-MCNC: 3513 PG/ML (ref ?–450)
OSMOLALITY SERPL CALC.SUM OF ELEC: 292 MOSM/KG (ref 275–295)
PLATELET # BLD AUTO: 171 10(3)UL (ref 150–450)
POTASSIUM SERPL-SCNC: 3.9 MMOL/L (ref 3.5–5.1)
PROT SERPL-MCNC: 7.8 G/DL (ref 6.4–8.2)
RBC # BLD AUTO: 4.37 X10(6)UL
RSV RNA SPEC NAA+PROBE: NEGATIVE
SARS-COV-2 RNA RESP QL NAA+PROBE: NOT DETECTED
SODIUM SERPL-SCNC: 138 MMOL/L (ref 136–145)
TROPONIN I SERPL HS-MCNC: 17 NG/L
WBC # BLD AUTO: 9.2 X10(3) UL (ref 4–11)

## 2024-04-20 PROCEDURE — 71045 X-RAY EXAM CHEST 1 VIEW: CPT

## 2024-04-20 PROCEDURE — 99223 1ST HOSP IP/OBS HIGH 75: CPT | Performed by: STUDENT IN AN ORGANIZED HEALTH CARE EDUCATION/TRAINING PROGRAM

## 2024-04-20 RX ORDER — ENOXAPARIN SODIUM 100 MG/ML
30 INJECTION SUBCUTANEOUS DAILY
Status: DISCONTINUED | OUTPATIENT
Start: 2024-04-20 | End: 2024-04-20

## 2024-04-20 RX ORDER — SPIRONOLACTONE 25 MG/1
12.5 TABLET ORAL DAILY
Status: DISCONTINUED | OUTPATIENT
Start: 2024-04-21 | End: 2024-04-22

## 2024-04-20 RX ORDER — FUROSEMIDE 10 MG/ML
40 INJECTION INTRAMUSCULAR; INTRAVENOUS DAILY
Status: DISCONTINUED | OUTPATIENT
Start: 2024-04-21 | End: 2024-04-22

## 2024-04-20 RX ORDER — ATORVASTATIN CALCIUM 20 MG/1
20 TABLET, FILM COATED ORAL NIGHTLY
Status: DISCONTINUED | OUTPATIENT
Start: 2024-04-20 | End: 2024-04-22

## 2024-04-20 RX ORDER — ECHINACEA PURPUREA EXTRACT 125 MG
1 TABLET ORAL
Status: DISCONTINUED | OUTPATIENT
Start: 2024-04-20 | End: 2024-04-22

## 2024-04-20 RX ORDER — FUROSEMIDE 10 MG/ML
40 INJECTION INTRAMUSCULAR; INTRAVENOUS ONCE
Status: COMPLETED | OUTPATIENT
Start: 2024-04-20 | End: 2024-04-20

## 2024-04-20 RX ORDER — LEVOTHYROXINE SODIUM 0.07 MG/1
75 TABLET ORAL
Status: DISCONTINUED | OUTPATIENT
Start: 2024-04-21 | End: 2024-04-22

## 2024-04-20 RX ORDER — DIGOXIN 125 MCG
125 TABLET ORAL DAILY
Status: DISCONTINUED | OUTPATIENT
Start: 2024-04-20 | End: 2024-04-20

## 2024-04-20 RX ORDER — ONDANSETRON 2 MG/ML
4 INJECTION INTRAMUSCULAR; INTRAVENOUS EVERY 4 HOURS PRN
Status: CANCELLED | OUTPATIENT
Start: 2024-04-20 | End: 2024-04-20

## 2024-04-20 RX ORDER — MELATONIN
3 NIGHTLY PRN
Status: DISCONTINUED | OUTPATIENT
Start: 2024-04-20 | End: 2024-04-22

## 2024-04-20 RX ORDER — LOSARTAN POTASSIUM 50 MG/1
50 TABLET ORAL NIGHTLY
Status: DISCONTINUED | OUTPATIENT
Start: 2024-04-20 | End: 2024-04-22

## 2024-04-20 RX ORDER — GUAIFENESIN 600 MG/1
600 TABLET, EXTENDED RELEASE ORAL 2 TIMES DAILY PRN
Status: DISCONTINUED | OUTPATIENT
Start: 2024-04-20 | End: 2024-04-22

## 2024-04-20 RX ORDER — BISACODYL 10 MG
10 SUPPOSITORY, RECTAL RECTAL
Status: DISCONTINUED | OUTPATIENT
Start: 2024-04-20 | End: 2024-04-22

## 2024-04-20 RX ORDER — BENZONATATE 100 MG/1
200 CAPSULE ORAL 3 TIMES DAILY PRN
Status: DISCONTINUED | OUTPATIENT
Start: 2024-04-20 | End: 2024-04-22

## 2024-04-20 RX ORDER — SENNOSIDES 8.6 MG
17.2 TABLET ORAL NIGHTLY PRN
Status: DISCONTINUED | OUTPATIENT
Start: 2024-04-20 | End: 2024-04-22

## 2024-04-20 RX ORDER — ALBUTEROL SULFATE 90 UG/1
4 AEROSOL, METERED RESPIRATORY (INHALATION) ONCE
Status: COMPLETED | OUTPATIENT
Start: 2024-04-20 | End: 2024-04-20

## 2024-04-20 RX ORDER — NICOTINE POLACRILEX 4 MG
30 LOZENGE BUCCAL
Status: DISCONTINUED | OUTPATIENT
Start: 2024-04-20 | End: 2024-04-22

## 2024-04-20 RX ORDER — CARVEDILOL 6.25 MG/1
6.25 TABLET ORAL 2 TIMES DAILY WITH MEALS
Status: DISCONTINUED | OUTPATIENT
Start: 2024-04-20 | End: 2024-04-22

## 2024-04-20 RX ORDER — ACETAMINOPHEN 500 MG
500 TABLET ORAL EVERY 4 HOURS PRN
Status: DISCONTINUED | OUTPATIENT
Start: 2024-04-20 | End: 2024-04-22

## 2024-04-20 RX ORDER — METOCLOPRAMIDE HYDROCHLORIDE 5 MG/ML
5 INJECTION INTRAMUSCULAR; INTRAVENOUS EVERY 8 HOURS PRN
Status: DISCONTINUED | OUTPATIENT
Start: 2024-04-20 | End: 2024-04-22

## 2024-04-20 RX ORDER — MONTELUKAST SODIUM 10 MG/1
10 TABLET ORAL DAILY
Status: DISCONTINUED | OUTPATIENT
Start: 2024-04-21 | End: 2024-04-22

## 2024-04-20 RX ORDER — NICOTINE POLACRILEX 4 MG
15 LOZENGE BUCCAL
Status: DISCONTINUED | OUTPATIENT
Start: 2024-04-20 | End: 2024-04-22

## 2024-04-20 RX ORDER — ESCITALOPRAM OXALATE 5 MG/1
5 TABLET ORAL DAILY
Status: DISCONTINUED | OUTPATIENT
Start: 2024-04-21 | End: 2024-04-22

## 2024-04-20 RX ORDER — ONDANSETRON 2 MG/ML
4 INJECTION INTRAMUSCULAR; INTRAVENOUS EVERY 6 HOURS PRN
Status: DISCONTINUED | OUTPATIENT
Start: 2024-04-20 | End: 2024-04-22

## 2024-04-20 RX ORDER — POLYETHYLENE GLYCOL 3350 17 G/17G
17 POWDER, FOR SOLUTION ORAL DAILY PRN
Status: DISCONTINUED | OUTPATIENT
Start: 2024-04-20 | End: 2024-04-22

## 2024-04-20 RX ORDER — DEXTROSE MONOHYDRATE 25 G/50ML
50 INJECTION, SOLUTION INTRAVENOUS
Status: DISCONTINUED | OUTPATIENT
Start: 2024-04-20 | End: 2024-04-22

## 2024-04-20 NOTE — ED PROVIDER NOTES
Patient Seen in: [facility] Emergency Department      History     Chief Complaint   Patient presents with    Cough/URI    Difficulty Breathing     cough, labored breathing, borderline O2 sats     Stated Complaint: cough, labored breathing, borderline O2 sats- sent over from [facility]    Subjective:   HPI    83-year-old female with past medical history of A-fib on Eliquis, CHF, hypertension presents today for evaluation.  For the past 2 days, patient has had a cough, sneezing along with shortness of breath.  She also has bilateral leg swelling although states this is significantly better than her baseline.  Daughter called her pulmonologist yesterday and she was prescribed albuterol.  Because of her symptoms today, she went to a walk-in clinic for evaluation.  She was noted to appear short of breath and her O2 sat was borderline.  She took her albuterol for the first time that was prescribed.  Now, she states she feels significantly better and no longer feels short of breath.  She just had return from the washroom after ambulating and had no shortness of breath at all.  She denies any fevers or chest pain.    Objective:   Past Medical History:    (HFpEF) heart failure with preserved ejection fraction (HCC)    Atrial fibrillation (HCC)    Back pain    CKD (chronic kidney disease)    Colon cancer (HCC)    Congestive heart disease (HCC)    Deep vein thrombosis (HCC)    Diabetes (HCC)    Essential hypertension    History of blood clots    HTN (hypertension)    Hyperlipidemia    Hypothyroidism    Obesity    Pulmonary embolism (HCC)    Sleep apnea    TIA (transient ischemic attack)    Visual impairment              Past Surgical History:   Procedure Laterality Date    Back surgery      Colectomy      Colonoscopy      Excis lumbar disk,one level      Hip replacement surgery      Knee replacement surgery            Total hip replacement      Total knee replacement Bilateral                 Social History      Socioeconomic History    Marital status:    Tobacco Use    Smoking status: Never     Passive exposure: Never    Smokeless tobacco: Never   Vaping Use    Vaping status: Never Used   Substance and Sexual Activity    Alcohol use: Not Currently    Drug use: Never   Social History Narrative    Retired  for >30 years.  [employer]. Daughter Charito lives in [place].               Review of Systems    Positive for stated complaint: cough, labored breathing, borderline O2 sats- sent over from [facility]  Other systems are as noted in HPI.  Constitutional and vital signs reviewed.      All other systems reviewed and negative except as noted above.    Physical Exam     ED Triage Vitals [04/20/24 1225]   /89   Pulse 100   Resp 20   Temp 98.1 °F (36.7 °C)   Temp src Oral   SpO2 94 %   O2 Device None (Room air)       Current:BP (!) 158/98   Pulse 75   Temp 98.1 °F (36.7 °C) (Oral)   Resp 19   SpO2 97%         Physical Exam  Vitals and nursing note reviewed.   Constitutional:       Appearance: Normal appearance.   HENT:      Head: Normocephalic.      Nose: Nose normal.      Mouth/Throat:      Mouth: Mucous membranes are moist.   Eyes:      Extraocular Movements: Extraocular movements intact.   Cardiovascular:      Rate and Rhythm: Normal rate and regular rhythm.   Pulmonary:      Effort: Pulmonary effort is normal.      Breath sounds: Normal breath sounds.   Abdominal:      General: Abdomen is flat.   Musculoskeletal:         General: Normal range of motion.      Comments: Trace bilateral lower extremity edema   Skin:     General: Skin is warm.   Neurological:      General: No focal deficit present.      Mental Status: She is alert.   Psychiatric:         Mood and Affect: Mood normal.           ED Course     Labs Reviewed   COMP METABOLIC PANEL (14) - Abnormal; Notable for the following components:       Result Value    BUN 31 (*)     Creatinine 1.43 (*)     Calcium, Total 11.4 (*)     eGFR-Cr  37 (*)     A/G Ratio 0.9 (*)     All other components within normal limits   PRO BETA NATRIURETIC PEPTIDE - Abnormal; Notable for the following components:    Pro-Beta Natriuretic Peptide 3,513 (*)     All other components within normal limits   CBC W/ DIFFERENTIAL - Abnormal; Notable for the following components:    Monocyte Absolute 1.30 (*)     All other components within normal limits   TROPONIN I HIGH SENSITIVITY - Normal   SARS-COV-2/FLU A AND B/RSV BY PCR (GENEXPERT) - Normal    Narrative:     This test is intended for the qualitative detection and differentiation of SARS-CoV-2, influenza A, influenza B, and respiratory syncytial virus (RSV) viral RNA in nasopharyngeal or nares swabs from individuals suspected of respiratory viral infection consistent with COVID-19 by their healthcare provider. Signs and symptoms of respiratory viral infection due to SARS-CoV-2, influenza, and RSV can be similar.    Test performed using the Xpert Xpress SARS-CoV-2/FLU/RSV (real time RT-PCR)  assay on the [employer] instrument, [address], [place] [address].   This test is being used under the Food and Drug Administration's Emergency Use Authorization.    The authorized Fact Sheet for Healthcare Providers for this assay is available upon request from the laboratory.   CBC WITH DIFFERENTIAL WITH PLATELET    Narrative:     The following orders were created for panel order CBC With Differential With Platelet.  Procedure                               Abnormality         Status                     ---------                               -----------         ------                     CBC W/ DIFFERENTIAL[[id number]]          Abnormal            Final result                 Please view results for these tests on the individual orders.   RAINBOW DRAW LAVENDER   RAINBOW DRAW LIGHT GREEN   RAINBOW DRAW BLUE     EKG    Rate, intervals and axes as noted on EKG Report.  Rate: 89  Rhythm: Sinus Rhythm  Reading: No STEMI                 XR CHEST  AP PORTABLE  (CPT=71045)    Result Date: 4/20/2024  PROCEDURE:  XR CHEST AP PORTABLE  (CPT=71045)  TECHNIQUE:  AP chest radiograph was obtained.  COMPARISON:  [facility] , XR, XR CHEST AP PORTABLE  (CPT=71045), 6/29/2023, 3:06 PM.  INDICATIONS:  cough, labored breathing, borderline O2 sats- sent over from [facility]  PATIENT STATED HISTORY: (As transcribed by Technologist)  Patient shares everyone in her family has a bad cold and now since yesterday she has a bad cough with SOB starting.               CONCLUSION:   Stable cardiac and mediastinal contours.  Chronic interstitial thickening without pulmonary edema or focal airspace consolidation.  The pleural spaces are clear.   LOCATION:  [facility]      Dictated by (CST): Makayla Luis MD on 4/20/2024 at 1:01 PM     Finalized by (CST): Makayla Luis MD on 4/20/2024 at 1:01 PM               MDM      Differential Diagnosis  83-year-old female presents today for evaluation of 2 days of a cough and sneezing.  She also felt more short of breath as well.  She admits that she was more short of breath at the walk-in clinic, however after taking the albuterol, she states her shortness of breath has resolved.  She denies any fevers or chest pain.  Presently, her lungs are grossly clear and her breathing is nonlabored.  Differential would include bronchitis, pneumonia, CHF.  Patient is anticoagulated on Eliquis, with her presenting symptoms and history, I feel pulmonary embolism is less likely.  Plan for chest x-ray along with labs.  Will order albuterol with spacer to ensure patient is properly educated on taking the inhaler.    3:06 pm  Patient's proBNP is elevated.  With her shortness of breath, leg swelling along with this finding, I am suspicious that her symptoms may be related to a component of CHF.  A dose of Lasix was given in the ED.  Will admit for further care, I notified the hospitalist of need for admission.      External Chart Reviewed  A cardiac nuclear stress test demonstrated an  ejection fraction of 52% however left ventricular large blood was noted    Discussions of Management  I notified the hospitalist of need for admission  Admission disposition: 4/20/2024  3:22 PM                                        Medical Decision Making      Disposition and Plan     Clinical Impression:  1. Acute on chronic congestive heart failure, unspecified heart failure type (HCC)         Disposition:  Admit  4/20/2024  3:22 pm    Follow-up:  No follow-up provider specified.        Medications Prescribed:  Current Discharge Medication List                            Hospital Problems       Present on Admission  Date Reviewed: 4/20/2024            ICD-10-CM Noted POA    CHF (congestive heart failure) (HCC) I50.9 4/20/2024 Unknown

## 2024-04-20 NOTE — ED QUICK NOTES
Orders for admission, patient is aware of plan and ready to go upstairs. Any questions, please call ED RN Tg at extension [phone].     Patient Covid vaccination status: Fully vaccinated     COVID Test Ordered in ED: SARS-CoV-2/Flu A and B/RSV by PCR ([employer])    COVID Suspicion at Admission: N/A    Running Infusions:  None    Mental Status/LOC at time of transport: A&Ox4    Other pertinent information:   CIWA score: N/A   NIH score:  N/A        
Self

## 2024-04-20 NOTE — H&P
[facility]     Joni Christiansen Patient Status:  Observation    1940 MRN [id number]   Location [facility] 8NE-A Attending Chance Zhu,    Hosp Day # 0 PCP Amy Amaro MD     Chief Complaint: Cough, dyspnea    Subjective:    History of Present Illness:     Joni Christiansen is a 83 year old female with past medical history of A-fib on Eliquis, HFpEF, hypertension, CKD, diabetes, hyperlipidemia, hypothyroidism who presents ED for dyspnea.  Patient has had cough and dyspnea for the past few days.  She is also had bilateral leg swelling.  Patient's daughter called her pulmonologist today who prescribed albuterol.  Albuterol helped with dyspnea at times.  She denies any fevers, chills, nausea, vomiting, abdominal pain, chest pain, headaches, dyspnea.    History/Other:    Past Medical History:  Past Medical History:    (HFpEF) heart failure with preserved ejection fraction (HCC)    Atrial fibrillation (HCC)    Back pain    CKD (chronic kidney disease)    Colon cancer (HCC)    Congestive heart disease (HCC)    Deep vein thrombosis (HCC)    Diabetes (HCC)    Essential hypertension    History of blood clots    HTN (hypertension)    Hyperlipidemia    Hypothyroidism    Obesity    Pulmonary embolism (HCC)    Sleep apnea    TIA (transient ischemic attack)    Visual impairment     Past Surgical History:   Past Surgical History:   Procedure Laterality Date    Back surgery      Colectomy      Colonoscopy      Excis lumbar disk,one level      Hip replacement surgery      Knee replacement surgery            Total hip replacement      Total knee replacement Bilateral       Family History:   Family History   Problem Relation Age of Onset    Other (other) Father         congestive heart failure    Hypertension Maternal Grandmother      Social History:    reports that she has never smoked. She has never been exposed to tobacco smoke. She has never used smokeless tobacco. She  reports that she does not currently use alcohol. She reports that she does not use drugs.     Allergies:   Allergies   Allergen Reactions    Penicillins UNKNOWN and OTHER (SEE COMMENTS)     Other reaction(s): Unknown    Was told as a child it was an allergy       Medications:    No current facility-administered medications on file prior to encounter.     Current Outpatient Medications on File Prior to Encounter   Medication Sig Dispense Refill    albuterol 108 (90 Base) MCG/ACT Inhalation Aero Soln Inhale 2 puffs into the lungs every 4 to 6 hours as needed for Wheezing or Shortness of Breath. 1 each 3    MONTELUKAST 10 MG Oral Tab TAKE 1 TABLET BY MOUTH EVERY EVENING 90 tablet 0    alendronate 35 MG Oral Tab 1 tablet (35 mg total).      24HR ALLERGY RELIEF 180 MG Oral Tab TAKE 1 TABLET BY MOUTH DAILY 90 tablet 0    IRBESARTAN 150 MG Oral Tab TAKE 1 TABLET BY MOUTH EVERY EVENING -HOLD IF SBP LESS THAN 110 90 tablet 1    CARVEDILOL 6.25 MG Oral Tab TAKE 1 TABLET BY MOUTH TWICE DAILY WITH MEALS - HOLD FOR SBP < 110 OR DBP < 50 60 tablet 1    digoxin 0.125 MG Oral Tab Take 1 tablet (125 mcg total) by mouth daily.      diazePAM (VALIUM) 2 MG Oral Tab Take one tablet 60 minutes prior to procedure, may take and additional one tablet in 30 minutes if needed due to incomplete response. 2 tablet 0    triamcinolone 0.1 % External Cream Apply topically 2 (two) times daily as needed. Can use on legs and hands 80 g 2    lidocaine 5 % External Patch Place 1 patch onto the skin daily as needed. 30 each 2    ELIQUIS 5 MG Oral Tab TAKE 1 TABLET BY MOUTH TWICE DAILY 180 tablet 1    ESCITALOPRAM 5 MG Oral Tab TAKE 1 TABLET BY MOUTH DAILY 90 tablet 1    CALCIUM CARBONATE 1500 (600 Ca) MG Oral Tab TAKE 1 TABLET BY MOUTH DAILY 90 tablet 1    LEVOTHYROXINE 75 MCG Oral Tab TAKE 1 TABLET BY MOUTH EVERY MORNING 90 tablet 1    VITAMIN B-12 ER 1000 MCG Oral Tab CR TAKE 1 TABLET BY MOUTH DAILY 90 tablet 1    PRAVASTATIN SODIUM 80 MG Oral Tab  TAKE 1 TABLET BY MOUTH EVERY EVENING 90 tablet 1    CHOLECALCIFEROL 50 MCG (2000 UT) Oral Cap TAKE 1 CAPSULE BY MOUTH DAILY 90 capsule 1    SPIRONOLACTONE 25 MG Oral Tab TAKE 1/2 TABLET BY MOUTH DAILY 45 tablet 1    TORSEMIDE 10 MG Oral Tab TAKE 1 TABLET BY MOUTH DAILY 90 tablet 1    HYDROcodone-acetaminophen 5-325 MG Oral Tab Take 1 tablet by mouth every 8 (eight) hours as needed for Pain. 60 tablet 0    Glucose Blood (ONETOUCH VERIO) In Vitro Strip Check 3-4 times/day for sugar check      acetaminophen 500 MG Oral Tab Take 2 tablets (1,000 mg total) by mouth every 6 (six) hours as needed for Pain.         Review of Systems:   A comprehensive review of systems was completed.    Pertinent positives and negatives noted in the HPI.    Objective:   Physical Exam:    /77 (BP Location: Left arm)   Pulse 111   Temp 98.1 °F (36.7 °C) (Oral)   Resp 22   Wt 212 lb (96.2 kg)   SpO2 94%   BMI 37.55 kg/m²   General: No acute distress, Alert  Respiratory: No rhonchi, no wheezes  Cardiovascular: S1, S2. Regular rate and rhythm  Abdomen: Soft, Non-tender, non-distended, positive bowel sounds  Neuro: No new focal deficits  Extremities: Trace bilateral pedal edema    Results:    Labs:      Labs Last 24 Hours:    Recent Labs   Lab 04/20/24  1243   RBC 4.37   HGB 13.7   HCT 41.3   MCV 94.5   MCH 31.4   MCHC 33.2   RDW 12.8   NEPRELIM 6.48   WBC 9.2   .0       Recent Labs   Lab 04/20/24  1243   GLU 86   BUN 31*   CREATSERUM 1.43*   EGFRCR 37*   CA 11.4*   ALB 3.7      K 3.9      CO2 30.0   ALKPHO 84   AST 17   ALT 22   BILT 0.8   TP 7.8       No results found for: \"PT\", \"INR\"    Recent Labs   Lab 04/20/24  1243   TROPHS 17       Recent Labs   Lab 04/20/24  1243   PBNP 3,513*       No results for input(s): \"PCT\" in the last 168 hours.    Imaging: Imaging data reviewed in Epic.    Assessment & Plan:      #Acute on chronic HFpEF  -Chest x-ray reviewed  -proBNP elevated  -Echo ordered  -IV Lasix  -Strict I's  and O's, daily weights, fluid and salt restriction  -Cardiology consulted    #A-fib  -Carvedilol, Eliquis    #DVT and PE  -Eliquis    #Diabetes type 2  -Hyperglycemia protocol    #Hypothyroidism  -Levothyroxine    #Hypertension  -Carvedilol, ARB    #Hyperlipidemia  -Statin    #CKD      Plan of care discussed with patient    Chance Zhu, DO    Supplementary Documentation:     The 21st Century Cures Act makes medical notes like these available to patients in the interest of transparency. Please be advised this is a medical document. Medical documents are intended to carry relevant information, facts as evident, and the clinical opinion of the practitioner. The medical note is intended as peer to peer communication and may appear blunt or direct. It is written in medical language and may contain abbreviations or verbiage that are unfamiliar.

## 2024-04-20 NOTE — PROGRESS NOTES
Pt is admitted for cough and fatigue. Pt is AOX4.  [place] and bilateral HA. VSS, afebrile and denies any pain. SpO2 maintained on RA. . SOB with ambulation, Dry cough. Tele-Afib/PVC. Lovenox. Cardiac control diet/1800 ml fluid restriction . Denies any n/v/d. Voids. Up with SBA/walker .   WCTM. Pt is updated with plan of care.

## 2024-04-20 NOTE — PLAN OF CARE
NURSING ADMISSION NOTE      Patient admitted via Cart  Oriented to room.  Safety precautions initiated.  Bed in low position.  Call light in reach.      Problem: PAIN - ADULT  Goal: Verbalizes/displays adequate comfort level or patient's stated pain goal  Description: INTERVENTIONS:  - Encourage pt to monitor pain and request assistance  - Assess pain using appropriate pain scale  - Administer analgesics based on type and severity of pain and evaluate response  - Implement non-pharmacological measures as appropriate and evaluate response  - Consider cultural and social influences on pain and pain management  - Manage/alleviate anxiety  - Utilize distraction and/or relaxation techniques  - Monitor for opioid side effects  - Notify MD/LIP if interventions unsuccessful or patient reports new pain  - Anticipate increased pain with activity and pre-medicate as appropriate  Outcome: Progressing     Problem: SAFETY ADULT - FALL  Goal: Free from fall injury  Description: INTERVENTIONS:  - Assess pt frequently for physical needs  - Identify cognitive and physical deficits and behaviors that affect risk of falls.  - [place] fall precautions as indicated by assessment.  - Educate pt/family on patient safety including physical limitations  - Instruct pt to call for assistance with activity based on assessment  - Modify environment to reduce risk of injury  - Provide assistive devices as appropriate  - Consider OT/PT consult to assist with strengthening/mobility  - Encourage toileting schedule  Outcome: Progressing     Problem: DISCHARGE PLANNING  Goal: Discharge to home or other facility with appropriate resources  Description: INTERVENTIONS:  - Identify barriers to discharge w/pt and caregiver  - Include patient/family/discharge partner in discharge planning  - Arrange for needed discharge resources and transportation as appropriate  - Identify discharge learning needs (meds, wound care, etc)  - Arrange for interpreters  to assist at discharge as needed  - Consider post-discharge preferences of patient/family/discharge partner  - Complete POLST form as appropriate  - Assess patient's ability to be responsible for managing their own health  - Refer to Case Management Department for coordinating discharge planning if the patient needs post-hospital services based on physician/LIP order or complex needs related to functional status, cognitive ability or social support system  Outcome: Progressing     Problem: CARDIOVASCULAR - ADULT  Goal: Maintains optimal cardiac output and hemodynamic stability  Description: INTERVENTIONS:  - Monitor vital signs, rhythm, and trends  - Monitor for bleeding, hypotension and signs of decreased cardiac output  - Evaluate effectiveness of vasoactive medications to optimize hemodynamic stability  - Monitor arterial and/or venous puncture sites for bleeding and/or hematoma  - Assess quality of pulses, skin color and temperature  - Assess for signs of decreased coronary artery perfusion - ex. Angina  - Evaluate fluid balance, assess for edema, trend weights  Outcome: Progressing  Goal: Absence of cardiac arrhythmias or at baseline  Description: INTERVENTIONS:  - Continuous cardiac monitoring, monitor vital signs, obtain 12 lead EKG if indicated  - Evaluate effectiveness of antiarrhythmic and heart rate control medications as ordered  - Initiate emergency measures for life threatening arrhythmias  - Monitor electrolytes and administer replacement therapy as ordered  Outcome: Progressing     Problem: RESPIRATORY - ADULT  Goal: Achieves optimal ventilation and oxygenation  Description: INTERVENTIONS:  - Assess for changes in respiratory status  - Assess for changes in mentation and behavior  - Position to facilitate oxygenation and minimize respiratory effort  - Oxygen supplementation based on oxygen saturation or ABGs  - Provide Smoking Cessation handout, if applicable  - Encourage broncho-pulmonary hygiene  including cough, deep breathe, Incentive Spirometry  - Assess the need for suctioning and perform as needed  - Assess and instruct to report SOB or any respiratory difficulty  - Respiratory Therapy support as indicated  - Manage/alleviate anxiety  - Monitor for signs/symptoms of CO2 retention  Outcome: Progressing     Problem: Patient/Family Goals  Goal: Patient/Family Long Term Goal  Description: Patient's Long Term Goal: DC to home     Interventions:  - Follow plan of care     - See additional Care Plan goals for specific interventions  Outcome: Progressing  Goal: Patient/Family Short Term Goal  Description: Patient's Short Term Goal: 4/20:  get better     Interventions:   - meds, fall precaution   - See additional Care Plan goals for specific interventions  Outcome: Progressing

## 2024-04-20 NOTE — PROGRESS NOTES
CHIEF COMPLAINT:     Chief Complaint   Patient presents with    Cough     Sob, runny nose x 1 day        HPI:   Joni Christiansen is a 83 year old female who presents with daughter for upper respiratory symptoms for  1 days. Patient reports congestion, productive cough, and shortness of breath. Pt contacted PCP who offered an inhaler that pt tried this morning and did not feel much improvement. Family explains that the whole family has need sick with a viral and sinus infections. No viral testing has been completed at this time. PT has received influenza and COVID vaccines. PT has complicated PMH including CHF, CKD, and a-fib, Reviewed the limitations of the walk in clinic and the need for higher level of care.     Current Outpatient Medications   Medication Sig Dispense Refill    albuterol 108 (90 Base) MCG/ACT Inhalation Aero Soln Inhale 2 puffs into the lungs every 4 to 6 hours as needed for Wheezing or Shortness of Breath. 1 each 3    MONTELUKAST 10 MG Oral Tab TAKE 1 TABLET BY MOUTH EVERY EVENING 90 tablet 0    alendronate 35 MG Oral Tab 1 tablet (35 mg total).      24HR ALLERGY RELIEF 180 MG Oral Tab TAKE 1 TABLET BY MOUTH DAILY 90 tablet 0    IRBESARTAN 150 MG Oral Tab TAKE 1 TABLET BY MOUTH EVERY EVENING -HOLD IF SBP LESS THAN 110 90 tablet 1    CARVEDILOL 6.25 MG Oral Tab TAKE 1 TABLET BY MOUTH TWICE DAILY WITH MEALS - HOLD FOR SBP < 110 OR DBP < 50 60 tablet 1    digoxin 0.125 MG Oral Tab Take 1 tablet (125 mcg total) by mouth daily.      diazePAM (VALIUM) 2 MG Oral Tab Take one tablet 60 minutes prior to procedure, may take and additional one tablet in 30 minutes if needed due to incomplete response. 2 tablet 0    triamcinolone 0.1 % External Cream Apply topically 2 (two) times daily as needed. Can use on legs and hands 80 g 2    lidocaine 5 % External Patch Place 1 patch onto the skin daily as needed. 30 each 2    ELIQUIS 5 MG Oral Tab TAKE 1 TABLET BY MOUTH TWICE DAILY 180 tablet 1    ESCITALOPRAM 5 MG  Oral Tab TAKE 1 TABLET BY MOUTH DAILY 90 tablet 1    CALCIUM CARBONATE 1500 (600 Ca) MG Oral Tab TAKE 1 TABLET BY MOUTH DAILY 90 tablet 1    LEVOTHYROXINE 75 MCG Oral Tab TAKE 1 TABLET BY MOUTH EVERY MORNING 90 tablet 1    VITAMIN B-12 ER 1000 MCG Oral Tab CR TAKE 1 TABLET BY MOUTH DAILY 90 tablet 1    PRAVASTATIN SODIUM 80 MG Oral Tab TAKE 1 TABLET BY MOUTH EVERY EVENING 90 tablet 1    CHOLECALCIFEROL 50 MCG (2000 UT) Oral Cap TAKE 1 CAPSULE BY MOUTH DAILY 90 capsule 1    SPIRONOLACTONE 25 MG Oral Tab TAKE 1/2 TABLET BY MOUTH DAILY 45 tablet 1    TORSEMIDE 10 MG Oral Tab TAKE 1 TABLET BY MOUTH DAILY 90 tablet 1    HYDROcodone-acetaminophen 5-325 MG Oral Tab Take 1 tablet by mouth every 8 (eight) hours as needed for Pain. 60 tablet 0    Glucose Blood (ONETOUCH VERIO) In Vitro Strip Check 3-4 times/day for sugar check      acetaminophen 500 MG Oral Tab Take 2 tablets (1,000 mg total) by mouth every 6 (six) hours as needed for Pain.        Past Medical History:    (HFpEF) heart failure with preserved ejection fraction (HCC)    Atrial fibrillation (HCC)    Back pain    CKD (chronic kidney disease)    Colon cancer (HCC)    Congestive heart disease (HCC)    Deep vein thrombosis (HCC)    Diabetes (HCC)    Essential hypertension    History of blood clots    HTN (hypertension)    Hyperlipidemia    Hypothyroidism    Obesity    Pulmonary embolism (HCC)    Sleep apnea    TIA (transient ischemic attack)    Visual impairment      Past Surgical History:   Procedure Laterality Date    Back surgery      Colectomy      Colonoscopy      Excis lumbar disk,one level      Hip replacement surgery      Knee replacement surgery            Total hip replacement      Total knee replacement Bilateral          Social History     Socioeconomic History    Marital status:    Tobacco Use    Smoking status: Never     Passive exposure: Never    Smokeless tobacco: Never   Vaping Use    Vaping status: Never Used   Substance and Sexual  Activity    Alcohol use: Not Currently    Drug use: Never   Social History Narrative    Retired  for >30 years.  [employer]. Daughter Charito lives in [place].          REVIEW OF SYSTEMS:   Review of Systems   Constitutional:  Negative for chills and fever.   HENT:  Positive for congestion and rhinorrhea.    Respiratory:  Positive for cough and shortness of breath.    Cardiovascular:  Negative for chest pain and palpitations.   All other systems reviewed and are negative.         EXAM:   /72 (BP Location: Right arm, Patient Position: Sitting, Cuff Size: large)   Pulse 87   Temp 99.7 °F (37.6 °C) (Tympanic)   Resp 18   Ht 5' 3\" (1.6 m)   Wt 220 lb (99.8 kg)   SpO2 93%   BMI 38.97 kg/m²   Physical Exam  Vitals and nursing note reviewed.   HENT:      Head: Normocephalic and atraumatic.      Right Ear: Hearing and external ear normal. No drainage. Tympanic membrane is not injected, perforated, erythematous or bulging.      Left Ear: Hearing and external ear normal. No drainage. Tympanic membrane is not injected, perforated, erythematous or bulging.      Nose: Rhinorrhea present. No mucosal edema.      Right Sinus: No maxillary sinus tenderness or frontal sinus tenderness.      Left Sinus: No maxillary sinus tenderness or frontal sinus tenderness.      Mouth/Throat:      Lips: No lesions.      Mouth: Mucous membranes are moist. No oral lesions.      Palate: No lesions.      Pharynx: Uvula midline.      Tonsils: No tonsillar exudate or tonsillar abscesses.   Eyes:      General: No scleral icterus.        Right eye: No discharge.         Left eye: No discharge.   Cardiovascular:      Rate and Rhythm: Rhythm irregular.      Comments: Pt states that her lower extremity swelling is less than normal  Pulmonary:      Effort: Accessory muscle usage present.      Breath sounds: Decreased breath sounds present.      Comments: Productive cough present  Musculoskeletal:      Right lower leg:  Edema present.      Left lower leg: Edema present.   Skin:     General: Skin is warm and dry.   Neurological:      Mental Status: She is alert and oriented to person, place, and time.   Psychiatric:         Mood and Affect: Mood normal.         Behavior: Behavior normal. Behavior is cooperative.           ASSESSMENT AND PLAN:   Joni Christiansen is a 83 year old female who presents with upper respiratory symptoms that are consistent with    ASSESSMENT:   Encounter Diagnoses   Name Primary?    URI with cough and congestion Yes    Labored breathing     Borderline low oxygen saturation level        PLAN: Due to borderline O2 sats, increased work of breathing, cough, and complicated PMH, pt and daughter instructed to report to [facility] Emergency Room for prompt evaluation/further work up. Pt and daughter agree with plan, daughter prefers to drive to ER, EMS transport declined. Questions answered, family verbalizes understanding of plan.     Meds & Refills for this Visit:  Requested Prescriptions      No prescriptions requested or ordered in this encounter

## 2024-04-21 ENCOUNTER — APPOINTMENT (OUTPATIENT)
Dept: CV DIAGNOSTICS | Facility: HOSPITAL | Age: 84
End: 2024-04-21
Attending: STUDENT IN AN ORGANIZED HEALTH CARE EDUCATION/TRAINING PROGRAM
Payer: MEDICARE

## 2024-04-21 LAB
ALBUMIN SERPL-MCNC: 3.1 G/DL (ref 3.4–5)
ALBUMIN/GLOB SERPL: 0.8 {RATIO} (ref 1–2)
ALP LIVER SERPL-CCNC: 79 U/L
ALT SERPL-CCNC: 20 U/L
ANION GAP SERPL CALC-SCNC: 7 MMOL/L (ref 0–18)
AST SERPL-CCNC: 15 U/L (ref 15–37)
BASOPHILS # BLD AUTO: 0.05 X10(3) UL (ref 0–0.2)
BASOPHILS NFR BLD AUTO: 0.6 %
BILIRUB SERPL-MCNC: 0.9 MG/DL (ref 0.1–2)
BUN BLD-MCNC: 30 MG/DL (ref 9–23)
CALCIUM BLD-MCNC: 10.6 MG/DL (ref 8.5–10.1)
CHLORIDE SERPL-SCNC: 102 MMOL/L (ref 98–112)
CO2 SERPL-SCNC: 28 MMOL/L (ref 21–32)
CREAT BLD-MCNC: 1.29 MG/DL
EGFRCR SERPLBLD CKD-EPI 2021: 42 ML/MIN/1.73M2 (ref 60–?)
EOSINOPHIL # BLD AUTO: 0.06 X10(3) UL (ref 0–0.7)
EOSINOPHIL NFR BLD AUTO: 0.8 %
ERYTHROCYTE [DISTWIDTH] IN BLOOD BY AUTOMATED COUNT: 12.9 %
GLOBULIN PLAS-MCNC: 3.9 G/DL (ref 2.8–4.4)
GLUCOSE BLD-MCNC: 102 MG/DL (ref 70–99)
GLUCOSE BLD-MCNC: 154 MG/DL (ref 70–99)
GLUCOSE BLD-MCNC: 86 MG/DL (ref 70–99)
GLUCOSE BLD-MCNC: 87 MG/DL (ref 70–99)
GLUCOSE BLD-MCNC: 88 MG/DL (ref 70–99)
HCT VFR BLD AUTO: 42.5 %
HGB BLD-MCNC: 14.4 G/DL
IMM GRANULOCYTES # BLD AUTO: 0.03 X10(3) UL (ref 0–1)
IMM GRANULOCYTES NFR BLD: 0.4 %
LYMPHOCYTES # BLD AUTO: 1.76 X10(3) UL (ref 1–4)
LYMPHOCYTES NFR BLD AUTO: 22.8 %
MCH RBC QN AUTO: 32 PG (ref 26–34)
MCHC RBC AUTO-ENTMCNC: 33.9 G/DL (ref 31–37)
MCV RBC AUTO: 94.4 FL
MONOCYTES # BLD AUTO: 1.15 X10(3) UL (ref 0.1–1)
MONOCYTES NFR BLD AUTO: 14.9 %
NEUTROPHILS # BLD AUTO: 4.66 X10 (3) UL (ref 1.5–7.7)
NEUTROPHILS # BLD AUTO: 4.66 X10(3) UL (ref 1.5–7.7)
NEUTROPHILS NFR BLD AUTO: 60.5 %
OSMOLALITY SERPL CALC.SUM OF ELEC: 290 MOSM/KG (ref 275–295)
PLATELET # BLD AUTO: 156 10(3)UL (ref 150–450)
POTASSIUM SERPL-SCNC: 3.7 MMOL/L (ref 3.5–5.1)
PROT SERPL-MCNC: 7 G/DL (ref 6.4–8.2)
Q-T INTERVAL: 324 MS
QRS DURATION: 92 MS
QTC CALCULATION (BEZET): 394 MS
R AXIS: 18 DEGREES
RBC # BLD AUTO: 4.5 X10(6)UL
SODIUM SERPL-SCNC: 137 MMOL/L (ref 136–145)
T AXIS: 58 DEGREES
VENTRICULAR RATE: 89 BPM
WBC # BLD AUTO: 7.7 X10(3) UL (ref 4–11)

## 2024-04-21 PROCEDURE — 93306 TTE W/DOPPLER COMPLETE: CPT | Performed by: STUDENT IN AN ORGANIZED HEALTH CARE EDUCATION/TRAINING PROGRAM

## 2024-04-21 PROCEDURE — 99232 SBSQ HOSP IP/OBS MODERATE 35: CPT | Performed by: STUDENT IN AN ORGANIZED HEALTH CARE EDUCATION/TRAINING PROGRAM

## 2024-04-21 NOTE — PHYSICAL THERAPY NOTE
PHYSICAL THERAPY EVALUATION - INPATIENT     Room Number: 8620/8620-A  Evaluation Date: 4/21/2024  Type of Evaluation: Initial  Physician Order: PT Eval and Treat    Presenting Problem: SOB, CHF  Co-Morbidities : CHF,Afib, CKD, Afib,DM,  Reason for Therapy: Mobility Dysfunction and Discharge Planning    PHYSICAL THERAPY ASSESSMENT   Patient is currently functioning near baseline with bed mobility, transfers, gait, maintaining seated position, standing prolonged periods, and performing household tasks.  Prior to admission, patient's baseline is mod I and is amb to the dining [place] and [facility] s any use of an A.D..  Patient is requiring independent and modified independent as a result of the following impairments: decreased endurance/aerobic capacity.  Physical Therapy will continue to follow for duration of hospitalization.    Patient will benefit from continued skilled PT Services at discharge to promote functional independence in home.  Anticipate patient will return home with home health PT pending progress    PLAN  PT Treatment Plan: Bed mobility;Body mechanics;Patient education;Gait training;Strengthening;Stair training;Transfer training;Balance training;Range of motion;Family education  Rehab Potential : Good  Frequency (Obs): 3x/week  Number of Visits to Meet Established Goals:  (1-2 visit)      CURRENT GOALS    Goal #1 Patient is able to demonstrate supine - sit EOB @ level: independent     Goal #2 Patient is able to demonstrate transfers Sit to/from Stand at assistance level: independent     Goal #3 Patient is able to ambulate 500 feet with assist device: none at assistance level: independent     Goal #4    Goal #5    Goal #6    Goal Comments: Goals established on 4/21/2024      PHYSICAL THERAPY MEDICAL/SOCIAL HISTORY  History related to current admission: Patient is a 83 year old female admitted on 4/20/2024 from home for SOB.with hx of CHF      HOME SITUATION  Type of Home: Independent living  facility   Home Layout: One level        Stairs to Bedroom: 0       Lives With: Alone     Patient Owned Equipment: None       Prior Level of [place]:   Prior to admission, patient's baseline is mod I and is amb to the dining [place] and [facility] any use of an A.D..     SUBJECTIVE  I go for meals once a day      OBJECTIVE     Fall Risk: High fall risk    WEIGHT BEARING RESTRICTION  Weight Bearing Restriction: None                PAIN ASSESSMENT  Rating: Unable to rate          COGNITION  Overall Cognitive Status:  WFL - within functional limits    RANGE OF MOTION AND STRENGTH ASSESSMENT  Upper extremity ROM and strength are within functional limits     Lower extremity ROM is within functional limits     Lower extremity strength is within functional limits       BALANCE  Static Sitting: Good  Dynamic Sitting: Good  Static Standing: Good  Dynamic Standing: Fair +    ADDITIONAL TESTS     ACTIVITY TOLERANCE; Fair+          AM-PAC '6-Clicks' INPATIENT SHORT FORM - BASIC MOBILITY  How much difficulty does the patient currently have...  Patient Difficulty: Turning over in bed (including adjusting bedclothes, sheets and blankets)?: None   Patient Difficulty: Sitting down on and standing up from a chair with arms (e.g., wheelchair, bedside commode, etc.): None   Patient Difficulty: Moving from lying on back to sitting on the side of the bed?: None   How much help from another person does the patient currently need...   Help from Another: Moving to and from a bed to a chair (including a wheelchair)?: None   Help from Another: Need to walk in hospital room?: A Little   Help from Another: Climbing 3-5 steps with a railing?: A Little       AM-PAC Score:  Raw Score: 22   Approx Degree of Impairment: 20.91%   Standardized Score (AM-PAC Scale): 53.28   CMS Modifier (G-Code): CJ    FUNCTIONAL ABILITY STATUS  Gait Assessment   Functional Mobility/Gait Assessment  Distance (ft): 300  Assistive Device: None  Pattern: Within  Functional Limits    Skilled Therapy Provided     Bed Mobility:  Rolling: mod I  Supine to sit: mod I   Sit to supine: NT     Transfer Mobility:  Sit to stand: SBA   Stand to sit: mod I/SBA  Gait = gt is slow and steady with slight SOB when amb s any use of an A.D.    Therapist's Comments:   Left on the recliner and made comfortable  Instructed to walk with staff several times while in hosp  Nursing addressed all issues and concern  Amb to the restroom and is able to care for herself s any difficulty    Exercise/Education Provided:  Energy conservation  Functional activity tolerated  Gait training    Patient End of Session: Up in chair;Needs met;Call light within reach;RN aware of session/findings;All patient questions and concerns addressed      Patient Evaluation Complexity Level:  History Moderate - 1 or 2 personal factors and/or co-morbidities   Examination of body systems Low - addressing 1-2 elements   Clinical Presentation Low - Stable   Clinical Decision Making Low - Stable       PT Session Time: 30 minutes  Gait Training: 15 minutes  Therapeutic Activity: 10 minutes

## 2024-04-21 NOTE — CONSULTS
[facility]  Cardiology Consultation    Joni Christiansen Patient Status:  Observation    1940 MRN [id number]   Location [facility] 8NE-A Attending Chance Zhu, DO   Hosp Day # 0 PCP Amy Amaro MD     Reason for Consultation:  CHF    History of Present Illness:  Joni Christiansen is a a(n) 83 year old female followed by my partner Dr. Cruz for diastolic heart failure/ELY/ILD with prior CardioMems.  Has had frequent HF admissions prior.  This in setting usually of uncontrolled HTN.  She denies angina but does report mild MEDINA with orthopnea/PND.  No chest pain, syncope or palpitations. At home on torsemide 10 spironolactone 12.5 mg daily    Historically on oral anticoagulation for hx of afib/DVT/PE    On exam with mild volume overload  proBNP up at 3,513 - usually ~2,000  HS troponin negative  Echo ordered and pending    History:  Past Medical History:    (HFpEF) heart failure with preserved ejection fraction (HCC)    Atrial fibrillation (HCC)    Back pain    CKD (chronic kidney disease)    Colon cancer (HCC)    Congestive heart disease (HCC)    Deep vein thrombosis (HCC)    Diabetes (HCC)    Essential hypertension    History of blood clots    HTN (hypertension)    Hyperlipidemia    Hypothyroidism    Obesity    Pulmonary embolism (HCC)    Sleep apnea    TIA (transient ischemic attack)    Visual impairment     Past Surgical History:   Procedure Laterality Date    Back surgery      Colectomy      Colonoscopy      Excis lumbar disk,one level      Hip replacement surgery      Knee replacement surgery            Total hip replacement      Total knee replacement Bilateral      Family History   Problem Relation Age of Onset    Other (other) Father         congestive heart failure    Hypertension Maternal Grandmother       reports that she has never smoked. She has never been exposed to tobacco smoke. She has never used smokeless tobacco. She reports that she does not currently use alcohol. She  reports that she does not use drugs.    Allergies:  Allergies   Allergen Reactions    Penicillins UNKNOWN and OTHER (SEE COMMENTS)     Other reaction(s): Unknown    Was told as a child it was an allergy       Medications:    Current Facility-Administered Medications:     acetaminophen (Tylenol Extra Strength) tab 500 mg, 500 mg, Oral, Q4H PRN    melatonin tab 3 mg, 3 mg, Oral, Nightly PRN    polyethylene glycol (PEG 3350) (Miralax) 17 g oral packet 17 g, 17 g, Oral, Daily PRN    sennosides (Senokot) tab 17.2 mg, 17.2 mg, Oral, Nightly PRN    bisacodyl (Dulcolax) 10 MG rectal suppository 10 mg, 10 mg, Rectal, Daily PRN    guaiFENesin ER (Mucinex) 12 hr tab 600 mg, 600 mg, Oral, BID PRN    benzonatate (Tessalon) cap 200 mg, 200 mg, Oral, TID PRN    glycerin-hypromellose- (Artificial Tears) 0.2-0.2-1 % ophthalmic solution 1 drop, 1 drop, Both Eyes, QID PRN    sodium chloride (Saline Mist) 0.65 % nasal solution 1 spray, 1 spray, Each Nare, Q3H PRN    ondansetron (Zofran) 4 MG/2ML injection 4 mg, 4 mg, Intravenous, Q6H PRN    metoclopramide (Reglan) 5 mg/mL injection 5 mg, 5 mg, Intravenous, Q8H PRN    furosemide (Lasix) 10 mg/mL injection 40 mg, 40 mg, Intravenous, Daily    insulin aspart (NovoLOG) 100 Units/mL FlexPen 1-68 Units, 1-68 Units, Subcutaneous, TID CC    glucose (Dex4) 15 GM/59ML oral liquid 15 g, 15 g, Oral, Q15 Min PRN **OR** glucose (Glutose) 40% oral gel 15 g, 15 g, Oral, Q15 Min PRN **OR** glucose-vitamin C (Dex-4) chewable tab 4 tablet, 4 tablet, Oral, Q15 Min PRN **OR** dextrose 50% injection 50 mL, 50 mL, Intravenous, Q15 Min PRN **OR** glucose (Dex4) 15 GM/59ML oral liquid 30 g, 30 g, Oral, Q15 Min PRN **OR** glucose (Glutose) 40% oral gel 30 g, 30 g, Oral, Q15 Min PRN **OR** glucose-vitamin C (Dex-4) chewable tab 8 tablet, 8 tablet, Oral, Q15 Min PRN    insulin aspart (NovoLOG) 100 Units/mL FlexPen 1-30 Units, 1-30 Units, Subcutaneous, TID AC and HS    carvedilol (Coreg) tab 6.25 mg, 6.25  mg, Oral, BID with meals    apixaban (Eliquis) tab 5 mg, 5 mg, Oral, BID    escitalopram (Lexapro) tab 5 mg, 5 mg, Oral, Daily    losartan (Cozaar) tab 50 mg, 50 mg, Oral, Nightly    levothyroxine (Synthroid) tab 75 mcg, 75 mcg, Oral, QAM AC    montelukast (Singulair) tab 10 mg, 10 mg, Oral, Daily    atorvastatin (Lipitor) tab 20 mg, 20 mg, Oral, Nightly    spironolactone (Aldactone) partial tab 12.5 mg, 12.5 mg, Oral, Daily    Review of Systems:  A comprehensive review of systems was negative if not otherwise mention in above HPI.    /90 (BP Location: Left arm)   Pulse 111   Temp 98 °F (36.7 °C) (Oral)   Resp 20   Wt 209 lb 10.5 oz   SpO2 95%   BMI 37.14 kg/m²   Temp (24hrs), Av.4 °F (36.9 °C), Min:98 °F (36.7 °C), Max:99.7 °F (37.6 °C)       Intake/Output Summary (Last 24 hours) at 2024 0710  Last data filed at 2024 0552  Gross per 24 hour   Intake 240 ml   Output 2001 ml   Net -1761 ml     Wt Readings from Last 3 Encounters:   24 209 lb 10.5 oz   24 220 lb   24 220 lb       Physical Exam:   General: Alert and oriented x 3.    HEENT: Normocephalic, anicteric sclera, neck supple.  Neck: minimal JVD  Cardiac: irregular, s1, s2  Lungs: decreased breath sounds at bases  Extremities: Without LE edema  Neurologic: Alert and oriented, normal affect.  Skin: Warm and dry.     Laboratory Data:  Lab Results   Component Value Date    WBC 7.7 2024    HGB 14.4 2024    HCT 42.5 2024    .0 2024    CREATSERUM 1.29 2024    BUN 30 2024     2024    K 3.7 2024     2024    CO2 28.0 2024    GLU 88 2024    CA 10.6 2024    ALB 3.1 2024    ALKPHO 79 2024    BILT 0.9 2024    TP 7.0 2024    AST 15 2024    ALT 20 2024    PGLU 86 2024       Imaging:  Nuc :  IMPRESSION:  Normal rest/stress gated SPECT myocardial perfusion scan.  Left ventricular enlargement with  preserved overall left ventricular systolic function.   Normal electrocardiographic response to regadenoson infusion.        Impression:  Principal Problem:    Acute on chronic congestive heart failure, unspecified heart failure type (HCC)  Active Problems:    CHF (congestive heart failure) (HCC)    Acute on chronic heart failure with preserved ejection fraction (HCC)  Rate controlled afib  Hx of DVT/PE  CKD  HTN    Recommendations:  - gentle IV diuresis for 24-48 hrs  - echo being done today to help guide management, assess filling pressures  - strict I/Os and daily weights  - CardioMems interrogation  - replete lytes per protocol and monitor kidney fc - hx of CKD  - resume oral spironolactone  - anticoagulation with eliquis  - no additional cardiac imaging/testing  - continue baseline coreg/ARB - substitute losartan for irbesartan while inpatient       Thank you for allowing me to participate in the care of your patient.    Roel Hickey MD  4/21/2024  7:10 AM

## 2024-04-21 NOTE — PLAN OF CARE
Assumed care of pt at 1930 on 4/20  A/Ox4, up w/ standby and walker. Steady gait.   Room air. Pt declined cpap at night.   Afib on tele. HR 90s to low 100s. Pt denies chest pain.   Continent of bladder and bowel.   Plan for echo 4/21. IV lasix daily.   Safety measures reviewed w/ pt. Call light in reach, bed alarm on. Pt updated on plan of care.     Problem: PAIN - ADULT  Goal: Verbalizes/displays adequate comfort level or patient's stated pain goal  Description: INTERVENTIONS:  - Encourage pt to monitor pain and request assistance  - Assess pain using appropriate pain scale  - Administer analgesics based on type and severity of pain and evaluate response  - Implement non-pharmacological measures as appropriate and evaluate response  - Consider cultural and social influences on pain and pain management  - Manage/alleviate anxiety  - Utilize distraction and/or relaxation techniques  - Monitor for opioid side effects  - Notify MD/LIP if interventions unsuccessful or patient reports new pain  - Anticipate increased pain with activity and pre-medicate as appropriate  Outcome: Progressing     Problem: SAFETY ADULT - FALL  Goal: Free from fall injury  Description: INTERVENTIONS:  - Assess pt frequently for physical needs  - Identify cognitive and physical deficits and behaviors that affect risk of falls.  - [place] fall precautions as indicated by assessment.  - Educate pt/family on patient safety including physical limitations  - Instruct pt to call for assistance with activity based on assessment  - Modify environment to reduce risk of injury  - Provide assistive devices as appropriate  - Consider OT/PT consult to assist with strengthening/mobility  - Encourage toileting schedule  Outcome: Progressing     Problem: DISCHARGE PLANNING  Goal: Discharge to home or other facility with appropriate resources  Description: INTERVENTIONS:  - Identify barriers to discharge w/pt and caregiver  - Include patient/family/discharge  partner in discharge planning  - Arrange for needed discharge resources and transportation as appropriate  - Identify discharge learning needs (meds, wound care, etc)  - Arrange for interpreters to assist at discharge as needed  - Consider post-discharge preferences of patient/family/discharge partner  - Complete POLST form as appropriate  - Assess patient's ability to be responsible for managing their own health  - Refer to Case Management Department for coordinating discharge planning if the patient needs post-hospital services based on physician/LIP order or complex needs related to functional status, cognitive ability or social support system  Outcome: Progressing     Problem: CARDIOVASCULAR - ADULT  Goal: Maintains optimal cardiac output and hemodynamic stability  Description: INTERVENTIONS:  - Monitor vital signs, rhythm, and trends  - Monitor for bleeding, hypotension and signs of decreased cardiac output  - Evaluate effectiveness of vasoactive medications to optimize hemodynamic stability  - Monitor arterial and/or venous puncture sites for bleeding and/or hematoma  - Assess quality of pulses, skin color and temperature  - Assess for signs of decreased coronary artery perfusion - ex. Angina  - Evaluate fluid balance, assess for edema, trend weights  Outcome: Progressing  Goal: Absence of cardiac arrhythmias or at baseline  Description: INTERVENTIONS:  - Continuous cardiac monitoring, monitor vital signs, obtain 12 lead EKG if indicated  - Evaluate effectiveness of antiarrhythmic and heart rate control medications as ordered  - Initiate emergency measures for life threatening arrhythmias  - Monitor electrolytes and administer replacement therapy as ordered  Outcome: Progressing     Problem: RESPIRATORY - ADULT  Goal: Achieves optimal ventilation and oxygenation  Description: INTERVENTIONS:  - Assess for changes in respiratory status  - Assess for changes in mentation and behavior  - Position to facilitate  oxygenation and minimize respiratory effort  - Oxygen supplementation based on oxygen saturation or ABGs  - Provide Smoking Cessation handout, if applicable  - Encourage broncho-pulmonary hygiene including cough, deep breathe, Incentive Spirometry  - Assess the need for suctioning and perform as needed  - Assess and instruct to report SOB or any respiratory difficulty  - Respiratory Therapy support as indicated  - Manage/alleviate anxiety  - Monitor for signs/symptoms of CO2 retention  Outcome: Progressing     Problem: Patient/Family Goals  Goal: Patient/Family Long Term Goal  Description: Patient's Long Term Goal: DC to home     Interventions:  - Follow plan of care     - See additional Care Plan goals for specific interventions  Outcome: Progressing  Goal: Patient/Family Short Term Goal  Description: Patient's Short Term Goal: 4/20:  get better     Interventions:   - meds, fall precaution   - See additional Care Plan goals for specific interventions  Outcome: Progressing

## 2024-04-21 NOTE — PROGRESS NOTES
[facility]   part of [facility]     Hospitalist Progress Note     Joni Christiansen Patient Status:  Observation    1940 MRN [id number]   Location [facility] 8NE-A Attending Chance Zhu,    Hosp Day # 0 PCP Amy Amaro MD     Chief Complaint: Cough, dyspnea    Subjective:     Patient resting in chair and feels well    Objective:    Review of Systems:   A comprehensive review of systems was completed; pertinent positive and negatives stated in subjective.    Vital signs:  Temp:  [97.7 °F (36.5 °C)-99.7 °F (37.6 °C)] 97.7 °F (36.5 °C)  Pulse:  [] 89  Resp:  [17-26] 17  BP: ()/(41-98) 71/41  SpO2:  [90 %-97 %] 93 %    Physical Exam:    General: No acute distress  Respiratory: No wheezes, no rhonchi  Cardiovascular: S1, S2, regular rate and rhythm  Abdomen: Soft, Non-tender, non-distended, positive bowel sounds  Neuro: No new focal deficits.   Extremities: Trace pedal edema    Diagnostic Data:    Labs:  Recent Labs   Lab 24  1243 24  0547   WBC 9.2 7.7   HGB 13.7 14.4   MCV 94.5 94.4   .0 156.0       Recent Labs   Lab 24  1243 24  0547   GLU 86 88   BUN 31* 30*   CREATSERUM 1.43* 1.29*   CA 11.4* 10.6*   ALB 3.7 3.1*    137   K 3.9 3.7    102   CO2 30.0 28.0   ALKPHO 84 79   AST 17 15   ALT 22 20   BILT 0.8 0.9   TP 7.8 7.0       Estimated Creatinine Clearance: 28.3 mL/min (A) (based on SCr of 1.29 mg/dL (H)).    Recent Labs   Lab 24  1243   TROPHS 17       No results for input(s): \"PTP\", \"INR\" in the last 168 hours.               Microbiology    No results found for this visit on 24.      Imaging: Reviewed in Epic.    Medications:    furosemide  40 mg Intravenous Daily    insulin aspart  1-68 Units Subcutaneous TID CC    insulin aspart  1-30 Units Subcutaneous TID AC and HS    carvedilol  6.25 mg Oral BID with meals    apixaban  5 mg Oral BID    escitalopram  5 mg Oral Daily    losartan  50 mg Oral Nightly     levothyroxine  75 mcg Oral QAM AC    montelukast  10 mg Oral Daily    atorvastatin  20 mg Oral Nightly    spironolactone  12.5 mg Oral Daily       Assessment & Plan:      Acute on chronic HFpEF  -Chest x-ray reviewed  -proBNP elevated  -Echo ordered  -IV Lasix on hold given soft blood pressure this morning  -Strict I's and O's, daily weights, fluid and salt restriction  -Cardiology consulted     #A-fib  -Carvedilol on hold given soft blood pressure this morning  -Eliquis     #DVT and PE  -Eliquis     #Diabetes type 2  -Hyperglycemia protocol     #Hypothyroidism  -Levothyroxine     #Hypertension  -Carvedilol, ARB on hold given soft blood pressure this morning     #Hyperlipidemia  -Statin     #CKD      Chance Zhu,     Supplementary Documentation:     Quality:  DVT Mechanical Prophylaxis:   SCDs,    DVT Pharmacologic Prophylaxis   Medication    apixaban (Eliquis) tab 5 mg                Code Status: DNAR/Selective Treatment  Mccollum: No urinary catheter in place  Mccollum Duration (in days):   Central line:    TOMY: 4/22/2024    Discharge is dependent on: Clinical improvement  At this point Ms. Christiansen is expected to be discharge to: Home    The 21st Century Cures Act makes medical notes like these available to patients in the interest of transparency. Please be advised this is a medical document. Medical documents are intended to carry relevant information, facts as evident, and the clinical opinion of the practitioner. The medical note is intended as peer to peer communication and may appear blunt or direct. It is written in medical language and may contain abbreviations or verbiage that are unfamiliar.

## 2024-04-21 NOTE — PLAN OF CARE
Assumed care of patient at 0700. Pt A/Ox 4. O2 sats maintained on room air. Afib on tele. Last BM 4/21. Voiding without difficulty. Pt reports no pain. Pt up standby assist and walker. Pt updated on plan of care. Care needs met. Bed in lowest position, Call light within reach. Bed and chair alarm on.     0830: BP low - see flowsheet. Pt asymptomatic. Dr. Hickey notified who instructed to hold Lasix and BP meds this morning. No further orders. Dr. Zhu updated who placed parameters on BP meds, no further orders.     1100: BP improved, smaller cuff used on forearm.   1758: Dr. Zhu notified BP 90/78. No new orders at this time, continue to hold lasix and monitor.     POC: IV lasix, monitor BP, echo    Problem: PAIN - ADULT  Goal: Verbalizes/displays adequate comfort level or patient's stated pain goal  Description: INTERVENTIONS:  - Encourage pt to monitor pain and request assistance  - Assess pain using appropriate pain scale  - Administer analgesics based on type and severity of pain and evaluate response  - Implement non-pharmacological measures as appropriate and evaluate response  - Consider cultural and social influences on pain and pain management  - Manage/alleviate anxiety  - Utilize distraction and/or relaxation techniques  - Monitor for opioid side effects  - Notify MD/LIP if interventions unsuccessful or patient reports new pain  - Anticipate increased pain with activity and pre-medicate as appropriate  Outcome: Progressing     Problem: SAFETY ADULT - FALL  Goal: Free from fall injury  Description: INTERVENTIONS:  - Assess pt frequently for physical needs  - Identify cognitive and physical deficits and behaviors that affect risk of falls.  - [place] fall precautions as indicated by assessment.  - Educate pt/family on patient safety including physical limitations  - Instruct pt to call for assistance with activity based on assessment  - Modify environment to reduce risk of injury  - Provide assistive  devices as appropriate  - Consider OT/PT consult to assist with strengthening/mobility  - Encourage toileting schedule  Outcome: Progressing     Problem: DISCHARGE PLANNING  Goal: Discharge to home or other facility with appropriate resources  Description: INTERVENTIONS:  - Identify barriers to discharge w/pt and caregiver  - Include patient/family/discharge partner in discharge planning  - Arrange for needed discharge resources and transportation as appropriate  - Identify discharge learning needs (meds, wound care, etc)  - Arrange for interpreters to assist at discharge as needed  - Consider post-discharge preferences of patient/family/discharge partner  - Complete POLST form as appropriate  - Assess patient's ability to be responsible for managing their own health  - Refer to Case Management Department for coordinating discharge planning if the patient needs post-hospital services based on physician/LIP order or complex needs related to functional status, cognitive ability or social support system  Outcome: Progressing     Problem: CARDIOVASCULAR - ADULT  Goal: Maintains optimal cardiac output and hemodynamic stability  Description: INTERVENTIONS:  - Monitor vital signs, rhythm, and trends  - Monitor for bleeding, hypotension and signs of decreased cardiac output  - Evaluate effectiveness of vasoactive medications to optimize hemodynamic stability  - Monitor arterial and/or venous puncture sites for bleeding and/or hematoma  - Assess quality of pulses, skin color and temperature  - Assess for signs of decreased coronary artery perfusion - ex. Angina  - Evaluate fluid balance, assess for edema, trend weights  Outcome: Progressing  Goal: Absence of cardiac arrhythmias or at baseline  Description: INTERVENTIONS:  - Continuous cardiac monitoring, monitor vital signs, obtain 12 lead EKG if indicated  - Evaluate effectiveness of antiarrhythmic and heart rate control medications as ordered  - Initiate emergency measures  for life threatening arrhythmias  - Monitor electrolytes and administer replacement therapy as ordered  Outcome: Progressing     Problem: RESPIRATORY - ADULT  Goal: Achieves optimal ventilation and oxygenation  Description: INTERVENTIONS:  - Assess for changes in respiratory status  - Assess for changes in mentation and behavior  - Position to facilitate oxygenation and minimize respiratory effort  - Oxygen supplementation based on oxygen saturation or ABGs  - Provide Smoking Cessation handout, if applicable  - Encourage broncho-pulmonary hygiene including cough, deep breathe, Incentive Spirometry  - Assess the need for suctioning and perform as needed  - Assess and instruct to report SOB or any respiratory difficulty  - Respiratory Therapy support as indicated  - Manage/alleviate anxiety  - Monitor for signs/symptoms of CO2 retention  Outcome: Progressing     Problem: Patient/Family Goals  Goal: Patient/Family Long Term Goal  Description: Patient's Long Term Goal: DC to home     Interventions:  - Follow plan of care     - See additional Care Plan goals for specific interventions  Outcome: Progressing  Goal: Patient/Family Short Term Goal  Description: Patient's Short Term Goal: 4/20:  get better     Interventions:   - meds, fall precaution   - See additional Care Plan goals for specific interventions  Outcome: Progressing

## 2024-04-22 ENCOUNTER — TELEPHONE (OUTPATIENT)
Facility: CLINIC | Age: 84
End: 2024-04-22

## 2024-04-22 VITALS
TEMPERATURE: 98 F | SYSTOLIC BLOOD PRESSURE: 133 MMHG | RESPIRATION RATE: 20 BRPM | DIASTOLIC BLOOD PRESSURE: 79 MMHG | WEIGHT: 209.63 LBS | HEART RATE: 117 BPM | BODY MASS INDEX: 37 KG/M2 | OXYGEN SATURATION: 93 %

## 2024-04-22 PROCEDURE — 99239 HOSP IP/OBS DSCHRG MGMT >30: CPT | Performed by: STUDENT IN AN ORGANIZED HEALTH CARE EDUCATION/TRAINING PROGRAM

## 2024-04-22 RX ORDER — METOPROLOL SUCCINATE 50 MG/1
50 TABLET, EXTENDED RELEASE ORAL
Status: DISCONTINUED | OUTPATIENT
Start: 2024-04-22 | End: 2024-04-22

## 2024-04-22 RX ORDER — METOPROLOL SUCCINATE 50 MG/1
50 TABLET, EXTENDED RELEASE ORAL
Qty: 30 TABLET | Refills: 0 | Status: SHIPPED | OUTPATIENT
Start: 2024-04-22

## 2024-04-22 NOTE — PLAN OF CARE
Assumed care of pt at 1930  A/Ox4, up w/ standby. Steady gait.   Room air. Denies shortness of breath.   Afib on tele. HR 90-100s, 110s w/ activity.   Pt cont w/ soft Bps. Held scheduled PM Losartan.   Lasix held on previous shift. Reviewed w/ pt about fluid restriction orders.    Bed locked and in lowest position. Safety measures reviewed w/ pt. Call light and belongings within reach. Pt updated on plan of care.     2015 - Verified w/ MD if glucose checks needed to be continued qid. Glucose checks dc'd per MD.     Problem: PAIN - ADULT  Goal: Verbalizes/displays adequate comfort level or patient's stated pain goal  Description: INTERVENTIONS:  - Encourage pt to monitor pain and request assistance  - Assess pain using appropriate pain scale  - Administer analgesics based on type and severity of pain and evaluate response  - Implement non-pharmacological measures as appropriate and evaluate response  - Consider cultural and social influences on pain and pain management  - Manage/alleviate anxiety  - Utilize distraction and/or relaxation techniques  - Monitor for opioid side effects  - Notify MD/LIP if interventions unsuccessful or patient reports new pain  - Anticipate increased pain with activity and pre-medicate as appropriate  Outcome: Progressing     Problem: SAFETY ADULT - FALL  Goal: Free from fall injury  Description: INTERVENTIONS:  - Assess pt frequently for physical needs  - Identify cognitive and physical deficits and behaviors that affect risk of falls.  - [place] fall precautions as indicated by assessment.  - Educate pt/family on patient safety including physical limitations  - Instruct pt to call for assistance with activity based on assessment  - Modify environment to reduce risk of injury  - Provide assistive devices as appropriate  - Consider OT/PT consult to assist with strengthening/mobility  - Encourage toileting schedule  Outcome: Progressing     Problem: DISCHARGE PLANNING  Goal: Discharge to  home or other facility with appropriate resources  Description: INTERVENTIONS:  - Identify barriers to discharge w/pt and caregiver  - Include patient/family/discharge partner in discharge planning  - Arrange for needed discharge resources and transportation as appropriate  - Identify discharge learning needs (meds, wound care, etc)  - Arrange for interpreters to assist at discharge as needed  - Consider post-discharge preferences of patient/family/discharge partner  - Complete POLST form as appropriate  - Assess patient's ability to be responsible for managing their own health  - Refer to Case Management Department for coordinating discharge planning if the patient needs post-hospital services based on physician/LIP order or complex needs related to functional status, cognitive ability or social support system  Outcome: Progressing     Problem: CARDIOVASCULAR - ADULT  Goal: Maintains optimal cardiac output and hemodynamic stability  Description: INTERVENTIONS:  - Monitor vital signs, rhythm, and trends  - Monitor for bleeding, hypotension and signs of decreased cardiac output  - Evaluate effectiveness of vasoactive medications to optimize hemodynamic stability  - Monitor arterial and/or venous puncture sites for bleeding and/or hematoma  - Assess quality of pulses, skin color and temperature  - Assess for signs of decreased coronary artery perfusion - ex. Angina  - Evaluate fluid balance, assess for edema, trend weights  Outcome: Progressing  Goal: Absence of cardiac arrhythmias or at baseline  Description: INTERVENTIONS:  - Continuous cardiac monitoring, monitor vital signs, obtain 12 lead EKG if indicated  - Evaluate effectiveness of antiarrhythmic and heart rate control medications as ordered  - Initiate emergency measures for life threatening arrhythmias  - Monitor electrolytes and administer replacement therapy as ordered  Outcome: Progressing     Problem: RESPIRATORY - ADULT  Goal: Achieves optimal  ventilation and oxygenation  Description: INTERVENTIONS:  - Assess for changes in respiratory status  - Assess for changes in mentation and behavior  - Position to facilitate oxygenation and minimize respiratory effort  - Oxygen supplementation based on oxygen saturation or ABGs  - Provide Smoking Cessation handout, if applicable  - Encourage broncho-pulmonary hygiene including cough, deep breathe, Incentive Spirometry  - Assess the need for suctioning and perform as needed  - Assess and instruct to report SOB or any respiratory difficulty  - Respiratory Therapy support as indicated  - Manage/alleviate anxiety  - Monitor for signs/symptoms of CO2 retention  Outcome: Progressing     Problem: Patient/Family Goals  Goal: Patient/Family Long Term Goal  Description: Patient's Long Term Goal: DC to home     Interventions:  - Follow plan of care     - See additional Care Plan goals for specific interventions  Outcome: Progressing  Goal: Patient/Family Short Term Goal  Description: Patient's Short Term Goal: 4/20:  get better     Interventions:   - meds, fall precaution   - See additional Care Plan goals for specific interventions  Outcome: Progressing

## 2024-04-22 NOTE — PROGRESS NOTES
Progress Note  Joni Christiansen Patient Status:  Observation    1940 MRN [id number]   Location [facility] 8NE-A Attending Chance Zhu, DO   Hosp Day # 0 PCP Amy Amaro MD     Subjective:  No acute events overnight.  Sitting up in a chair this morning, on room air, feels that breathing is back to baseline currently.  Denies any chest pain, palpitations or dizziness at this time.        Objective:  /84 (BP Location: Left arm)   Pulse 111   Temp 97.8 °F (36.6 °C) (Oral)   Resp 18   Wt 209 lb 9.6 oz (95.1 kg)   SpO2 90%   BMI 37.13 kg/m²     Telemetry: A-fib, HR 90-100s      Intake/Output:    Intake/Output Summary (Last 24 hours) at 2024 1055  Last data filed at 2024 2200  Gross per 24 hour   Intake 820 ml   Output 750 ml   Net 70 ml       Last 3 Weights   24 0435 209 lb 9.6 oz (95.1 kg)   24 0552 209 lb 10.5 oz (95.1 kg)   24 1748 212 lb (96.2 kg)   24 1727 212 lb 11.9 oz (96.5 kg)   24 1125 220 lb (99.8 kg)   24 1337 220 lb (99.8 kg)       Labs:  Recent Labs   Lab 24  1243 24  0547   GLU 86 88   BUN 31* 30*   CREATSERUM 1.43* 1.29*   EGFRCR 37* 42*   CA 11.4* 10.6*    137   K 3.9 3.7    102   CO2 30.0 28.0     Recent Labs   Lab 24  1243 24  0547   RBC 4.37 4.50   HGB 13.7 14.4   HCT 41.3 42.5   MCV 94.5 94.4   MCH 31.4 32.0   MCHC 33.2 33.9   RDW 12.8 12.9   NEPRELIM 6.48 4.66   WBC 9.2 7.7   .0 156.0         Recent Labs   Lab 24  1243   TROPHS 17       Review of Systems   Constitutional: Negative.   Cardiovascular:  Positive for dyspnea on exertion and irregular heartbeat.   Respiratory: Negative.         Physical Exam:    Gen: alert, oriented x 3, NAD  Heent: pupils equal, reactive. Mucous membranes moist.   Neck: no jvd  Cardiac: irregular rate and rhythm, normal S1,S2, no murmur, gallop or rub   Lungs: diminished at the basis, WILMA fine crackles   Abd: soft, NT/ND, obese +bs  Ext: no  edema  Skin: Warm, dry  Neuro: No focal deficits        Medications:     furosemide  40 mg Intravenous Daily    carvedilol  6.25 mg Oral BID with meals    apixaban  5 mg Oral BID    escitalopram  5 mg Oral Daily    losartan  50 mg Oral Nightly    levothyroxine  75 mcg Oral QAM AC    montelukast  10 mg Oral Daily    atorvastatin  20 mg Oral Nightly    spironolactone  12.5 mg Oral Daily     Assessment:  Acute on chronic HFpEF, presented with dyspnea and cough   CXR showed chronic interstitial thickening, no pulmonary edema   proBNP 3,513   Trop neg  Echo 4/21/24 LVEF 50-55%, no rwma    Diuresing with IV lasix 40 mg daily, net fluid off 1.5L-dose held this morning with soft BP  On carvedilol, losartan, ruben  Chronic A-fib, rates controlled   On carvedilol  Eliquis for stroke prevention  Hx of DVT/PE  On eliquis  HTN-controlled, now with soft Bps  systolic    On carvedilol, losartan, ruben  HLP-on statin  DM2, A1C 5.6  CKD, crt 1.29 -stable   Hypothyroidism-on replacement    ELY     Plan:  Diuresed well with dose of IV lasix, net neg 1.5L, reports that breathing is now back to baseline.  Holding further diuresis this am with low BP.  Echo results noted LVEF 50-55%.  Will switch carvedilol to toprol with soft Bps and elevated HR-discussed with Dr Allen.   Continue Eliquis for stroke prevention.   Tentative plan to dc later today pending PT and BP stability.        Cardiology attending:  Pt seen and independently examined.  Note edited.  Plan of care performed by myself in its entirety.  Not sure much heart failure was present.    Tachy at times, so will change carvedilol to Toprol.  Otherwise same meds as on admit.  Cards f/u with APN in one week.    Sreekanth Allen MD  L3    Plan of care discussed with patient, RN.    Zahra Austin, APRN  4/22/2024  10:55 AM  [phone]

## 2024-04-22 NOTE — PROGRESS NOTES
[facility]   part of [facility]     Hospitalist Progress Note     Joni Christiansen Patient Status:  Observation    1940 MRN [id number]   Location [facility] 8NE-A Attending Chance Zhu,    Hosp Day # 0 PCP Amy Amaro MD     Chief Complaint: Cough, dyspnea    Subjective:     Patient resting in chair and feels well    Objective:    Review of Systems:   A comprehensive review of systems was completed; pertinent positive and negatives stated in subjective.    Vital signs:  Temp:  [97.4 °F (36.3 °C)-98.2 °F (36.8 °C)] 98.2 °F (36.8 °C)  Pulse:  [] 117  Resp:  [14-20] 20  BP: ()/(69-94) 133/79  SpO2:  [88 %-94 %] 93 %    Physical Exam:    General: No acute distress  Respiratory: No wheezes, no rhonchi  Cardiovascular: S1, S2, regular rate and rhythm  Abdomen: Soft, Non-tender, non-distended, positive bowel sounds  Neuro: No new focal deficits.   Extremities: Trace pedal edema    Diagnostic Data:    Labs:  Recent Labs   Lab 24  1243 24  0547   WBC 9.2 7.7   HGB 13.7 14.4   MCV 94.5 94.4   .0 156.0       Recent Labs   Lab 24  1243 24  0547   GLU 86 88   BUN 31* 30*   CREATSERUM 1.43* 1.29*   CA 11.4* 10.6*   ALB 3.7 3.1*    137   K 3.9 3.7    102   CO2 30.0 28.0   ALKPHO 84 79   AST 17 15   ALT 22 20   BILT 0.8 0.9   TP 7.8 7.0       Estimated Creatinine Clearance: 28.3 mL/min (A) (based on SCr of 1.29 mg/dL (H)).    Recent Labs   Lab 24  1243   TROPHS 17       No results for input(s): \"PTP\", \"INR\" in the last 168 hours.               Microbiology    No results found for this visit on 24.      Imaging: Reviewed in Epic.    Medications:    metoprolol succinate  50 mg Oral Daily Beta Blocker    apixaban  5 mg Oral BID    escitalopram  5 mg Oral Daily    losartan  50 mg Oral Nightly    levothyroxine  75 mcg Oral QAM AC    montelukast  10 mg Oral Daily    atorvastatin  20 mg Oral Nightly    spironolactone  12.5 mg Oral Daily        Assessment & Plan:      #Acute on chronic HFpEF  -Chest x-ray reviewed  -proBNP elevated  -Strict I's and O's, daily weights, fluid and salt restriction  -Cardiology consulted     #A-fib  -Carvedilol changed to metoprolol  -Eliquis     #DVT and PE  -Eliquis     #Diabetes type 2  -Hyperglycemia protocol     #Hypothyroidism  -Levothyroxine     #Hypertension  -Home meds     #Hyperlipidemia  -Statin     #CKD    DC Planning    Chance Zhu, DO    Supplementary Documentation:     Quality:  DVT Mechanical Prophylaxis:   SCDs,    DVT Pharmacologic Prophylaxis   Medication    apixaban (Eliquis) tab 5 mg                Code Status: DNAR/Selective Treatment  Mccollum: No urinary catheter in place  Mccollum Duration (in days):   Central line:    TOMY: 4/22/2024    Discharge is dependent on: Clinical improvement  At this point Ms. Christiansen is expected to be discharge to: Home    The 21st Century Cures Act makes medical notes like these available to patients in the interest of transparency. Please be advised this is a medical document. Medical documents are intended to carry relevant information, facts as evident, and the clinical opinion of the practitioner. The medical note is intended as peer to peer communication and may appear blunt or direct. It is written in medical language and may contain abbreviations or verbiage that are unfamiliar.

## 2024-04-22 NOTE — DISCHARGE SUMMARY
[facility]     Joni Christiansen Patient Status:  Observation    1940 MRN [id number]   Location [facility] 8NE-A Attending No att. providers found   Hosp Day # 0 PCP Aym Amaro MD     Date of Admission: 2024  Date of Discharge:  2024     Discharge Disposition: Home or Self Care    Discharge Diagnosis:  Acute on chronic HFpEF  #A-fib  #DVT and PE  #Diabetes type 2  # Hypothyroidism  #Hypertension  #Hyperlipidemia  #CKD    History of Present Illness: Joni Christiansen is a 83 year old female with past medical history of A-fib on Eliquis, HFpEF, hypertension, CKD, diabetes, hyperlipidemia, hypothyroidism who presents ED for dyspnea.  Patient has had cough and dyspnea for the past few days.  She is also had bilateral leg swelling.  Patient's daughter called her pulmonologist today who prescribed albuterol.  Albuterol helped with dyspnea at times.  She denies any fevers, chills, nausea, vomiting, abdominal pain, chest pain, headaches, dyspnea.        Brief Synopsis: Cardiology was consulted.  Patient was started on IV diuresis.  Patient's dyspnea and edema improved.  PT OT recommended discharge home.  Patient was discharged home with outpatient follow-up.    Lace+ Score: 73  59-90 High Risk  29-58 Medium Risk  0-28   Low Risk       TCM Follow-Up Recommendation:  LACE > 58: High Risk of readmission after discharge from the hospital.      Procedures during hospitalization:   None    Incidental or significant findings and recommendations (brief descriptions):  See brief synopsis above    Lab/Test results pending at Discharge:   None    Consultants:  Cardiology    Discharge Medication List:     Discharge Medications        START taking these medications        Instructions Prescription details   metoprolol succinate ER 50 MG Tb24  Commonly known as: Toprol XL      Take 1 tablet (50 mg total) by mouth Daily Beta Blocker.   Quantity: 30 tablet  Refills: 0             CONTINUE taking these medications        Instructions Prescription details   24HR Allergy Relief 180 MG Tabs  Generic drug: fexofenadine      TAKE 1 TABLET BY MOUTH DAILY   Quantity: 90 tablet  Refills: 0     acetaminophen 500 MG Tabs  Commonly known as: Tylenol Extra Strength      Take 2 tablets (1,000 mg total) by mouth every 6 (six) hours as needed for Pain.   Refills: 0     albuterol 108 (90 Base) MCG/ACT Aers  Commonly known as: Ventolin HFA      Inhale 2 puffs into the lungs every 4 to 6 hours as needed for Wheezing or Shortness of Breath.   Quantity: 1 each  Refills: 3     Calcium Carbonate 1500 (600 Ca) MG Tabs      TAKE 1 TABLET BY MOUTH DAILY   Quantity: 90 tablet  Refills: 1     cholecalciferol 50 MCG (2000 UT) Caps  Generic drug: Cholecalciferol      TAKE 1 CAPSULE BY MOUTH DAILY   Quantity: 90 capsule  Refills: 1     Eliquis 5 MG Tabs  Generic drug: apixaban      TAKE 1 TABLET BY MOUTH TWICE DAILY   Quantity: 180 tablet  Refills: 1     escitalopram 5 MG Tabs  Commonly known as: Lexapro      TAKE 1 TABLET BY MOUTH DAILY   Quantity: 90 tablet  Refills: 1     HYDROcodone-acetaminophen 5-325 MG Tabs  Commonly known as: Norco      Take 1 tablet by mouth every 8 (eight) hours as needed for Pain.   Quantity: 60 tablet  Refills: 0     Irbesartan 150 MG Tabs      TAKE 1 TABLET BY MOUTH EVERY EVENING -HOLD IF SBP LESS THAN 110   Quantity: 90 tablet  Refills: 1     levothyroxine 75 MCG Tabs  Commonly known as: Synthroid      TAKE 1 TABLET BY MOUTH EVERY MORNING   Quantity: 90 tablet  Refills: 1     lidocaine 5 % Ptch  Commonly known as: Lidoderm      Place 1 patch onto the skin daily as needed.   Quantity: 30 each  Refills: 2     montelukast 10 MG Tabs  Commonly known as: Singulair      TAKE 1 TABLET BY MOUTH EVERY EVENING   Quantity: 90 tablet  Refills: 0     Pravastatin Sodium 80 MG Tabs  Commonly known as: PRAVACHOL      TAKE 1 TABLET BY MOUTH EVERY EVENING   Quantity: 90 tablet  Refills: 1      spironolactone 25 MG Tabs  Commonly known as: Aldactone      TAKE 1/2 TABLET BY MOUTH DAILY   Quantity: 45 tablet  Refills: 1     torsemide 10 MG Tabs  Commonly known as: DEMADEX      TAKE 1 TABLET BY MOUTH DAILY   Quantity: 90 tablet  Refills: 1     triamcinolone 0.1 % Crea  Commonly known as: Kenalog      Apply topically 2 (two) times daily as needed. Can use on legs and hands   Quantity: 80 g  Refills: 2     Vitamin B-12 ER 1000 MCG Tbcr      TAKE 1 TABLET BY MOUTH DAILY   Quantity: 90 tablet  Refills: 1            STOP taking these medications      carvedilol 6.25 MG Tabs  Commonly known as: Coreg                  Where to Get Your Medications        These medications were sent to [facility] - [place], [place] - [address] [facility] [phone], [phone]  [address], [place] [address]      Phone: [phone]   metoprolol succinate ER 50 MG Tb24         [place] reviewed[place]    Follow-up appointment:   Carlos Cruz MD  [address] [address] [phone]    Follow up in 1 week(s)  MD or Amy Parnell MD  [address] [place] [address] [phone]    Schedule an appointment as soon as possible for a visit in 1 week(s)      Appointments for Next 30 Days 4/24/2024 - 5/24/2024        Date Arrival Time Visit Type Length Department Provider     5/22/2024  8:15 PM  [facility] ADULT [2123] 15 min [facility] SCHEDULE BY DATE    Patient Instructions:         Location Instructions:     Your appointment is scheduled at the [facility]. The address is [facility], [address], [place], [place]. The phone number is [phone].  Masks are optional for all patients and visitors, unless otherwise indicated.                      Vital signs:  Temp:  [96.4 °F (35.8 °C)] 96.4 °F (35.8 °C)  Pulse:  [80] 80  Resp:  [14] 14  BP: (118)/(62) 118/62  SpO2:  [97 %] 97 %    Physical Exam:     General: No acute distress   Lungs: clear to auscultation  Cardiovascular: S1, S2  Abdomen: Soft      -----------------------------------------------------------------------------------------------  PATIENT DISCHARGE INSTRUCTIONS: See electronic chart    Chance Zhu DO    Total time spent on discharge plannin minutes     The  [employer] Cures Act makes medical notes like these available to patients in the interest of transparency. Please be advised this is a medical document. Medical documents are intended to carry relevant information, facts as evident, and the clinical opinion of the practitioner. The medical note is intended as peer to peer communication and may appear blunt or direct. It is written in medical language and may contain abbreviations or verbiage that are unfamiliar.

## 2024-04-22 NOTE — HISTORICAL OFFICE NOTE
[facility]  [address] [facility], 4th floor, [place] [address]  [phone]      Joni Christiansen  Progress Note  Demographics:  Name: Joni Christiansen YOB: 1940  Age: 83, Female Medical Record No: [id number]  Visited Date/Time: 01/16/2024 01:00 PM    Chief Complaints  3 month follow up     Atrial fibrillation  Uncontrolled hypertension  Obstructive sleep apnea and chronic kidney disease multiple medical problems  History of Present Illness  83-year-old female patient coming to our clinic for 3-month follow-up.    Patient has chronic diastolic heart failure with multiple medical problems.  Patient comes here with her daughter.    Patient has systemic hypertension, chronic diastolic heart failure on low-dose of Lasix, chronic untreated sleep apnea with interstitial lung disease and tendency to hypoxia, chronic atrial fibrillation on Eliquis, diet-controlled diabetes mellitus type 2, chronic kidney disease stage II and dyslipidemia, history of colon cancer, osteoarthritis in the right hip replacement with both knee replacement and lumbar spine disease.  Patient has chronic venous sufficiency with morbid obesity and trace leg edema.    Patient has mild fatigue and mild chronic dyspnea on exertion with no acute cardiopulmonary symptoms.  Functional class II.  Patient uses a walker from patient but not always.    Patient was supposed to check nonfasting BMP after medication changes but has not done it.    Patient has chronic diastolic heart failure compensated.  Patient feels much better after adjusting her blood pressure medication and diuretics.  She cannot be overdiuresed likely during the last few admissions which were not usually CHF.    In fall 2023, patient went to emergency room with bilateral leg weakness weakness and pain from neuropathy and ER physician ordered ultrasound which was negative for DVT.    Patient has peripheral neuropathy and spine issues and diffuse  arthritis and also atypical chest pain so PCP ordered nuclear stress test in 2023.    Lexiscan nuclear stress ordered by PCP for atypical chest wall discomfort related to cervical and thoracic spine radiculopathy/neuropathy -2023 -and it was normal study with normal perfusion and no defect with normal heart function LVEF 52%.  Patient had normal blood pressure and heart rate during stress test.    Patient was seen by pulmonologist for sleep apnea study..  Patient has ordered but she is on positive airway pressure therapy for sleep apnea already.  She has not tolerated CPAP in the past.  She has tendency to hypercapnia and was encouraged to use it by pulmonologist and mask.    Patient's   in 2023. Pt stays at [facility], but memory unit.    Patient had 3 admissions to hospital since last clinic visit in  and 2023.  First was related to uncontrolled hypertension which was causing dyspnea on exertion and meds were adjusted.  She was put onto high doses of blood pressure medications and diuretics and came back hypovolemic and weak with physical deconditioning and mechanical fall but no syncope.  Then she was discharged home and came back again with physical deconditioning mechanical fall and generalized weakness while helping to move staff while moving her on stuff to assisted living facility since her  lives there in memory unit.  Patient moved to [place] to live closer to her family.  She used to live in [facility].    Two admissions were related to dehydration at the end of 2023.  We hydrated the patient with IV fluids and recovered acute renal insufficiency.  Patient will set up on torsemide 10 mg p.o. daily and spironolactone only 12.5 mg p.o. daily.    Patient has tendency to hypoxia and hypercapnia and will need positive pressure airway treatment which was done in hospital.    proBNP at baseline is 3620-8722.    Patient uses  walker for ambulation as needed but not today.  She has history of right hip replacement and both knee replacement and lumbar spine disease.  Patient declined CPAP in the past and uses nasal strips which do not work for her well.    Patient recovered from hospitalization and feels better.  She has fatigue and chronic dyspnea on exertion with functional class II.  There is no fluid overload by physical exam.    EKG -June 7, 2023-  atrial fibrillation 78 bpm with no ischemia but minor nonspecific changes.  Narrow QRS and normal intervals.    Ambulatory EKG monitoring -2 weeks -June 2023 -controlled atrial fibrillation with mean rate of 80 bpm.  A-fib is all the time.  Occasional to frequent PVCs but no other prognostically significant arrhythmia.    Patient moved from [facility] to live closer to her daughter after her  moved assisted living facility - memory unit and she came to establish cardiology care here.  Patient was followed by cardiologist Dr. Acosta in [facility].  Then has been moving here.    MCT monitoring was ordered after hospitalization and she had it between May 19 and June 1, 2023.  It showed controlled atrial fibrillation with very brief VT abnormal significant pauses or significant AV conduction abnormalities.  I pulled few strips but we do not have official reading yet.    Hospitalization in May 2023 -generalized weakness with lethargy and short of breath.  Assumed CHF but there was hypoxia with some bronchospasm.  She did not bring enough diuretics and ended with mild fluid overload.  There was 3-second pause but it was with A-fib so no real pauses and Dr. Bloom asked for outpatient event monitoring.    CT angiography of the chest showed no PE or congestion but chronic interstitial changes.  Mild lymphadenopathy.  Groundglass opacities.    Hospitalization for mild CHF exacerbation -April 2023.  She has bronchospasm with very mild fluid overload.  She was hypoxic and  short of breath on exertion.  There was decreased appetite and mild leg swelling.  It was related to uncontrolled hypertension 170/90 mmHg.  A-fib was controlled.  Blood pressure was uncontrolled and meds were adjusted.  proBNP was elevated at 4700 with negative troponin and negative COVID.    She was on oxygen in hospital and then weaned off.    Echo Doppler in hospital April 2023 -  -LVEF 50 to 55% with no wall motion abnormalities.  Marked lighted left atrium.  Mild LVH.  Some diastolic dysfunction.  No significant valvular abnormalities.  Pulmonary pressure was not assessed but by Doppler seem to be within normal range with weak envelope signal.  Dilated both atria.    Patient uses nasal strips but no CPAP for sleep apnea.    Laboratory data -August 26, 2023 -creatinine 1.46 stable BUN 20 potassium 4.3 sodium 139 glucose 74 with normal liver enzymes and cholesterol profile at goal with borderline HDL of 39.  A1c 5.5%.    Laboratory data in October 2023 -creatinine 1.5 BUN 26 potassium 3.9 sodium 140 glucose 117.    Follow-up labs -July 1, 2023 normal CBC -with glucose 84 potassium 3.8 creatinine down to 1.27 from 1.7 and even was above 2.  CBC within normal limits with hemoglobin 13.5 platelets 180.  proBNP 2200.    Creatinine was between 1.6 and 2.1 when she was coming hypokalemic to hospital.    Laboratory data -May 14, 2023 normal CBC with -glucose 97 potassium 3.9 sodium 138 creatinine 1.2 BUN 23 stable proBNP 2900 and negative troponin  Currently has NYHA Class 2 symptoms.  Cardiac risk factors Hypertension, Dyslipidemia, Physical inactivity and Never smoked  Past Medical History  1.Chronic diastolic (congestive) heart failure  2.Atrial fibrillation, chronic  3.Chronic kidney disease, stage 3a  4.PE (pulmonary embolism)  5.Hypertension (HTN), primary  6.MEDINA (dyspnea on exertion)  7.Bradycardia  8.Obesity, Class II, BMI 35.0-39.9  9.Chronic respiratory failure with hypoxia  10.Obstructive sleep  apnea  11.Chronic venous insufficiency of lower extremity  12.Anticoagulant long-term use  13.Hypoxia  Family History  1. Father - CHF (congestive heart failure), chronic, combined systolic CHF (congestive heart failure), chronic, combined systolic  Social History  Smoking status Never smoked  Tobacco usage - No (Non-smoker (finding))  Review of systems  Constitutional Fatigue  Gastrointestinal No history of Abdominal pain  Cardiovascular MEDINA  No history of Chest pain, Palpitations, Syncope, PND, Orthopnea, Edema and Claudication  Musculoskeletal Arthralgias and Arthritis  No history of Myalgias  Respiratory No history of SOB, Cough, Hemoptysis, Wheezing, Pleurisy and Sputum  Neurological Poor balance  No history of Numbness, Dizziness, Speech problems and Unsteady gait  Psychiatric Depression and Anxiety  Physical Examination  Vitals Left Arm Sitting  / 70 mmHg, Pulse rate 64 bpm, Irregular, Height in 5' 3\", BMI: 39, Weight in 220 lbs (or) 100 kgs and BSA : 2.16 cc/m²  General Appearance No Acute Distress and Obese  Head/Eyes/Ears/Nose/Mouth/Throat Conjunctiva pink, Sclera Clear, PERRLA, Mucous membranes Moist and No pallor  Neck Normal carotid pulsations, No carotid bruits and No JVD  Respiratory Unlabored and Lungs clear with normal breath sounds  Cardiovascular Intact distal pulses and Irregularly irregular rhythm. Normal rate present. Normal and normal S1 and S2  2/6 systolic murmur is noted .    Gastrointestinal Abdomen soft, Non-tender, Normoactive bowel sounds and No organomegaly  Gait Abnormal gait and Walks with walker  Strength and tone Normal muscle strength  Lower Extremities Pulses 2+ and equal bilaterally and No edema  Skin Warm and dry and No rashes or lesions  Neurologic / Psychiatric Alert and Oriented  Speech Normal speech  Allergies  1.penicillin - Ingredient(Reaction:as a child , Severity:Severe)  Medications  1.acetaminophen 500 MG Oral Tab, Take 2 tablets (1,000 mg total) by mouth every  6 (six) hours as needed for Pain.  2.alendronate 35 mg tablet, Take 1 tablet orally once a day as needed.  3.apixaban 5 MG Oral Tab, Take 1 tablet (5 mg total) by mouth 2 (two) times daily.  4.carvediloL 6.25 mg tablet, Take 1 tablet orally 2 times a day.  5.cholecalciferol (vitamin D3) 25 mcg (1,000 unit) capsule, Take 1 capsule orally once a day.  6.cyanocobalamin (vitamin B-12) 1,000 mcg capsule, Take 1 capsule orally once a day.  7.escitalopram 5 MG Oral Tab, Take 1 tablet (5 mg total) by mouth daily.  8.HYDROcodone-acetaminophen 5-325 MG Oral Tab, Take 1 tablet by mouth every 8 (eight) hours as needed for Pain.  9.Irbesartan 150 MG Oral Tab, Take 1 tablet (150 mg total) by mouth nightly.  10.levothyroxine 75 MCG Oral Tab, Take 1 tablet (75 mcg total) by mouth before breakfast.  11.montelukast 10 mg tablet, Take 1 tablet orally once a day.  12.Pravastatin Sodium 80 MG Oral Tab, Take 1 tablet (80 mg total) by mouth nightly.  13.spironolactone 25 mg tablet, Take one-half tablet orally once a day.  14.torsemide 10 mg tablet, Take 1 tablet orally once a day.  15.traMADoL 50 mg tablet, Take 1 tablet orally every 6 hours as needed.  Impression  1.Chronic diastolic (congestive) heart failure  2.Atrial fibrillation, chronic  3.Chronic kidney disease, stage 3a  4.Hypertension (HTN), primary  5.MEDINA (dyspnea on exertion)  6.Bradycardia  7.Obesity, Class II, BMI 35.0-39.9  8.Chronic respiratory failure with hypoxia  9.Obstructive sleep apnea  10.Chronic venous insufficiency of lower extremity  11.Anticoagulant long-term use  12.Hypoxia    Assessment & Plan  1.  Patient feels much better after adjusting her blood pressure medications and diuretics.  She cannot be overdiuresis like during the last 2 admissions.  Chronic diastolic heart failure is compensated.  Lung issues are also stable.  2.  At present time patient does well on torsemide 10 mg p.o. daily and spironolactone 12.5 mg p.o. daily and will stay on these doses.   Will refill medication electronically as needed.  No need today.  Patient also has venous insufficiency with trace lower leg puffiness but not today.  3.  Atrial fibrillation is controlled and will continue the same dose of carvedilol.  4.  Patient has tendency to exertional hypoxia and will see pulmonologist regarding interstitial lung disease and untreated sleep apnea.  If she cannot tolerate CPAP she may be at least on oxygen at night.  She has diastolic heart failure and A-fib.  5.  Diastolic heart failure is compensated at present time and she is euvolemic by exam.  6.  Continue anticoagulation with Eliquis to decrease of stroke with A-fib.  7.  Follow-up with pulmonologist regarding sleep apnea and tendency to hypoxia.  8.  Falling precautions.  9.  Nonfasting basic metabolic panel this week and patient will added to blood work with other providers.  10.  Follow-up with me in 4 months.    All was discussed with the patient and her daughter in detail.  Copy to PCP.  Labs and Diagnostics ordered  1.BMP (Basic Metabolic Panel) (Schedule next available)  Future appointments  1.Referral Visit - Amy Barnett ([email or web address]) : (Today)  2.Follow up visit - Carlos Cruz MD (4 Months)  Miscellaneous  1.Weight monitoring (regime/therapy)  2.Reviewed glucose, sodium, potassium, chloride, co2, anion gap, bun, creatinine, calcium, osmolality calculated, e gfr cr and fasting patient bmp answer with the patient.  3.Reviewed ambulatory telemetry monitor with the patient.  Nurses documentation  Triage - Nursing Doc:  Upcoming surgeries: no   Use of assistive devices(s): wheelchair  Refills: no  Verbal order for EKG: no  Triage & medication list reviewed by: MM   Patient instructions  Plan:  - continue current medications  - BMP to be done today or tomorrow  - follow up with Dr Cruz in 4 months  Lab Details  BASIC METABOLIC PANEL (8)  10/16/2023 02:31:13 PM  GLUCOSE 117 70-99  mg/dL H F  SODIUM 140 136-145 mmol/L  F  POTASSIUM 3.9 3.5-5.1 mmol/L  F  CHLORIDE 110  mmol/L  F  CO2 24.0 21.0-32.0 mmol/L  F  ANION GAP 6 0-18 mmol/L  F  BUN 26 7-18 mg/dL H F  CREATININE 1.55 0.55-1.02 mg/dL H F  CALCIUM 10.1 8.5-10.1 mg/dL  F  OSMOLALITY CALCULATED 296 275-295 mOsm/kg H F  E GFR CR 33 >=60 mL/min/1.73m2 L F  FASTING PATIENT BMP ANSWER No   F  COMP METABOLIC PANEL (14)  06/27/2023 10:38:52 PM  GLUCOSE 74 70-99 mg/dL  F  SODIUM 140 136-145 mmol/L  F  POTASSIUM 4.3 3.5-5.1 mmol/L  F  CHLORIDE 109  mmol/L  F  CO2 25.0 21.0-32.0 mmol/L  F  ANION GAP 6 0-18 mmol/L  F  BUN 33 7-18 mg/dL H F  CREATININE 1.57 0.55-1.02 mg/dL H F  CALCIUM 10.2 8.5-10.1 mg/dL H F  OSMOLALITY CALCULATED 296 275-295 mOsm/kg H F  E GFR CR 33 >=60 mL/min/1.73m2 L F  AST 28 15-37 U/L  F  ALT 23 13-56 U/L  F  ALKALINE PHOSPHATASE 103  U/L  F  BILIRUBIN, TOTAL 0.6 0.1-2.0 mg/dL  F  TOTAL PROTEIN 8.0 6.4-8.2 g/dL  F  ALBUMIN 3.8 3.4-5.0 g/dL  F  GLOBULIN 4.2 2.8-4.4 g/dL  F  A/G RATIO 0.9 1.0-2.0 L F  FASTING PATIENT CMP ANSWER Yes   F  BNP (B TYPE NATRIUERTIC PEPTIDE)  06/27/2023 10:38:52 PM  PRO-BETA NATRIURETIC PEPTIDE 2204 <450 pg/mL H F  CBC W/ DIFFERENTIAL  06/27/2023 01:55:28 PM  WBC 10.0 4.0-11.0 x10(3) uL  F  RED BLOOD COUNT 4.42 3.80-5.30 x10(6)uL  F  HGB 13.5 12.0-16.0 g/dL  F  HCT 42.9 35.0-48.0 %  F  PLATELETS 180.0 150.0-450.0 10(3)uL  F  MEAN CELL VOLUME 97.1 80.0-100.0 fL  F  MEAN CORPUSCULAR HEMOGLOBIN 30.5 26.0-34.0 pg  F  MEAN CORPUSCULAR HGB CONC 31.5 31.0-37.0 g/dL  F  RED CELL DISTRIBUTION WIDTH CV 13.4 %  F  NEUTROPHIL ABS PRELIM 7.38 1.50-7.70 x10 (3) uL  F  NEUTROPHIL ABSOLUTE 7.38 1.50-7.70 x10(3) uL  F  LYMPHOCYTE ABSOLUTE 1.87 1.00-4.00 x10(3) uL  F  MONOCYTE ABSOLUTE 0.48 0.10-1.00 x10(3) uL  F  EOSINOPHIL ABSOLUTE 0.15 0.00-0.70 x10(3) uL  F  BASOPHIL ABSOLUTE 0.09 0.00-0.20 x10(3) uL  F  IMMATURE GRANULOCYTE COUNT 0.03 0.00-1.00 x10(3) uL  F  NEUTROPHIL % 73.8 %  F  LYMPHOCYTE  % 18.7 %  F  MONOCYTE % 4.8 %  F  EOSINOPHIL % 1.5 %  F  BASOPHIL % 0.9 %  F  IMMATURE GRANULOCYTE RATIO % 0.3 %  F  Diagnostics Details  ACTMonitoring 05/19/2023  1.This is an excellent quality study.    2.Predominant rhythm is atrial fibrillation.    3.The minimum heart rate recorded was 55 beats / minute. The maximum heart rate is 135 beats / minute. The mean heart rate is 80 beats / minute.    4.Afib burden 98% with overall controlled rates.    5.Frequent premature ventricular contractions noted.    CPOE Orders carried out by: Kaylee  Care Providers: Carlos Cruz MD, Rosa Henderson CMA, Kaylee  Electronically Authenticated by  Carlos Cruz MD  01/16/2024 06:56:30 PM  Disclaimer: Components of this note were documented using voice recognition system and are subject to errors not corrected at proofreading. Contact the author of this note for any clarifications.

## 2024-04-22 NOTE — CM/SW NOTE
04/22/24 1400   CM/SW Referral Data   Referral Source Social Work (self-referral)   Reason for Referral Discharge planning   Informant Patient   Medical Hx   Does patient have an established PCP? Yes   Patient Info   Patient's Current Mental Status at Time of Assessment Oriented;Alert   Patient's Home Environment Independent Living   Patient Status Prior to Admission   Independent with ADLs and Mobility Yes   Discharge Needs   Anticipated D/C needs Other  (PT at [facility])       Chart reviewed for discharge planning. Pt admitted from [facility] [place]. PT anticipates returning home w/ [facility]. SW met with pt at bedside, confirmed that pt would like to use the onsite PT at [facility]. SW spoke with rep at [facility] and stated that they use [employer]: [phone]. Msg left w/ company and requested for fax # to fax over referral. Confirmed that no report is needed for pt to return. Daughter is to  pt.    Kiera Catrer, MSW, LSW  Discharge Planner  [id number]

## 2024-04-22 NOTE — PLAN OF CARE
Pt alert and oriented x4. Pt on tele in a fib, asymptomatic. VSS. Denies any c/o of chest pain or shortness of breath. Pt on room air. Pt continent of bowel and bladder. Pt denies any c/o of pain. Updated pt on plan of care, pt verbalizes understanding. Bed in lowest position and call light within reach.     Plan: Discharge planning    Problem: PAIN - ADULT  Goal: Verbalizes/displays adequate comfort level or patient's stated pain goal  Description: INTERVENTIONS:  - Encourage pt to monitor pain and request assistance  - Assess pain using appropriate pain scale  - Administer analgesics based on type and severity of pain and evaluate response  - Implement non-pharmacological measures as appropriate and evaluate response  - Consider cultural and social influences on pain and pain management  - Manage/alleviate anxiety  - Utilize distraction and/or relaxation techniques  - Monitor for opioid side effects  - Notify MD/LIP if interventions unsuccessful or patient reports new pain  - Anticipate increased pain with activity and pre-medicate as appropriate  Outcome: Progressing     Problem: SAFETY ADULT - FALL  Goal: Free from fall injury  Description: INTERVENTIONS:  - Assess pt frequently for physical needs  - Identify cognitive and physical deficits and behaviors that affect risk of falls.  - [place] fall precautions as indicated by assessment.  - Educate pt/family on patient safety including physical limitations  - Instruct pt to call for assistance with activity based on assessment  - Modify environment to reduce risk of injury  - Provide assistive devices as appropriate  - Consider OT/PT consult to assist with strengthening/mobility  - Encourage toileting schedule  Outcome: Progressing     Problem: DISCHARGE PLANNING  Goal: Discharge to home or other facility with appropriate resources  Description: INTERVENTIONS:  - Identify barriers to discharge w/pt and caregiver  - Include patient/family/discharge partner in  discharge planning  - Arrange for needed discharge resources and transportation as appropriate  - Identify discharge learning needs (meds, wound care, etc)  - Arrange for interpreters to assist at discharge as needed  - Consider post-discharge preferences of patient/family/discharge partner  - Complete POLST form as appropriate  - Assess patient's ability to be responsible for managing their own health  - Refer to Case Management Department for coordinating discharge planning if the patient needs post-hospital services based on physician/LIP order or complex needs related to functional status, cognitive ability or social support system  Outcome: Progressing     Problem: CARDIOVASCULAR - ADULT  Goal: Maintains optimal cardiac output and hemodynamic stability  Description: INTERVENTIONS:  - Monitor vital signs, rhythm, and trends  - Monitor for bleeding, hypotension and signs of decreased cardiac output  - Evaluate effectiveness of vasoactive medications to optimize hemodynamic stability  - Monitor arterial and/or venous puncture sites for bleeding and/or hematoma  - Assess quality of pulses, skin color and temperature  - Assess for signs of decreased coronary artery perfusion - ex. Angina  - Evaluate fluid balance, assess for edema, trend weights  Outcome: Progressing  Goal: Absence of cardiac arrhythmias or at baseline  Description: INTERVENTIONS:  - Continuous cardiac monitoring, monitor vital signs, obtain 12 lead EKG if indicated  - Evaluate effectiveness of antiarrhythmic and heart rate control medications as ordered  - Initiate emergency measures for life threatening arrhythmias  - Monitor electrolytes and administer replacement therapy as ordered  Outcome: Progressing     Problem: RESPIRATORY - ADULT  Goal: Achieves optimal ventilation and oxygenation  Description: INTERVENTIONS:  - Assess for changes in respiratory status  - Assess for changes in mentation and behavior  - Position to facilitate oxygenation  and minimize respiratory effort  - Oxygen supplementation based on oxygen saturation or ABGs  - Provide Smoking Cessation handout, if applicable  - Encourage broncho-pulmonary hygiene including cough, deep breathe, Incentive Spirometry  - Assess the need for suctioning and perform as needed  - Assess and instruct to report SOB or any respiratory difficulty  - Respiratory Therapy support as indicated  - Manage/alleviate anxiety  - Monitor for signs/symptoms of CO2 retention  Outcome: Progressing     Problem: Patient/Family Goals  Goal: Patient/Family Long Term Goal  Description: Patient's Long Term Goal: DC to home     Interventions:  - Follow plan of care     - See additional Care Plan goals for specific interventions  Outcome: Progressing  Goal: Patient/Family Short Term Goal  Description: Patient's Short Term Goal: 4/20:  get better     Interventions:   - meds, fall precaution   - See additional Care Plan goals for specific interventions  Outcome: Progressing

## 2024-04-22 NOTE — OCCUPATIONAL THERAPY NOTE
OCCUPATIONAL THERAPY EVALUATION - INPATIENT    Room Number: 8620/8620-A  Evaluation Date: 4/22/2024     Type of Evaluation: Initial  Presenting Problem: Acute on chronic CHF    Physician Order: IP Consult to Occupational Therapy  Reason for Therapy:  ADL/IADL Dysfunction and Discharge Planning      OCCUPATIONAL THERAPY ASSESSMENT   Patient is a 83 year old female admitted on 4/20/2024 with Presenting Problem: Acute on chronic CHF. Co-Morbidities : CHF,Afib, CKD, Afib,DM,  Patient is currently functioning near baseline with toileting, lower body dressing, grooming, transfers, static standing balance, dynamic standing balance, functional standing tolerance, and energy conservation strategies.  Prior to admission, patient's baseline is mod I with BADLs and functional mobility with no device.  Patient met all OT goals at mod I level.  Patient reports no further questions/concerns at this time.       WEIGHT BEARING RESTRICTION  Weight Bearing Restriction: None                Recommendations for nursing staff:   Transfers: SBA  Toileting location: Toilet    EVALUATION SESSION:  Patient at start of session: Seated in recliner  FUNCTIONAL TRANSFER ASSESSMENT  Sit to Stand: Chair  Chair: Modified Independent  Toilet Transfer: Modified Independent    BED MOBILITY     BALANCE ASSESSMENT  Static Sitting: Independent  Sitting Unilateral: Independent  Static Standing: Independent  Standing Bilateral: Independent    FUNCTIONAL ADL ASSESSMENT  Grooming Standing: Independent (Standing at sink to complete hand hygeine after toileting activity)  LB Dressing Seated: Independent  Toileting Seated: Independent      ACTIVITY TOLERANCE: Pulse remained high but stable during activity. Pt asymptomatic  Pulse: (!) 126 (112-126 during functional mobility and toileting activity)  Heart Rate Source: Monitor                   O2 SATURATIONS       COGNITION  Overall Cognitive Status:  WFL - within functional limits  COGNITION ASSESSMENTS        Upper Extremity:   ROM: within functional limits   Strength: is within functional limits   Coordination:  Gross motor: WFL  Fine motor: WFL  Sensation: Light touch:  intact    EDUCATION PROVIDED  Patient: Role of Occupational Therapy; Plan of Care; Adaptive Equipment Recommendations; Fall Prevention; Posture/Positioning; Energy Conservation; Compensatory ADL Techniques; Proper Body Mechanics  Patient's Response to Education: Verbalized Understanding; Returned Demonstration    Equipment used: None    Therapist comments: Pt agreeable to therapy. Educated pt on role of OT and goals of session. No reports of pain or dizziness. Educated pt on energy conservation techniques, fall prevention, and breathing techniques. Has a \"bratty dog\" at home that she reports she can still take care off IND.     Patient End of Session: Up in chair;Needs met;Call light within reach;All patient questions and concerns addressed    OCCUPATIONAL PROFILE    HOME SITUATION  Type of Home: Independent living facility  Home Layout: One level  Lives With: Alone;Staff 24 hours    Toilet and Equipment: Comfort height toilet;Grab bar  Shower/Tub and Equipment: Walk-in shower;Grab bar  Other Equipment: None          Drives: Yes       Prior Level of Function: mod I with BADLs and functional mobility. Utilizes FWW PRN for longer distances such as when she walks the dog. Has people come in to assist with cleaning and housework.     SUBJECTIVE  \"I have a bratty dog\" RE: Is anyone at home with you?    PAIN ASSESSMENT  Ratin  Location: None reported or observed       OBJECTIVE     Fall Risk: Standard fall risk    WEIGHT BEARING RESTRICTION  Weight Bearing Restriction: None                AM-PAC ‘6-Clicks’ Inpatient Daily Activity Short Form  -   Putting on and taking off regular lower body clothing?: A Little  -   Bathing (including washing, rinsing, drying)?: A Little  -   Toileting, which includes using toilet, bedpan or urinal? : None  -   Putting  on and taking off regular upper body clothing?: None  -   Taking care of personal grooming such as brushing teeth?: None  -   Eating meals?: None    AM-PAC Score:  Score: 22  Approx Degree of Impairment: 25.8%  Standardized Score (AM-PAC Scale): 47.1      ADDITIONAL TESTS     NEUROLOGICAL FINDINGS        PLAN   Patient has been evaluated and presents with no skilled Occupational Therapy needs at this time.  Patient discharged from Occupational Therapy services.  Please re-order if a new functional limitation presents during this admission.      Patient Evaluation Complexity Level:   Occupational Profile/Medical History LOW - Brief history including review of medical or therapy records    Specific performance deficits impacting engagement in ADL/IADL LOW  1 - 3 performance deficits    Client Assessment/Performance Deficits LOW - No comorbidities nor modifications of tasks    Clinical Decision Making LOW - Analysis of occupational profile, problem-focused assessments, limited treatment options    Overall Complexity LOW     OT Session Time: 16 minutes  Self-Care Home Management: 13 minutes  Therapeutic Activity: 0 minutes  Neuromuscular Re-education: 0 minutes  Therapeutic Exercise: 0 minutes  Cognitive Skills: 0 minutes  Sensory Integrative: 0 minutes  Orthotic Management and Trainin minutes  Can add/delete any of these

## 2024-04-22 NOTE — DISCHARGE INSTRUCTIONS
Going Home Instructions    In this section you will find the tools which will guide you through the first few days after you leave the hospital. Continued use of these tools will help you develop the skills necessary to keep your heart failure under control.       Home Care Instructions Following Heart Failure - the most important things to do every day include:     Weigh yourself  Take your medicines as prescribed  Limit your sodium (salt) and fluid intake  Know when to call your cardiologist, primary doctor, or nurse  Know when to seek emergency care    Things for You to Remember:   1. See your doctor or healthcare provider.  It is important that you attend this appointment to make sure your symptoms are under control.     2. Your recommended sodium intake is 0068-5925 mg daily    3. Limit your fluid intake to no more than 2 liters or 64 ounces per day    4. Some exercise and activity is important to help keep your heart functioning and strong. Unless instructed not to exercise, you may walk at a slow to moderate pace for 10-15 minutes 2-3 days per week to start. Pace your activity to prevent shortness of breath or fatigue. Stop exercise if you develop chest pain, lightheadedness, or significant shortness of breath.       Call Your Cardiologist If:   You gain 2 pounds overnight or 3-4 pounds in 3-5 days  You have more difficulty breathing  You are getting more tired with normal activity  You are more short of breath lying down, or awaken at night short of breath  You have swelling of your feet or legs  You urinate less often during the day and more often at night  You have cramps in your legs  You have blurred vision or see yellowish-green halos around objects of lights    Go to the Emergency Room If:   You have pain or tightness in your chest  You are extremely short of breath  You are coughing up pink-frothy mucus  You are traveling and develop symptoms of worsening heart failure

## 2024-04-22 NOTE — TELEPHONE ENCOUNTER
SHIRA sent to pt's dtr asking to notify our office once pt gets discharged from [facility] to work on getting her seen by Dr. Zambrano before June.

## 2024-04-23 ENCOUNTER — PATIENT OUTREACH (OUTPATIENT)
Dept: CASE MANAGEMENT | Age: 84
End: 2024-04-23

## 2024-04-23 DIAGNOSIS — Z02.9 ENCOUNTERS FOR ADMINISTRATIVE PURPOSE: Primary | ICD-10-CM

## 2024-04-23 NOTE — CM/SW NOTE
Received call back from [facility] for onsite PT for pt at [facility]. SW faxed order to their program. [facility] will schedule pt for PT.

## 2024-04-23 NOTE — PROGRESS NOTES
LM for pt to call [facility] for TCM since discharge. [facility] phone number was provided for pt to call back.

## 2024-04-24 ENCOUNTER — OFFICE VISIT (OUTPATIENT)
Dept: FAMILY MEDICINE CLINIC | Facility: CLINIC | Age: 84
End: 2024-04-24
Payer: MEDICARE

## 2024-04-24 VITALS
WEIGHT: 214.81 LBS | TEMPERATURE: 96 F | HEART RATE: 80 BPM | SYSTOLIC BLOOD PRESSURE: 118 MMHG | DIASTOLIC BLOOD PRESSURE: 62 MMHG | BODY MASS INDEX: 38.06 KG/M2 | RESPIRATION RATE: 14 BRPM | OXYGEN SATURATION: 97 % | HEIGHT: 62.99 IN

## 2024-04-24 DIAGNOSIS — E87.79 CARDIAC VOLUME OVERLOAD: Primary | ICD-10-CM

## 2024-04-24 DIAGNOSIS — H65.92 FLUID LEVEL BEHIND TYMPANIC MEMBRANE OF LEFT EAR: ICD-10-CM

## 2024-04-24 DIAGNOSIS — L98.8 SKIN MACULE: ICD-10-CM

## 2024-04-24 DIAGNOSIS — L29.9 PRURITUS: ICD-10-CM

## 2024-04-24 DIAGNOSIS — J06.9 URI WITH COUGH AND CONGESTION: ICD-10-CM

## 2024-04-24 RX ORDER — ORPHENADRINE CITRATE 100 MG/1
1 TABLET, EXTENDED RELEASE ORAL 2 TIMES DAILY PRN
COMMUNITY
Start: 2024-04-15

## 2024-04-24 NOTE — PROGRESS NOTES
Subjective:   Joni Christiansen is a 83 year old female who presents for hospital follow up.   She was discharged from [facility] to Home or Self Care  Admission Date: 4/20/24   Discharge Date: 4/22/24  Hospital Discharge Diagnosis: Acute on chronic congestive heart failure, unspecified heart failure type (HCC)    Interactive contact within 2 business days post discharge first initiated on Date: 4/23/2024    During the visit, the following was completed:  Obtained and reviewed discharge summary, continuity of care documents, Hospitalist notes, and Cardiology notes  Reviewed Labs (CBC, CMP), Labs (Cardiac markers), X-Ray radiology results, EKG, and Echocardiogram    HPI: Joni Christiansen is an 83 year old female who presents for hospital follow up. Initially her symptoms started with congestion from possible URI since other family members had URI symptoms. She want to WIC but her oxygen was 93% so she was advised to go to the ER. Patient was found to have volume overload. Echo showed EF 50-55%, dilated left atrium. CXR clear. Patient underwent gentle diuresis, responded well and was sent home with cardiology follow up. Breathing still feels shallow. Trying to watch fluids. Staying active. Has to take dog out five times a day. Still blowing nose a lot. Minimal coughing. Appetite fine. Going to the bathroom normal. No unusual swelling or bruising swelling has improved a lot. Stopped carvedilol and now on metoprolol.  Not excessively tired.    Feeling itchy, was on zyrtec but not a lot of improvement. Using prescription topical as needed.    To see Pulmonologist, but it is set for June 2024.    Has a spot on her face that popped up during the stress of everything.     History/Other:   Current Medications:  Medication Reconciliation:  I am aware of an inpatient discharge within the last 30 days.  The discharge medication list has been reconciled with the patient's current medication list and reviewed by me.  See  medication list for additions of new medication, and changes to current doses of medications and discontinued medications.  Outpatient Medications Marked as Taking for the 4/24/24 encounter (Office Visit) with Amy Amaro MD   Medication Sig    orphenadrine  MG Oral Tablet 12 Hr Take 100 mg by mouth 2 (two) times daily as needed.    metoprolol succinate ER 50 MG Oral Tablet 24 Hr Take 1 tablet (50 mg total) by mouth Daily Beta Blocker.    albuterol 108 (90 Base) MCG/ACT Inhalation Aero Soln Inhale 2 puffs into the lungs every 4 to 6 hours as needed for Wheezing or Shortness of Breath.    MONTELUKAST 10 MG Oral Tab TAKE 1 TABLET BY MOUTH EVERY EVENING    24HR ALLERGY RELIEF 180 MG Oral Tab TAKE 1 TABLET BY MOUTH DAILY    IRBESARTAN 150 MG Oral Tab TAKE 1 TABLET BY MOUTH EVERY EVENING -HOLD IF SBP LESS THAN 110    triamcinolone 0.1 % External Cream Apply topically 2 (two) times daily as needed. Can use on legs and hands    lidocaine 5 % External Patch Place 1 patch onto the skin daily as needed.    ELIQUIS 5 MG Oral Tab TAKE 1 TABLET BY MOUTH TWICE DAILY    ESCITALOPRAM 5 MG Oral Tab TAKE 1 TABLET BY MOUTH DAILY    CALCIUM CARBONATE 1500 (600 Ca) MG Oral Tab TAKE 1 TABLET BY MOUTH DAILY    LEVOTHYROXINE 75 MCG Oral Tab TAKE 1 TABLET BY MOUTH EVERY MORNING    VITAMIN B-12 ER 1000 MCG Oral Tab CR TAKE 1 TABLET BY MOUTH DAILY    PRAVASTATIN SODIUM 80 MG Oral Tab TAKE 1 TABLET BY MOUTH EVERY EVENING    CHOLECALCIFEROL 50 MCG (2000 UT) Oral Cap TAKE 1 CAPSULE BY MOUTH DAILY    SPIRONOLACTONE 25 MG Oral Tab TAKE 1/2 TABLET BY MOUTH DAILY    TORSEMIDE 10 MG Oral Tab TAKE 1 TABLET BY MOUTH DAILY    acetaminophen 500 MG Oral Tab Take 2 tablets (1,000 mg total) by mouth every 6 (six) hours as needed for Pain.       Review of Systems:  GENERAL: weight stable, energy stable, no sweating  SKIN: denies any unusual skin lesions  EYES: denies blurred vision or double vision  HEENT: denies nasal congestion, sinus pain  or ST  LUNGS: denies shortness of breath with exertion  CARDIOVASCULAR: denies chest pain on exertion or palpitations  GI: denies abdominal pain, denies heartburn, denies diarrhea  MUSCULOSKELETAL: denies pain, normal range of motion of extremities  NEURO: denies headaches, denies dizziness, denies weakness  PSYCHE: denies depression or anxiety  HEMATOLOGIC: denies hx of anemia, or bruising, denies bleeding  ENDOCRINE: denies thyroid history  ALL/ASTHMA: denies hx of allergy or asthma    Objective:   No LMP recorded. Patient is postmenopausal.  Estimated body mass index is 38.06 kg/m² as calculated from the following:    Height as of this encounter: 5' 2.99\" (1.6 m).    Weight as of this encounter: 214 lb 12.8 oz (97.4 kg).   /62   Pulse 80   Temp (!) 96.4 °F (35.8 °C)   Resp 14   Ht 5' 2.99\" (1.6 m)   Wt 214 lb 12.8 oz (97.4 kg)   SpO2 97%   BMI 38.06 kg/m²    GENERAL: well developed, well nourished, in no apparent distress  SKIN: erythematous rough macule on left cheek  HEENT: atraumatic, normocephalic, ears and throat are clear  EYES: PERRLA, EOMI, conjunctiva are clear  NECK: supple, no adenopathy  CHEST: no chest tenderness  LUNGS: clear to auscultation  CARDIO: RRR without murmur  GI: good BS's, no masses, HSM or tenderness  MUSCULOSKELETAL: back is not tender, FROM of the extremities  EXTREMITIES: no cyanosis, clubbing or edema  NEURO: Oriented times three, cranial nerves are intact, motor and sensory are grossly intact    Assessment & Plan:   1. Cardiac volume overload (Primary)  Patient presents for hospital follow up. Improving. Emphasized importance of fluid balance. Limit salt. Keep upcoming appointment with cardiology, appreciate evaluation and recommendations  3. URI with cough and congestion  Continue allergy medication, stay hydrated, symptomatic treatment indicated.  4. Fluid level behind tympanic membrane of left ear  2. Pruritus - longstanding, responds to topical steroids but does  not resolve, will refer to derm, appreciate evaluation and recommendations  5. Skin macule  - differential diagnosis includes atopic dermatitis, actinic keratosis, skin cancer - will refer to derm, appreciate evaluation and recommendations  -     Derm Referral - In Network        Return in about 7 weeks (around 6/10/2024) for Medicare annual wellness supervisit.

## 2024-04-24 NOTE — PATIENT INSTRUCTIONS
CeraVe or Cetaphil or Eucerin or Vanicream or Vaseline fragrance free.    Try lukewarm water. Pat skin dry and apply thick cream or lotion.      Try flonase for 2 weeks to help the ear to clear.

## 2024-05-20 ENCOUNTER — MED REC SCAN ONLY (OUTPATIENT)
Dept: FAMILY MEDICINE CLINIC | Facility: CLINIC | Age: 84
End: 2024-05-20

## 2024-05-21 DIAGNOSIS — I48.19 PERSISTENT ATRIAL FIBRILLATION (HCC): ICD-10-CM

## 2024-05-21 DIAGNOSIS — I50.22 CHRONIC SYSTOLIC CONGESTIVE HEART FAILURE (HCC): ICD-10-CM

## 2024-05-21 DIAGNOSIS — N18.31 CHRONIC RENAL IMPAIRMENT, STAGE 3A (HCC): ICD-10-CM

## 2024-05-21 DIAGNOSIS — F41.9 ANXIETY: ICD-10-CM

## 2024-05-21 DIAGNOSIS — M85.852 OSTEOPENIA OF NECK OF LEFT FEMUR: ICD-10-CM

## 2024-05-21 DIAGNOSIS — I10 HYPERTENSION, ESSENTIAL, BENIGN: ICD-10-CM

## 2024-05-21 DIAGNOSIS — J30.9 ALLERGIC RHINITIS, UNSPECIFIED SEASONALITY, UNSPECIFIED TRIGGER: ICD-10-CM

## 2024-05-21 DIAGNOSIS — N18.31 TYPE 2 DIABETES MELLITUS WITH STAGE 3A CHRONIC KIDNEY DISEASE, WITHOUT LONG-TERM CURRENT USE OF INSULIN (HCC): ICD-10-CM

## 2024-05-21 DIAGNOSIS — Z86.718 HISTORY OF DVT (DEEP VEIN THROMBOSIS): ICD-10-CM

## 2024-05-21 DIAGNOSIS — E03.9 PRIMARY HYPOTHYROIDISM: ICD-10-CM

## 2024-05-21 DIAGNOSIS — Z86.711 HISTORY OF PULMONARY EMBOLUS (PE): ICD-10-CM

## 2024-05-21 DIAGNOSIS — R41.3 MEMORY CHANGES: ICD-10-CM

## 2024-05-21 DIAGNOSIS — E11.22 TYPE 2 DIABETES MELLITUS WITH STAGE 3A CHRONIC KIDNEY DISEASE, WITHOUT LONG-TERM CURRENT USE OF INSULIN (HCC): ICD-10-CM

## 2024-05-21 DIAGNOSIS — E78.00 HYPERCHOLESTEROLEMIA: ICD-10-CM

## 2024-05-22 RX ORDER — FEXOFENADINE HYDROCHLORIDE 180 MG/1
180 TABLET ORAL DAILY
Qty: 90 TABLET | Refills: 0 | Status: SHIPPED | OUTPATIENT
Start: 2024-05-22

## 2024-05-22 RX ORDER — PRAVASTATIN SODIUM 80 MG/1
80 TABLET ORAL EVERY EVENING
Qty: 90 TABLET | Refills: 1 | Status: SHIPPED | OUTPATIENT
Start: 2024-05-22

## 2024-05-22 RX ORDER — LEVOTHYROXINE SODIUM 75 UG/1
75 TABLET ORAL EVERY MORNING
Qty: 90 TABLET | Refills: 0 | Status: SHIPPED | OUTPATIENT
Start: 2024-05-22

## 2024-05-22 RX ORDER — APIXABAN 5 MG/1
5 TABLET, FILM COATED ORAL 2 TIMES DAILY
Qty: 180 TABLET | Refills: 1 | Status: SHIPPED | OUTPATIENT
Start: 2024-05-22

## 2024-05-22 RX ORDER — ESCITALOPRAM OXALATE 5 MG/1
5 TABLET ORAL DAILY
Qty: 90 TABLET | Refills: 1 | Status: SHIPPED | OUTPATIENT
Start: 2024-05-22

## 2024-05-22 RX ORDER — TORSEMIDE 10 MG/1
10 TABLET ORAL DAILY
Qty: 90 TABLET | Refills: 1 | Status: SHIPPED | OUTPATIENT
Start: 2024-05-22

## 2024-05-22 RX ORDER — ACETAMINOPHEN 160 MG
2000 TABLET,DISINTEGRATING ORAL DAILY
Qty: 90 CAPSULE | Refills: 1 | Status: SHIPPED | OUTPATIENT
Start: 2024-05-22

## 2024-05-22 RX ORDER — SPIRONOLACTONE 25 MG/1
12.5 TABLET ORAL DAILY
Qty: 45 TABLET | Refills: 1 | Status: SHIPPED | OUTPATIENT
Start: 2024-05-22

## 2024-05-22 NOTE — TELEPHONE ENCOUNTER
Last office visit: 3/21/2024  Last Refill:  Return To Clinic: 6 months from last office visit  Protocol:   Medication Quantity Refills Start End   24HR ALLERGY RELIEF 180 MG Oral Tab 90 tablet 0 2/20/2024 --   Sig:   TAKE 1 TABLET BY MOUTH DAILY     Route:   Oral       Medication Quantity Refills Start End   LEVOTHYROXINE 75 MCG Oral Tab 90 tablet 1 11/22/2023 --   Sig:   TAKE 1 TABLET BY MOUTH EVERY MORNING     Route:   Oral       Medication Quantity Refills Start End   CALCIUM CARBONATE 1500 (600 Ca) MG Oral Tab 90 tablet 1 11/22/2023 --   Sig:   TAKE 1 TABLET BY MOUTH DAILY     Route:   Oral       Medication Quantity Refills Start End   CHOLECALCIFEROL 50 MCG (2000 UT) Oral Cap 90 capsule 1 11/22/2023 --   Sig:   TAKE 1 CAPSULE BY MOUTH DAILY     Route:   Oral       Medication Quantity Refills Start End   ELIQUIS 5 MG Oral Tab 180 tablet 1 11/22/2023 --   Sig:   TAKE 1 TABLET BY MOUTH TWICE DAILY     Route:   Oral              Medication Quantity Refills Start End   PRAVASTATIN SODIUM 80 MG Oral Tab 90 tablet 1 11/22/2023 --   Sig:   TAKE 1 TABLET BY MOUTH EVERY EVENING         Medication Quantity Refills Start End   SPIRONOLACTONE 25 MG Oral Tab 45 tablet 1 11/22/2023 --   Sig:   TAKE 1/2 TABLET BY MOUTH DAILY     Route:   Oral       Medication Quantity Refills Start End   TORSEMIDE 10 MG Oral Tab 90 tablet 1 11/22/2023 --   Sig:   TAKE 1 TABLET BY MOUTH DAILY     Route:   Oral       Medication Quantity Refills Start End   VITAMIN B-12 ER 1000 MCG Oral Tab CR 90 tablet 1 11/22/2023 --   Sig:   TAKE 1 TABLET BY MOUTH DAILY     Route:   Oral       Medication Quantity Refills Start End   ESCITALOPRAM 5 MG Oral Tab 90 tablet 1 11/22/2023 --   Sig:   TAKE 1 TABLET BY MOUTH DAILY     Route:   Oral

## 2024-06-03 ENCOUNTER — TELEPHONE (OUTPATIENT)
Facility: CLINIC | Age: 84
End: 2024-06-03

## 2024-06-03 NOTE — TELEPHONE ENCOUNTER
Spoke to daughter Charito,  pt cancelled her sleep study at the sleep lab because she did not want to try the cpap machine.  They would like to discuss alternative treatments for sleep apnea with Dr Zambrano in their upcoming appointment.    Pt is scheduled 6/20.

## 2024-06-20 ENCOUNTER — OFFICE VISIT (OUTPATIENT)
Facility: CLINIC | Age: 84
End: 2024-06-20

## 2024-06-20 VITALS
SYSTOLIC BLOOD PRESSURE: 134 MMHG | HEIGHT: 62.99 IN | DIASTOLIC BLOOD PRESSURE: 74 MMHG | WEIGHT: 220 LBS | RESPIRATION RATE: 16 BRPM | HEART RATE: 84 BPM | BODY MASS INDEX: 38.98 KG/M2 | OXYGEN SATURATION: 97 %

## 2024-06-20 DIAGNOSIS — J96.22 ACUTE AND CHRONIC RESPIRATORY FAILURE WITH HYPERCAPNIA (HCC): ICD-10-CM

## 2024-06-20 DIAGNOSIS — R91.8 PULMONARY INFILTRATES: Primary | ICD-10-CM

## 2024-06-20 PROCEDURE — 99214 OFFICE O/P EST MOD 30 MIN: CPT | Performed by: INTERNAL MEDICINE

## 2024-06-20 NOTE — PATIENT INSTRUCTIONS
paige---          -                        -Repeat sleep study as planned                         - to get CT chest -                        - see me in 4 months                            Sofya Zambrano MD  Pulmonary Medicine  6/20/2024

## 2024-06-20 NOTE — PROGRESS NOTES
Pulmonary Consult Note    History of Present Illness:  Joni Christiansen is a 83 year old female presenting to pulmonary clinic today for hypercapnic resp failure   Told ELY- 20yrs - never wore mask-- legs heavy and weak with some pain on both thighs-presented to the ER yesterday without specific findings and plans to follow-up with primary  With lethargy and fell asleep while talking to daughter - - -no confusion noted-patient denies specific issues-states able to walk  No hx asthma /copd-- never smoked - no inhalers  Winded with exertion- using walker- stairs - without stopping-   No cp   No coughing   Did not get new study -   Patient was admitted 6/29/2023 with increasing falls lethargy generalized weakness and reports of shortness of breath.  Saw cardiology suspected HFpEF-deconditioning  Saw pulmonary-suspected HF PEF-recommended outpatient sleep study  ABG 7.38 PCO2 54 PO2 95    9.23- remains with dyspnea with walking - ok in grocery store with cart-   Walking down to room - end of bustamante - occasionally  has to stop - not every time   No further lethargy - no falling asleep and no confusion -   No coughing - no cp -   Occasionally  with left upper chest - daggar going through - somes and goes -   Neg stress test--   Leg weakness and heaviness is gone   Was getting cortisone shot - in hip - none x 7 months     12.23- struggled with mask-   Was on 4 hours - then now with heated circuit fault - with alarm - - prior to thankgivings- - not using now   Due for repeat titiration -- 12/6   Would awakening gasping for air - very scary   Fine during the day -   Remains with dyspnea- walks dog 5 times a day - 1/2 mile maybe -- some dyspnea and wants to sit down when she gets in     6/24- was in hosp with volume overload - saw kike and told heart is fine-   Had a bad virus --   Remains in afib - on eliqius -   Sats were 95%   Remains on same meds - rtc in oct   Back top better now -   Had to cancel sleep study -                   Past Medical History:   Past Medical History:    (HFpEF) heart failure with preserved ejection fraction (HCC)    Atrial fibrillation (HCC)    Back pain    CKD (chronic kidney disease)    Colon cancer (HCC)    Congestive heart disease (HCC)    Deep vein thrombosis (HCC)    Diabetes (HCC)    Essential hypertension    History of blood clots    HTN (hypertension)    Hyperlipidemia    Hypothyroidism    Obesity    Pulmonary embolism (HCC)    Sleep apnea    TIA (transient ischemic attack)    Visual impairment    Dvt/pe- 20yrs ago- AC since - train trip   Afib- - 20 yrs- no pacermaker - controlled - no ablations  No cad-- enlarged - no MI - - dr stokes   Colon cancer - 30 yrs ago- no chem/rt- no recent scope - told no need to come   No other cancers   No rheum hx   No ulcers or colitis - no gerd             Past Surgical History:   Past Surgical History:   Procedure Laterality Date    Back surgery      Colectomy      Colonoscopy      Excis lumbar disk,one level      Hip replacement surgery      Knee replacement surgery            Total hip replacement      Total knee replacement Bilateral        Family Medical History:   Family History   Problem Relation Age of Onset    Other (other) Father         congestive heart failure    Hypertension Maternal Grandmother        Social History: Social History    Socioeconomic History      Marital status:     Tobacco Use      Smoking status: Never      Smokeless tobacco: Never    Vaping Use      Vaping Use: Never used    Substance and Sexual Activity      Alcohol use: Not Currently      Drug use: Never   -       Allergies: Penicillins     Medications:   Current Outpatient Medications   Medication Sig Dispense Refill    24HR ALLERGY RELIEF 180 MG Oral Tab TAKE 1 TABLET BY MOUTH DAILY 90 tablet 0    CALCIUM CARBONATE 1500 (600 Ca) MG Oral Tab TAKE 1 TABLET BY MOUTH DAILY 90 tablet 1    CHOLECALCIFEROL 50 MCG ( UT) Oral Cap TAKE 1 CAPSULE BY  MOUTH DAILY 90 capsule 1    ELIQUIS 5 MG Oral Tab TAKE 1 TABLET BY MOUTH TWICE DAILY 180 tablet 1    PRAVASTATIN SODIUM 80 MG Oral Tab TAKE 1 TABLET BY MOUTH EVERY EVENING 90 tablet 1    SPIRONOLACTONE 25 MG Oral Tab TAKE 1/2 TABLET BY MOUTH DAILY 45 tablet 1    levothyroxine 75 MCG Oral Tab TAKE 1 TABLET BY MOUTH EVERY MORNING 90 tablet 0    TORSEMIDE 10 MG Oral Tab TAKE 1 TABLET BY MOUTH DAILY 90 tablet 1    B-12 1000 MCG Oral Tab CR TAKE 1 TABLET BY MOUTH DAILY 90 tablet 1    ESCITALOPRAM 5 MG Oral Tab TAKE 1 TABLET BY MOUTH DAILY 90 tablet 1    orphenadrine  MG Oral Tablet 12 Hr Take 100 mg by mouth 2 (two) times daily as needed.      metoprolol succinate ER 50 MG Oral Tablet 24 Hr Take 1 tablet (50 mg total) by mouth Daily Beta Blocker. 30 tablet 0    albuterol 108 (90 Base) MCG/ACT Inhalation Aero Soln Inhale 2 puffs into the lungs every 4 to 6 hours as needed for Wheezing or Shortness of Breath. 1 each 3    MONTELUKAST 10 MG Oral Tab TAKE 1 TABLET BY MOUTH EVERY EVENING 90 tablet 0    IRBESARTAN 150 MG Oral Tab TAKE 1 TABLET BY MOUTH EVERY EVENING -HOLD IF SBP LESS THAN 110 90 tablet 1    triamcinolone 0.1 % External Cream Apply topically 2 (two) times daily as needed. Can use on legs and hands 80 g 2    lidocaine 5 % External Patch Place 1 patch onto the skin daily as needed. 30 each 2    acetaminophen 500 MG Oral Tab Take 2 tablets (1,000 mg total) by mouth every 6 (six) hours as needed for Pain.      HYDROcodone-acetaminophen 5-325 MG Oral Tab Take 1 tablet by mouth every 8 (eight) hours as needed for Pain. (Patient not taking: Reported on 6/20/2024) 60 tablet 0       Review of Systems: weight - 204 down some on trlicity - lost 100# twice - then regained - - thinks weight is up but rings fall off - 218 - now 220   Tums as needed for pizza - no other gerd and no meds - no issues - no issues   No swallowing - occasionally  choking /coughing -- scheduled for upper scope - GI-- chinese food- has to  regurg - -- refused scopes freaked out   No diarrhea   Some mild swelling - left leg with DVT - low dose torsemide - no wounds- itches - about the same   No skin rashes or hives   Sleeping-- feels sleeps well-not better rested with the machine- -- sleeping well - no awkenings -                 Physical Exam:  /74 (BP Location: Right arm, Patient Position: Sitting, Cuff Size: adult)   Pulse 84   Resp 16   Ht 5' 2.99\" (1.6 m)   Wt 220 lb (99.8 kg)   SpO2 97%   BMI 38.98 kg/m²    Constitutional: comfortable . No acute distress.   HEENT: Head NC/AT. PEERL. Throat is clear small area   Cardio:  ireg with murmers noted rate controlled  Respiratory: Thorax symmetrical with no labored breathing.minimal crackles - both bases left greater than right - sl crackles   GI:  Abd soft, non-tender.  Extremities: No clubbing or cyanosisedema +1  with VV no clubbing - left greater than right   Neurologic: A&Ox3. No gross motor deficits.  Skin: Warm, dry.no rashes or hives noted   Lymphatic: No cervical or supraclavicular lymphadenopathy.no jvd   Psych: Calm, cooperative. Pleasant affect.    Results:  Reviewed   Clear chest x-ray 6/29/2023  CT chest 5/23  With diffuse mosaic infiltrates some groundglass areas ill-defined patchy consolidation with some nodular left upper lobe no prior films  Echo 4/23 atrial fibrillation EF 50 to 55% unable to assess diastolic-normal pulmonary pressures    Assessment/Plan:  #History of DVT/PE  Reports 20 years ago while on a train trip  Reports remains on anticoagulation since that time-in association with A-fib  12.23 on AC   6/24- stable on anticoagulation    #Permanent atrial fibrillation  Believes at least a 20-year history  No ablations no pacemaker states controlled  Sees [facility]   6/24 no changes       #Hypercapnic respiratory failure  During recent admission 7.38 PCO2 54 PO2 95  Suspect OHS/untreated ELY  BiPAP deemed insufficient due to OHS and multiple respiratory readmissions  prescribing NIV for home use patient will use and benefit  9/23-struggling with NIV though trying--plan to adjust pressures and respiratory rate and follow carefully-denies any lethargy or confusion  12/23 continues to struggle-had seen Dr. Whitehead with plans for repeat titration  6/24- did not get study - no acute issues     # ELY  Reports diagnosis on testing at least 20 years ago.  Never tried mask or other interventions.  Has not required O2--- has been hesitant to proceed  12/23 as above plans for NIPPV titration  6/24- no study yet     # HFpEF   6/24--admitted with URI symptoms and evidence of volume overload including chest x-ray with interstitial thickening--echo with EF 50 to 55% unable to measure--remains with edema   To check on CT     -  #Frequent falls/weakness/back pain  Recent hospitalization with weakness and lethargy  Now seems improved  Walking her dog 5 times a day  6/24- working with PT - post hosp       #Acute on chronic dyspnea--  recent admission 6/23 with weakness and dyspnea improved after Lasix  9/23 overall moving around a little bit better remains in a wheelchair.  12.23- walking the dog - now around parking lot -   Going shopping etc - legs get tired   6/24- thinks about the same - denies active issue     Plan---          -                        -Repeat sleep study as planned                         - to get CT chest - high res CT -- no contrast                        - see me in 4 months                            Sofya Zambrano MD  Pulmonary Medicine  6/20/2024

## 2024-06-24 ENCOUNTER — TELEPHONE (OUTPATIENT)
Dept: FAMILY MEDICINE CLINIC | Facility: CLINIC | Age: 84
End: 2024-06-24

## 2024-06-25 NOTE — TELEPHONE ENCOUNTER
Received page from pts daughter, Charito.   States patient has take 6 doses of tylenol today as well as a muscle relaxer for back pain, and is still in significant pain without relief. Advised Charito to take Christineol to the Emergency Room for evaluation and pain management.

## 2024-07-02 ENCOUNTER — PATIENT MESSAGE (OUTPATIENT)
Dept: FAMILY MEDICINE CLINIC | Facility: CLINIC | Age: 84
End: 2024-07-02

## 2024-07-03 NOTE — TELEPHONE ENCOUNTER
From: Joni Christiansen  To: Amy Amaro  Sent: 7/2/2024 5:49 PM CDT  Subject: Handicap Sticker    Hi Dr. Amaro,    My mom got her [place] plates and license!! So she’s able to get the handicap sign for her car. Does she need to make an appointment for that?    Thank you!  Charito

## 2024-07-15 ENCOUNTER — TELEPHONE (OUTPATIENT)
Dept: FAMILY MEDICINE CLINIC | Facility: CLINIC | Age: 84
End: 2024-07-15

## 2024-07-15 DIAGNOSIS — L29.9 PRURITUS: ICD-10-CM

## 2024-07-15 RX ORDER — MONTELUKAST SODIUM 10 MG/1
10 TABLET ORAL EVERY EVENING
Qty: 90 TABLET | Refills: 0 | Status: SHIPPED | OUTPATIENT
Start: 2024-07-15

## 2024-07-15 NOTE — TELEPHONE ENCOUNTER
Pt having tooth extraction next Monday, 7/22, with [facility], [facility] in [facility].    Pt was advised by Dr Gonzalez to contact PCP office to request pain medication.    Pt would like Rx sent to:  [facility], [facility] [place], [place] - [facility] [phone], [phone]    Please notify pt when Rx is sent.

## 2024-07-15 NOTE — TELEPHONE ENCOUNTER
Did not pass protocol  Last office visit 3/21/2024  Last refill was: , 4/10/2024  90__tabs  Next appointment: none scheduled    Please sign pended medication if appropriate            Medication Quantity Refills Start End   MONTELUKAST 10 MG Oral Tab 90 tablet 0 4/10/2024 --   Sig:   TAKE 1 TABLET BY MOUTH EVERY EVENING     Route:   Oral

## 2024-07-17 NOTE — TELEPHONE ENCOUNTER
Patient informed of Dr. Amaro's recommendation below to have patient's surgeon prescribe the pain medication and she voiced understanding and agreed with plan.

## 2024-08-12 ENCOUNTER — OFFICE VISIT (OUTPATIENT)
Dept: SLEEP CENTER | Age: 84
End: 2024-08-12
Attending: Other
Payer: MEDICARE

## 2024-08-12 DIAGNOSIS — G47.33 OSA (OBSTRUCTIVE SLEEP APNEA): ICD-10-CM

## 2024-08-12 PROCEDURE — 95810 POLYSOM 6/> YRS 4/> PARAM: CPT

## 2024-08-13 ENCOUNTER — MED REC SCAN ONLY (OUTPATIENT)
Facility: CLINIC | Age: 84
End: 2024-08-13

## 2024-08-15 ENCOUNTER — SLEEP STUDY (OUTPATIENT)
Facility: CLINIC | Age: 84
End: 2024-08-15
Payer: MEDICARE

## 2024-08-15 DIAGNOSIS — G47.33 OBSTRUCTIVE SLEEP APNEA SYNDROME: Primary | ICD-10-CM

## 2024-08-15 PROCEDURE — 95810 POLYSOM 6/> YRS 4/> PARAM: CPT | Performed by: OTHER

## 2024-08-19 ENCOUNTER — HOSPITAL ENCOUNTER (OUTPATIENT)
Dept: MAMMOGRAPHY | Facility: HOSPITAL | Age: 84
Discharge: HOME | End: 2024-08-19
Attending: STUDENT IN AN ORGANIZED HEALTH CARE EDUCATION/TRAINING PROGRAM
Payer: MEDICARE

## 2024-08-19 ENCOUNTER — HOSPITAL ENCOUNTER (OUTPATIENT)
Dept: CT IMAGING | Facility: HOSPITAL | Age: 84
Discharge: HOME | End: 2024-08-19
Attending: INTERNAL MEDICINE
Payer: MEDICARE

## 2024-08-19 ENCOUNTER — TELEPHONE (OUTPATIENT)
Dept: FAMILY MEDICINE CLINIC | Facility: CLINIC | Age: 84
End: 2024-08-19

## 2024-08-19 DIAGNOSIS — R91.8 PULMONARY INFILTRATES: ICD-10-CM

## 2024-08-19 DIAGNOSIS — Z12.31 ENCOUNTER FOR SCREENING MAMMOGRAM FOR MALIGNANT NEOPLASM OF BREAST: Primary | ICD-10-CM

## 2024-08-19 DIAGNOSIS — Z12.31 ENCOUNTER FOR SCREENING MAMMOGRAM FOR MALIGNANT NEOPLASM OF BREAST: ICD-10-CM

## 2024-08-19 PROCEDURE — 71250 CT THORAX DX C-: CPT | Performed by: INTERNAL MEDICINE

## 2024-08-19 PROCEDURE — 77063 BREAST TOMOSYNTHESIS BI: CPT | Performed by: STUDENT IN AN ORGANIZED HEALTH CARE EDUCATION/TRAINING PROGRAM

## 2024-08-19 PROCEDURE — 77067 SCR MAMMO BI INCL CAD: CPT | Performed by: STUDENT IN AN ORGANIZED HEALTH CARE EDUCATION/TRAINING PROGRAM

## 2024-08-20 DIAGNOSIS — I10 HYPERTENSION, ESSENTIAL, BENIGN: ICD-10-CM

## 2024-08-20 NOTE — TELEPHONE ENCOUNTER
Did not pass protocol  Last office visit 3/21/2024  Last refill was: , _2/8/2024  90_tabs  Next appointment:  8/30/2024    Please sign pended medication if appropriate     Medication Quantity Refills Start End   IRBESARTAN 150 MG Oral Tab 90 tablet 1 2/8/2024 --   Sig:   TAKE 1 TABLET BY MOUTH EVERY EVENING -HOLD IF SBP LESS THAN 110     Route:   (none)

## 2024-08-21 RX ORDER — IRBESARTAN 150 MG/1
150 TABLET ORAL DAILY
Qty: 90 TABLET | Refills: 0 | Status: SHIPPED | OUTPATIENT
Start: 2024-08-21

## 2024-08-23 ENCOUNTER — TELEPHONE (OUTPATIENT)
Facility: CLINIC | Age: 84
End: 2024-08-23

## 2024-08-23 DIAGNOSIS — G47.33 OSA (OBSTRUCTIVE SLEEP APNEA): Primary | ICD-10-CM

## 2024-08-23 NOTE — TELEPHONE ENCOUNTER
Evelyn message instructed patient to schedule his cpap titration at [facility], then contact the office to schedule the  consult appointment with Dr. Zambrano scheduled.   Related recommendation to avoid alcohol, sedating medication before sleep and not to drive while drowsy.  Provided call back # should patient have questions.     Patient: COY KENNEY  MR# [id number]  Time of Service: 6/29/2023  5:57 AM      History  Chief Complaint   Patient presents with   • Cough   • Breathing Problem       History of Present Illness  Patient is a 47 year old female who presents to the emergency department with complaints of cough, palpitations, and chest pain. Symptoms began one week ago while travelling in [place]. Patient states that she woke up in her hotel room and felt that her heart was racing. This was accompanied by pleuritic chest pain and cough. Since that time, she has noted body aches and cough. She denies any sore throat. Minimal nasal congestion. No leg swelling. No history of venous thromboembolism. Patient notes that she drank more alcohol while in [place].     History given by patient.     No past medical history on file.    No family history on file.         No past surgical history on file.    Current Medications:  Reviewed in chart.    Allergies:  ALLERGIES:  No Known Allergies      =============================================================    Physical Exam  Vitals:    06/29/23 0518 06/29/23 0809 06/29/23 0839   BP: 106/74 93/64 103/61   BP Location: RUE - Right upper extremity     Patient Position: Supine     Pulse: 86 67 73   Resp: 16 15 19   Temp: 98.1 °F (36.7 °C)     TempSrc: Oral     SpO2: 100% 99% 98%       Vitals and nursing note reviewed.  General: Awake, alert, interactive. No acute distress. Coughing during evaluation.   Neck: Supple. No meningismus.   HEENT: Posterior oropharynx normal. Mucous membranes moist. Facial movements symmetric.   Cardiac: Regular rate and rhythm, S1 and S2. No murmurs, rubs, or gallops.   Lungs: Clear to auscultation bilaterally. No tachypnea.  Abdominal: Soft. Non-tender. Not distended.  Extremities: No lower extremity edema. No cyanosis.   Neuro: Awake, alert, interactive. Moving all extremities well.  Psych: Appropriate affect. Linear thought processes.        =============================================================    ED Course/Medical Decision Making    Diagnostics    Rhythm Strip Interpretation:  The patient's rhythm was interpreted by me as normal sinus rhythm.    Oxygen Saturation:  The patient's Oxygen Saturation Monitor was interpreted by me.  The reading was 99%.  The patient was on room air at the time of the reading  This is interpreted as normal.     12 lead EKG interpretation by ED Physician:  Normal sinus rhythm. No ST elevations or depressions. No STEMI.   =============================================================    Laboratory summary    Labs Reviewed   BASIC METABOLIC PANEL - Abnormal; Notable for the following components:       Result Value    Glucose 108 (*)     All other components within normal limits   D DIMER, QUANTITATIVE - Abnormal; Notable for the following components:    D Dimer, Quantitative 1.41 (*)     All other components within normal limits    Narrative:     Values < 0.50 mg/L(FEU) may be useful to help exclude DVT or PE in patients at low clinical risk for these disorders.  For patients above the age of 50, the American college of Physicians recommends using age adjusted cutoff values. AGE X 0.01 calculates the age adjusted cutoff value in mg/L for these patient populations.   CBC WITH AUTOMATED DIFFERENTIAL (PERFORMABLE ONLY) - Abnormal; Notable for the following components:    Absolute Lymphocytes 0.4 (*)     All other components within normal limits    Narrative:     This is an appended report.  These results have been appended to a previously verified report.   POCT RAPID STREP A - Abnormal; Notable for the following components:    GRP A STREP Positive (*)     All other components within normal limits   TROPONIN I, HIGH SENSITIVITY - Normal   MAGNESIUM - Normal   POCT URINE PREGNANCY - Normal   HCG, URINE - POINT OF CARE - Normal   COVID/FLU/RSV PANEL   CBC WITH DIFFERENTIAL    Narrative:     The following orders were  created for panel order CBC with Automated Differential.  Procedure                               Abnormality         Status                     ---------                               -----------         ------                     CBC with Automated Dif...[[id number]]  Abnormal            Final result                 Please view results for these tests on the individual orders.       =============================================================    Radiography/Imaging    Radiographic studies interpreted by ED Physician:    CTA CHEST PULMONARY EMBOLISM   Final Result      1. No intraluminal filling defects identified to suggest pulmonary   embolism.    2. Lungs without evidence of focal consolidation, pleural effusion or   pneumothorax.       Electronically Signed by: KAREN OCHOA M.D.    Signed on: 6/29/2023 9:38 AM    Workstation ID: [id number]      XR CHEST AP OR PA   Final Result      No radiographic evidence of an acute cardiopulmonary process.      Electronically Signed by: KAREN OCHOA M.D.    Signed on: 6/29/2023 6:55 AM    Workstation ID: [id number]          =============================================================    Medical Decision Making    Labs studies were ordered and reviewed as above.   Radiographic studies ordered and reviewed as above.  Past medical records were reviewed and significant findings indicated above.    MEDICATIONS ADMINISTERED IN EMERGENCY DEPARTMENT:  Medications   iohexol (OMNIPAQUE 350 INJECT) contrast solution 100 mL (65 mLs Intravenous Given 6/29/23 0932)       =============================================================    Procedures:      =============================================================  Assessment:  Patient is a 47 year old female who presents to the emergency department with complaints of cough, palpitations, and chest pain.  Symptoms are favored to represent upper respiratory infection.  I have low suspicion for acute coronary syndrome.  Heart  score is 0.  In context of positive D-dimer, a CT pulmonary angio was obtained to evaluate for possible pulmonary embolism was reassuring.  Symptoms not suggestive of acute aortic pathology.  While strep test is positive, patient does not have sore throat to suggest strep pharyngitis.  Accordingly, I have prescribed her amoxicillin which she is to take if she develops a sore throat.  Return precautions discussed at length, including: Return of chest pain, shortness of breath, leg swelling, dizziness, or any other new or concerning symptoms.  She is to follow-up with a primary care physician in the next several days for reevaluation and to establish care with a cardiologist in 1 week.  Questions answered.    =============================================================    Disposition      The patient was discharged to home from the Emergency Department in good condition.    Scripts provided to the patient:  New Prescriptions    AMOXICILLIN (AMOXIL) 500 MG TABLET    Take 1 tablet by mouth in the morning and 1 tablet in the evening. Do all this for 10 days.    GUAIFENESIN (MUCINEX) 600 MG 12 HR TABLET    Take 2 tablets by mouth in the morning and 2 tablets in the evening. Do all this for 7 days.       Patient instructed and strongly encouraged to return immediately to the ED Department for persistent, recurrent or worsening symptoms.  Instructions to notify the Primary Care Provider and arrange additional follow-up were advised.  Written aftercare and follow-up instructions were discussed with and verbally acknowledged by the patient.    Portions of this note were created using dictation software.     CLINICAL IMPRESSION  1. Acute cough    2. Positive D dimer    3. Chest pain, unspecified type    4. Strep pharyngitis        Vladimir Moore MD, MPH, [facility]  Emergency Medicine           Vladimir Moore MD  06/29/23 9471

## 2024-08-30 ENCOUNTER — OFFICE VISIT (OUTPATIENT)
Dept: FAMILY MEDICINE CLINIC | Facility: CLINIC | Age: 84
End: 2024-08-30
Payer: MEDICARE

## 2024-08-30 VITALS
RESPIRATION RATE: 19 BRPM | HEART RATE: 78 BPM | HEIGHT: 62 IN | BODY MASS INDEX: 41.04 KG/M2 | SYSTOLIC BLOOD PRESSURE: 128 MMHG | WEIGHT: 223 LBS | TEMPERATURE: 97 F | DIASTOLIC BLOOD PRESSURE: 70 MMHG

## 2024-08-30 DIAGNOSIS — E03.9 PRIMARY HYPOTHYROIDISM: ICD-10-CM

## 2024-08-30 DIAGNOSIS — R60.0 BILATERAL LOWER EXTREMITY EDEMA: ICD-10-CM

## 2024-08-30 DIAGNOSIS — I10 HYPERTENSION, ESSENTIAL, BENIGN: ICD-10-CM

## 2024-08-30 DIAGNOSIS — I83.813 VARICOSE VEINS OF BOTH LOWER EXTREMITIES WITH PAIN: ICD-10-CM

## 2024-08-30 DIAGNOSIS — R05.3 CHRONIC COUGH: ICD-10-CM

## 2024-08-30 DIAGNOSIS — M48.062 SPINAL STENOSIS OF LUMBAR REGION WITH NEUROGENIC CLAUDICATION: ICD-10-CM

## 2024-08-30 DIAGNOSIS — G89.29 CHRONIC BILATERAL LOW BACK PAIN WITHOUT SCIATICA: ICD-10-CM

## 2024-08-30 DIAGNOSIS — R09.A2 GLOBUS SENSATION: ICD-10-CM

## 2024-08-30 DIAGNOSIS — F41.9 ANXIETY: ICD-10-CM

## 2024-08-30 DIAGNOSIS — Z02.89 ENCOUNTER FOR COMPLETION OF FORM WITH PATIENT: ICD-10-CM

## 2024-08-30 DIAGNOSIS — E78.00 HYPERCHOLESTEROLEMIA: ICD-10-CM

## 2024-08-30 DIAGNOSIS — M54.50 CHRONIC BILATERAL LOW BACK PAIN WITHOUT SCIATICA: ICD-10-CM

## 2024-08-30 DIAGNOSIS — I48.19 PERSISTENT ATRIAL FIBRILLATION (HCC): ICD-10-CM

## 2024-08-30 DIAGNOSIS — E11.22 TYPE 2 DIABETES MELLITUS WITH STAGE 3A CHRONIC KIDNEY DISEASE, WITHOUT LONG-TERM CURRENT USE OF INSULIN (HCC): Primary | ICD-10-CM

## 2024-08-30 DIAGNOSIS — M85.852 OSTEOPENIA OF NECK OF LEFT FEMUR: ICD-10-CM

## 2024-08-30 DIAGNOSIS — N18.31 TYPE 2 DIABETES MELLITUS WITH STAGE 3A CHRONIC KIDNEY DISEASE, WITHOUT LONG-TERM CURRENT USE OF INSULIN (HCC): Primary | ICD-10-CM

## 2024-08-30 DIAGNOSIS — J30.9 ALLERGIC RHINITIS, UNSPECIFIED SEASONALITY, UNSPECIFIED TRIGGER: ICD-10-CM

## 2024-08-30 PROBLEM — I50.9 CONGESTIVE HEART FAILURE (HCC): Status: RESOLVED | Noted: 2023-04-22 | Resolved: 2024-08-30

## 2024-08-30 PROBLEM — I50.9 CONGESTIVE HEART FAILURE, UNSPECIFIED HF CHRONICITY, UNSPECIFIED HEART FAILURE TYPE (HCC): Status: RESOLVED | Noted: 2023-04-22 | Resolved: 2024-08-30

## 2024-08-30 RX ORDER — PRAVASTATIN SODIUM 80 MG/1
80 TABLET ORAL EVERY EVENING
Qty: 90 TABLET | Refills: 1 | Status: SHIPPED | OUTPATIENT
Start: 2024-08-30

## 2024-08-30 RX ORDER — FEXOFENADINE HYDROCHLORIDE 180 MG/1
180 TABLET ORAL DAILY
Qty: 90 TABLET | Refills: 0 | Status: SHIPPED | OUTPATIENT
Start: 2024-08-30

## 2024-08-30 RX ORDER — LEVOTHYROXINE SODIUM 75 UG/1
75 TABLET ORAL EVERY MORNING
Qty: 90 TABLET | Refills: 1 | Status: SHIPPED | OUTPATIENT
Start: 2024-08-30

## 2024-08-30 RX ORDER — HYDROCODONE BITARTRATE AND ACETAMINOPHEN 5; 325 MG/1; MG/1
TABLET ORAL
COMMUNITY
Start: 2024-07-22 | End: 2024-08-30

## 2024-08-30 RX ORDER — ESCITALOPRAM OXALATE 5 MG/1
5 TABLET ORAL DAILY
Qty: 90 TABLET | Refills: 1 | Status: SHIPPED | OUTPATIENT
Start: 2024-08-30

## 2024-08-30 RX ORDER — HYDROCODONE BITARTRATE AND ACETAMINOPHEN 5; 325 MG/1; MG/1
1 TABLET ORAL
Qty: 60 TABLET | Refills: 0 | Status: SHIPPED | OUTPATIENT
Start: 2024-08-30

## 2024-08-30 RX ORDER — IRBESARTAN 150 MG/1
150 TABLET ORAL DAILY
Qty: 90 TABLET | Refills: 1 | Status: SHIPPED | OUTPATIENT
Start: 2024-08-30

## 2024-08-30 NOTE — PROGRESS NOTES
[facility] Family Medicine Note  08/30/24    Chief Complaint   Patient presents with    Complete Form     HPI:   Joni Christiansen is a 84 year old female who presents for follow up.    Patient would like updated parking placard form filled out. Would like refill of norco for lumbar spinal stenosis.    Has to redo sleep study. Did not tolerate the CPAP masks. Using walker as needed. Walks dog. Is very involved at [employer].     Has varicose veins with swelling and pain.    Patient presents for recheck of her hypertension. Pt has been taking medications as instructed, no medication side effects.   BP Meds: Irbesartan Tabs - 150 MG; metoprolol succinate ER Tb24 - 50 MG; spironolactone Tabs - 25 MG; torsemide Tabs - 10 MG   Last Cr was 1.29 done on 4/21/2024.Last eGFR was 42 on 4/21/2024.    Patient presents for follow up of hyperlipidemia. Patient has been taking medications as prescribed, no muscle aches noted. Working on diet and exercise.   Cholesterol Meds: Pravastatin Sodium Tabs - 80 MG   Cholesterol: 114, done on 1/17/2024.  HDL Cholesterol: 45, done on 1/17/2024.  LDL Cholesterol: 48, done on 1/17/2024.  TriGlycerides 116, done on 1/17/2024.    Patient presents for diabetes follow up. Working on diet and exercise. No low blood sugars noted.   DM Meds:  non at present    Lab Results   Component Value Date    A1C 5.6 03/21/2024    A1C 5.5 08/26/2023    A1C 5.4 04/22/2023      Last Diabetic Eye Exam:  No data recorded  No data recorded    Pt had a history of hypothyroidism and here to recheck. Has been tolerating the medication well. Denies any shakiness, palpitations, increased BM's or wgt loss. Denies any fatigue, wgt gain.  Thryoid Meds: levothyroxine Tabs - 75 MCG    Lab Results   Component Value Date    T4F 1.2 01/17/2024    TSH 1.580 01/17/2024     Recent Labs     04/24/23  0618 01/17/24  1002   T4F 1.1 1.2   TSH 0.352* 1.580       Wt Readings from Last 6 Encounters:   08/30/24  223 lb (101.2 kg)   24 220 lb (99.8 kg)   24 214 lb 12.8 oz (97.4 kg)   24 209 lb 9.6 oz (95.1 kg)   24 220 lb (99.8 kg)   24 220 lb (99.8 kg)       Past Medical History:    (HFpEF) heart failure with preserved ejection fraction (HCC)    Atrial fibrillation (HCC)    Back pain    CKD (chronic kidney disease)    Colon cancer (HCC)    Congestive heart disease (HCC)    Congestive heart failure (HCC)    Congestive heart failure, unspecified HF chronicity, unspecified heart failure type (HCC)    Deep vein thrombosis (HCC)    Diabetes (HCC)    Essential hypertension    History of blood clots    HTN (hypertension)    Hyperlipidemia    Hypothyroidism    Obesity    Pulmonary embolism (HCC)    Sleep apnea    TIA (transient ischemic attack)    Visual impairment     Past Surgical History:   Procedure Laterality Date    Back surgery      Colectomy      Colonoscopy      Excis lumbar disk,one level      Hip replacement surgery      Knee replacement surgery            Total hip replacement      Total knee replacement Bilateral      Allergies   Allergen Reactions    Penicillins UNKNOWN and OTHER (SEE COMMENTS)     Other reaction(s): Unknown    Was told as a child it was an allergy      Irbesartan 150 MG Oral Tab Take 1 tablet (150 mg total) by mouth daily. 90 tablet 1    levothyroxine 75 MCG Oral Tab Take 1 tablet (75 mcg total) by mouth every morning. 90 tablet 1    HYDROcodone-acetaminophen 5-325 MG Oral Tab Take 1 tablet by mouth every 4 to 6 hours as needed for Pain. 60 tablet 0    escitalopram 5 MG Oral Tab Take 1 tablet (5 mg total) by mouth daily. 90 tablet 1    Pravastatin Sodium 80 MG Oral Tab Take 1 tablet (80 mg total) by mouth every evening. 90 tablet 1    MONTELUKAST 10 MG Oral Tab TAKE 1 TABLET BY MOUTH EVERY EVENING 90 tablet 0    [DISCONTINUED] 24HR ALLERGY RELIEF 180 MG Oral Tab TAKE 1 TABLET BY MOUTH DAILY 90 tablet 0    CALCIUM CARBONATE 1500 (600 Ca) MG Oral Tab TAKE 1 TABLET BY  MOUTH DAILY 90 tablet 1    CHOLECALCIFEROL 50 MCG (2000 UT) Oral Cap TAKE 1 CAPSULE BY MOUTH DAILY 90 capsule 1    ELIQUIS 5 MG Oral Tab TAKE 1 TABLET BY MOUTH TWICE DAILY 180 tablet 1    SPIRONOLACTONE 25 MG Oral Tab TAKE 1/2 TABLET BY MOUTH DAILY 45 tablet 1    TORSEMIDE 10 MG Oral Tab TAKE 1 TABLET BY MOUTH DAILY 90 tablet 1    B-12 1000 MCG Oral Tab CR TAKE 1 TABLET BY MOUTH DAILY 90 tablet 1    orphenadrine  MG Oral Tablet 12 Hr Take 100 mg by mouth 2 (two) times daily as needed.      metoprolol succinate ER 50 MG Oral Tablet 24 Hr Take 1 tablet (50 mg total) by mouth Daily Beta Blocker. 30 tablet 0    albuterol 108 (90 Base) MCG/ACT Inhalation Aero Soln Inhale 2 puffs into the lungs every 4 to 6 hours as needed for Wheezing or Shortness of Breath. 1 each 3    triamcinolone 0.1 % External Cream Apply topically 2 (two) times daily as needed. Can use on legs and hands 80 g 2    lidocaine 5 % External Patch Place 1 patch onto the skin daily as needed. 30 each 2    acetaminophen 500 MG Oral Tab Take 2 tablets (1,000 mg total) by mouth every 6 (six) hours as needed for Pain.       Social History     Socioeconomic History    Marital status:    Tobacco Use    Smoking status: Never     Passive exposure: Never    Smokeless tobacco: Never   Vaping Use    Vaping status: Never Used   Substance and Sexual Activity    Alcohol use: Not Currently    Drug use: Never   Social History Narrative    Retired  for >30 years.  [employer]. Daughter Charito lives in [place].      Social Determinants of Health     Food Insecurity: No Food Insecurity (4/20/2024)    Food Insecurity     Food Insecurity: Never true   Transportation Needs: No Transportation Needs (4/20/2024)    Transportation Needs     Lack of Transportation: No   Housing Stability: Low Risk  (4/20/2024)    Housing Stability     Housing Instability: No     Counseling given: Not Answered    Family History   Problem Relation Age of Onset     Other (other) Father         congestive heart failure    Hypertension Maternal Grandmother      Family Status   Relation Status    Fa (Not Specified)    MGMA (Not Specified)        REVIEW OF SYSTEMS:   See HPI    EXAM:   /70   Pulse 78   Temp 96.9 °F (36.1 °C) (Temporal)   Resp 19   Ht 5' 2\" (1.575 m)   Wt 223 lb (101.2 kg)   BMI 40.79 kg/m²  Estimated body mass index is 40.79 kg/m² as calculated from the following:    Height as of this encounter: 5' 2\" (1.575 m).    Weight as of this encounter: 223 lb (101.2 kg).   Vital signs reviewed. Appears stated age, well groomed.  Physical Exam:  GEN:  Patient is alert and oriented x3, no apparent distress  HEAD:  Normocephalic, atraumatic  HEENT:  no scleral icterus, conjunctivae clear bilaterally, EOMI, PERRLA, OP clear  LUNGS: clear to auscultation bilaterally, no rales/rhonchi/wheezing  HEART:  Regular rate and rhythm, normal S1/S2, no murmurs, rubs or gallops  EXTREMITIES:  Moves all extremities well  FEET: Bilateral barefoot skin diabetic exam is abnormal with toe deformity - or chronic midfoot/rearfoot and dystrophic nails and/or dry skin foot  NEURO:  CN 2 - 12 grossly intact, gait normal    ASSESSMENT AND PLAN:   1. Type 2 diabetes mellitus with stage 3a chronic kidney disease, without long-term current use of insulin (HCC)  Patient presents for diet controlled diabetes follow up  - doing well  - to check labs in 9/2024  - due for eye exam, will send updated referral as prior referral physician retired  - follow up in 6mo pending labs  - Ophthalmology Referral - In Network  - Pravastatin Sodium 80 MG Oral Tab; Take 1 tablet (80 mg total) by mouth every evening.  Dispense: 90 tablet; Refill: 1    2. Hypertension, essential, benign  Pt presents for follow up of HTN  - no red flag symptoms  - BP controlled  - continue current regimen  - RTC 6mo or sooner if needed  - Irbesartan 150 MG Oral Tab; Take 1 tablet (150 mg total) by mouth daily.  Dispense: 90  tablet; Refill: 1    3. Primary hypothyroidism  Patient presents for follow up of hypothyroidism  - doing well  - will continue current regimen  - follow up in 6mo or sooner if needed  - levothyroxine 75 MCG Oral Tab; Take 1 tablet (75 mcg total) by mouth every morning.  Dispense: 90 tablet; Refill: 1    4. Persistent atrial fibrillation (HCC)  Following with cardiology, continue anticoagulation per cardiology, appreciate evaluation and recommendations    5. Chronic cough  Patient has chronic cough and globus sensation, will refer to ENT and GI, appreciate evaluation and recommendations  - ENT - INTERNAL    6. Globus sensation  - Gastro Referral - In Network  - ENT - INTERNAL    7. Chronic bilateral low back pain without sciatica  Patient has chronic low back pain, can use norco as needed for severe pain, limit use around bedtime if able  - HYDROcodone-acetaminophen 5-325 MG Oral Tab; Take 1 tablet by mouth every 4 to 6 hours as needed for Pain.  Dispense: 60 tablet; Refill: 0    8. Anxiety  Patient has hx of anxiety  - doing very well  - will continue current regimen  - escitalopram 5 MG Oral Tab; Take 1 tablet (5 mg total) by mouth daily.  Dispense: 90 tablet; Refill: 1    9. Spinal stenosis of lumbar region with neurogenic claudication  As above    10. Bilateral lower extremity edema  11. Varicose veins of both lower extremities with pain  Patient has BLE edema, heart function recently was  EF 50-55% 4/2024 at [facility]  - will refer to vascular, appreciate evaluation and recommendations  - Vascular Surgery - In Network    12. Hypercholesterolemia  Patient presents for follow up of HLD  - no side effects of medications  - will continue current regimen  - low fat diet and exercise  - follow up in 6mo or sooner if needed  - Pravastatin Sodium 80 MG Oral Tab; Take 1 tablet (80 mg total) by mouth every evening.  Dispense: 90 tablet; Refill: 1    13. Encounter for completion of form with patient  Parking placard filled  out        Meds & Refills for this Visit:  Requested Prescriptions     Signed Prescriptions Disp Refills    Irbesartan 150 MG Oral Tab 90 tablet 1     Sig: Take 1 tablet (150 mg total) by mouth daily.    levothyroxine 75 MCG Oral Tab 90 tablet 1     Sig: Take 1 tablet (75 mcg total) by mouth every morning.    HYDROcodone-acetaminophen 5-325 MG Oral Tab 60 tablet 0     Sig: Take 1 tablet by mouth every 4 to 6 hours as needed for Pain.    escitalopram 5 MG Oral Tab 90 tablet 1     Sig: Take 1 tablet (5 mg total) by mouth daily.    Pravastatin Sodium 80 MG Oral Tab 90 tablet 1     Sig: Take 1 tablet (80 mg total) by mouth every evening.       Start Taking               HYDROcodone-acetaminophen 5-325 MG Oral Tab Take 1 tablet by mouth every 4 to 6 hours as needed for Pain.          These Medications Have Changed       Start Taking Instead of    levothyroxine 75 MCG Oral Tab levothyroxine 75 MCG Oral Tab    Dosage:  Take 1 tablet (75 mcg total) by mouth every morning. - Oral Dosage:  TAKE 1 TABLET BY MOUTH EVERY MORNING - Oral    escitalopram 5 MG Oral Tab ESCITALOPRAM 5 MG Oral Tab    Dosage:  Take 1 tablet (5 mg total) by mouth daily. - Oral Dosage:  TAKE 1 TABLET BY MOUTH DAILY - Oral    Pravastatin Sodium 80 MG Oral Tab PRAVASTATIN SODIUM 80 MG Oral Tab    Dosage:  Take 1 tablet (80 mg total) by mouth every evening. - Oral Dosage:  TAKE 1 TABLET BY MOUTH EVERY EVENING - Oral            Health Maintenance:  Health Maintenance Due   Topic Date Due    Diabetes Care Foot Exam  Never done    Diabetes Care Dilated Eye Exam  Never done    MA Annual Health Assessment  01/01/2024    COVID-19 Vaccine (8 - 2023-24 season) 03/07/2024    Diabetes Care A1C  09/21/2024       Patient/Caregiver Education: There are no barriers to learning. Medical education done.   Outcome: Patient verbalizes understanding. Patient is notified to call with any questions, complications, allergies, or worsening or changing symptoms.  Patient is to  call with any side effects or complications from the treatments as a result of today.     Problem List:  Patient Active Problem List   Diagnosis    Hypoxia    Dyspnea, unspecified type    Diabetes ([facility])    Essential hypertension    Hypercholesterolemia    Age-related osteoporosis without current pathological fracture    Allergic rhinitis    Atrophy of vagina    Chronic renal insufficiency, stage III (moderate) ([facility])    Chronic right shoulder pain    Gallstones    Hypercalcemia    Hyperuricuria    Low HDL (under 40)    Lumbar degenerative disc disease    Lymphedema    Nontraumatic incomplete tear of right rotator cuff    Obesity    Onychomycosis    ELY (obstructive sleep apnea)    Atrial fibrillation ([facility])    Postmenopausal bleeding    Primary hypothyroidism    Primary osteoarthritis of right hip    Secondary hyperparathyroidism ([facility])    Senile tremor    Spinal stenosis at L4-L5 level    Spinal stenosis of lumbar region with neurogenic claudication    Vitreous floaters of right eye    Diet-controlled diabetes mellitus ([facility])    Hypertension, essential, benign    History of TIA (transient ischemic attack)    History of DVT (deep vein thrombosis)    History of pulmonary embolus (PE)    Type 2 diabetes mellitus with diabetic chronic kidney disease ([facility])    Morbid (severe) obesity due to excess calories ([facility])    Body mass index (BMI) 40.0-44.9, adult ([facility])    Osteopenia of neck of left femur    Acute on chronic heart failure ([facility])    Hypotension    Chronic diastolic heart failure ([facility])    Acute renal insufficiency    Hypotension, unspecified hypotension type    General weakness    Frequent falls    Levoscoliosis of lumbar spine    Thrombocytopenia ([facility])    CKD (chronic kidney disease) stage 4, GFR 15-29 ml/min ([facility])    Acute and chronic respiratory failure with hypercapnia ([facility])    AC joint arthropathy    Osteophyte of left shoulder    Osteoarthritis of left glenohumeral joint    CHF (congestive heart failure) ([facility])     Acute on chronic congestive heart failure, unspecified heart failure type (HCC)    Acute on chronic heart failure with preserved ejection fraction (HCC)       Return in about 6 months (around 2/28/2025) for Medicare Annual Wellness Visit, med check.    Amy Amaro MD  [facility]  08/30/24      Please note that portions of this note may have been completed with a voice recognition program. Efforts were made to edit the dictations but occasionally words are mis-transcribed. Thank you for your understanding.

## 2024-08-30 NOTE — TELEPHONE ENCOUNTER
Last Office Visit: 8/30/24  Last Refill: 5/22/24  Return to Clinic:  Protocol: passed    Requested Prescriptions     Pending Prescriptions Disp Refills    24HR ALLERGY RELIEF 180 MG Oral Tab [Pharmacy Med Name: ALLERGY RELIEF 180 MG TABLET] 90 tablet 0     Sig: TAKE 1 TABLET BY MOUTH DAILY    CALCIUM CARBONATE 1500 (600 Ca) MG Oral Tab [Pharmacy Med Name: LDR CALCIUM TB 600MG 150] 90 tablet 0     Sig: TAKE 1 TABLET BY MOUTH DAILY       Calcium carbonate- medication sent for 90 days and one refill on 5/22/24. Too soon for refill.

## 2024-08-31 DIAGNOSIS — M85.852 OSTEOPENIA OF NECK OF LEFT FEMUR: ICD-10-CM

## 2024-09-03 ENCOUNTER — MED REC SCAN ONLY (OUTPATIENT)
Dept: FAMILY MEDICINE CLINIC | Facility: CLINIC | Age: 84
End: 2024-09-03

## 2024-09-03 NOTE — TELEPHONE ENCOUNTER
Last Office Visit: 08/30/2024    Last Refill:   Medication Quantity Refills Start End   CALCIUM CARBONATE 1500 (600 Ca) MG Oral Tab 90 tablet 1 5/22/2024 --     Return to Clinic: 02/30/2025    Protocol: failed    Refill pended. Please approve if okay. Thank you.

## 2024-09-05 DIAGNOSIS — M85.852 OSTEOPENIA OF NECK OF LEFT FEMUR: ICD-10-CM

## 2024-09-05 NOTE — TELEPHONE ENCOUNTER
Did not pass protocol  Last office visit 8/30/2024  Last refill was: , __tabs  Next appointment:     Please sign pended medication if appropriate

## 2024-09-06 ENCOUNTER — PATIENT OUTREACH (OUTPATIENT)
Dept: CASE MANAGEMENT | Age: 84
End: 2024-09-06

## 2024-09-06 DIAGNOSIS — M85.852 OSTEOPENIA OF NECK OF LEFT FEMUR: ICD-10-CM

## 2024-09-10 ENCOUNTER — TELEPHONE (OUTPATIENT)
Dept: FAMILY MEDICINE CLINIC | Facility: CLINIC | Age: 84
End: 2024-09-10

## 2024-09-10 DIAGNOSIS — M85.852 OSTEOPENIA OF NECK OF LEFT FEMUR: ICD-10-CM

## 2024-09-10 NOTE — TELEPHONE ENCOUNTER
Patient was told she would have to repeat sleep study and insurance is not covering. Patient would like to know why she needs this done again due insurance not covering. Please advise.

## 2024-09-11 NOTE — TELEPHONE ENCOUNTER
Spoke to the patient. Informed that the sleep study order is from her pulmonologist, Dr. Zambrano, and to check with her office to see if a different order is preferred, as per Dr. Amaro.     Provided information below:    Sofya Zambrano MD  Pulmonary Disease, Critical Care Medicine  [facility]  Phone: [phone]    Patient verbalized understanding and agreed with plan.

## 2024-09-11 NOTE — TELEPHONE ENCOUNTER
Patient is following with pulmonology and the order was from Dr. Zambrano, pulmonology, I recommend checking with her office to see if a different order is preferred. Thank you.

## 2024-09-23 ENCOUNTER — PATIENT OUTREACH (OUTPATIENT)
Dept: CASE MANAGEMENT | Age: 84
End: 2024-09-23

## 2024-09-23 NOTE — PROGRESS NOTES
Pt requested ccm info before making enrollment decision. Pt will review with daughter. CCM sent info through [employer]

## 2024-09-26 ENCOUNTER — HOSPITAL ENCOUNTER (EMERGENCY)
Facility: HOSPITAL | Age: 84
Discharge: HOME | End: 2024-09-26
Attending: EMERGENCY MEDICINE
Payer: MEDICARE

## 2024-09-26 VITALS
HEART RATE: 69 BPM | OXYGEN SATURATION: 96 % | SYSTOLIC BLOOD PRESSURE: 154 MMHG | DIASTOLIC BLOOD PRESSURE: 80 MMHG | TEMPERATURE: 98 F | RESPIRATION RATE: 16 BRPM

## 2024-09-26 DIAGNOSIS — M62.830 BACK SPASM: Primary | ICD-10-CM

## 2024-09-26 PROCEDURE — 99284 EMERGENCY DEPT VISIT MOD MDM: CPT

## 2024-09-26 PROCEDURE — 96374 THER/PROPH/DIAG INJ IV PUSH: CPT

## 2024-09-26 PROCEDURE — 96375 TX/PRO/DX INJ NEW DRUG ADDON: CPT

## 2024-09-26 RX ORDER — DIAZEPAM 5 MG
5 TABLET ORAL ONCE
Status: COMPLETED | OUTPATIENT
Start: 2024-09-26 | End: 2024-09-26

## 2024-09-26 RX ORDER — DEXAMETHASONE SODIUM PHOSPHATE 10 MG/ML
10 INJECTION, SOLUTION INTRAMUSCULAR; INTRAVENOUS ONCE
Status: COMPLETED | OUTPATIENT
Start: 2024-09-26 | End: 2024-09-26

## 2024-09-26 RX ORDER — DIAZEPAM 5 MG
5 TABLET ORAL 3 TIMES DAILY PRN
Qty: 15 TABLET | Refills: 0 | Status: SHIPPED | OUTPATIENT
Start: 2024-09-26 | End: 2024-10-03

## 2024-09-26 NOTE — ED INITIAL ASSESSMENT (HPI)
Patient reports c/o left sided lower back spasms x 5 days/. Patient rates pain 10 out of 10 on pain scale.

## 2024-09-26 NOTE — ED QUICK NOTES
Pt reevaluated by dr. Kramer. Pt informed of her test report and plan of care. Verbalizing understanding. Pt's daughter at bedside will drive pt home

## 2024-09-27 ENCOUNTER — TELEPHONE (OUTPATIENT)
Dept: FAMILY MEDICINE CLINIC | Facility: CLINIC | Age: 84
End: 2024-09-27

## 2024-09-27 ENCOUNTER — APPOINTMENT (OUTPATIENT)
Dept: CT IMAGING | Age: 84
End: 2024-09-27
Attending: EMERGENCY MEDICINE

## 2024-09-27 ENCOUNTER — HOSPITAL ENCOUNTER (EMERGENCY)
Age: 84
Discharge: HOME | End: 2024-09-27
Attending: EMERGENCY MEDICINE

## 2024-09-27 VITALS
OXYGEN SATURATION: 95 % | HEIGHT: 62 IN | BODY MASS INDEX: 43 KG/M2 | TEMPERATURE: 98.4 F | WEIGHT: 233.69 LBS | SYSTOLIC BLOOD PRESSURE: 146 MMHG | RESPIRATION RATE: 18 BRPM | HEART RATE: 68 BPM | DIASTOLIC BLOOD PRESSURE: 89 MMHG

## 2024-09-27 DIAGNOSIS — S39.012A LUMBOSACRAL STRAIN, INITIAL ENCOUNTER: Primary | ICD-10-CM

## 2024-09-27 LAB
APPEARANCE UR: CLEAR
BACTERIA #/AREA URNS HPF: ABNORMAL /HPF
BILIRUB UR QL STRIP: NEGATIVE
COLOR UR: ABNORMAL
GLUCOSE UR STRIP-MCNC: NEGATIVE MG/DL
HGB UR QL STRIP: NEGATIVE
HYALINE CASTS #/AREA URNS LPF: ABNORMAL /LPF
KETONES UR STRIP-MCNC: NEGATIVE MG/DL
LEUKOCYTE ESTERASE UR QL STRIP: ABNORMAL
MUCOUS THREADS URNS QL MICRO: PRESENT
NITRITE UR QL STRIP: NEGATIVE
PH UR STRIP: 5.5 [PH] (ref 5–7)
PROT UR STRIP-MCNC: NEGATIVE MG/DL
RBC #/AREA URNS HPF: ABNORMAL /HPF
SP GR UR STRIP: 1.02 (ref 1–1.03)
SQUAMOUS #/AREA URNS HPF: ABNORMAL /HPF
TRANS CELLS #/AREA URNS HPF: ABNORMAL /HPF
UROBILINOGEN UR STRIP-MCNC: 0.2 MG/DL
WBC #/AREA URNS HPF: ABNORMAL /HPF

## 2024-09-27 PROCEDURE — 72131 CT LUMBAR SPINE W/O DYE: CPT

## 2024-09-27 PROCEDURE — 20552 NJX 1/MLT TRIGGER POINT 1/2: CPT

## 2024-09-27 PROCEDURE — 99284 EMERGENCY DEPT VISIT MOD MDM: CPT

## 2024-09-27 PROCEDURE — 10002803 HB RX 637: Performed by: EMERGENCY MEDICINE

## 2024-09-27 PROCEDURE — 96372 THER/PROPH/DIAG INJ SC/IM: CPT | Performed by: EMERGENCY MEDICINE

## 2024-09-27 PROCEDURE — 81001 URINALYSIS AUTO W/SCOPE: CPT | Performed by: EMERGENCY MEDICINE

## 2024-09-27 PROCEDURE — 10002800 HB RX 250 W HCPCS: Performed by: EMERGENCY MEDICINE

## 2024-09-27 RX ORDER — KETOROLAC TROMETHAMINE 15 MG/ML
15 INJECTION, SOLUTION INTRAMUSCULAR; INTRAVENOUS ONCE
Status: DISCONTINUED | OUTPATIENT
Start: 2024-09-27 | End: 2024-09-27

## 2024-09-27 RX ORDER — KETOROLAC TROMETHAMINE 30 MG/ML
30 INJECTION, SOLUTION INTRAMUSCULAR; INTRAVENOUS ONCE
Status: COMPLETED | OUTPATIENT
Start: 2024-09-27 | End: 2024-09-27

## 2024-09-27 RX ORDER — IBUPROFEN 600 MG/1
600 TABLET, FILM COATED ORAL EVERY 8 HOURS PRN
Qty: 21 TABLET | Refills: 0 | Status: SHIPPED | OUTPATIENT
Start: 2024-09-27 | End: 2024-10-04

## 2024-09-27 RX ORDER — HYDROCODONE BITARTRATE AND ACETAMINOPHEN 5; 325 MG/1; MG/1
1-2 TABLET ORAL NIGHTLY PRN
Qty: 17 TABLET | Refills: 0 | Status: SHIPPED | OUTPATIENT
Start: 2024-09-27 | End: 2024-10-07

## 2024-09-27 RX ORDER — BUPIVACAINE HYDROCHLORIDE 5 MG/ML
10 INJECTION, SOLUTION EPIDURAL; INTRACAUDAL ONCE
Status: DISCONTINUED | OUTPATIENT
Start: 2024-09-27 | End: 2024-09-27 | Stop reason: HOSPADM

## 2024-09-27 RX ORDER — DIAZEPAM 5 MG
5-10 TABLET ORAL EVERY 8 HOURS PRN
Qty: 21 TABLET | Refills: 0 | Status: SHIPPED | OUTPATIENT
Start: 2024-09-27 | End: 2024-10-02

## 2024-09-27 RX ORDER — HYDROCODONE BITARTRATE AND ACETAMINOPHEN 5; 325 MG/1; MG/1
2 TABLET ORAL ONCE
Status: COMPLETED | OUTPATIENT
Start: 2024-09-27 | End: 2024-09-27

## 2024-09-27 RX ADMIN — KETOROLAC TROMETHAMINE 30 MG: 30 INJECTION, SOLUTION INTRAMUSCULAR at 06:51

## 2024-09-27 RX ADMIN — HYDROCODONE BITARTRATE AND ACETAMINOPHEN 2 TABLET: 5; 325 TABLET ORAL at 06:52

## 2024-09-27 NOTE — TELEPHONE ENCOUNTER
Patient was in ED yesterday for her back and again this morning for same thing. Medications are not working and she woulod like to see Dr. Amaro on Monday 9/30/24

## 2024-09-27 NOTE — ED PROVIDER NOTES
Patient Seen in: [facility] Emergency Department      History     Chief Complaint   Patient presents with    Back Pain     Stated Complaint: back spasms  x 5 days, no relief with hydrocodone    Subjective:   HPI    Patient has a medical history as noted below.  She is here for symptomatic care.  States she has chronic back pain and sporadically gets back spasms.  Typically she takes some [place] falls asleep and he gets better.  She has been trying to do this for the last 5 days and has not helped.    There is a left side.    No radicular symptoms.  No numbness no weakness.  No falls no trauma.    No midline pain.    Objective:   Past Medical History:    (HFpEF) heart failure with preserved ejection fraction (HCC)    Atrial fibrillation (HCC)    Back pain    CKD (chronic kidney disease)    Colon cancer (HCC)    Congestive heart disease (HCC)    Congestive heart failure (HCC)    Congestive heart failure, unspecified HF chronicity, unspecified heart failure type (HCC)    Deep vein thrombosis (HCC)    Diabetes (HCC)    Essential hypertension    History of blood clots    HTN (hypertension)    Hyperlipidemia    Hypothyroidism    Obesity    Pulmonary embolism (HCC)    Sleep apnea    TIA (transient ischemic attack)    Visual impairment              Past Surgical History:   Procedure Laterality Date    Back surgery      Colectomy      Colonoscopy      Excis lumbar disk,one level      Hip replacement surgery      Knee replacement surgery            Total hip replacement      Total knee replacement Bilateral                 Social History     Socioeconomic History    Marital status:    Tobacco Use    Smoking status: Never     Passive exposure: Never    Smokeless tobacco: Never   Vaping Use    Vaping status: Never Used   Substance and Sexual Activity    Alcohol use: Not Currently    Drug use: Never   Social History Narrative    Retired  for >30 years.  [employer]. Daughter Charito lives in  [place].      Social Determinants of Health     Food Insecurity: No Food Insecurity (4/20/2024)    Food Insecurity     Food Insecurity: Never true   Transportation Needs: No Transportation Needs (4/20/2024)    Transportation Needs     Lack of Transportation: No   Housing Stability: Low Risk  (4/20/2024)    Housing Stability     Housing Instability: No              Review of Systems    Positive for stated Chief Complaint: Back Pain    Other systems are as noted in HPI.  Constitutional and vital signs reviewed.      All other systems reviewed and negative except as noted above.    Physical Exam     ED Triage Vitals   BP 09/26/24 0938 (!) 156/104   Pulse 09/26/24 0938 98   Resp 09/26/24 0938 16   Temp 09/26/24 0940 97.8 °F (36.6 °C)   Temp src 09/26/24 0940 Temporal   SpO2 09/26/24 0938 96 %   O2 Device 09/26/24 0938 None (Room air)       Current Vitals:   Vital Signs  BP: 154/80  Pulse: 69  Resp: 16  Temp: 97.8 °F (36.6 °C)  Temp src: Temporal  MAP (mmHg): 97    Oxygen Therapy  SpO2: 96 %  O2 Device: None (Room air)            Physical Exam    Physical Exam   Constitutional: Awake, alert, well appearing  Head: Normocephalic and atraumatic.   Eyes: Conjunctivae are normal. Pupils are equal, round, and reactive to light.   Neck: Normal range of motion. No JVD  Cardiovascular: Normal rate, regular rhythm  Pulmonary/Chest: Normal effort.  No accessory muscle use.  No cyanosis.  Abdominal: Soft. Not distended.  Neurological: Pt is alert and oriented to person, place, and time. no cranial nerve deficits. Speech fluent      No midline spinal tenderness along cervical, thoracic, lumbar, sacral spine.  No pain to percussion.  5 out of 5 strength with bilateral hip flexion, hip extension, knee flexion, knee extension.  5 out of 5 strength with plantar flexion and dorsiflexion of ankle.  5 out of 5 strength with bilateral EHL.    Babinski's downgoing bilaterally.    ED Course     Labs Reviewed   RAINBOW DRAW LAVENDER    RAINBOW DRAW LIGHT GREEN   RAINBOW DRAW BLUE   RAINBOW DRAW GOLD             Medications   diazePAM (Valium) tab 5 mg (5 mg Oral Given 9/26/24 1006)   fentaNYL (Sublimaze) 50 mcg/mL injection 50 mcg (50 mcg Intravenous Given 9/26/24 1006)   dexAMETHasone PF (Decadron) 10 MG/ML injection 10 mg (10 mg Intravenous Given 9/26/24 1006)       Patient felt much better afterwards.           MDM          Differential diagnoses considered: Back spasm, acute on chronic back pain, osteoarthritis    -Patient received the above medications felt much better.  We discussed going home with a muscle relaxer, advised something fairly mild like tizanidine but patient wanted something stronger-, we discussed side effects including sedation, AMS, unsteady gait putting her at risk for falls.  With this risk she still wanted some Valium for home.  Daughter was at bedside.    She was okay with the plan.    Prescription for Valium sent to the pharmacy.    Patient has chronic back pain, her symptoms today are similar to previous symptoms she has had she does not have any neurological deficits on exam does not complain of any deficits.  There does not seem to be any indication for advanced imaging or any further diagnostic test today.        *Comorbidities contributing to the complexity of decision making: History of chronic back pain, anticoagulated status.  *External charts reviewed: Last lumbar imaging available to me is 11/19/2022 done at [facility].  Levoscoliosis with advanced degenerative disease no compression fracture      Shared decision making was done by: patient, myself, family bedside.                                     Medical Decision Making      Disposition and Plan     Clinical Impression:  1. Back spasm         Disposition:  Discharge  9/26/2024 11:10 am    Follow-up:  Amy Amaro MD  [address] [place] [id number]  [phone]    Follow up            Medications Prescribed:  Discharge Medication  List as of 9/26/2024 11:10 AM        START taking these medications    Details   diazePAM 5 MG Oral Tab Take 1 tablet (5 mg total) by mouth 3 (three) times daily as needed (Muscle Spams)., Normal, Disp-15 tablet, R-0

## 2024-09-30 NOTE — TELEPHONE ENCOUNTER
Unfortunately I am not in the office tomorrow. Can be seen by another available provider or I can try to double book later in the week I will have to look at it on Wednesday. Thank you.

## 2024-09-30 NOTE — TELEPHONE ENCOUNTER
I called and spoke with Charito. She does not want her mother to see anyone else. She would like for you to look at your schedule on Wednesday and let her know when her mother can be seen. She is aware she will not hear back from our office until Wednesday. Charito verbalized understanding.

## 2024-10-01 ENCOUNTER — HOSPITAL ENCOUNTER (INPATIENT)
Age: 84
LOS: 1 days | Discharge: HOME | DRG: 552 | End: 2024-10-03
Attending: EMERGENCY MEDICINE | Admitting: INTERNAL MEDICINE

## 2024-10-01 DIAGNOSIS — M54.50 ACUTE LEFT-SIDED LOW BACK PAIN WITHOUT SCIATICA: Primary | ICD-10-CM

## 2024-10-01 DIAGNOSIS — R52 INTRACTABLE PAIN: ICD-10-CM

## 2024-10-01 DIAGNOSIS — M62.830 BACK MUSCLE SPASM: ICD-10-CM

## 2024-10-01 LAB
ALBUMIN SERPL-MCNC: 3.7 G/DL (ref 3.4–5)
ALBUMIN/GLOB SERPL: 0.9 {RATIO} (ref 1–2.4)
ALP SERPL-CCNC: 109 UNITS/L (ref 45–117)
ALT SERPL-CCNC: 22 UNITS/L
ANION GAP SERPL CALC-SCNC: 8 MMOL/L (ref 7–19)
AST SERPL-CCNC: 25 UNITS/L
BASOPHILS # BLD: 0.1 K/MCL (ref 0–0.3)
BASOPHILS NFR BLD: 1 %
BILIRUB SERPL-MCNC: 0.6 MG/DL (ref 0.2–1)
BUN SERPL-MCNC: 36 MG/DL (ref 6–20)
BUN/CREAT SERPL: 26 (ref 7–25)
CALCIUM SERPL-MCNC: 10.8 MG/DL (ref 8.4–10.2)
CHLORIDE SERPL-SCNC: 109 MMOL/L (ref 97–110)
CO2 SERPL-SCNC: 26 MMOL/L (ref 21–32)
CREAT SERPL-MCNC: 1.41 MG/DL (ref 0.51–0.95)
DEPRECATED RDW RBC: 45.4 FL (ref 39–50)
EGFRCR SERPLBLD CKD-EPI 2021: 37 ML/MIN/{1.73_M2}
EOSINOPHIL # BLD: 0.3 K/MCL (ref 0–0.5)
EOSINOPHIL NFR BLD: 3 %
ERYTHROCYTE [DISTWIDTH] IN BLOOD: 13.1 % (ref 11–15)
FASTING DURATION TIME PATIENT: ABNORMAL H
GLOBULIN SER-MCNC: 4 G/DL (ref 2–4)
GLUCOSE SERPL-MCNC: 107 MG/DL (ref 70–99)
HCT VFR BLD CALC: 44.2 % (ref 36–46.5)
HGB BLD-MCNC: 14.5 G/DL (ref 12–15.5)
IMM GRANULOCYTES # BLD AUTO: 0 K/MCL (ref 0–0.2)
IMM GRANULOCYTES # BLD: 0 %
LIPASE SERPL-CCNC: 54 UNITS/L (ref 15–77)
LYMPHOCYTES # BLD: 2.7 K/MCL (ref 1–4)
LYMPHOCYTES NFR BLD: 25 %
MCH RBC QN AUTO: 30.9 PG (ref 26–34)
MCHC RBC AUTO-ENTMCNC: 32.8 G/DL (ref 32–36.5)
MCV RBC AUTO: 94 FL (ref 78–100)
MONOCYTES # BLD: 1 K/MCL (ref 0.3–0.9)
MONOCYTES NFR BLD: 9 %
NEUTROPHILS # BLD: 6.9 K/MCL (ref 1.8–7.7)
NEUTROPHILS NFR BLD: 62 %
NRBC BLD MANUAL-RTO: 0 /100 WBC
PLATELET # BLD AUTO: 205 K/MCL (ref 140–450)
POTASSIUM SERPL-SCNC: 3.9 MMOL/L (ref 3.4–5.1)
PROT SERPL-MCNC: 7.7 G/DL (ref 6.4–8.2)
RBC # BLD: 4.7 MIL/MCL (ref 4–5.2)
SODIUM SERPL-SCNC: 139 MMOL/L (ref 135–145)
WBC # BLD: 11 K/MCL (ref 4.2–11)

## 2024-10-01 PROCEDURE — 10002800 HB RX 250 W HCPCS: Performed by: PHYSICIAN ASSISTANT

## 2024-10-01 PROCEDURE — 96361 HYDRATE IV INFUSION ADD-ON: CPT

## 2024-10-01 PROCEDURE — 99284 EMERGENCY DEPT VISIT MOD MDM: CPT

## 2024-10-01 PROCEDURE — 96375 TX/PRO/DX INJ NEW DRUG ADDON: CPT

## 2024-10-01 PROCEDURE — 85025 COMPLETE CBC W/AUTO DIFF WBC: CPT | Performed by: PHYSICIAN ASSISTANT

## 2024-10-01 PROCEDURE — 80053 COMPREHEN METABOLIC PANEL: CPT | Performed by: PHYSICIAN ASSISTANT

## 2024-10-01 PROCEDURE — 96374 THER/PROPH/DIAG INJ IV PUSH: CPT

## 2024-10-01 PROCEDURE — 10002807 HB RX 258: Performed by: PHYSICIAN ASSISTANT

## 2024-10-01 PROCEDURE — 83690 ASSAY OF LIPASE: CPT | Performed by: PHYSICIAN ASSISTANT

## 2024-10-01 PROCEDURE — G0378 HOSPITAL OBSERVATION PER HR: HCPCS

## 2024-10-01 RX ORDER — ALBUTEROL SULFATE 90 UG/1
2 INHALANT RESPIRATORY (INHALATION)
COMMUNITY
Start: 2024-04-18

## 2024-10-01 RX ORDER — PRAVASTATIN SODIUM 80 MG/1
80 TABLET ORAL NIGHTLY
COMMUNITY
Start: 2024-08-30

## 2024-10-01 RX ORDER — TORSEMIDE 10 MG/1
1 TABLET ORAL DAILY
COMMUNITY
Start: 2024-05-22

## 2024-10-01 RX ORDER — ATORVASTATIN CALCIUM 40 MG/1
TABLET, FILM COATED ORAL
COMMUNITY
End: 2024-10-02

## 2024-10-01 RX ORDER — APIXABAN 5 MG/1
TABLET, FILM COATED ORAL
COMMUNITY
Start: 2024-05-22

## 2024-10-01 RX ORDER — ESCITALOPRAM OXALATE 5 MG/1
1 TABLET ORAL DAILY
COMMUNITY
Start: 2024-08-30

## 2024-10-01 RX ORDER — ONDANSETRON 2 MG/ML
4 INJECTION INTRAMUSCULAR; INTRAVENOUS ONCE
Status: COMPLETED | OUTPATIENT
Start: 2024-10-01 | End: 2024-10-01

## 2024-10-01 RX ORDER — CARVEDILOL 12.5 MG/1
TABLET ORAL
COMMUNITY
End: 2024-10-02

## 2024-10-01 RX ORDER — CETIRIZINE HYDROCHLORIDE 10 MG/1
10 TABLET, CHEWABLE ORAL
COMMUNITY
End: 2024-10-02

## 2024-10-01 RX ORDER — LEVOTHYROXINE SODIUM 75 MCG
TABLET ORAL
COMMUNITY
Start: 2024-08-30

## 2024-10-01 RX ORDER — METOPROLOL SUCCINATE 50 MG/1
50 TABLET, EXTENDED RELEASE ORAL
COMMUNITY
Start: 2024-04-22 | End: 2024-10-02

## 2024-10-01 RX ORDER — IRBESARTAN 150 MG/1
TABLET ORAL
COMMUNITY
Start: 2024-08-30

## 2024-10-01 RX ORDER — DIGOXIN 125 MCG
TABLET ORAL
COMMUNITY
End: 2024-10-02

## 2024-10-01 RX ORDER — ORPHENADRINE CITRATE 100 MG/1
100 TABLET, EXTENDED RELEASE ORAL
COMMUNITY
Start: 2024-04-15 | End: 2024-10-02

## 2024-10-01 RX ORDER — GABAPENTIN 100 MG/1
CAPSULE ORAL
COMMUNITY
End: 2024-10-02

## 2024-10-01 RX ADMIN — ONDANSETRON 4 MG: 2 INJECTION INTRAMUSCULAR; INTRAVENOUS at 21:56

## 2024-10-01 RX ADMIN — SODIUM CHLORIDE 1000 ML: 9 INJECTION, SOLUTION INTRAVENOUS at 22:04

## 2024-10-01 RX ADMIN — MORPHINE SULFATE 4 MG: 4 INJECTION INTRAVENOUS at 21:56

## 2024-10-01 ASSESSMENT — ENCOUNTER SYMPTOMS
VOMITING: 1
FEVER: 0
NUMBNESS: 0
WEAKNESS: 0
ABDOMINAL PAIN: 0
BACK PAIN: 1

## 2024-10-01 ASSESSMENT — PAIN SCALES - GENERAL: PAINLEVEL_OUTOF10: 10

## 2024-10-02 LAB

## 2024-10-02 PROCEDURE — 10000002 HB ROOM CHARGE MED SURG

## 2024-10-02 PROCEDURE — 96361 HYDRATE IV INFUSION ADD-ON: CPT

## 2024-10-02 PROCEDURE — G0378 HOSPITAL OBSERVATION PER HR: HCPCS

## 2024-10-02 PROCEDURE — 10002801 HB RX 250 W/O HCPCS: Performed by: INTERNAL MEDICINE

## 2024-10-02 PROCEDURE — 87086 URINE CULTURE/COLONY COUNT: CPT | Performed by: EMERGENCY MEDICINE

## 2024-10-02 PROCEDURE — 10002803 HB RX 637: Performed by: EMERGENCY MEDICINE

## 2024-10-02 PROCEDURE — 81001 URINALYSIS AUTO W/SCOPE: CPT | Performed by: EMERGENCY MEDICINE

## 2024-10-02 PROCEDURE — 10002803 HB RX 637: Performed by: INTERNAL MEDICINE

## 2024-10-02 RX ORDER — ESCITALOPRAM OXALATE 10 MG/1
5 TABLET ORAL DAILY
Status: DISCONTINUED | OUTPATIENT
Start: 2024-10-02 | End: 2024-10-03 | Stop reason: HOSPADM

## 2024-10-02 RX ORDER — GABAPENTIN 100 MG/1
100 CAPSULE ORAL EVERY 8 HOURS SCHEDULED
Status: DISCONTINUED | OUTPATIENT
Start: 2024-10-02 | End: 2024-10-02

## 2024-10-02 RX ORDER — CHOLECALCIFEROL (VITAMIN D3) 50 MCG
1 TABLET ORAL DAILY
COMMUNITY

## 2024-10-02 RX ORDER — TORSEMIDE 10 MG/1
10 TABLET ORAL DAILY
Status: DISCONTINUED | OUTPATIENT
Start: 2024-10-02 | End: 2024-10-03 | Stop reason: HOSPADM

## 2024-10-02 RX ORDER — HYDROCODONE BITARTRATE AND ACETAMINOPHEN 5; 325 MG/1; MG/1
1 TABLET ORAL EVERY 6 HOURS PRN
Status: DISCONTINUED | OUTPATIENT
Start: 2024-10-02 | End: 2024-10-03 | Stop reason: HOSPADM

## 2024-10-02 RX ORDER — DIGOXIN 125 MCG
125 TABLET ORAL DAILY
Status: DISCONTINUED | OUTPATIENT
Start: 2024-10-02 | End: 2024-10-02

## 2024-10-02 RX ORDER — CARVEDILOL 12.5 MG/1
12.5 TABLET ORAL EVERY 12 HOURS SCHEDULED
Status: DISCONTINUED | OUTPATIENT
Start: 2024-10-02 | End: 2024-10-02

## 2024-10-02 RX ORDER — FEXOFENADINE HCL 180 MG/1
1 TABLET ORAL DAILY
COMMUNITY

## 2024-10-02 RX ORDER — MONTELUKAST SODIUM 10 MG/1
10 TABLET ORAL EVERY EVENING
COMMUNITY

## 2024-10-02 RX ORDER — SPIRONOLACTONE 25 MG/1
12.5 TABLET ORAL DAILY
Status: DISCONTINUED | OUTPATIENT
Start: 2024-10-02 | End: 2024-10-03 | Stop reason: HOSPADM

## 2024-10-02 RX ORDER — CALCIUM CARBONATE 500 MG/1
1000 TABLET, CHEWABLE ORAL ONCE
Status: COMPLETED | OUTPATIENT
Start: 2024-10-02 | End: 2024-10-02

## 2024-10-02 RX ORDER — LOSARTAN POTASSIUM 50 MG/1
50 TABLET ORAL DAILY
Status: DISCONTINUED | OUTPATIENT
Start: 2024-10-02 | End: 2024-10-03 | Stop reason: HOSPADM

## 2024-10-02 RX ORDER — MONTELUKAST SODIUM 10 MG/1
10 TABLET ORAL EVERY EVENING
Status: DISCONTINUED | OUTPATIENT
Start: 2024-10-02 | End: 2024-10-03 | Stop reason: HOSPADM

## 2024-10-02 RX ORDER — LEVOTHYROXINE SODIUM 75 UG/1
75 TABLET ORAL
Status: DISCONTINUED | OUTPATIENT
Start: 2024-10-02 | End: 2024-10-03 | Stop reason: HOSPADM

## 2024-10-02 RX ORDER — SPIRONOLACTONE 25 MG/1
0.5 TABLET ORAL DAILY
COMMUNITY

## 2024-10-02 RX ADMIN — APIXABAN 5 MG: 5 TABLET, FILM COATED ORAL at 20:55

## 2024-10-02 RX ADMIN — LEVOTHYROXINE SODIUM 75 MCG: 75 TABLET ORAL at 13:36

## 2024-10-02 RX ADMIN — TORSEMIDE 10 MG: 10 TABLET ORAL at 16:37

## 2024-10-02 RX ADMIN — SPIRONOLACTONE 12.5 MG: 25 TABLET, COATED ORAL at 16:37

## 2024-10-02 RX ADMIN — ANTACID TABLETS 1000 MG: 500 TABLET, CHEWABLE ORAL at 01:46

## 2024-10-02 RX ADMIN — LOSARTAN POTASSIUM 50 MG: 50 TABLET, FILM COATED ORAL at 13:36

## 2024-10-02 RX ADMIN — HYDROCODONE BITARTRATE AND ACETAMINOPHEN 1 TABLET: 5; 325 TABLET ORAL at 22:35

## 2024-10-02 RX ADMIN — MONTELUKAST SODIUM 10 MG: 10 TABLET, FILM COATED ORAL at 20:55

## 2024-10-02 RX ADMIN — APIXABAN 5 MG: 5 TABLET, FILM COATED ORAL at 13:36

## 2024-10-02 RX ADMIN — ESCITALOPRAM OXALATE 5 MG: 10 TABLET ORAL at 13:36

## 2024-10-02 SDOH — ECONOMIC STABILITY: TRANSPORTATION INSECURITY
IN THE PAST 12 MONTHS, HAS LACK OF RELIABLE TRANSPORTATION KEPT YOU FROM MEDICAL APPOINTMENTS, MEETINGS, WORK OR FROM GETTING THINGS NEEDED FOR DAILY LIVING?: NO

## 2024-10-02 SDOH — SOCIAL STABILITY: SOCIAL NETWORK
HOW OFTEN DO YOU SEE OR TALK TO PEOPLE THAT YOU CARE ABOUT AND FEEL CLOSE TO? (FOR EXAMPLE: TALKING TO FRIENDS ON THE PHONE, VISITING FRIENDS OR FAMILY, GOING TO CHURCH OR CLUB MEETINGS): 5 OR MORE TIMES A WEEK

## 2024-10-02 SDOH — ECONOMIC STABILITY: HOUSING INSECURITY: DO YOU HAVE PROBLEMS WITH ANY OF THE FOLLOWING?: NONE OF THE ABOVE

## 2024-10-02 SDOH — ECONOMIC STABILITY: INCOME INSECURITY: IN THE PAST 12 MONTHS, HAS THE ELECTRIC, GAS, OIL, OR WATER COMPANY THREATENED TO SHUT OFF SERVICE IN YOUR HOME?: NO

## 2024-10-02 SDOH — HEALTH STABILITY: GENERAL: BECAUSE OF A PHYSICAL, MENTAL, OR EMOTIONAL CONDITION, DO YOU HAVE DIFFICULTY DOING ERRANDS ALONE?: NO

## 2024-10-02 SDOH — HEALTH STABILITY: PHYSICAL HEALTH: DO YOU HAVE SERIOUS DIFFICULTY WALKING OR CLIMBING STAIRS?: YES

## 2024-10-02 SDOH — HEALTH STABILITY: GENERAL
BECAUSE OF A PHYSICAL, MENTAL, OR EMOTIONAL CONDITION, DO YOU HAVE SERIOUS DIFFICULTY CONCENTRATING, REMEMBERING OR MAKING DECISIONS?: NO

## 2024-10-02 SDOH — ECONOMIC STABILITY: HOUSING INSECURITY: WHAT IS YOUR LIVING SITUATION TODAY?: I HAVE A STEADY PLACE TO LIVE

## 2024-10-02 SDOH — ECONOMIC STABILITY: GENERAL: WOULD YOU LIKE HELP WITH ANY OF THE FOLLOWING NEEDS?: I DON'T WANT HELP WITH ANY OF THESE

## 2024-10-02 SDOH — ECONOMIC STABILITY: FOOD INSECURITY: WITHIN THE PAST 12 MONTHS, THE FOOD YOU BOUGHT JUST DIDN'T LAST AND YOU DIDN'T HAVE MONEY TO GET MORE.: NEVER TRUE

## 2024-10-02 SDOH — ECONOMIC STABILITY: GENERAL

## 2024-10-02 ASSESSMENT — PATIENT HEALTH QUESTIONNAIRE - PHQ9
IS PATIENT ABLE TO COMPLETE PHQ2 OR PHQ9: YES
SUM OF ALL RESPONSES TO PHQ9 QUESTIONS 1 AND 2: 0
CLINICAL INTERPRETATION OF PHQ2 SCORE: NO FURTHER SCREENING NEEDED
2. FEELING DOWN, DEPRESSED OR HOPELESS: NOT AT ALL
SUM OF ALL RESPONSES TO PHQ9 QUESTIONS 1 AND 2: 0
1. LITTLE INTEREST OR PLEASURE IN DOING THINGS: NOT AT ALL

## 2024-10-02 ASSESSMENT — ENCOUNTER SYMPTOMS
GASTROINTESTINAL NEGATIVE: 1
FATIGUE: 1
ACTIVITY CHANGE: 1
BACK PAIN: 1
NEUROLOGICAL NEGATIVE: 1
RESPIRATORY NEGATIVE: 1

## 2024-10-02 ASSESSMENT — ACTIVITIES OF DAILY LIVING (ADL)
RECENT_DECLINE_ADL: YES, DECLINE IN BATHING/DRESSING/FEEDING, COLLABORATE WITH PROVIDER (T);YES, DECLINE IN AMBULATION/TRANSFERRING, COLLABORATE WITH PROVIDER (T)
ADL_BEFORE_ADMISSION: INDEPENDENT
ADL_SCORE: 12
ADL_SHORT_OF_BREATH: NO

## 2024-10-02 ASSESSMENT — ORIENTATION MEMORY CONCENTRATION TEST (OMCT)
COUNT BACKWARDS FROM 20 TO 1: CORRECT
REPEAT THE NAME AND ADDRESS I ASKED YOU TO REMEMBER: 1 ERROR
OMCT INTERPRETATION: 0-6: NO SIGNIFICANT IMPAIRMENT
SAY THE MONTHS IN REVERSE ORDER STARTING WITH LAST MONTH: 1 ERROR
WHAT TIME IS IT (NO WATCH OR CLOCK): CORRECT
OMCT SCORE: 4
WHAT MONTH IS IT NOW: CORRECT
WHAT YEAR IS IT NOW (MUST BE EXACT): CORRECT

## 2024-10-02 ASSESSMENT — COLUMBIA-SUICIDE SEVERITY RATING SCALE - C-SSRS
IS THE PATIENT ABLE TO COMPLETE C-SSRS: YES
6. HAVE YOU EVER DONE ANYTHING, STARTED TO DO ANYTHING, OR PREPARED TO DO ANYTHING TO END YOUR LIFE?: NO
1. WITHIN THE PAST MONTH, HAVE YOU WISHED YOU WERE DEAD OR WISHED YOU COULD GO TO SLEEP AND NOT WAKE UP?: NO
2. HAVE YOU ACTUALLY HAD ANY THOUGHTS OF KILLING YOURSELF?: NO

## 2024-10-02 ASSESSMENT — LIFESTYLE VARIABLES
ALCOHOL_USE_STATUS: NO OR LOW RISK WITH VALIDATED TOOL
HOW OFTEN DO YOU HAVE A DRINK CONTAINING ALCOHOL: MONTHLY OR LESS
HOW OFTEN DO YOU HAVE 6 OR MORE DRINKS ON ONE OCCASION: NEVER
HOW MANY STANDARD DRINKS CONTAINING ALCOHOL DO YOU HAVE ON A TYPICAL DAY: 0,1 OR 2
AUDIT-C TOTAL SCORE: 1

## 2024-10-02 ASSESSMENT — PAIN SCALES - GENERAL
PAINLEVEL_OUTOF10: 6
PAINLEVEL_OUTOF10: 0
PAINLEVEL_OUTOF10: 0

## 2024-10-02 ASSESSMENT — COGNITIVE AND FUNCTIONAL STATUS - GENERAL: DO YOU HAVE DIFFICULTY DRESSING OR BATHING: NO

## 2024-10-03 ENCOUNTER — PATIENT MESSAGE (OUTPATIENT)
Dept: FAMILY MEDICINE CLINIC | Facility: CLINIC | Age: 84
End: 2024-10-03

## 2024-10-03 VITALS
RESPIRATION RATE: 17 BRPM | SYSTOLIC BLOOD PRESSURE: 123 MMHG | HEIGHT: 62 IN | DIASTOLIC BLOOD PRESSURE: 76 MMHG | TEMPERATURE: 97.9 F | WEIGHT: 232.59 LBS | BODY MASS INDEX: 42.8 KG/M2 | OXYGEN SATURATION: 95 % | HEART RATE: 72 BPM

## 2024-10-03 LAB — BACTERIA UR CULT: NORMAL

## 2024-10-03 PROCEDURE — 10002801 HB RX 250 W/O HCPCS: Performed by: INTERNAL MEDICINE

## 2024-10-03 PROCEDURE — G0378 HOSPITAL OBSERVATION PER HR: HCPCS

## 2024-10-03 PROCEDURE — 10002803 HB RX 637: Performed by: INTERNAL MEDICINE

## 2024-10-03 RX ORDER — CETIRIZINE HYDROCHLORIDE 10 MG/1
5 TABLET ORAL DAILY
Status: DISCONTINUED | OUTPATIENT
Start: 2024-10-03 | End: 2024-10-03 | Stop reason: HOSPADM

## 2024-10-03 RX ADMIN — TORSEMIDE 10 MG: 10 TABLET ORAL at 09:32

## 2024-10-03 RX ADMIN — SPIRONOLACTONE 12.5 MG: 25 TABLET, COATED ORAL at 09:31

## 2024-10-03 RX ADMIN — APIXABAN 5 MG: 5 TABLET, FILM COATED ORAL at 09:32

## 2024-10-03 RX ADMIN — ESCITALOPRAM OXALATE 5 MG: 10 TABLET ORAL at 09:32

## 2024-10-03 RX ADMIN — LOSARTAN POTASSIUM 50 MG: 50 TABLET, FILM COATED ORAL at 09:32

## 2024-10-03 RX ADMIN — LEVOTHYROXINE SODIUM 75 MCG: 75 TABLET ORAL at 05:45

## 2024-10-03 RX ADMIN — CETIRIZINE HYDROCHLORIDE 5 MG: 10 TABLET ORAL at 09:31

## 2024-10-03 SDOH — SOCIAL STABILITY: SOCIAL INSECURITY: HOW OFTEN DOES ANYONE, INCLUDING FAMILY AND FRIENDS, THREATEN YOU WITH HARM?: NEVER

## 2024-10-03 SDOH — SOCIAL STABILITY: SOCIAL INSECURITY: HOW OFTEN DOES ANYONE, INCLUDING FAMILY AND FRIENDS, INSULT OR TALK DOWN TO YOU?: NEVER

## 2024-10-03 SDOH — SOCIAL STABILITY: SOCIAL INSECURITY: HOW OFTEN DOES ANYONE, INCLUDING FAMILY AND FRIENDS, SCREAM OR CURSE AT YOU?: NEVER

## 2024-10-03 SDOH — SOCIAL STABILITY: SOCIAL INSECURITY: HOW OFTEN DOES ANYONE, INCLUDING FAMILY AND FRIENDS, PHYSICALLY HURT YOU?: NEVER

## 2024-10-03 ASSESSMENT — PAIN SCALES - GENERAL
PAINLEVEL_OUTOF10: 0
PAINLEVEL_OUTOF10: 0

## 2024-10-04 ENCOUNTER — PATIENT MESSAGE (OUTPATIENT)
Dept: FAMILY MEDICINE CLINIC | Facility: CLINIC | Age: 84
End: 2024-10-04

## 2024-10-04 ENCOUNTER — OFFICE VISIT (OUTPATIENT)
Dept: FAMILY MEDICINE CLINIC | Facility: CLINIC | Age: 84
End: 2024-10-04
Payer: MEDICARE

## 2024-10-04 VITALS
HEART RATE: 72 BPM | TEMPERATURE: 97 F | DIASTOLIC BLOOD PRESSURE: 68 MMHG | HEIGHT: 62 IN | WEIGHT: 216 LBS | RESPIRATION RATE: 16 BRPM | BODY MASS INDEX: 39.75 KG/M2 | SYSTOLIC BLOOD PRESSURE: 110 MMHG

## 2024-10-04 DIAGNOSIS — M48.061 NEUROFORAMINAL STENOSIS OF LUMBAR SPINE: ICD-10-CM

## 2024-10-04 DIAGNOSIS — M51.46: ICD-10-CM

## 2024-10-04 DIAGNOSIS — M54.50 SEVERE LOW BACK PAIN: ICD-10-CM

## 2024-10-04 DIAGNOSIS — M41.86 LEVOSCOLIOSIS OF LUMBAR SPINE: ICD-10-CM

## 2024-10-04 DIAGNOSIS — Z98.890 HISTORY OF LUMBAR LAMINECTOMY FOR SPINAL CORD DECOMPRESSION: ICD-10-CM

## 2024-10-04 DIAGNOSIS — Z09 HOSPITAL DISCHARGE FOLLOW-UP: Primary | ICD-10-CM

## 2024-10-04 PROCEDURE — 99214 OFFICE O/P EST MOD 30 MIN: CPT | Performed by: STUDENT IN AN ORGANIZED HEALTH CARE EDUCATION/TRAINING PROGRAM

## 2024-10-04 PROCEDURE — G2211 COMPLEX E/M VISIT ADD ON: HCPCS | Performed by: STUDENT IN AN ORGANIZED HEALTH CARE EDUCATION/TRAINING PROGRAM

## 2024-10-04 RX ORDER — LIDOCAINE PAIN RELIEF 40 MG/1000MG
PATCH TOPICAL
COMMUNITY
Start: 2024-04-15

## 2024-10-04 RX ORDER — ORPHENADRINE CITRATE 100 MG/1
100 TABLET, EXTENDED RELEASE ORAL 2 TIMES DAILY PRN
Qty: 90 TABLET | Refills: 0 | Status: SHIPPED | OUTPATIENT
Start: 2024-10-04

## 2024-10-04 RX ORDER — ORPHENADRINE CITRATE 100 MG/1
100 TABLET ORAL 2 TIMES DAILY PRN
Qty: 90 TABLET | Refills: 0 | Status: CANCELLED | OUTPATIENT
Start: 2024-10-04

## 2024-10-04 RX ORDER — IBUPROFEN 600 MG/1
1 TABLET, FILM COATED ORAL EVERY 8 HOURS PRN
COMMUNITY
Start: 2024-09-27 | End: 2024-10-04

## 2024-10-04 NOTE — TELEPHONE ENCOUNTER
Called and spoke with patient's daughter and she states patient is feeling better this morning and would prefer to come in today versus ER.  Per daughter she would like to discuss care of plan for the chronic back pain.    Discussed with Dr. Amaro and she offered an appointment for 245 PM today.    Daughter agreed with appointment.  Scheduled.

## 2024-10-04 NOTE — PROGRESS NOTES
[facility] Family Medicine Note  10/04/24    Chief Complaint   Patient presents with    Back Pain     Especially Lower left      HPI:   Joni Christiansen is a 84 year old female who presents for ER and hospital follow up.    Patient presents for back pain and has been to the ER 3 times in the last 8 day and on the last ER visit she was admitted for two days for pain control.  She was most recently discharged home on 10/3/2024 and then pain recurred.     L2-L5 with disc bulge resulting in canal stenosis.     Left low back has been flared up. Feels it can flare up. She had been shredding papers and unpacking. Normally would take tylenol or hydrocodone. This started last Monday, was taking heating pad. In ER had valium and it flared again. Went to different ER and was given a shot. Went home and then went back to ER again. Was given morphine and was admitted. Pain feels very sharp like an attack, will throw up. The pain gets up to a 10/10. Better with limited movement. Has mild pain if not moving.     Took orphenadrine and hydrocodone helped last night.     Would rather use heating pad instead of lidocaine.    No fevers, chest pain, shortness of breath.     CT lumbar spine wo contrast 9/2/24:  CT OF THE LUMBAR SPINE     HISTORY: Lower back pain.     TECHNIQUE: CT of the lumbar spine was performed without intravenous   contrast.  Coronal and sagittal reformats were obtained. Adjustment of the   mA and/or kV was done according to the patient's size.   COMPARISON: None   FINDINGS:   Bones are demineralized, limiting sensitivity. Levoscoliosis of the lumbar   spine with apex at approximately L1-2. 5 lumbar-type nonrib-bearing   vertebral bodies are evident. For the purposes of this exam the last   intervertebral disc space available L5-S1.     There is disc space narrowing at the lumbar spine with anterior osteophytic   spurring. There is also multilevel Schmorl's nodes. There is subchondral   sclerosis  at L4-5 most notably.     No evidence of acute compression fracture or traumatic malalignment. No   evidence of disc disruption or facet dislocation.     Sequela of prior laminectomy/decompression at L3-4.     L1-L2:  No herniated discs. No central canal or neuroforamina stenosis   seen.   L2-L3: Small disc bulge resulting in mild central canal stenosis. Bilateral   facet hypertrophy resulting in severe right and moderate left   neuroforaminal stenosis.   L3-L4: Diffuse disc bulge resulting in central canal stenosis. Bilateral   facet hypertrophy resulting bilateral neural foraminal stenosis.   L4-L5: Disc bulge and facet hypertrophy resulting in central canal stenosis   and bilateral neuroforaminal stenosis.   L5-S1:  No herniated discs. No central canal or neuroforamina stenosis   seen.     Imaged portions of the peritoneum are negative for pathologic dilatation of   bowel loops. No retroperitoneal hemorrhage. Aortic and branch vessel   atherosclerosis.     Impression:  1. No acute fracture or traumatic malalignment.   2. Levoscoliosis of the lumbar spine with multilevel degenerative disc and   facet changes as detailed above.   3. Prior laminectomy at L3-4.   4. Very degrees of central canal and neural foraminal stenosis as above.   5. If there is clinical concern for acute radiculopathy, recommend MRI.     Electronically Signed by: DADA BAEZ MD   Signed on: 9/27/2024 8:27 AM   Workstation ID: [id number]         Wt Readings from Last 6 Encounters:   10/04/24 216 lb (98 kg)   08/30/24 223 lb (101.2 kg)   06/20/24 220 lb (99.8 kg)   04/24/24 214 lb 12.8 oz (97.4 kg)   04/22/24 209 lb 9.6 oz (95.1 kg)   04/20/24 220 lb (99.8 kg)       Past Medical History:    (HFpEF) heart failure with preserved ejection fraction (HCC)    Atrial fibrillation (HCC)    Back pain    CKD (chronic kidney disease)    Colon cancer (HCC)    Congestive heart disease (HCC)    Congestive heart failure (HCC)    Congestive heart  failure, unspecified HF chronicity, unspecified heart failure type (HCC)    Deep vein thrombosis (HCC)    Diabetes (HCC)    Essential hypertension    History of blood clots    HTN (hypertension)    Hyperlipidemia    Hypothyroidism    Obesity    Pulmonary embolism (HCC)    Sleep apnea    TIA (transient ischemic attack)    Visual impairment     Past Surgical History:   Procedure Laterality Date    Back surgery      Colectomy      Colonoscopy      Excis lumbar disk,one level      Hip replacement surgery      Knee replacement surgery            Total hip replacement      Total knee replacement Bilateral      Allergies   Allergen Reactions    Penicillins UNKNOWN and OTHER (SEE COMMENTS)     Other reaction(s): Unknown    Was told as a child it was an allergy      [] ibuprofen 600 MG Oral Tab Take 1 tablet (600 mg total) by mouth every 8 (eight) hours as needed.      LIDOCAINE PAIN RELIEF 4 % External Patch APPLY ONE PATCH TOPICALLY ONCE A DAY FOR 10 DAYS - 12 HOURS ON AND 12 HOURS OFF]      orphenadrine  MG Oral Tablet 12 Hr Take 100 mg by mouth 2 (two) times daily as needed. 90 tablet 0    CALCIUM CARBONATE 1500 (600 Ca) MG Oral Tab TAKE 1 TABLET BY MOUTH DAILY 90 tablet 0    24HR ALLERGY RELIEF 180 MG Oral Tab TAKE 1 TABLET BY MOUTH DAILY 90 tablet 0    Irbesartan 150 MG Oral Tab Take 1 tablet (150 mg total) by mouth daily. 90 tablet 1    levothyroxine 75 MCG Oral Tab Take 1 tablet (75 mcg total) by mouth every morning. 90 tablet 1    HYDROcodone-acetaminophen 5-325 MG Oral Tab Take 1 tablet by mouth every 4 to 6 hours as needed for Pain. 60 tablet 0    escitalopram 5 MG Oral Tab Take 1 tablet (5 mg total) by mouth daily. 90 tablet 1    Pravastatin Sodium 80 MG Oral Tab Take 1 tablet (80 mg total) by mouth every evening. 90 tablet 1    MONTELUKAST 10 MG Oral Tab TAKE 1 TABLET BY MOUTH EVERY EVENING 90 tablet 0    CHOLECALCIFEROL 50 MCG (2000 UT) Oral Cap TAKE 1 CAPSULE BY MOUTH DAILY 90 capsule 1     ELIQUIS 5 MG Oral Tab TAKE 1 TABLET BY MOUTH TWICE DAILY 180 tablet 1    SPIRONOLACTONE 25 MG Oral Tab TAKE 1/2 TABLET BY MOUTH DAILY 45 tablet 1    TORSEMIDE 10 MG Oral Tab TAKE 1 TABLET BY MOUTH DAILY 90 tablet 1    B-12 1000 MCG Oral Tab CR TAKE 1 TABLET BY MOUTH DAILY 90 tablet 1    albuterol 108 (90 Base) MCG/ACT Inhalation Aero Soln Inhale 2 puffs into the lungs every 4 to 6 hours as needed for Wheezing or Shortness of Breath. 1 each 3    triamcinolone 0.1 % External Cream Apply topically 2 (two) times daily as needed. Can use on legs and hands 80 g 2    lidocaine 5 % External Patch Place 1 patch onto the skin daily as needed. 30 each 2     Social History     Socioeconomic History    Marital status:    Tobacco Use    Smoking status: Never     Passive exposure: Never    Smokeless tobacco: Never   Vaping Use    Vaping status: Never Used   Substance and Sexual Activity    Alcohol use: Not Currently    Drug use: Never   Social History Narrative    Retired  for >30 years.  [employer]. Daughter Charito lives in [place].      Social Determinants of Health     Financial Resource Strain: Low Risk  (10/2/2024)    Received from [facility]    Financial Resource Strain     In the past year, have you or any family members you live with been unable to get any of the following when it was really needed? Check all that apply.: None   Food Insecurity: Low Risk  (10/2/2024)    Received from [facility]    Food Insecurity     Within the past 12 months, you worried that your food would run out before you got money to buy more.  : Never true     Within the past 12 months, the food you bought just didn't last and you didn't have money to get more. : Never true   Transportation Needs: Not At Risk (10/2/2024)    Received from [facility]    Transportation Needs     In the past 12 months, has lack of reliable transportation kept you from medical appointments, meetings,  work or from getting things needed for daily living? : No   Social Connections: Low Risk  (10/2/2024)    Received from [facility]    Social Connections     How often do you see or talk to people that you care about and feel close to? (For example: talking to friends on the phone, visiting friends or family, going to [employer]uc[employer] or club meetings): 5 or more times a week   Housing Stability: Low Risk  (4/20/2024)    Housing Stability     Housing Instability: No     Counseling given: Not Answered    Family History   Problem Relation Age of Onset    Other (other) Father         congestive heart failure    Hypertension Maternal Grandmother      Family Status   Relation Status    Fa (Not Specified)    MGMA (Not Specified)        REVIEW OF SYSTEMS:   See HPI    EXAM:   /68   Pulse 72   Temp 97.2 °F (36.2 °C) (Temporal)   Resp 16   Ht 5' 2\" (1.575 m)   Wt 216 lb (98 kg)   BMI 39.51 kg/m²  Estimated body mass index is 39.51 kg/m² as calculated from the following:    Height as of this encounter: 5' 2\" (1.575 m).    Weight as of this encounter: 216 lb (98 kg).   Vital signs reviewed. Appears stated age, well groomed.  Physical Exam:  GEN:  Patient is alert and oriented x3, no apparent distress  HEAD:  Normocephalic, atraumatic  HEENT:  no scleral icterus, conjunctivae clear bilaterally, EOMI, PERRLA, OP clear  LUNGS: clear to auscultation bilaterally, no rales/rhonchi/wheezing  HEART:  Regular rate and rhythm, normal S1/S2, no murmurs, rubs or gallops  ABDOMEN:  Bowel sounds normal, soft, nondistended, nontender, no hepatosplenomegaly  EXTREMITIES:  Moves all extremities well  BACK: limited exam due to pain, no point tenderness but there is hypertonicity of left lumbar paraspinals  NEURO:  CN 2 - 12 grossly intact, gait slow      ASSESSMENT AND PLAN:   1. Hospital discharge follow-up  Patient presents for multiple ER visit follow up and hospital discharge follow up for severe low back pain with known hx of  lumbar laminectomy in the setting of levoscoliosis, schmorl nodes, neuroforaminal stenosis of lumbar spine. CT imaging showed diffuse disc bulge with central canal stenosis. Due to remitting and relapsing severe pain despite multiple pain medications will pursue MRI lumbar spine to evaluate further and direct care. In the meantime can continue pain control with muscle relaxer and hydrocodone as needed. Monitor for drowsiness. Will also refer to pain management for consideration of minimally invasive treatment if no contraindication on imaging. To ER if any loss of bowel or bladder control. To ER if severe pain returns. Patient and patient's daughter verbalized understanding.    2. Severe low back pain  - orphenadrine  MG Oral Tablet 12 Hr; Take 100 mg by mouth 2 (two) times daily as needed.  Dispense: 90 tablet; Refill: 0  - MRI SPINE LUMBAR (CPT=72148); Future  - Pain Management Referral - In Network    3. Levoscoliosis of lumbar spine  - orphenadrine  MG Oral Tablet 12 Hr; Take 100 mg by mouth 2 (two) times daily as needed.  Dispense: 90 tablet; Refill: 0  - MRI SPINE LUMBAR (CPT=72148); Future  - Pain Management Referral - In Network    4. Schmorl nodes of lumbar region  - orphenadrine  MG Oral Tablet 12 Hr; Take 100 mg by mouth 2 (two) times daily as needed.  Dispense: 90 tablet; Refill: 0  - MRI SPINE LUMBAR (CPT=72148); Future  - Pain Management Referral - In Network    5. History of lumbar laminectomy for spinal cord decompression  - orphenadrine  MG Oral Tablet 12 Hr; Take 100 mg by mouth 2 (two) times daily as needed.  Dispense: 90 tablet; Refill: 0  - MRI SPINE LUMBAR (CPT=72148); Future  - Pain Management Referral - In Network    6. Neuroforaminal stenosis of lumbar spine  - orphenadrine  MG Oral Tablet 12 Hr; Take 100 mg by mouth 2 (two) times daily as needed.  Dispense: 90 tablet; Refill: 0  - MRI SPINE LUMBAR (CPT=72148); Future  - Pain Management Referral - In  Network        Meds & Refills for this Visit:  Requested Prescriptions     Signed Prescriptions Disp Refills    orphenadrine  MG Oral Tablet 12 Hr 90 tablet 0     Sig: Take 100 mg by mouth 2 (two) times daily as needed.       Stop Taking                acetaminophen 500 MG Oral Tab    Take 2 tablets (1,000 mg total) by mouth every 6 (six) hours as needed for Pain.            Health Maintenance:  Health Maintenance Due   Topic Date Due    Diabetes Care Dilated Eye Exam  Never done    MA Annual Health Assessment  01/01/2024    COVID-19 Vaccine (8 - 2023-24 season) 09/01/2024    Diabetes Care A1C  09/21/2024    Influenza Vaccine (1) 10/01/2024       Patient/Caregiver Education: There are no barriers to learning. Medical education done.   Outcome: Patient verbalizes understanding. Patient is notified to call with any questions, complications, allergies, or worsening or changing symptoms.  Patient is to call with any side effects or complications from the treatments as a result of today.     Problem List:  Patient Active Problem List   Diagnosis    Hypoxia    Dyspnea, unspecified type    Diabetes (HCC)    Essential hypertension    Hypercholesterolemia    Age-related osteoporosis without current pathological fracture    Allergic rhinitis    Atrophy of vagina    Chronic renal insufficiency, stage III (moderate) (HCC)    Chronic right shoulder pain    Gallstones    Hypercalcemia    Hyperuricuria    Low HDL (under 40)    Lumbar degenerative disc disease    Lymphedema    Nontraumatic incomplete tear of right rotator cuff    Obesity    Onychomycosis    ELY (obstructive sleep apnea)    Atrial fibrillation (HCC)    Postmenopausal bleeding    Primary hypothyroidism    Primary osteoarthritis of right hip    Secondary hyperparathyroidism (HCC)    Senile tremor    Spinal stenosis at L4-L5 level    Spinal stenosis of lumbar region with neurogenic claudication    Vitreous floaters of right eye    Diet-controlled diabetes  mellitus (HCC)    Hypertension, essential, benign    History of TIA (transient ischemic attack)    History of DVT (deep vein thrombosis)    History of pulmonary embolus (PE)    Type 2 diabetes mellitus with diabetic chronic kidney disease (HCC)    Morbid (severe) obesity due to excess calories (HCC)    Body mass index (BMI) 40.0-44.9, adult (HCC)    Osteopenia of neck of left femur    Acute on chronic heart failure (HCC)    Hypotension    Chronic diastolic heart failure (HCC)    Acute renal insufficiency    Hypotension, unspecified hypotension type    General weakness    Frequent falls    Levoscoliosis of lumbar spine    Thrombocytopenia (HCC)    CKD (chronic kidney disease) stage 4, GFR 15-29 ml/min (HCC)    Acute and chronic respiratory failure with hypercapnia (HCC)    AC joint arthropathy    Osteophyte of left shoulder    Osteoarthritis of left glenohumeral joint    CHF (congestive heart failure) (HCC)    Acute on chronic congestive heart failure, unspecified heart failure type (HCC)    Acute on chronic heart failure with preserved ejection fraction (HCC)       Return for pending results.    Amy Amaro MD  [facility]  10/04/24      Please note that portions of this note may have been completed with a voice recognition program. Efforts were made to edit the dictations but occasionally words are mis-transcribed. Thank you for your understanding.

## 2024-10-04 NOTE — TELEPHONE ENCOUNTER
Last office visit: 10/4/24  Next office visit: n/a  Last Refill:4/15/24    Requested Prescriptions     Pending Prescriptions Disp Refills    orphenadrine  MG Oral Tablet 12 Hr 90 tablet 0     Sig: Take 100 mg by mouth 2 (two) times daily as needed.       Please authorize if acceptable.   Thank you!

## 2024-10-04 NOTE — TELEPHONE ENCOUNTER
From: Joni Christiansen  To: Amy DEVONNeelam Anabelletray  Sent: 10/3/2024 7:49 PM CDT  Subject: Back pain    Hi Dr. Amaro,    My mom has been in the hospital for the past two days due to the pain in her back. She was discharged today and in less than 8 hours she’s having back pain again. She was ok in the hospital but nothing was said about how we can go about stopping the pain from happening again.   They said to follow-up with you so I’m asking is there anyway we can see you tomorrow (Friday). I think an MRI maybe to see what’s happening, what changes have occurred over the years, etc. They did a CAT scan the second time we were in the ER. She’s been to the ER three times in the last week and if her back doesn’t get better tonight we may head back there again.     Thank you!!   Charito

## 2024-10-04 NOTE — TELEPHONE ENCOUNTER
I'm sorry to hear about all of this. I can double book at end of day today but for severe pain I would still recommend ER evaluation. MRI would give more information but it is typically not done STAT. Severe pain may need IV medications instead of oral medications which can be given in hospital setting. Let me know either way. Thank you.

## 2024-10-05 ENCOUNTER — MED REC SCAN ONLY (OUTPATIENT)
Dept: FAMILY MEDICINE CLINIC | Facility: CLINIC | Age: 84
End: 2024-10-05

## 2024-10-05 ENCOUNTER — TELEPHONE (OUTPATIENT)
Dept: CARE COORDINATION | Age: 84
End: 2024-10-05

## 2024-10-06 ENCOUNTER — TELEPHONE (OUTPATIENT)
Dept: CARE COORDINATION | Age: 84
End: 2024-10-06

## 2024-10-07 NOTE — TELEPHONE ENCOUNTER
From: Joni Christiansen  To: Amy Amaro  Sent: 10/4/2024 9:41 PM CDT  Subject: Forgot to ask    Hi Dr. Amaro,    I meant to ask two other questions. Where I live will be providing shots. I wanted to make sure what shots I should get and can I get them all at the same time. They are offering Covid, Shingles, RSV, and Flu.    Second question: I misplaced the paperwork for the handicap sign. Any chance I can get another copy?     Thank you,  Joni

## 2024-10-07 NOTE — TELEPHONE ENCOUNTER
The DMV paperwork is scanned into media 9/12/24.    Patient is up to date with shingles vaccine and RSV vaccine. No need to repeat these two.    I recommend getting the covid vaccine then waiting two weeks and getting the flu vaccine. Then will be up to date.     Thank you.

## 2024-10-12 ENCOUNTER — TELEPHONE (OUTPATIENT)
Dept: CARE COORDINATION | Age: 84
End: 2024-10-12

## 2024-10-13 ENCOUNTER — TELEPHONE (OUTPATIENT)
Dept: CARE COORDINATION | Age: 84
End: 2024-10-13

## 2024-10-15 DIAGNOSIS — L29.9 PRURITUS: ICD-10-CM

## 2024-10-15 RX ORDER — MONTELUKAST SODIUM 10 MG/1
10 TABLET ORAL EVERY EVENING
Qty: 90 TABLET | Refills: 1 | Status: SHIPPED | OUTPATIENT
Start: 2024-10-15

## 2024-10-15 NOTE — TELEPHONE ENCOUNTER
Last Office Visit: 10/04/2024 for hfu    Last Refill:   Medication Quantity Refills Start End   MONTELUKAST 10 MG Oral Tab 90 tablet 0 7/15/2024 --     Return to Clinic: n/a    Protocol: failed    Refill pended. Please approve if okay. Thank you.

## 2024-10-16 DIAGNOSIS — L29.9 PRURITUS: ICD-10-CM

## 2024-10-16 RX ORDER — MONTELUKAST SODIUM 10 MG/1
10 TABLET ORAL EVERY EVENING
Qty: 90 TABLET | Refills: 0 | OUTPATIENT
Start: 2024-10-16

## 2024-10-18 ENCOUNTER — HOSPITAL ENCOUNTER (OUTPATIENT)
Age: 84
Discharge: HOME | End: 2024-10-18
Payer: MEDICARE

## 2024-10-18 VITALS
BODY MASS INDEX: 38.98 KG/M2 | WEIGHT: 220 LBS | OXYGEN SATURATION: 96 % | HEIGHT: 63 IN | HEART RATE: 106 BPM | RESPIRATION RATE: 22 BRPM | SYSTOLIC BLOOD PRESSURE: 155 MMHG | DIASTOLIC BLOOD PRESSURE: 81 MMHG | TEMPERATURE: 98 F

## 2024-10-18 DIAGNOSIS — H11.31 SUBCONJUNCTIVAL HEMATOMA, RIGHT: Primary | ICD-10-CM

## 2024-10-18 PROCEDURE — 99212 OFFICE O/P EST SF 10 MIN: CPT

## 2024-10-18 NOTE — ED INITIAL ASSESSMENT (HPI)
Right eye sclera with subconjunctival hemorrhage, did not even know uit was there, daughter noticed it . Denies vision changes or pain

## 2024-10-18 NOTE — ED PROVIDER NOTES
Patient Seen in: [facility]      History     Chief Complaint   Patient presents with    Eye Visual Problem     Stated Complaint: Eye redness    Subjective:   HPI    84-year-old female who comes in today with multiple comorbidities including atrial fibrillation chronic kidney disease congestive heart failure diabetes and DVT currently on Eliquis complaining of right eye redness.  Patient states that her daughter noticed that today when she came over she has no pain irritation.  Denies any other symptoms at this time.      Objective:     Past Medical History:    (HFpEF) heart failure with preserved ejection fraction (HCC)    Atrial fibrillation (HCC)    Back pain    CKD (chronic kidney disease)    Colon cancer (HCC)    Congestive heart disease (HCC)    Congestive heart failure (HCC)    Congestive heart failure, unspecified HF chronicity, unspecified heart failure type (HCC)    Deep vein thrombosis (HCC)    Diabetes (HCC)    Essential hypertension    History of blood clots    HTN (hypertension)    Hyperlipidemia    Hypothyroidism    Obesity    Pulmonary embolism (HCC)    Sleep apnea    TIA (transient ischemic attack)    Visual impairment              Past Surgical History:   Procedure Laterality Date    Back surgery      Colectomy      Colonoscopy      Excis lumbar disk,one level      Hip replacement surgery      Knee replacement surgery            Total hip replacement      Total knee replacement Bilateral                 Social History     Socioeconomic History    Marital status:    Tobacco Use    Smoking status: Never     Passive exposure: Never    Smokeless tobacco: Never   Vaping Use    Vaping status: Never Used   Substance and Sexual Activity    Alcohol use: Not Currently    Drug use: Never   Social History Narrative    Retired  for >30 years.  [employer]. Daughter Charito lives in [place].      Social Drivers of Health     Financial Resource Strain: Low Risk   (10/2/2024)    Received from [facility]    Financial Resource Strain     In the past year, have you or any family members you live with been unable to get any of the following when it was really needed? Check all that apply.: None   Food Insecurity: Low Risk  (10/2/2024)    Received from [facility]    Food Insecurity     Within the past 12 months, you worried that your food would run out before you got money to buy more.  : Never true     Within the past 12 months, the food you bought just didn't last and you didn't have money to get more. : Never true   Transportation Needs: Not At Risk (10/2/2024)    Received from [facility]    Transportation Needs     In the past 12 months, has lack of reliable transportation kept you from medical appointments, meetings, work or from getting things needed for daily living? : No   Social Connections: Low Risk  (10/2/2024)    Received from [facility]    Social Connections     How often do you see or talk to people that you care about and feel close to? (For example: talking to friends on the phone, visiting friends or family, going to [employer] or club meetings): 5 or more times a week   Housing Stability: Low Risk  (4/20/2024)    Housing Stability     Housing Instability: No              Review of Systems    Positive for stated complaint: Eye redness  Other systems are as noted in HPI.  Constitutional and vital signs reviewed.      All other systems reviewed and negative except as noted above.    Physical Exam     ED Triage Vitals [10/18/24 1433]   /81   Pulse 106   Resp 22   Temp 98.4 °F (36.9 °C)   Temp src Temporal   SpO2 96 %   O2 Device None (Room air)       Current Vitals:   Vital Signs  BP: 155/81  Pulse: 106  Resp: 22  Temp: 98.4 °F (36.9 °C)  Temp src: Temporal    Oxygen Therapy  SpO2: 96 %  O2 Device: None (Room air)      Right Eye Chart Acuity: 20/40, Corrected  Left Eye Chart Acuity: 20/50, Corrected  Physical  Exam    General Appearance: Alert, cooperative, no SOB or labored breathing, appropriate for age   Head: Normocephalic, without obvious abnormality   Eyes: EOMI without nystagmus. PERRLA. Right eye: + subconjunctival hemorrhage  No obvious foreign body. No surrounding erythema or edema.       Ears: Bilateral:TM clear and pearly gray color. No external auditory canal edema or discharge. No pinna, tragus, or mastoid TTP.  Nose: Nares symmetrical, No maxillary or frontal sinus tenderness   Throat: Lips, tongue, and mucosa are moist, pink, and intact; teeth intact. No erythema, edema, exudates of posterior pharynx. Uvula midline, palate symmetrical  Neck: Supple; no lymphadenopathy   Skin:  No rashes.          ED Course   Labs Reviewed - No data to display       MDM          Medical Decision Making  83yo F who comes in with complaints of redness to the eye, patient on eliquis     Problems Addressed:  Subconjunctival hematoma, right: acute illness or injury    Risk  OTC drugs.  Risk Details: Diagnosis includes corneal abrasion, subconjunctival hemorrhage    Discussed with the patient treatment plan for subconjunctival hemorrhage, if any worsening symptoms be reevaluated        Disposition and Plan     Clinical Impression:  1. Subconjunctival hematoma, right         Disposition:  Discharge  10/18/2024  3:09 pm    Follow-up:  Elmer Collier MD  [address] [facility] [address]  [phone]    Schedule an appointment as soon as possible for a visit   If symptoms worsen          Medications Prescribed:  Discharge Medication List as of 10/18/2024  3:14 PM              Supplementary Documentation:

## 2024-10-19 ENCOUNTER — HOSPITAL ENCOUNTER (INPATIENT)
Age: 84
DRG: 480 | End: 2024-10-19
Attending: EMERGENCY MEDICINE | Admitting: INTERNAL MEDICINE

## 2024-10-19 ENCOUNTER — TELEPHONE (OUTPATIENT)
Dept: CARE COORDINATION | Age: 84
End: 2024-10-19

## 2024-10-19 ENCOUNTER — APPOINTMENT (OUTPATIENT)
Dept: CT IMAGING | Age: 84
DRG: 480 | End: 2024-10-19
Attending: EMERGENCY MEDICINE

## 2024-10-19 ENCOUNTER — APPOINTMENT (OUTPATIENT)
Dept: GENERAL RADIOLOGY | Age: 84
DRG: 480 | End: 2024-10-19
Attending: EMERGENCY MEDICINE

## 2024-10-19 VITALS
SYSTOLIC BLOOD PRESSURE: 172 MMHG | TEMPERATURE: 97.7 F | DIASTOLIC BLOOD PRESSURE: 87 MMHG | RESPIRATION RATE: 18 BRPM | HEART RATE: 87 BPM | BODY MASS INDEX: 40.53 KG/M2 | WEIGHT: 220.24 LBS | HEIGHT: 62 IN | OXYGEN SATURATION: 95 %
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
## 2024-10-19 DIAGNOSIS — S09.8XXA BLUNT HEAD INJURY, INITIAL ENCOUNTER: ICD-10-CM
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
## 2024-10-19 DIAGNOSIS — Z79.01 CHRONIC ANTICOAGULATION: ICD-10-CM

## 2024-10-19 DIAGNOSIS — S01.01XA LACERATION OF SCALP WITHOUT FOREIGN BODY, INITIAL ENCOUNTER: ICD-10-CM

## 2024-10-19 DIAGNOSIS — S72.301A CLOSED FRACTURE OF SHAFT OF RIGHT FEMUR, UNSPECIFIED FRACTURE MORPHOLOGY, INITIAL ENCOUNTER (CMD): Primary | ICD-10-CM

## 2024-10-19 DIAGNOSIS — S72.91XA CLOSED FRACTURE OF RIGHT FEMUR, UNSPECIFIED FRACTURE MORPHOLOGY, INITIAL ENCOUNTER (CMD): ICD-10-CM

## 2024-10-19 LAB
ABO + RH BLD: NORMAL
ALBUMIN SERPL-MCNC: 3.5 G/DL (ref 3.4–5)
ALBUMIN/GLOB SERPL: 0.9 {RATIO} (ref 1–2.4)
ALP SERPL-CCNC: 103 UNITS/L (ref 45–117)
ALT SERPL-CCNC: 22 UNITS/L
ANION GAP SERPL CALC-SCNC: 9 MMOL/L (ref 7–19)
APTT PPP: 29 SEC (ref 22–32)
AST SERPL-CCNC: 21 UNITS/L
BASOPHILS # BLD: 0.1 K/MCL (ref 0–0.3)
BASOPHILS NFR BLD: 1 %
BILIRUB SERPL-MCNC: 0.4 MG/DL (ref 0.2–1)
BLD GP AB SCN SERPL QL GEL: NEGATIVE
BUN SERPL-MCNC: 31 MG/DL (ref 6–20)
BUN/CREAT SERPL: 22 (ref 7–25)
CALCIUM SERPL-MCNC: 9.9 MG/DL (ref 8.4–10.2)
CHLORIDE SERPL-SCNC: 106 MMOL/L (ref 97–110)
CO2 SERPL-SCNC: 29 MMOL/L (ref 21–32)
CREAT SERPL-MCNC: 1.43 MG/DL (ref 0.51–0.95)
DEPRECATED RDW RBC: 46 FL (ref 39–50)
EGFRCR SERPLBLD CKD-EPI 2021: 37 ML/MIN/{1.73_M2}
EOSINOPHIL # BLD: 0.3 K/MCL (ref 0–0.5)
EOSINOPHIL NFR BLD: 3 %
ERYTHROCYTE [DISTWIDTH] IN BLOOD: 13 % (ref 11–15)
FASTING DURATION TIME PATIENT: ABNORMAL H
GLOBULIN SER-MCNC: 3.8 G/DL (ref 2–4)
GLUCOSE SERPL-MCNC: 114 MG/DL (ref 70–99)
HCT VFR BLD CALC: 39.7 % (ref 36–46.5)
HGB BLD-MCNC: 13 G/DL (ref 12–15.5)
IMM GRANULOCYTES # BLD AUTO: 0 K/MCL (ref 0–0.2)
IMM GRANULOCYTES # BLD: 0 %
INR PPP: 1.1
LYMPHOCYTES # BLD: 3.3 K/MCL (ref 1–4)
LYMPHOCYTES NFR BLD: 36 %
MCH RBC QN AUTO: 31.1 PG (ref 26–34)
MCHC RBC AUTO-ENTMCNC: 32.7 G/DL (ref 32–36.5)
MCV RBC AUTO: 95 FL (ref 78–100)
MONOCYTES # BLD: 0.9 K/MCL (ref 0.3–0.9)
MONOCYTES NFR BLD: 9 %
NEUTROPHILS # BLD: 4.7 K/MCL (ref 1.8–7.7)
NEUTROPHILS NFR BLD: 51 %
NRBC BLD MANUAL-RTO: 0 /100 WBC
NT-PROBNP SERPL-MCNC: 1397 PG/ML
PLATELET # BLD AUTO: 183 K/MCL (ref 140–450)
POTASSIUM SERPL-SCNC: 3.7 MMOL/L (ref 3.4–5.1)
PROT SERPL-MCNC: 7.3 G/DL (ref 6.4–8.2)
PROTHROMBIN TIME: 11.3 SEC (ref 9.7–11.8)
QRS-INTERVAL (MSEC): 98
QT-INTERVAL (MSEC): 354
QTC: 426
R AXIS (DEGREES): 7
RBC # BLD: 4.18 MIL/MCL (ref 4–5.2)
REPORT TEXT: NORMAL
SODIUM SERPL-SCNC: 140 MMOL/L (ref 135–145)
T AXIS (DEGREES): 51
TYPE AND SCREEN EXPIRATION DATE: NORMAL
VENTRICULAR RATE EKG/MIN (BPM): 87
WBC # BLD: 9.3 K/MCL (ref 4.2–11)

## 2024-10-19 PROCEDURE — 93005 ELECTROCARDIOGRAM TRACING: CPT | Performed by: EMERGENCY MEDICINE

## 2024-10-19 PROCEDURE — 80053 COMPREHEN METABOLIC PANEL: CPT | Performed by: EMERGENCY MEDICINE

## 2024-10-19 PROCEDURE — 85730 THROMBOPLASTIN TIME PARTIAL: CPT | Performed by: EMERGENCY MEDICINE

## 2024-10-19 PROCEDURE — 10002800 HB RX 250 W HCPCS: Performed by: INTERNAL MEDICINE

## 2024-10-19 PROCEDURE — 73552 X-RAY EXAM OF FEMUR 2/>: CPT

## 2024-10-19 PROCEDURE — 0HQ0XZZ REPAIR SCALP SKIN, EXTERNAL APPROACH: ICD-10-PCS | Performed by: EMERGENCY MEDICINE

## 2024-10-19 PROCEDURE — 10002800 HB RX 250 W HCPCS

## 2024-10-19 PROCEDURE — 12001 RPR S/N/AX/GEN/TRNK 2.5CM/<: CPT | Performed by: EMERGENCY MEDICINE

## 2024-10-19 PROCEDURE — 10002800 HB RX 250 W HCPCS: Performed by: EMERGENCY MEDICINE

## 2024-10-19 PROCEDURE — 93010 ELECTROCARDIOGRAM REPORT: CPT | Performed by: INTERNAL MEDICINE

## 2024-10-19 PROCEDURE — 96374 THER/PROPH/DIAG INJ IV PUSH: CPT

## 2024-10-19 PROCEDURE — 99285 EMERGENCY DEPT VISIT HI MDM: CPT

## 2024-10-19 PROCEDURE — 85025 COMPLETE CBC W/AUTO DIFF WBC: CPT | Performed by: EMERGENCY MEDICINE

## 2024-10-19 PROCEDURE — 10003579 HB TRAUMA W/O CRITICAL CARE

## 2024-10-19 PROCEDURE — 83880 ASSAY OF NATRIURETIC PEPTIDE: CPT | Performed by: EMERGENCY MEDICINE

## 2024-10-19 PROCEDURE — 96375 TX/PRO/DX INJ NEW DRUG ADDON: CPT

## 2024-10-19 PROCEDURE — 71045 X-RAY EXAM CHEST 1 VIEW: CPT

## 2024-10-19 PROCEDURE — 70450 CT HEAD/BRAIN W/O DYE: CPT

## 2024-10-19 PROCEDURE — 10006031 HB ROOM CHARGE TELEMETRY

## 2024-10-19 PROCEDURE — 72125 CT NECK SPINE W/O DYE: CPT

## 2024-10-19 PROCEDURE — 86850 RBC ANTIBODY SCREEN: CPT | Performed by: EMERGENCY MEDICINE

## 2024-10-19 PROCEDURE — 85610 PROTHROMBIN TIME: CPT | Performed by: EMERGENCY MEDICINE

## 2024-10-19 PROCEDURE — 73502 X-RAY EXAM HIP UNI 2-3 VIEWS: CPT

## 2024-10-19 RX ORDER — LOSARTAN POTASSIUM 50 MG/1
50 TABLET ORAL DAILY
Status: DISCONTINUED | OUTPATIENT
Start: 2024-10-20 | End: 2024-10-21

## 2024-10-19 RX ORDER — DIPHENHYDRAMINE HYDROCHLORIDE 50 MG/ML
25 INJECTION INTRAMUSCULAR; INTRAVENOUS ONCE
Status: COMPLETED | OUTPATIENT
Start: 2024-10-19 | End: 2024-10-19

## 2024-10-19 RX ORDER — METOCLOPRAMIDE HYDROCHLORIDE 5 MG/ML
10 INJECTION INTRAMUSCULAR; INTRAVENOUS ONCE
Status: COMPLETED | OUTPATIENT
Start: 2024-10-19 | End: 2024-10-19

## 2024-10-19 RX ORDER — ONDANSETRON 2 MG/ML
4 INJECTION INTRAMUSCULAR; INTRAVENOUS ONCE
Status: DISCONTINUED | OUTPATIENT
Start: 2024-10-19 | End: 2024-10-19 | Stop reason: CLARIF

## 2024-10-19 RX ORDER — 0.9 % SODIUM CHLORIDE 0.9 %
10 VIAL (ML) INJECTION PRN
Status: DISCONTINUED | OUTPATIENT
Start: 2024-10-19 | End: 2024-10-19 | Stop reason: HOSPADM

## 2024-10-19 RX ORDER — ONDANSETRON 2 MG/ML
INJECTION INTRAMUSCULAR; INTRAVENOUS
Status: COMPLETED
Start: 2024-10-19 | End: 2024-10-19

## 2024-10-19 RX ORDER — ONDANSETRON 2 MG/ML
4 INJECTION INTRAMUSCULAR; INTRAVENOUS ONCE
Status: COMPLETED | OUTPATIENT
Start: 2024-10-19 | End: 2024-10-19

## 2024-10-19 RX ORDER — LEVOTHYROXINE SODIUM 75 UG/1
75 TABLET ORAL
Status: DISCONTINUED | OUTPATIENT
Start: 2024-10-20 | End: 2024-10-29 | Stop reason: HOSPADM

## 2024-10-19 RX ORDER — FUROSEMIDE 10 MG/ML
40 INJECTION INTRAMUSCULAR; INTRAVENOUS ONCE
Status: COMPLETED | OUTPATIENT
Start: 2024-10-20 | End: 2024-10-20

## 2024-10-19 RX ORDER — PRAVASTATIN SODIUM 20 MG
80 TABLET ORAL NIGHTLY
Status: DISCONTINUED | OUTPATIENT
Start: 2024-10-20 | End: 2024-10-29 | Stop reason: HOSPADM

## 2024-10-19 RX ORDER — 0.9 % SODIUM CHLORIDE 0.9 %
2 VIAL (ML) INJECTION EVERY 12 HOURS SCHEDULED
Status: DISCONTINUED | OUTPATIENT
Start: 2024-10-19 | End: 2024-10-19 | Stop reason: HOSPADM

## 2024-10-19 RX ORDER — LORATADINE 10 MG/1
10 TABLET ORAL DAILY
Status: DISCONTINUED | OUTPATIENT
Start: 2024-10-20 | End: 2024-10-29 | Stop reason: HOSPADM

## 2024-10-19 RX ORDER — MONTELUKAST SODIUM 10 MG/1
10 TABLET ORAL EVERY EVENING
Status: DISCONTINUED | OUTPATIENT
Start: 2024-10-20 | End: 2024-10-29 | Stop reason: HOSPADM

## 2024-10-19 RX ORDER — ESCITALOPRAM OXALATE 10 MG/1
5 TABLET ORAL DAILY
Status: DISCONTINUED | OUTPATIENT
Start: 2024-10-20 | End: 2024-10-29 | Stop reason: HOSPADM

## 2024-10-19 RX ORDER — SPIRONOLACTONE 25 MG/1
12.5 TABLET ORAL DAILY
Status: DISCONTINUED | OUTPATIENT
Start: 2024-10-20 | End: 2024-10-29 | Stop reason: HOSPADM

## 2024-10-19 RX ADMIN — METOCLOPRAMIDE 10 MG: 5 INJECTION, SOLUTION INTRAMUSCULAR; INTRAVENOUS at 19:33

## 2024-10-19 RX ADMIN — ONDANSETRON 4 MG: 2 INJECTION INTRAMUSCULAR; INTRAVENOUS at 18:45

## 2024-10-19 RX ADMIN — MORPHINE SULFATE 2 MG: 2 INJECTION, SOLUTION INTRAMUSCULAR; INTRAVENOUS at 20:09

## 2024-10-19 RX ADMIN — MORPHINE SULFATE 2 MG: 2 INJECTION, SOLUTION INTRAMUSCULAR; INTRAVENOUS at 22:50

## 2024-10-19 RX ADMIN — DIPHENHYDRAMINE HYDROCHLORIDE 25 MG: 50 INJECTION, SOLUTION INTRAMUSCULAR; INTRAVENOUS at 19:32

## 2024-10-19 SDOH — ECONOMIC STABILITY: FOOD INSECURITY: WITHIN THE PAST 12 MONTHS, THE FOOD YOU BOUGHT JUST DIDN'T LAST AND YOU DIDN'T HAVE MONEY TO GET MORE.: NEVER TRUE

## 2024-10-19 SDOH — HEALTH STABILITY: PHYSICAL HEALTH: DO YOU HAVE SERIOUS DIFFICULTY WALKING OR CLIMBING STAIRS?: NO

## 2024-10-19 SDOH — ECONOMIC STABILITY: HOUSING INSECURITY: WHAT IS YOUR LIVING SITUATION TODAY?: I HAVE A STEADY PLACE TO LIVE

## 2024-10-19 SDOH — ECONOMIC STABILITY: HOUSING INSECURITY: WHAT IS YOUR LIVING SITUATION TODAY?: ALONE

## 2024-10-19 SDOH — ECONOMIC STABILITY: HOUSING INSECURITY: WHAT IS YOUR LIVING SITUATION TODAY?: SUPPORTED INDEPENDENT

## 2024-10-19 SDOH — SOCIAL STABILITY: SOCIAL INSECURITY: HOW OFTEN DOES ANYONE, INCLUDING FAMILY AND FRIENDS, PHYSICALLY HURT YOU?: NEVER

## 2024-10-19 SDOH — SOCIAL STABILITY: SOCIAL INSECURITY: HOW OFTEN DOES ANYONE, INCLUDING FAMILY AND FRIENDS, SCREAM OR CURSE AT YOU?: NEVER

## 2024-10-19 SDOH — ECONOMIC STABILITY: HOUSING INSECURITY: DO YOU HAVE PROBLEMS WITH ANY OF THE FOLLOWING?: NONE OF THE ABOVE

## 2024-10-19 SDOH — ECONOMIC STABILITY: INCOME INSECURITY: IN THE PAST 12 MONTHS, HAS THE ELECTRIC, GAS, OIL, OR WATER COMPANY THREATENED TO SHUT OFF SERVICE IN YOUR HOME?: NO

## 2024-10-19 SDOH — SOCIAL STABILITY: SOCIAL INSECURITY: HOW OFTEN DOES ANYONE, INCLUDING FAMILY AND FRIENDS, THREATEN YOU WITH HARM?: NEVER

## 2024-10-19 SDOH — SOCIAL STABILITY: SOCIAL INSECURITY: HOW OFTEN DOES ANYONE, INCLUDING FAMILY AND FRIENDS, INSULT OR TALK DOWN TO YOU?: NEVER

## 2024-10-19 SDOH — ECONOMIC STABILITY: GENERAL

## 2024-10-19 SDOH — SOCIAL STABILITY: SOCIAL NETWORK: SUPPORT SYSTEMS: CHILDREN

## 2024-10-19 SDOH — HEALTH STABILITY: PHYSICAL HEALTH: DO YOU HAVE DIFFICULTY DRESSING OR BATHING?: NO

## 2024-10-19 SDOH — ECONOMIC STABILITY: GENERAL: WOULD YOU LIKE HELP WITH ANY OF THE FOLLOWING NEEDS?: I DON'T WANT HELP WITH ANY OF THESE

## 2024-10-19 SDOH — HEALTH STABILITY: GENERAL: BECAUSE OF A PHYSICAL, MENTAL, OR EMOTIONAL CONDITION, DO YOU HAVE DIFFICULTY DOING ERRANDS ALONE?: NO

## 2024-10-19 ASSESSMENT — ORIENTATION MEMORY CONCENTRATION TEST (OMCT)
SAY THE MONTHS IN REVERSE ORDER STARTING WITH LAST MONTH: CORRECT
OMCT INTERPRETATION: 0-6: NO SIGNIFICANT IMPAIRMENT
REPEAT THE NAME AND ADDRESS I ASKED YOU TO REMEMBER: CORRECT
WHAT MONTH IS IT NOW: CORRECT
COUNT BACKWARDS FROM 20 TO 1: CORRECT
WHAT TIME IS IT (NO WATCH OR CLOCK): CORRECT
WHAT YEAR IS IT NOW (MUST BE EXACT): CORRECT
OMCT SCORE: 0

## 2024-10-19 ASSESSMENT — PATIENT HEALTH QUESTIONNAIRE - PHQ9
1. LITTLE INTEREST OR PLEASURE IN DOING THINGS: NOT AT ALL
CLINICAL INTERPRETATION OF PHQ2 SCORE: NO FURTHER SCREENING NEEDED
SUM OF ALL RESPONSES TO PHQ9 QUESTIONS 1 AND 2: 0
SUM OF ALL RESPONSES TO PHQ9 QUESTIONS 1 AND 2: 0
2. FEELING DOWN, DEPRESSED OR HOPELESS: NOT AT ALL
IS PATIENT ABLE TO COMPLETE PHQ2 OR PHQ9: YES

## 2024-10-19 ASSESSMENT — COLUMBIA-SUICIDE SEVERITY RATING SCALE - C-SSRS
2. HAVE YOU ACTUALLY HAD ANY THOUGHTS OF KILLING YOURSELF?: NO
1. WITHIN THE PAST MONTH, HAVE YOU WISHED YOU WERE DEAD OR WISHED YOU COULD GO TO SLEEP AND NOT WAKE UP?: NO
6. HAVE YOU EVER DONE ANYTHING, STARTED TO DO ANYTHING, OR PREPARED TO DO ANYTHING TO END YOUR LIFE?: NO
IS THE PATIENT ABLE TO COMPLETE C-SSRS: YES

## 2024-10-19 ASSESSMENT — ACTIVITIES OF DAILY LIVING (ADL)
ADL_BEFORE_ADMISSION: INDEPENDENT
ADL_SCORE: 12
RECENT_DECLINE_ADL: NO
ADL_SHORT_OF_BREATH: NO

## 2024-10-19 ASSESSMENT — LIFESTYLE VARIABLES
HOW MANY STANDARD DRINKS CONTAINING ALCOHOL DO YOU HAVE ON A TYPICAL DAY: 0,1 OR 2
HOW OFTEN DO YOU HAVE 6 OR MORE DRINKS ON ONE OCCASION: NEVER
ALCOHOL_USE_STATUS: NO OR LOW RISK WITH VALIDATED TOOL
HOW OFTEN DO YOU HAVE A DRINK CONTAINING ALCOHOL: MONTHLY OR LESS
AUDIT-C TOTAL SCORE: 1

## 2024-10-19 ASSESSMENT — PAIN SCALES - GENERAL
PAINLEVEL_OUTOF10: 8
PAINLEVEL_OUTOF10: 2

## 2024-10-20 ENCOUNTER — ANESTHESIA EVENT (OUTPATIENT)
Dept: SURGERY | Age: 84
End: 2024-10-20

## 2024-10-20 ENCOUNTER — APPOINTMENT (OUTPATIENT)
Dept: GENERAL RADIOLOGY | Age: 84
DRG: 480 | End: 2024-10-20
Attending: ORTHOPAEDIC SURGERY

## 2024-10-20 ENCOUNTER — ANESTHESIA (OUTPATIENT)
Dept: SURGERY | Age: 84
End: 2024-10-20

## 2024-10-20 LAB
GLUCOSE BLDC GLUCOMTR-MCNC: 139 MG/DL (ref 70–99)
QRS-INTERVAL (MSEC): 98
QT-INTERVAL (MSEC): 354
QTC: 426
R AXIS (DEGREES): 7
RAINBOW EXTRA TUBES HOLD SPECIMEN: NORMAL
REPORT TEXT: NORMAL
T AXIS (DEGREES): 51
VENTRICULAR RATE EKG/MIN (BPM): 87

## 2024-10-20 PROCEDURE — 99223 1ST HOSP IP/OBS HIGH 75: CPT | Performed by: ORTHOPAEDIC SURGERY

## 2024-10-20 PROCEDURE — 10002800 HB RX 250 W HCPCS: Performed by: ORTHOPAEDIC SURGERY

## 2024-10-20 PROCEDURE — 10005281 FL INTRAOPERATIVE C ARM WITH REPORT

## 2024-10-20 PROCEDURE — 13000116 HB ORTHO COMPLEX CASE S/U + 1ST 15 MIN: Performed by: ORTHOPAEDIC SURGERY

## 2024-10-20 PROCEDURE — 10004451 HB PACU RECOVERY 1ST 30 MINUTES: Performed by: ORTHOPAEDIC SURGERY

## 2024-10-20 PROCEDURE — 10002801 HB RX 250 W/O HCPCS: Performed by: INTERNAL MEDICINE

## 2024-10-20 PROCEDURE — 10004651 HB RX, NO CHARGE ITEM: Performed by: ORTHOPAEDIC SURGERY

## 2024-10-20 PROCEDURE — 10002800 HB RX 250 W HCPCS: Performed by: INTERNAL MEDICINE

## 2024-10-20 PROCEDURE — 10002803 HB RX 637: Performed by: INTERNAL MEDICINE

## 2024-10-20 PROCEDURE — 10002807 HB RX 258: Performed by: ANESTHESIOLOGY

## 2024-10-20 PROCEDURE — 10002800 HB RX 250 W HCPCS

## 2024-10-20 PROCEDURE — 13000117 HB ORTHO COMPLEX CASE EA ADD MINUTE: Performed by: ORTHOPAEDIC SURGERY

## 2024-10-20 PROCEDURE — 10002800 HB RX 250 W HCPCS: Performed by: ANESTHESIOLOGY

## 2024-10-20 PROCEDURE — C1769 GUIDE WIRE: HCPCS | Performed by: ORTHOPAEDIC SURGERY

## 2024-10-20 PROCEDURE — 10006031 HB ROOM CHARGE TELEMETRY

## 2024-10-20 PROCEDURE — 10006027 HB SUPPLY 278: Performed by: ORTHOPAEDIC SURGERY

## 2024-10-20 PROCEDURE — 10004452 HB PACU ADDL 30 MINUTES: Performed by: ORTHOPAEDIC SURGERY

## 2024-10-20 PROCEDURE — 13000002 HB ANESTHESIA  GENERAL  S/U + 1ST 15 MIN: Performed by: ORTHOPAEDIC SURGERY

## 2024-10-20 PROCEDURE — 10002801 HB RX 250 W/O HCPCS: Performed by: ANESTHESIOLOGY

## 2024-10-20 PROCEDURE — C1713 ANCHOR/SCREW BN/BN,TIS/BN: HCPCS | Performed by: ORTHOPAEDIC SURGERY

## 2024-10-20 PROCEDURE — 82962 GLUCOSE BLOOD TEST: CPT

## 2024-10-20 PROCEDURE — 10006023 HB SUPPLY 272: Performed by: ORTHOPAEDIC SURGERY

## 2024-10-20 PROCEDURE — 0QS834Z REPOSITION RIGHT FEMORAL SHAFT WITH INTERNAL FIXATION DEVICE, PERCUTANEOUS APPROACH: ICD-10-PCS | Performed by: ORTHOPAEDIC SURGERY

## 2024-10-20 PROCEDURE — 27506 TREATMENT OF THIGH FRACTURE: CPT | Performed by: ORTHOPAEDIC SURGERY

## 2024-10-20 PROCEDURE — 13000003 HB ANESTHESIA  GENERAL EA ADD MINUTE: Performed by: ORTHOPAEDIC SURGERY

## 2024-10-20 DEVICE — IMPLANTABLE DEVICE: Type: IMPLANTABLE DEVICE | Site: LEG UPPER | Status: FUNCTIONAL

## 2024-10-20 DEVICE — LOCKING SCREW
Type: IMPLANTABLE DEVICE | Site: LEG UPPER | Status: FUNCTIONAL
Brand: T2 ALPHA

## 2024-10-20 DEVICE — ADVANCED LOCKING SCREW: Type: IMPLANTABLE DEVICE | Site: LEG UPPER | Status: FUNCTIONAL

## 2024-10-20 RX ORDER — PHENYLEPHRINE HYDROCHLORIDE 10 MG/ML
INJECTION, SOLUTION INTRAMUSCULAR; INTRAVENOUS; SUBCUTANEOUS PRN
Status: DISCONTINUED | OUTPATIENT
Start: 2024-10-20 | End: 2024-10-20

## 2024-10-20 RX ORDER — ONDANSETRON 2 MG/ML
INJECTION INTRAMUSCULAR; INTRAVENOUS PRN
Status: DISCONTINUED | OUTPATIENT
Start: 2024-10-20 | End: 2024-10-20

## 2024-10-20 RX ORDER — HYDRALAZINE HYDROCHLORIDE 20 MG/ML
10 INJECTION INTRAMUSCULAR; INTRAVENOUS EVERY 6 HOURS PRN
Status: ACTIVE | OUTPATIENT
Start: 2024-10-20 | End: 2024-10-22

## 2024-10-20 RX ORDER — NALBUPHINE HYDROCHLORIDE 10 MG/ML
2.5 INJECTION INTRAMUSCULAR; INTRAVENOUS; SUBCUTANEOUS
Status: DISCONTINUED | OUTPATIENT
Start: 2024-10-20 | End: 2024-10-20

## 2024-10-20 RX ORDER — DEXTROSE MONOHYDRATE 25 G/50ML
25 INJECTION, SOLUTION INTRAVENOUS PRN
Status: DISCONTINUED | OUTPATIENT
Start: 2024-10-20 | End: 2024-10-20

## 2024-10-20 RX ORDER — SODIUM CHLORIDE 9 MG/ML
INJECTION, SOLUTION INTRAVENOUS CONTINUOUS PRN
Status: DISCONTINUED | OUTPATIENT
Start: 2024-10-20 | End: 2024-10-20

## 2024-10-20 RX ORDER — METOCLOPRAMIDE HYDROCHLORIDE 5 MG/ML
5 INJECTION INTRAMUSCULAR; INTRAVENOUS
Status: DISCONTINUED | OUTPATIENT
Start: 2024-10-20 | End: 2024-10-20

## 2024-10-20 RX ORDER — METOPROLOL TARTRATE 1 MG/ML
5 INJECTION, SOLUTION INTRAVENOUS EVERY 6 HOURS PRN
Status: DISPENSED | OUTPATIENT
Start: 2024-10-20 | End: 2024-10-22

## 2024-10-20 RX ORDER — ORPHENADRINE CITRATE 100 MG/1
100 TABLET ORAL 2 TIMES DAILY PRN
COMMUNITY

## 2024-10-20 RX ORDER — NICOTINE POLACRILEX 4 MG
30 LOZENGE BUCCAL
Status: DISCONTINUED | OUTPATIENT
Start: 2024-10-20 | End: 2024-10-20

## 2024-10-20 RX ORDER — ASPIRIN 81 MG/1
81 TABLET, CHEWABLE ORAL 2 TIMES DAILY
Status: DISCONTINUED | OUTPATIENT
Start: 2024-10-20 | End: 2024-10-21 | Stop reason: ALTCHOICE

## 2024-10-20 RX ORDER — CYCLOBENZAPRINE HCL 5 MG
5 TABLET ORAL 3 TIMES DAILY PRN
Status: DISCONTINUED | OUTPATIENT
Start: 2024-10-20 | End: 2024-10-29 | Stop reason: HOSPADM

## 2024-10-20 RX ORDER — ONDANSETRON 2 MG/ML
4 INJECTION INTRAMUSCULAR; INTRAVENOUS
Status: DISCONTINUED | OUTPATIENT
Start: 2024-10-20 | End: 2024-10-20

## 2024-10-20 RX ORDER — CLINDAMYCIN PHOSPHATE 900 MG/50ML
900 INJECTION, SOLUTION INTRAVENOUS EVERY 8 HOURS SCHEDULED
Status: DISCONTINUED | OUTPATIENT
Start: 2024-10-20 | End: 2024-10-23

## 2024-10-20 RX ORDER — HYDROCODONE BITARTRATE AND ACETAMINOPHEN 5; 325 MG/1; MG/1
1 TABLET ORAL EVERY 6 HOURS PRN
Status: DISCONTINUED | OUTPATIENT
Start: 2024-10-20 | End: 2024-10-27 | Stop reason: DRUGHIGH

## 2024-10-20 RX ORDER — PROPOFOL 10 MG/ML
INJECTION, EMULSION INTRAVENOUS PRN
Status: DISCONTINUED | OUTPATIENT
Start: 2024-10-20 | End: 2024-10-20

## 2024-10-20 RX ORDER — NALOXONE HCL 0.4 MG/ML
0.2 VIAL (ML) INJECTION EVERY 5 MIN PRN
Status: DISCONTINUED | OUTPATIENT
Start: 2024-10-20 | End: 2024-10-20

## 2024-10-20 RX ORDER — HYDRALAZINE HYDROCHLORIDE 20 MG/ML
5 INJECTION INTRAMUSCULAR; INTRAVENOUS EVERY 10 MIN PRN
Status: DISCONTINUED | OUTPATIENT
Start: 2024-10-20 | End: 2024-10-20

## 2024-10-20 RX ORDER — SODIUM CHLORIDE, SODIUM LACTATE, POTASSIUM CHLORIDE, CALCIUM CHLORIDE 600; 310; 30; 20 MG/100ML; MG/100ML; MG/100ML; MG/100ML
INJECTION, SOLUTION INTRAVENOUS CONTINUOUS
Status: DISCONTINUED | OUTPATIENT
Start: 2024-10-20 | End: 2024-10-20

## 2024-10-20 RX ORDER — ROCURONIUM BROMIDE 10 MG/ML
INJECTION, SOLUTION INTRAVENOUS PRN
Status: DISCONTINUED | OUTPATIENT
Start: 2024-10-20 | End: 2024-10-20

## 2024-10-20 RX ORDER — DEXAMETHASONE SODIUM PHOSPHATE 4 MG/ML
INJECTION, SOLUTION INTRA-ARTICULAR; INTRALESIONAL; INTRAMUSCULAR; INTRAVENOUS; SOFT TISSUE PRN
Status: DISCONTINUED | OUTPATIENT
Start: 2024-10-20 | End: 2024-10-20

## 2024-10-20 RX ORDER — CYCLOBENZAPRINE HCL 5 MG
5 TABLET ORAL 3 TIMES DAILY
Status: DISCONTINUED | OUTPATIENT
Start: 2024-10-20 | End: 2024-10-20

## 2024-10-20 RX ORDER — HYDROCODONE BITARTRATE AND ACETAMINOPHEN 5; 325 MG/1; MG/1
1 TABLET ORAL EVERY 6 HOURS PRN
Status: ON HOLD | COMMUNITY
End: 2024-10-29 | Stop reason: HOSPADM

## 2024-10-20 RX ADMIN — HYDROMORPHONE HYDROCHLORIDE 0.2 MG: 1 INJECTION, SOLUTION INTRAMUSCULAR; INTRAVENOUS; SUBCUTANEOUS at 16:29

## 2024-10-20 RX ADMIN — PHENYLEPHRINE HYDROCHLORIDE 100 MCG: 10 INJECTION INTRAVENOUS at 12:20

## 2024-10-20 RX ADMIN — HYDROMORPHONE HYDROCHLORIDE 0.2 MG: 1 INJECTION, SOLUTION INTRAMUSCULAR; INTRAVENOUS; SUBCUTANEOUS at 16:14

## 2024-10-20 RX ADMIN — FUROSEMIDE 40 MG: 10 INJECTION, SOLUTION INTRAMUSCULAR; INTRAVENOUS at 00:29

## 2024-10-20 RX ADMIN — ROCURONIUM BROMIDE 50 MG: 10 INJECTION INTRAVENOUS at 11:45

## 2024-10-20 RX ADMIN — HYDROMORPHONE HYDROCHLORIDE 0.2 MG: 1 INJECTION, SOLUTION INTRAMUSCULAR; INTRAVENOUS; SUBCUTANEOUS at 15:53

## 2024-10-20 RX ADMIN — PROPOFOL 150 MG: 10 INJECTION, EMULSION INTRAVENOUS at 11:45

## 2024-10-20 RX ADMIN — LOSARTAN POTASSIUM 50 MG: 50 TABLET, FILM COATED ORAL at 08:42

## 2024-10-20 RX ADMIN — ESCITALOPRAM OXALATE 5 MG: 10 TABLET ORAL at 08:42

## 2024-10-20 RX ADMIN — MORPHINE SULFATE 2 MG: 2 INJECTION, SOLUTION INTRAMUSCULAR; INTRAVENOUS at 05:49

## 2024-10-20 RX ADMIN — PRAVASTATIN SODIUM 80 MG: 20 TABLET ORAL at 21:23

## 2024-10-20 RX ADMIN — CLINDAMYCIN PHOSPHATE 900 MG: 900 INJECTION, SOLUTION INTRAVENOUS at 19:40

## 2024-10-20 RX ADMIN — DEXAMETHASONE SODIUM PHOSPHATE 4 MG: 4 INJECTION INTRA-ARTICULAR; INTRALESIONAL; INTRAMUSCULAR; INTRAVENOUS; SOFT TISSUE at 11:53

## 2024-10-20 RX ADMIN — ASPIRIN 81 MG 81 MG: 81 TABLET ORAL at 21:23

## 2024-10-20 RX ADMIN — LORATADINE 10 MG: 10 TABLET ORAL at 08:42

## 2024-10-20 RX ADMIN — MONTELUKAST SODIUM 10 MG: 10 TABLET, FILM COATED ORAL at 21:24

## 2024-10-20 RX ADMIN — ROCURONIUM BROMIDE 10 MG: 10 INJECTION INTRAVENOUS at 13:36

## 2024-10-20 RX ADMIN — ROCURONIUM BROMIDE 10 MG: 10 INJECTION INTRAVENOUS at 12:34

## 2024-10-20 RX ADMIN — SODIUM CHLORIDE: 9 INJECTION, SOLUTION INTRAVENOUS at 11:05

## 2024-10-20 RX ADMIN — FENTANYL CITRATE 100 MCG: 50 INJECTION INTRAMUSCULAR; INTRAVENOUS at 13:43

## 2024-10-20 RX ADMIN — WATER 2000 MG: 1 INJECTION INTRAMUSCULAR; INTRAVENOUS; SUBCUTANEOUS at 11:55

## 2024-10-20 RX ADMIN — LEVOTHYROXINE SODIUM 75 MCG: 75 TABLET ORAL at 05:50

## 2024-10-20 RX ADMIN — MORPHINE SULFATE 2 MG: 2 INJECTION, SOLUTION INTRAMUSCULAR; INTRAVENOUS at 21:59

## 2024-10-20 RX ADMIN — PHENYLEPHRINE HYDROCHLORIDE 100 MCG: 10 INJECTION INTRAVENOUS at 12:45

## 2024-10-20 RX ADMIN — SPIRONOLACTONE 12.5 MG: 25 TABLET, COATED ORAL at 08:42

## 2024-10-20 RX ADMIN — ROCURONIUM BROMIDE 20 MG: 10 INJECTION INTRAVENOUS at 12:14

## 2024-10-20 RX ADMIN — PHENYLEPHRINE HYDROCHLORIDE 10 MCG/MIN: 10 INJECTION INTRAVENOUS at 12:18

## 2024-10-20 RX ADMIN — ONDANSETRON 4 MG: 2 INJECTION INTRAMUSCULAR; INTRAVENOUS at 11:53

## 2024-10-20 RX ADMIN — LABETALOL HYDROCHLORIDE 5 MG: 5 INJECTION INTRAVENOUS at 16:25

## 2024-10-20 RX ADMIN — FENTANYL CITRATE 100 MCG: 50 INJECTION INTRAMUSCULAR; INTRAVENOUS at 11:41

## 2024-10-20 RX ADMIN — PHENYLEPHRINE HYDROCHLORIDE 50 MCG: 10 INJECTION INTRAVENOUS at 12:57

## 2024-10-20 RX ADMIN — SUGAMMADEX 200 MG: 100 INJECTION, SOLUTION INTRAVENOUS at 15:11

## 2024-10-20 ASSESSMENT — PAIN SCALES - GENERAL
PAINLEVEL_OUTOF10: 2
PAINLEVEL_OUTOF10: 5
PAINLEVEL_OUTOF10: 6
PAINLEVEL_OUTOF10: 5
PAINLEVEL_OUTOF10: 2
PAINLEVEL_OUTOF10: 8
PAINLEVEL_OUTOF10: 8
PAINLEVEL_OUTOF10: 6
PAINLEVEL_OUTOF10: 2

## 2024-10-20 ASSESSMENT — PAIN DESCRIPTION - PAIN TYPE: TYPE: ACUTE PAIN

## 2024-10-21 LAB
ANION GAP SERPL CALC-SCNC: 8 MMOL/L (ref 7–19)
BASOPHILS # BLD: 0 K/MCL (ref 0–0.3)
BASOPHILS NFR BLD: 0 %
BUN SERPL-MCNC: 28 MG/DL (ref 6–20)
BUN/CREAT SERPL: 22 (ref 7–25)
CALCIUM SERPL-MCNC: 9.6 MG/DL (ref 8.4–10.2)
CHLORIDE SERPL-SCNC: 103 MMOL/L (ref 97–110)
CO2 SERPL-SCNC: 27 MMOL/L (ref 21–32)
CREAT SERPL-MCNC: 1.29 MG/DL (ref 0.51–0.95)
DEPRECATED RDW RBC: 47 FL (ref 39–50)
EGFRCR SERPLBLD CKD-EPI 2021: 41 ML/MIN/{1.73_M2}
EOSINOPHIL # BLD: 0 K/MCL (ref 0–0.5)
EOSINOPHIL NFR BLD: 0 %
ERYTHROCYTE [DISTWIDTH] IN BLOOD: 13.1 % (ref 11–15)
FASTING DURATION TIME PATIENT: ABNORMAL H
GLUCOSE SERPL-MCNC: 151 MG/DL (ref 70–99)
HCT VFR BLD CALC: 29.2 % (ref 36–46.5)
HGB BLD-MCNC: 9.3 G/DL (ref 12–15.5)
IMM GRANULOCYTES # BLD AUTO: 0.1 K/MCL (ref 0–0.2)
IMM GRANULOCYTES # BLD: 0 %
LYMPHOCYTES # BLD: 1.6 K/MCL (ref 1–4)
LYMPHOCYTES NFR BLD: 13 %
MAGNESIUM SERPL-MCNC: 2.1 MG/DL (ref 1.7–2.4)
MCH RBC QN AUTO: 31.4 PG (ref 26–34)
MCHC RBC AUTO-ENTMCNC: 31.8 G/DL (ref 32–36.5)
MCV RBC AUTO: 98.6 FL (ref 78–100)
MONOCYTES # BLD: 1.3 K/MCL (ref 0.3–0.9)
MONOCYTES NFR BLD: 10 %
NEUTROPHILS # BLD: 9.8 K/MCL (ref 1.8–7.7)
NEUTROPHILS NFR BLD: 77 %
NRBC BLD MANUAL-RTO: 0 /100 WBC
PLATELET # BLD AUTO: 202 K/MCL (ref 140–450)
POTASSIUM SERPL-SCNC: 4.3 MMOL/L (ref 3.4–5.1)
RBC # BLD: 2.96 MIL/MCL (ref 4–5.2)
SODIUM SERPL-SCNC: 134 MMOL/L (ref 135–145)
WBC # BLD: 12.8 K/MCL (ref 4.2–11)

## 2024-10-21 PROCEDURE — 10002801 HB RX 250 W/O HCPCS: Performed by: INTERNAL MEDICINE

## 2024-10-21 PROCEDURE — 97166 OT EVAL MOD COMPLEX 45 MIN: CPT

## 2024-10-21 PROCEDURE — 10002803 HB RX 637: Performed by: INTERNAL MEDICINE

## 2024-10-21 PROCEDURE — 97530 THERAPEUTIC ACTIVITIES: CPT

## 2024-10-21 PROCEDURE — 10004651 HB RX, NO CHARGE ITEM: Performed by: ORTHOPAEDIC SURGERY

## 2024-10-21 PROCEDURE — 99231 SBSQ HOSP IP/OBS SF/LOW 25: CPT | Performed by: PHYSICIAN ASSISTANT

## 2024-10-21 PROCEDURE — 83735 ASSAY OF MAGNESIUM: CPT | Performed by: INTERNAL MEDICINE

## 2024-10-21 PROCEDURE — 85025 COMPLETE CBC W/AUTO DIFF WBC: CPT | Performed by: INTERNAL MEDICINE

## 2024-10-21 PROCEDURE — 36415 COLL VENOUS BLD VENIPUNCTURE: CPT | Performed by: INTERNAL MEDICINE

## 2024-10-21 PROCEDURE — 97162 PT EVAL MOD COMPLEX 30 MIN: CPT

## 2024-10-21 PROCEDURE — 80048 BASIC METABOLIC PNL TOTAL CA: CPT | Performed by: INTERNAL MEDICINE

## 2024-10-21 PROCEDURE — 10002800 HB RX 250 W HCPCS: Performed by: ORTHOPAEDIC SURGERY

## 2024-10-21 PROCEDURE — 97535 SELF CARE MNGMENT TRAINING: CPT

## 2024-10-21 PROCEDURE — 10006031 HB ROOM CHARGE TELEMETRY

## 2024-10-21 PROCEDURE — 10002800 HB RX 250 W HCPCS: Performed by: INTERNAL MEDICINE

## 2024-10-21 PROCEDURE — 10002803 HB RX 637: Performed by: PHYSICIAN ASSISTANT

## 2024-10-21 RX ORDER — METOPROLOL SUCCINATE 25 MG/1
25 TABLET, EXTENDED RELEASE ORAL DAILY
Status: DISCONTINUED | OUTPATIENT
Start: 2024-10-21 | End: 2024-10-29 | Stop reason: HOSPADM

## 2024-10-21 RX ORDER — LOSARTAN POTASSIUM 50 MG/1
25 TABLET ORAL DAILY
Status: DISCONTINUED | OUTPATIENT
Start: 2024-10-22 | End: 2024-10-29 | Stop reason: HOSPADM

## 2024-10-21 RX ORDER — ONDANSETRON 2 MG/ML
4 INJECTION INTRAMUSCULAR; INTRAVENOUS EVERY 6 HOURS PRN
Status: DISCONTINUED | OUTPATIENT
Start: 2024-10-21 | End: 2024-10-29 | Stop reason: HOSPADM

## 2024-10-21 RX ORDER — TORSEMIDE 10 MG/1
10 TABLET ORAL DAILY
Status: DISCONTINUED | OUTPATIENT
Start: 2024-10-21 | End: 2024-10-29 | Stop reason: HOSPADM

## 2024-10-21 RX ADMIN — ASPIRIN 81 MG 81 MG: 81 TABLET ORAL at 08:43

## 2024-10-21 RX ADMIN — PRAVASTATIN SODIUM 80 MG: 20 TABLET ORAL at 21:15

## 2024-10-21 RX ADMIN — SPIRONOLACTONE 12.5 MG: 25 TABLET, COATED ORAL at 08:42

## 2024-10-21 RX ADMIN — METOPROLOL TARTRATE 5 MG: 1 INJECTION, SOLUTION INTRAVENOUS at 10:50

## 2024-10-21 RX ADMIN — LEVOTHYROXINE SODIUM 75 MCG: 75 TABLET ORAL at 05:19

## 2024-10-21 RX ADMIN — LOSARTAN POTASSIUM 50 MG: 50 TABLET, FILM COATED ORAL at 08:43

## 2024-10-21 RX ADMIN — CYCLOBENZAPRINE HYDROCHLORIDE 5 MG: 5 TABLET, FILM COATED ORAL at 05:19

## 2024-10-21 RX ADMIN — LORATADINE 10 MG: 10 TABLET ORAL at 08:43

## 2024-10-21 RX ADMIN — MONTELUKAST SODIUM 10 MG: 10 TABLET, FILM COATED ORAL at 17:00

## 2024-10-21 RX ADMIN — CLINDAMYCIN PHOSPHATE 900 MG: 900 INJECTION, SOLUTION INTRAVENOUS at 21:18

## 2024-10-21 RX ADMIN — CLINDAMYCIN PHOSPHATE 900 MG: 900 INJECTION, SOLUTION INTRAVENOUS at 05:21

## 2024-10-21 RX ADMIN — HYDROCODONE BITARTRATE AND ACETAMINOPHEN 1 TABLET: 5; 325 TABLET ORAL at 08:48

## 2024-10-21 RX ADMIN — APIXABAN 5 MG: 5 TABLET, FILM COATED ORAL at 12:30

## 2024-10-21 RX ADMIN — ONDANSETRON 4 MG: 2 INJECTION INTRAMUSCULAR; INTRAVENOUS at 10:49

## 2024-10-21 RX ADMIN — CLINDAMYCIN PHOSPHATE 900 MG: 900 INJECTION, SOLUTION INTRAVENOUS at 13:30

## 2024-10-21 RX ADMIN — ESCITALOPRAM OXALATE 5 MG: 10 TABLET ORAL at 08:43

## 2024-10-21 RX ADMIN — TORSEMIDE 10 MG: 10 TABLET ORAL at 12:30

## 2024-10-21 RX ADMIN — APIXABAN 5 MG: 5 TABLET, FILM COATED ORAL at 21:15

## 2024-10-21 ASSESSMENT — COGNITIVE AND FUNCTIONAL STATUS - GENERAL
BASIC_MOBILITY_CONVERTED_SCORE: 28.13
HELP NEEDED DRESSING REGULAR UPPER BODY CLOTHING: A LITTLE
HELP NEEDED DRESSING REGULAR LOWER BODY CLOTHING: A LOT
BASIC_MOBILITY_RAW_SCORE: 10
HELP NEEDED FOR BATHING: A LITTLE
DAILY_ACTIVITY_CONVERTED_SCORE: 38.66
DAILY_ACTIVITY_RAW_SCORE: 18
HELP NEEDED FOR TOILETING: A LOT

## 2024-10-21 ASSESSMENT — ACTIVITIES OF DAILY LIVING (ADL)
PRIOR_ADL: MODIFIED INDEPENDENT
HOME_MANAGEMENT_TIME_ENTRY: 15

## 2024-10-21 ASSESSMENT — PAIN SCALES - GENERAL
PAINLEVEL_OUTOF10: 3
PAINLEVEL_OUTOF10: 5
PAINLEVEL_OUTOF10: 4

## 2024-10-21 ASSESSMENT — ENCOUNTER SYMPTOMS
PAIN SEVERITY NOW: 7
PAIN SEVERITY NOW: 5

## 2024-10-22 LAB
ANION GAP SERPL CALC-SCNC: 4 MMOL/L (ref 7–19)
BUN SERPL-MCNC: 24 MG/DL (ref 6–20)
BUN/CREAT SERPL: 19 (ref 7–25)
CALCIUM SERPL-MCNC: 9.7 MG/DL (ref 8.4–10.2)
CHLORIDE SERPL-SCNC: 105 MMOL/L (ref 97–110)
CO2 SERPL-SCNC: 31 MMOL/L (ref 21–32)
CREAT SERPL-MCNC: 1.28 MG/DL (ref 0.51–0.95)
EGFRCR SERPLBLD CKD-EPI 2021: 42 ML/MIN/{1.73_M2}
FASTING DURATION TIME PATIENT: ABNORMAL H
GLUCOSE SERPL-MCNC: 133 MG/DL (ref 70–99)
POTASSIUM SERPL-SCNC: 4.2 MMOL/L (ref 3.4–5.1)
SODIUM SERPL-SCNC: 136 MMOL/L (ref 135–145)

## 2024-10-22 PROCEDURE — 10002803 HB RX 637: Performed by: INTERNAL MEDICINE

## 2024-10-22 PROCEDURE — 10006031 HB ROOM CHARGE TELEMETRY

## 2024-10-22 PROCEDURE — 80048 BASIC METABOLIC PNL TOTAL CA: CPT | Performed by: INTERNAL MEDICINE

## 2024-10-22 PROCEDURE — 10002801 HB RX 250 W/O HCPCS: Performed by: INTERNAL MEDICINE

## 2024-10-22 PROCEDURE — 10002803 HB RX 637: Performed by: PHYSICIAN ASSISTANT

## 2024-10-22 PROCEDURE — 10002800 HB RX 250 W HCPCS: Performed by: ORTHOPAEDIC SURGERY

## 2024-10-22 PROCEDURE — 36415 COLL VENOUS BLD VENIPUNCTURE: CPT | Performed by: INTERNAL MEDICINE

## 2024-10-22 RX ORDER — POLYETHYLENE GLYCOL 3350 17 G/17G
17 POWDER, FOR SOLUTION ORAL DAILY PRN
Status: DISCONTINUED | OUTPATIENT
Start: 2024-10-22 | End: 2024-10-29 | Stop reason: HOSPADM

## 2024-10-22 RX ORDER — DOCUSATE SODIUM 100 MG/1
100 CAPSULE, LIQUID FILLED ORAL 2 TIMES DAILY
Status: DISCONTINUED | OUTPATIENT
Start: 2024-10-22 | End: 2024-10-29 | Stop reason: HOSPADM

## 2024-10-22 RX ADMIN — HYDROCODONE BITARTRATE AND ACETAMINOPHEN 1 TABLET: 5; 325 TABLET ORAL at 06:25

## 2024-10-22 RX ADMIN — MONTELUKAST SODIUM 10 MG: 10 TABLET, FILM COATED ORAL at 18:06

## 2024-10-22 RX ADMIN — METOPROLOL SUCCINATE 25 MG: 25 TABLET, EXTENDED RELEASE ORAL at 08:03

## 2024-10-22 RX ADMIN — TORSEMIDE 10 MG: 10 TABLET ORAL at 08:07

## 2024-10-22 RX ADMIN — CYCLOBENZAPRINE HYDROCHLORIDE 5 MG: 5 TABLET, FILM COATED ORAL at 21:22

## 2024-10-22 RX ADMIN — HYDROCODONE BITARTRATE AND ACETAMINOPHEN 1 TABLET: 5; 325 TABLET ORAL at 12:26

## 2024-10-22 RX ADMIN — DOCUSATE SODIUM 100 MG: 100 CAPSULE, LIQUID FILLED ORAL at 21:22

## 2024-10-22 RX ADMIN — PRAVASTATIN SODIUM 80 MG: 20 TABLET ORAL at 21:22

## 2024-10-22 RX ADMIN — HYDROCODONE BITARTRATE AND ACETAMINOPHEN 1 TABLET: 5; 325 TABLET ORAL at 18:06

## 2024-10-22 RX ADMIN — SPIRONOLACTONE 12.5 MG: 25 TABLET, COATED ORAL at 08:00

## 2024-10-22 RX ADMIN — CLINDAMYCIN PHOSPHATE 900 MG: 900 INJECTION, SOLUTION INTRAVENOUS at 14:49

## 2024-10-22 RX ADMIN — LORATADINE 10 MG: 10 TABLET ORAL at 08:04

## 2024-10-22 RX ADMIN — ESCITALOPRAM OXALATE 5 MG: 10 TABLET ORAL at 08:04

## 2024-10-22 RX ADMIN — LEVOTHYROXINE SODIUM 75 MCG: 75 TABLET ORAL at 05:50

## 2024-10-22 RX ADMIN — CLINDAMYCIN PHOSPHATE 900 MG: 900 INJECTION, SOLUTION INTRAVENOUS at 05:49

## 2024-10-22 RX ADMIN — APIXABAN 5 MG: 5 TABLET, FILM COATED ORAL at 21:22

## 2024-10-22 RX ADMIN — CLINDAMYCIN PHOSPHATE 900 MG: 900 INJECTION, SOLUTION INTRAVENOUS at 21:26

## 2024-10-22 RX ADMIN — APIXABAN 5 MG: 5 TABLET, FILM COATED ORAL at 08:03

## 2024-10-22 ASSESSMENT — PAIN SCALES - GENERAL
PAINLEVEL_OUTOF10: 3
PAINLEVEL_OUTOF10: 7
PAINLEVEL_OUTOF10: 6
PAINLEVEL_OUTOF10: 3
PAINLEVEL_OUTOF10: 7
PAINLEVEL_OUTOF10: 3

## 2024-10-23 LAB
ANION GAP SERPL CALC-SCNC: 8 MMOL/L (ref 7–19)
BASOPHILS # BLD: 0.1 K/MCL (ref 0–0.3)
BASOPHILS NFR BLD: 1 %
BUN SERPL-MCNC: 26 MG/DL (ref 6–20)
BUN/CREAT SERPL: 23 (ref 7–25)
CALCIUM SERPL-MCNC: 9.8 MG/DL (ref 8.4–10.2)
CHLORIDE SERPL-SCNC: 103 MMOL/L (ref 97–110)
CO2 SERPL-SCNC: 29 MMOL/L (ref 21–32)
CREAT SERPL-MCNC: 1.14 MG/DL (ref 0.51–0.95)
DEPRECATED RDW RBC: 45.4 FL (ref 39–50)
EGFRCR SERPLBLD CKD-EPI 2021: 48 ML/MIN/{1.73_M2}
EOSINOPHIL # BLD: 0.2 K/MCL (ref 0–0.5)
EOSINOPHIL NFR BLD: 2 %
ERYTHROCYTE [DISTWIDTH] IN BLOOD: 13 % (ref 11–15)
FASTING DURATION TIME PATIENT: ABNORMAL H
GLUCOSE SERPL-MCNC: 155 MG/DL (ref 70–99)
HCT VFR BLD CALC: 25 % (ref 36–46.5)
HGB BLD-MCNC: 8 G/DL (ref 12–15.5)
IMM GRANULOCYTES # BLD AUTO: 0.1 K/MCL (ref 0–0.2)
IMM GRANULOCYTES # BLD: 1 %
LYMPHOCYTES # BLD: 1.5 K/MCL (ref 1–4)
LYMPHOCYTES NFR BLD: 13 %
MCH RBC QN AUTO: 30.8 PG (ref 26–34)
MCHC RBC AUTO-ENTMCNC: 32 G/DL (ref 32–36.5)
MCV RBC AUTO: 96.2 FL (ref 78–100)
MONOCYTES # BLD: 1 K/MCL (ref 0.3–0.9)
MONOCYTES NFR BLD: 8 %
NEUTROPHILS # BLD: 9.2 K/MCL (ref 1.8–7.7)
NEUTROPHILS NFR BLD: 75 %
NRBC BLD MANUAL-RTO: 0 /100 WBC
PLATELET # BLD AUTO: 196 K/MCL (ref 140–450)
POTASSIUM SERPL-SCNC: 4.2 MMOL/L (ref 3.4–5.1)
RBC # BLD: 2.6 MIL/MCL (ref 4–5.2)
SODIUM SERPL-SCNC: 136 MMOL/L (ref 135–145)
WBC # BLD: 12.2 K/MCL (ref 4.2–11)

## 2024-10-23 PROCEDURE — 97530 THERAPEUTIC ACTIVITIES: CPT

## 2024-10-23 PROCEDURE — 10006031 HB ROOM CHARGE TELEMETRY

## 2024-10-23 PROCEDURE — 85025 COMPLETE CBC W/AUTO DIFF WBC: CPT | Performed by: INTERNAL MEDICINE

## 2024-10-23 PROCEDURE — 10002803 HB RX 637: Performed by: INTERNAL MEDICINE

## 2024-10-23 PROCEDURE — 10002800 HB RX 250 W HCPCS: Performed by: ORTHOPAEDIC SURGERY

## 2024-10-23 PROCEDURE — 97535 SELF CARE MNGMENT TRAINING: CPT

## 2024-10-23 PROCEDURE — 36415 COLL VENOUS BLD VENIPUNCTURE: CPT | Performed by: INTERNAL MEDICINE

## 2024-10-23 PROCEDURE — 80048 BASIC METABOLIC PNL TOTAL CA: CPT | Performed by: INTERNAL MEDICINE

## 2024-10-23 PROCEDURE — 10002801 HB RX 250 W/O HCPCS: Performed by: INTERNAL MEDICINE

## 2024-10-23 PROCEDURE — 10002803 HB RX 637: Performed by: PHYSICIAN ASSISTANT

## 2024-10-23 RX ORDER — METOPROLOL SUCCINATE 25 MG/1
25 TABLET, EXTENDED RELEASE ORAL DAILY
Qty: 30 TABLET | Refills: 0 | Status: ACTIVE | OUTPATIENT
Start: 2024-10-24

## 2024-10-23 RX ADMIN — LORATADINE 10 MG: 10 TABLET ORAL at 08:35

## 2024-10-23 RX ADMIN — TORSEMIDE 10 MG: 10 TABLET ORAL at 08:46

## 2024-10-23 RX ADMIN — METOPROLOL SUCCINATE 25 MG: 25 TABLET, EXTENDED RELEASE ORAL at 08:42

## 2024-10-23 RX ADMIN — PRAVASTATIN SODIUM 80 MG: 20 TABLET ORAL at 20:08

## 2024-10-23 RX ADMIN — MONTELUKAST SODIUM 10 MG: 10 TABLET, FILM COATED ORAL at 20:10

## 2024-10-23 RX ADMIN — HYDROCODONE BITARTRATE AND ACETAMINOPHEN 1 TABLET: 5; 325 TABLET ORAL at 15:22

## 2024-10-23 RX ADMIN — POLYETHYLENE GLYCOL (3350) 17 G: 17 POWDER, FOR SOLUTION ORAL at 08:34

## 2024-10-23 RX ADMIN — LEVOTHYROXINE SODIUM 75 MCG: 75 TABLET ORAL at 05:39

## 2024-10-23 RX ADMIN — CLINDAMYCIN PHOSPHATE 900 MG: 900 INJECTION, SOLUTION INTRAVENOUS at 05:42

## 2024-10-23 RX ADMIN — ESCITALOPRAM OXALATE 5 MG: 10 TABLET ORAL at 08:35

## 2024-10-23 RX ADMIN — DOCUSATE SODIUM 100 MG: 100 CAPSULE, LIQUID FILLED ORAL at 08:35

## 2024-10-23 RX ADMIN — APIXABAN 5 MG: 5 TABLET, FILM COATED ORAL at 20:08

## 2024-10-23 RX ADMIN — APIXABAN 5 MG: 5 TABLET, FILM COATED ORAL at 08:35

## 2024-10-23 RX ADMIN — SPIRONOLACTONE 12.5 MG: 25 TABLET, COATED ORAL at 08:35

## 2024-10-23 RX ADMIN — DOCUSATE SODIUM 100 MG: 100 CAPSULE, LIQUID FILLED ORAL at 20:08

## 2024-10-23 RX ADMIN — CYCLOBENZAPRINE HYDROCHLORIDE 5 MG: 5 TABLET, FILM COATED ORAL at 10:16

## 2024-10-23 RX ADMIN — HYDROCODONE BITARTRATE AND ACETAMINOPHEN 1 TABLET: 5; 325 TABLET ORAL at 05:39

## 2024-10-23 ASSESSMENT — PAIN SCALES - GENERAL
PAINLEVEL_OUTOF10: 6
PAINLEVEL_OUTOF10: 3
PAINLEVEL_OUTOF10: 8
PAINLEVEL_OUTOF10: 0
PAINLEVEL_OUTOF10: 3
PAINLEVEL_OUTOF10: 4

## 2024-10-23 ASSESSMENT — ENCOUNTER SYMPTOMS
PAIN SEVERITY NOW: 9
PAIN SEVERITY NOW: 8

## 2024-10-23 ASSESSMENT — COGNITIVE AND FUNCTIONAL STATUS - GENERAL
BASIC_MOBILITY_RAW_SCORE: 8
HELP NEEDED FOR TOILETING: TOTAL
HELP NEEDED DRESSING REGULAR UPPER BODY CLOTHING: A LITTLE
HELP NEEDED DRESSING REGULAR LOWER BODY CLOTHING: TOTAL
BASIC_MOBILITY_CONVERTED_SCORE: 22.61
DAILY_ACTIVITY_CONVERTED_SCORE: 35.96
HELP NEEDED FOR BATHING: A LITTLE
DAILY_ACTIVITY_RAW_SCORE: 16

## 2024-10-23 ASSESSMENT — ACTIVITIES OF DAILY LIVING (ADL): HOME_MANAGEMENT_TIME_ENTRY: 23

## 2024-10-24 ENCOUNTER — PATIENT MESSAGE (OUTPATIENT)
Dept: FAMILY MEDICINE CLINIC | Facility: CLINIC | Age: 84
End: 2024-10-24

## 2024-10-24 PROCEDURE — 10002801 HB RX 250 W/O HCPCS: Performed by: INTERNAL MEDICINE

## 2024-10-24 PROCEDURE — 10002803 HB RX 637: Performed by: PHYSICIAN ASSISTANT

## 2024-10-24 PROCEDURE — 10002803 HB RX 637: Performed by: INTERNAL MEDICINE

## 2024-10-24 PROCEDURE — 10006031 HB ROOM CHARGE TELEMETRY

## 2024-10-24 RX ADMIN — APIXABAN 5 MG: 5 TABLET, FILM COATED ORAL at 21:09

## 2024-10-24 RX ADMIN — MONTELUKAST SODIUM 10 MG: 10 TABLET, FILM COATED ORAL at 17:42

## 2024-10-24 RX ADMIN — SPIRONOLACTONE 12.5 MG: 25 TABLET, COATED ORAL at 07:52

## 2024-10-24 RX ADMIN — METOPROLOL SUCCINATE 25 MG: 25 TABLET, EXTENDED RELEASE ORAL at 07:51

## 2024-10-24 RX ADMIN — CYCLOBENZAPRINE HYDROCHLORIDE 5 MG: 5 TABLET, FILM COATED ORAL at 21:09

## 2024-10-24 RX ADMIN — HYDROCODONE BITARTRATE AND ACETAMINOPHEN 1 TABLET: 5; 325 TABLET ORAL at 07:51

## 2024-10-24 RX ADMIN — HYDROCODONE BITARTRATE AND ACETAMINOPHEN 1 TABLET: 5; 325 TABLET ORAL at 17:42

## 2024-10-24 RX ADMIN — CYCLOBENZAPRINE HYDROCHLORIDE 5 MG: 5 TABLET, FILM COATED ORAL at 11:49

## 2024-10-24 RX ADMIN — LORATADINE 10 MG: 10 TABLET ORAL at 07:51

## 2024-10-24 RX ADMIN — HYDROCODONE BITARTRATE AND ACETAMINOPHEN 1 TABLET: 5; 325 TABLET ORAL at 13:40

## 2024-10-24 RX ADMIN — POLYETHYLENE GLYCOL (3350) 17 G: 17 POWDER, FOR SOLUTION ORAL at 05:42

## 2024-10-24 RX ADMIN — DOCUSATE SODIUM 100 MG: 100 CAPSULE, LIQUID FILLED ORAL at 07:51

## 2024-10-24 RX ADMIN — APIXABAN 5 MG: 5 TABLET, FILM COATED ORAL at 07:51

## 2024-10-24 RX ADMIN — TORSEMIDE 10 MG: 10 TABLET ORAL at 07:52

## 2024-10-24 RX ADMIN — ESCITALOPRAM OXALATE 5 MG: 10 TABLET ORAL at 07:52

## 2024-10-24 RX ADMIN — LEVOTHYROXINE SODIUM 75 MCG: 75 TABLET ORAL at 05:42

## 2024-10-24 RX ADMIN — DOCUSATE SODIUM 100 MG: 100 CAPSULE, LIQUID FILLED ORAL at 21:09

## 2024-10-24 RX ADMIN — PRAVASTATIN SODIUM 80 MG: 20 TABLET ORAL at 21:09

## 2024-10-24 ASSESSMENT — PAIN SCALES - GENERAL
PAINLEVEL_OUTOF10: 2
PAINLEVEL_OUTOF10: 6
PAINLEVEL_OUTOF10: 3
PAINLEVEL_OUTOF10: 2
PAINLEVEL_OUTOF10: 3

## 2024-10-25 PROCEDURE — 10002803 HB RX 637: Performed by: INTERNAL MEDICINE

## 2024-10-25 PROCEDURE — 90662 IIV NO PRSV INCREASED AG IM: CPT | Performed by: INTERNAL MEDICINE

## 2024-10-25 PROCEDURE — 97530 THERAPEUTIC ACTIVITIES: CPT

## 2024-10-25 PROCEDURE — 10002803 HB RX 637: Performed by: PHYSICIAN ASSISTANT

## 2024-10-25 PROCEDURE — 10006031 HB ROOM CHARGE TELEMETRY

## 2024-10-25 PROCEDURE — 10002801 HB RX 250 W/O HCPCS: Performed by: INTERNAL MEDICINE

## 2024-10-25 PROCEDURE — 10002800 HB RX 250 W HCPCS: Performed by: INTERNAL MEDICINE

## 2024-10-25 PROCEDURE — 90471 IMMUNIZATION ADMIN: CPT | Performed by: INTERNAL MEDICINE

## 2024-10-25 RX ADMIN — CYCLOBENZAPRINE HYDROCHLORIDE 5 MG: 5 TABLET, FILM COATED ORAL at 10:45

## 2024-10-25 RX ADMIN — HYDROCODONE BITARTRATE AND ACETAMINOPHEN 1 TABLET: 5; 325 TABLET ORAL at 13:25

## 2024-10-25 RX ADMIN — HYDROCODONE BITARTRATE AND ACETAMINOPHEN 1 TABLET: 5; 325 TABLET ORAL at 08:03

## 2024-10-25 RX ADMIN — METOPROLOL SUCCINATE 25 MG: 25 TABLET, EXTENDED RELEASE ORAL at 08:03

## 2024-10-25 RX ADMIN — APIXABAN 5 MG: 5 TABLET, FILM COATED ORAL at 08:03

## 2024-10-25 RX ADMIN — APIXABAN 5 MG: 5 TABLET, FILM COATED ORAL at 20:16

## 2024-10-25 RX ADMIN — ESCITALOPRAM OXALATE 5 MG: 10 TABLET ORAL at 08:03

## 2024-10-25 RX ADMIN — HYDROCODONE BITARTRATE AND ACETAMINOPHEN 1 TABLET: 5; 325 TABLET ORAL at 20:16

## 2024-10-25 RX ADMIN — HYDROCODONE BITARTRATE AND ACETAMINOPHEN 1 TABLET: 5; 325 TABLET ORAL at 01:19

## 2024-10-25 RX ADMIN — MONTELUKAST SODIUM 10 MG: 10 TABLET, FILM COATED ORAL at 17:19

## 2024-10-25 RX ADMIN — DOCUSATE SODIUM 100 MG: 100 CAPSULE, LIQUID FILLED ORAL at 20:16

## 2024-10-25 RX ADMIN — LORATADINE 10 MG: 10 TABLET ORAL at 08:03

## 2024-10-25 RX ADMIN — TORSEMIDE 10 MG: 10 TABLET ORAL at 08:03

## 2024-10-25 RX ADMIN — LEVOTHYROXINE SODIUM 75 MCG: 75 TABLET ORAL at 06:15

## 2024-10-25 RX ADMIN — PRAVASTATIN SODIUM 80 MG: 20 TABLET ORAL at 20:21

## 2024-10-25 RX ADMIN — ONDANSETRON 4 MG: 2 INJECTION INTRAMUSCULAR; INTRAVENOUS at 11:10

## 2024-10-25 RX ADMIN — DOCUSATE SODIUM 100 MG: 100 CAPSULE, LIQUID FILLED ORAL at 08:03

## 2024-10-25 RX ADMIN — INFLUENZA A VIRUS A/VICTORIA/4897/2022 IVR-238 (H1N1) ANTIGEN (FORMALDEHYDE INACTIVATED), INFLUENZA A VIRUS A/CALIFORNIA/122/2022 SAN-022 (H3N2) ANTIGEN (FORMALDEHYDE INACTIVATED), AND INFLUENZA B VIRUS B/MICHIGAN/01/2021 ANTIGEN (FORMALDEHYDE INACTIVATED) 0.5 ML: 60; 60; 60 INJECTION, SUSPENSION INTRAMUSCULAR at 17:19

## 2024-10-25 RX ADMIN — SPIRONOLACTONE 12.5 MG: 25 TABLET, COATED ORAL at 08:03

## 2024-10-25 ASSESSMENT — PAIN SCALES - GENERAL
PAINLEVEL_OUTOF10: 2
PAINLEVEL_OUTOF10: 3
PAINLEVEL_OUTOF10: 7
PAINLEVEL_OUTOF10: 5
PAINLEVEL_OUTOF10: 2
PAINLEVEL_OUTOF10: 7

## 2024-10-25 ASSESSMENT — COGNITIVE AND FUNCTIONAL STATUS - GENERAL
BASIC_MOBILITY_CONVERTED_SCORE: 28.13
BASIC_MOBILITY_RAW_SCORE: 10

## 2024-10-25 ASSESSMENT — ENCOUNTER SYMPTOMS: PAIN SEVERITY NOW: 10

## 2024-10-26 VITALS
SYSTOLIC BLOOD PRESSURE: 132 MMHG | OXYGEN SATURATION: 95 % | WEIGHT: 220.24 LBS | HEART RATE: 88 BPM | HEIGHT: 62 IN | TEMPERATURE: 97.9 F | BODY MASS INDEX: 40.53 KG/M2 | DIASTOLIC BLOOD PRESSURE: 76 MMHG | RESPIRATION RATE: 15 BRPM

## 2024-10-26 PROCEDURE — 10002803 HB RX 637: Performed by: INTERNAL MEDICINE

## 2024-10-26 PROCEDURE — 10006031 HB ROOM CHARGE TELEMETRY

## 2024-10-26 PROCEDURE — 10002803 HB RX 637: Performed by: PHYSICIAN ASSISTANT

## 2024-10-26 PROCEDURE — 10002801 HB RX 250 W/O HCPCS: Performed by: INTERNAL MEDICINE

## 2024-10-26 RX ADMIN — LORATADINE 10 MG: 10 TABLET ORAL at 08:21

## 2024-10-26 RX ADMIN — HYDROCODONE BITARTRATE AND ACETAMINOPHEN 1 TABLET: 5; 325 TABLET ORAL at 12:05

## 2024-10-26 RX ADMIN — CYCLOBENZAPRINE HYDROCHLORIDE 5 MG: 5 TABLET, FILM COATED ORAL at 20:32

## 2024-10-26 RX ADMIN — MONTELUKAST SODIUM 10 MG: 10 TABLET, FILM COATED ORAL at 16:41

## 2024-10-26 RX ADMIN — DOCUSATE SODIUM 100 MG: 100 CAPSULE, LIQUID FILLED ORAL at 08:17

## 2024-10-26 RX ADMIN — HYDROCODONE BITARTRATE AND ACETAMINOPHEN 1 TABLET: 5; 325 TABLET ORAL at 16:41

## 2024-10-26 RX ADMIN — SPIRONOLACTONE 12.5 MG: 25 TABLET, COATED ORAL at 08:21

## 2024-10-26 RX ADMIN — TORSEMIDE 10 MG: 10 TABLET ORAL at 08:21

## 2024-10-26 RX ADMIN — LEVOTHYROXINE SODIUM 75 MCG: 75 TABLET ORAL at 05:23

## 2024-10-26 RX ADMIN — METOPROLOL SUCCINATE 25 MG: 25 TABLET, EXTENDED RELEASE ORAL at 08:20

## 2024-10-26 RX ADMIN — ESCITALOPRAM OXALATE 5 MG: 10 TABLET ORAL at 08:17

## 2024-10-26 RX ADMIN — HYDROCODONE BITARTRATE AND ACETAMINOPHEN 1 TABLET: 5; 325 TABLET ORAL at 22:39

## 2024-10-26 RX ADMIN — HYDROCODONE BITARTRATE AND ACETAMINOPHEN 1 TABLET: 5; 325 TABLET ORAL at 05:23

## 2024-10-26 RX ADMIN — APIXABAN 5 MG: 5 TABLET, FILM COATED ORAL at 08:20

## 2024-10-26 RX ADMIN — PRAVASTATIN SODIUM 80 MG: 20 TABLET ORAL at 20:33

## 2024-10-26 RX ADMIN — APIXABAN 5 MG: 5 TABLET, FILM COATED ORAL at 20:33

## 2024-10-26 RX ADMIN — DOCUSATE SODIUM 100 MG: 100 CAPSULE, LIQUID FILLED ORAL at 20:33

## 2024-10-26 ASSESSMENT — PATIENT HEALTH QUESTIONNAIRE - PHQ9
IS PATIENT ABLE TO COMPLETE PHQ2 OR PHQ9: YES
CLINICAL INTERPRETATION OF PHQ2 SCORE: NO FURTHER SCREENING NEEDED
SUM OF ALL RESPONSES TO PHQ9 QUESTIONS 1 AND 2: 0

## 2024-10-26 ASSESSMENT — PAIN SCALES - PAIN ASSESSMENT IN ADVANCED DEMENTIA (PAINAD)
FACIALEXPRESSION: SMILING OR INEXPRESSIVE
BREATHING: NORMAL

## 2024-10-26 ASSESSMENT — PAIN SCALES - GENERAL
PAINLEVEL_OUTOF10: 0
PAINLEVEL_OUTOF10: 5
PAINLEVEL_OUTOF10: 8
PAINLEVEL_OUTOF10: 2
PAINLEVEL_OUTOF10: 4
PAINLEVEL_OUTOF10: 2
PAINLEVEL_OUTOF10: 2

## 2024-10-26 ASSESSMENT — PAIN SCALES - WONG BAKER
WONGBAKER_NUMERICALRESPONSE: 2

## 2024-10-26 ASSESSMENT — ORIENTATION MEMORY CONCENTRATION TEST (OMCT)
REPEAT THE NAME AND ADDRESS I ASKED YOU TO REMEMBER: CORRECT
OMCT INTERPRETATION: 0-6: NO SIGNIFICANT IMPAIRMENT
SAY THE MONTHS IN REVERSE ORDER STARTING WITH LAST MONTH: CORRECT
COUNT BACKWARDS FROM 20 TO 1: CORRECT
OMCT SCORE: 0
WHAT YEAR IS IT NOW (MUST BE EXACT): CORRECT
WHAT MONTH IS IT NOW: CORRECT
WHAT TIME IS IT (NO WATCH OR CLOCK): CORRECT

## 2024-10-27 PROCEDURE — 10006031 HB ROOM CHARGE TELEMETRY

## 2024-10-27 PROCEDURE — 10002803 HB RX 637: Performed by: INTERNAL MEDICINE

## 2024-10-27 PROCEDURE — 10002801 HB RX 250 W/O HCPCS: Performed by: INTERNAL MEDICINE

## 2024-10-27 PROCEDURE — 10002803 HB RX 637: Performed by: PHYSICIAN ASSISTANT

## 2024-10-27 PROCEDURE — 10002803 HB RX 637: Performed by: ORTHOPAEDIC SURGERY

## 2024-10-27 RX ORDER — SENNOSIDES A AND B 8.6 MG/1
1 TABLET, FILM COATED ORAL NIGHTLY
Status: DISCONTINUED | OUTPATIENT
Start: 2024-10-27 | End: 2024-10-29 | Stop reason: HOSPADM

## 2024-10-27 RX ORDER — HYDROCODONE BITARTRATE AND ACETAMINOPHEN 10; 325 MG/1; MG/1
1 TABLET ORAL EVERY 6 HOURS PRN
Status: DISCONTINUED | OUTPATIENT
Start: 2024-10-27 | End: 2024-10-29 | Stop reason: HOSPADM

## 2024-10-27 RX ORDER — POLYETHYLENE GLYCOL 3350 17 G/17G
17 POWDER, FOR SOLUTION ORAL DAILY
Status: DISCONTINUED | OUTPATIENT
Start: 2024-10-27 | End: 2024-10-29 | Stop reason: HOSPADM

## 2024-10-27 RX ADMIN — SENNOSIDES 8.6 MG: 8.6 TABLET, FILM COATED ORAL at 20:31

## 2024-10-27 RX ADMIN — PRAVASTATIN SODIUM 80 MG: 20 TABLET ORAL at 20:32

## 2024-10-27 RX ADMIN — CYCLOBENZAPRINE HYDROCHLORIDE 5 MG: 5 TABLET, FILM COATED ORAL at 11:50

## 2024-10-27 RX ADMIN — MONTELUKAST SODIUM 10 MG: 10 TABLET, FILM COATED ORAL at 18:15

## 2024-10-27 RX ADMIN — TORSEMIDE 10 MG: 10 TABLET ORAL at 08:08

## 2024-10-27 RX ADMIN — HYDROCODONE BITARTRATE AND ACETAMINOPHEN 1 TABLET: 10; 325 TABLET ORAL at 23:01

## 2024-10-27 RX ADMIN — LORATADINE 10 MG: 10 TABLET ORAL at 08:09

## 2024-10-27 RX ADMIN — HYDROCODONE BITARTRATE AND ACETAMINOPHEN 1 TABLET: 5; 325 TABLET ORAL at 08:07

## 2024-10-27 RX ADMIN — LEVOTHYROXINE SODIUM 75 MCG: 75 TABLET ORAL at 05:13

## 2024-10-27 RX ADMIN — SPIRONOLACTONE 12.5 MG: 25 TABLET, COATED ORAL at 08:08

## 2024-10-27 RX ADMIN — DOCUSATE SODIUM 100 MG: 100 CAPSULE, LIQUID FILLED ORAL at 20:30

## 2024-10-27 RX ADMIN — ESCITALOPRAM OXALATE 5 MG: 10 TABLET ORAL at 08:09

## 2024-10-27 RX ADMIN — APIXABAN 5 MG: 5 TABLET, FILM COATED ORAL at 08:09

## 2024-10-27 RX ADMIN — POLYETHYLENE GLYCOL (3350) 17 G: 17 POWDER, FOR SOLUTION ORAL at 18:16

## 2024-10-27 RX ADMIN — APIXABAN 5 MG: 5 TABLET, FILM COATED ORAL at 20:31

## 2024-10-27 RX ADMIN — HYDROCODONE BITARTRATE AND ACETAMINOPHEN 1 TABLET: 10; 325 TABLET ORAL at 14:34

## 2024-10-27 RX ADMIN — DOCUSATE SODIUM 100 MG: 100 CAPSULE, LIQUID FILLED ORAL at 08:09

## 2024-10-27 RX ADMIN — METOPROLOL SUCCINATE 25 MG: 25 TABLET, EXTENDED RELEASE ORAL at 08:08

## 2024-10-27 ASSESSMENT — ORIENTATION MEMORY CONCENTRATION TEST (OMCT)
OMCT INTERPRETATION: 0-6: NO SIGNIFICANT IMPAIRMENT
WHAT TIME IS IT (NO WATCH OR CLOCK): CORRECT
SAY THE MONTHS IN REVERSE ORDER STARTING WITH LAST MONTH: CORRECT
WHAT YEAR IS IT NOW (MUST BE EXACT): CORRECT
WHAT MONTH IS IT NOW: CORRECT
COUNT BACKWARDS FROM 20 TO 1: CORRECT
REPEAT THE NAME AND ADDRESS I ASKED YOU TO REMEMBER: CORRECT
OMCT SCORE: 0

## 2024-10-27 ASSESSMENT — PAIN SCALES - WONG BAKER
WONGBAKER_NUMERICALRESPONSE: 2

## 2024-10-27 ASSESSMENT — PAIN SCALES - GENERAL
PAINLEVEL_OUTOF10: 2
PAINLEVEL_OUTOF10: 0
PAINLEVEL_OUTOF10: 4
PAINLEVEL_OUTOF10: 2
PAINLEVEL_OUTOF10: 2

## 2024-10-27 ASSESSMENT — PATIENT HEALTH QUESTIONNAIRE - PHQ9
CLINICAL INTERPRETATION OF PHQ2 SCORE: NO FURTHER SCREENING NEEDED
SUM OF ALL RESPONSES TO PHQ9 QUESTIONS 1 AND 2: 0
IS PATIENT ABLE TO COMPLETE PHQ2 OR PHQ9: YES

## 2024-10-27 ASSESSMENT — PAIN SCALES - PAIN ASSESSMENT IN ADVANCED DEMENTIA (PAINAD): FACIALEXPRESSION: SAD. FRIGHTENED. FROWNING

## 2024-10-28 PROCEDURE — 10002801 HB RX 250 W/O HCPCS: Performed by: INTERNAL MEDICINE

## 2024-10-28 PROCEDURE — 10002803 HB RX 637: Performed by: INTERNAL MEDICINE

## 2024-10-28 PROCEDURE — 97535 SELF CARE MNGMENT TRAINING: CPT

## 2024-10-28 PROCEDURE — 10006031 HB ROOM CHARGE TELEMETRY

## 2024-10-28 PROCEDURE — 97530 THERAPEUTIC ACTIVITIES: CPT

## 2024-10-28 PROCEDURE — 10002803 HB RX 637: Performed by: PHYSICIAN ASSISTANT

## 2024-10-28 PROCEDURE — 10002803 HB RX 637: Performed by: ORTHOPAEDIC SURGERY

## 2024-10-28 RX ORDER — CALCIUM CARBONATE 500 MG/1
500 TABLET, CHEWABLE ORAL EVERY 4 HOURS PRN
Status: DISCONTINUED | OUTPATIENT
Start: 2024-10-28 | End: 2024-10-29 | Stop reason: HOSPADM

## 2024-10-28 RX ORDER — LANOLIN ALCOHOL/MO/W.PET/CERES
3 CREAM (GRAM) TOPICAL NIGHTLY PRN
Status: DISCONTINUED | OUTPATIENT
Start: 2024-10-28 | End: 2024-10-29 | Stop reason: HOSPADM

## 2024-10-28 RX ADMIN — LORATADINE 10 MG: 10 TABLET ORAL at 08:54

## 2024-10-28 RX ADMIN — CYCLOBENZAPRINE HYDROCHLORIDE 5 MG: 5 TABLET, FILM COATED ORAL at 12:57

## 2024-10-28 RX ADMIN — TORSEMIDE 10 MG: 10 TABLET ORAL at 08:54

## 2024-10-28 RX ADMIN — ANTACID TABLETS 500 MG: 500 TABLET, CHEWABLE ORAL at 20:14

## 2024-10-28 RX ADMIN — HYDROCODONE BITARTRATE AND ACETAMINOPHEN 1 TABLET: 10; 325 TABLET ORAL at 15:16

## 2024-10-28 RX ADMIN — METOPROLOL SUCCINATE 25 MG: 25 TABLET, EXTENDED RELEASE ORAL at 08:54

## 2024-10-28 RX ADMIN — APIXABAN 5 MG: 5 TABLET, FILM COATED ORAL at 20:06

## 2024-10-28 RX ADMIN — HYDROCODONE BITARTRATE AND ACETAMINOPHEN 1 TABLET: 10; 325 TABLET ORAL at 08:55

## 2024-10-28 RX ADMIN — PRAVASTATIN SODIUM 80 MG: 20 TABLET ORAL at 20:07

## 2024-10-28 RX ADMIN — DOCUSATE SODIUM 100 MG: 100 CAPSULE, LIQUID FILLED ORAL at 20:06

## 2024-10-28 RX ADMIN — HYDROCODONE BITARTRATE AND ACETAMINOPHEN 1 TABLET: 10; 325 TABLET ORAL at 21:33

## 2024-10-28 RX ADMIN — POLYETHYLENE GLYCOL (3350) 17 G: 17 POWDER, FOR SOLUTION ORAL at 08:57

## 2024-10-28 RX ADMIN — DOCUSATE SODIUM 100 MG: 100 CAPSULE, LIQUID FILLED ORAL at 08:54

## 2024-10-28 RX ADMIN — ESCITALOPRAM OXALATE 5 MG: 10 TABLET ORAL at 08:53

## 2024-10-28 RX ADMIN — CYCLOBENZAPRINE HYDROCHLORIDE 5 MG: 5 TABLET, FILM COATED ORAL at 21:33

## 2024-10-28 RX ADMIN — APIXABAN 5 MG: 5 TABLET, FILM COATED ORAL at 08:54

## 2024-10-28 RX ADMIN — MONTELUKAST SODIUM 10 MG: 10 TABLET, FILM COATED ORAL at 17:25

## 2024-10-28 RX ADMIN — LEVOTHYROXINE SODIUM 75 MCG: 75 TABLET ORAL at 06:17

## 2024-10-28 RX ADMIN — SENNOSIDES 8.6 MG: 8.6 TABLET, FILM COATED ORAL at 20:12

## 2024-10-28 RX ADMIN — SPIRONOLACTONE 12.5 MG: 25 TABLET, COATED ORAL at 08:53

## 2024-10-28 ASSESSMENT — ENCOUNTER SYMPTOMS
PAIN SEVERITY NOW: 8
PAIN SEVERITY NOW: 4

## 2024-10-28 ASSESSMENT — COGNITIVE AND FUNCTIONAL STATUS - GENERAL
DAILY_ACTIVITY_CONVERTED_SCORE: 38.66
DAILY_ACTIVITY_RAW_SCORE: 18
HELP NEEDED FOR BATHING: A LITTLE
BASIC_MOBILITY_CONVERTED_SCORE: 32.23
HELP NEEDED FOR TOILETING: A LOT
HELP NEEDED DRESSING REGULAR UPPER BODY CLOTHING: A LITTLE
BASIC_MOBILITY_RAW_SCORE: 12
HELP NEEDED DRESSING REGULAR LOWER BODY CLOTHING: A LOT

## 2024-10-28 ASSESSMENT — PAIN SCALES - GENERAL
PAINLEVEL_OUTOF10: 5
PAINLEVEL_OUTOF10: 0
PAINLEVEL_OUTOF10: 2
PAINLEVEL_OUTOF10: 0

## 2024-10-28 ASSESSMENT — ACTIVITIES OF DAILY LIVING (ADL): HOME_MANAGEMENT_TIME_ENTRY: 23

## 2024-10-29 VITALS
TEMPERATURE: 97.3 F | WEIGHT: 220.24 LBS | DIASTOLIC BLOOD PRESSURE: 76 MMHG | BODY MASS INDEX: 40.53 KG/M2 | SYSTOLIC BLOOD PRESSURE: 135 MMHG | HEART RATE: 85 BPM | HEIGHT: 62 IN | RESPIRATION RATE: 18 BRPM | OXYGEN SATURATION: 97 %

## 2024-10-29 PROCEDURE — 10002803 HB RX 637: Performed by: PHYSICIAN ASSISTANT

## 2024-10-29 PROCEDURE — 10002803 HB RX 637: Performed by: INTERNAL MEDICINE

## 2024-10-29 PROCEDURE — 10002803 HB RX 637: Performed by: ORTHOPAEDIC SURGERY

## 2024-10-29 RX ORDER — HYDROCODONE BITARTRATE AND ACETAMINOPHEN 10; 325 MG/1; MG/1
1 TABLET ORAL EVERY 6 HOURS PRN
Qty: 20 TABLET | Refills: 0 | Status: SHIPPED | OUTPATIENT
Start: 2024-10-29

## 2024-10-29 RX ADMIN — DOCUSATE SODIUM 100 MG: 100 CAPSULE, LIQUID FILLED ORAL at 08:15

## 2024-10-29 RX ADMIN — SPIRONOLACTONE 12.5 MG: 25 TABLET, COATED ORAL at 08:14

## 2024-10-29 RX ADMIN — POLYETHYLENE GLYCOL (3350) 17 G: 17 POWDER, FOR SOLUTION ORAL at 08:16

## 2024-10-29 RX ADMIN — ESCITALOPRAM OXALATE 5 MG: 10 TABLET ORAL at 08:15

## 2024-10-29 RX ADMIN — TORSEMIDE 10 MG: 10 TABLET ORAL at 08:14

## 2024-10-29 RX ADMIN — APIXABAN 5 MG: 5 TABLET, FILM COATED ORAL at 08:15

## 2024-10-29 RX ADMIN — METOPROLOL SUCCINATE 25 MG: 25 TABLET, EXTENDED RELEASE ORAL at 08:15

## 2024-10-29 RX ADMIN — CYCLOBENZAPRINE HYDROCHLORIDE 5 MG: 5 TABLET, FILM COATED ORAL at 08:14

## 2024-10-29 RX ADMIN — LORATADINE 10 MG: 10 TABLET ORAL at 08:45

## 2024-10-29 RX ADMIN — HYDROCODONE BITARTRATE AND ACETAMINOPHEN 1 TABLET: 10; 325 TABLET ORAL at 10:42

## 2024-10-29 RX ADMIN — HYDROCODONE BITARTRATE AND ACETAMINOPHEN 1 TABLET: 10; 325 TABLET ORAL at 03:47

## 2024-10-29 RX ADMIN — LEVOTHYROXINE SODIUM 75 MCG: 75 TABLET ORAL at 06:04

## 2024-10-29 ASSESSMENT — PAIN SCALES - GENERAL
PAINLEVEL_OUTOF10: 3
PAINLEVEL_OUTOF10: 0
PAINLEVEL_OUTOF10: 2
PAINLEVEL_OUTOF10: 7

## 2024-11-04 ENCOUNTER — MED REC SCAN ONLY (OUTPATIENT)
Dept: FAMILY MEDICINE CLINIC | Facility: CLINIC | Age: 84
End: 2024-11-04

## 2024-11-06 ENCOUNTER — TELEPHONE (OUTPATIENT)
Dept: ORTHOPEDICS | Age: 84
End: 2024-11-06

## 2024-11-12 ENCOUNTER — APPOINTMENT (OUTPATIENT)
Dept: ORTHOPEDICS | Age: 84
End: 2024-11-12

## 2024-11-16 ENCOUNTER — PATIENT MESSAGE (OUTPATIENT)
Dept: FAMILY MEDICINE CLINIC | Facility: CLINIC | Age: 84
End: 2024-11-16

## 2024-11-16 DIAGNOSIS — M97.01XD PERIPROSTHETIC FRACTURE AROUND INTERNAL PROSTHETIC RIGHT HIP JOINT, SUBSEQUENT ENCOUNTER: Primary | ICD-10-CM

## 2024-11-16 DIAGNOSIS — J30.9 ALLERGIC RHINITIS, UNSPECIFIED SEASONALITY, UNSPECIFIED TRIGGER: ICD-10-CM

## 2024-11-16 DIAGNOSIS — S72.301D CLOSED FRACTURE OF SHAFT OF RIGHT FEMUR WITH ROUTINE HEALING, UNSPECIFIED FRACTURE MORPHOLOGY, SUBSEQUENT ENCOUNTER: ICD-10-CM

## 2024-11-19 ENCOUNTER — APPOINTMENT (OUTPATIENT)
Dept: ORTHOPEDICS | Age: 84
End: 2024-11-19

## 2024-11-19 ENCOUNTER — IMAGING SERVICES (OUTPATIENT)
Dept: GENERAL RADIOLOGY | Age: 84
End: 2024-11-19

## 2024-11-19 DIAGNOSIS — S72.91XA CLOSED FRACTURE OF RIGHT FEMUR, UNSPECIFIED FRACTURE MORPHOLOGY, INITIAL ENCOUNTER (CMD): ICD-10-CM

## 2024-11-19 DIAGNOSIS — S72.91XA CLOSED FRACTURE OF RIGHT FEMUR, UNSPECIFIED FRACTURE MORPHOLOGY, INITIAL ENCOUNTER (CMD): Primary | ICD-10-CM

## 2024-11-19 PROCEDURE — 99024 POSTOP FOLLOW-UP VISIT: CPT

## 2024-11-19 PROCEDURE — 73552 X-RAY EXAM OF FEMUR 2/>: CPT | Performed by: RADIOLOGY

## 2024-11-20 ENCOUNTER — HOSPITAL ENCOUNTER (EMERGENCY)
Age: 84
Discharge: HOME | End: 2024-11-20
Attending: EMERGENCY MEDICINE

## 2024-11-20 ENCOUNTER — APPOINTMENT (OUTPATIENT)
Dept: CT IMAGING | Age: 84
End: 2024-11-20
Attending: EMERGENCY MEDICINE

## 2024-11-20 VITALS
WEIGHT: 216.05 LBS | BODY MASS INDEX: 38.28 KG/M2 | RESPIRATION RATE: 14 BRPM | SYSTOLIC BLOOD PRESSURE: 139 MMHG | HEART RATE: 90 BPM | DIASTOLIC BLOOD PRESSURE: 63 MMHG | OXYGEN SATURATION: 99 % | TEMPERATURE: 97.9 F | HEIGHT: 63 IN

## 2024-11-20 DIAGNOSIS — M54.50 ACUTE LEFT-SIDED LOW BACK PAIN WITHOUT SCIATICA: Primary | ICD-10-CM

## 2024-11-20 LAB
ANION GAP SERPL CALC-SCNC: 12 MMOL/L (ref 7–19)
BASOPHILS # BLD: 0.1 K/MCL (ref 0–0.3)
BASOPHILS NFR BLD: 1 %
BUN SERPL-MCNC: 21 MG/DL (ref 6–20)
BUN/CREAT SERPL: 14 (ref 7–25)
CALCIUM SERPL-MCNC: 11.7 MG/DL (ref 8.4–10.2)
CHLORIDE SERPL-SCNC: 103 MMOL/L (ref 97–110)
CO2 SERPL-SCNC: 27 MMOL/L (ref 21–32)
CREAT SERPL-MCNC: 1.49 MG/DL (ref 0.51–0.95)
DEPRECATED RDW RBC: 58.4 FL (ref 39–50)
EGFRCR SERPLBLD CKD-EPI 2021: 35 ML/MIN/{1.73_M2}
EOSINOPHIL # BLD: 0.1 K/MCL (ref 0–0.5)
EOSINOPHIL NFR BLD: 1 %
ERYTHROCYTE [DISTWIDTH] IN BLOOD: 15.8 % (ref 11–15)
FASTING DURATION TIME PATIENT: ABNORMAL H
GLUCOSE SERPL-MCNC: 118 MG/DL (ref 70–99)
HCT VFR BLD CALC: 33.7 % (ref 36–46.5)
HGB BLD-MCNC: 10.5 G/DL (ref 12–15.5)
IMM GRANULOCYTES # BLD AUTO: 0 K/MCL (ref 0–0.2)
IMM GRANULOCYTES # BLD: 0 %
LYMPHOCYTES # BLD: 2.2 K/MCL (ref 1–4)
LYMPHOCYTES NFR BLD: 22 %
MCH RBC QN AUTO: 31.4 PG (ref 26–34)
MCHC RBC AUTO-ENTMCNC: 31.2 G/DL (ref 32–36.5)
MCV RBC AUTO: 100.9 FL (ref 78–100)
MONOCYTES # BLD: 0.9 K/MCL (ref 0.3–0.9)
MONOCYTES NFR BLD: 9 %
NEUTROPHILS # BLD: 7 K/MCL (ref 1.8–7.7)
NEUTROPHILS NFR BLD: 67 %
NRBC BLD MANUAL-RTO: 0 /100 WBC
PLATELET # BLD AUTO: 292 K/MCL (ref 140–450)
POTASSIUM SERPL-SCNC: 3.8 MMOL/L (ref 3.4–5.1)
RAINBOW EXTRA TUBES HOLD SPECIMEN: NORMAL
RBC # BLD: 3.34 MIL/MCL (ref 4–5.2)
SODIUM SERPL-SCNC: 138 MMOL/L (ref 135–145)
WBC # BLD: 10.3 K/MCL (ref 4.2–11)

## 2024-11-20 PROCEDURE — 99284 EMERGENCY DEPT VISIT MOD MDM: CPT

## 2024-11-20 PROCEDURE — 80048 BASIC METABOLIC PNL TOTAL CA: CPT | Performed by: EMERGENCY MEDICINE

## 2024-11-20 PROCEDURE — 10002800 HB RX 250 W HCPCS: Performed by: EMERGENCY MEDICINE

## 2024-11-20 PROCEDURE — 74176 CT ABD & PELVIS W/O CONTRAST: CPT

## 2024-11-20 PROCEDURE — 96374 THER/PROPH/DIAG INJ IV PUSH: CPT

## 2024-11-20 PROCEDURE — 85025 COMPLETE CBC W/AUTO DIFF WBC: CPT | Performed by: EMERGENCY MEDICINE

## 2024-11-20 PROCEDURE — 10005281 CT LUMBAR SPINE 2D REFORMATTED

## 2024-11-20 RX ORDER — HYDROCODONE BITARTRATE AND ACETAMINOPHEN 5; 325 MG/1; MG/1
1 TABLET ORAL EVERY 6 HOURS PRN
Qty: 20 TABLET | Refills: 0 | Status: SHIPPED | OUTPATIENT
Start: 2024-11-20

## 2024-11-20 RX ADMIN — MORPHINE SULFATE 8 MG: 10 INJECTION INTRAVENOUS at 11:30

## 2024-11-20 ASSESSMENT — PAIN SCALES - GENERAL
PAINLEVEL_OUTOF10: 0
PAINLEVEL_OUTOF10: 0
PAINLEVEL_OUTOF10: 10
PAINLEVEL_OUTOF10: 7

## 2024-11-20 ASSESSMENT — PAIN DESCRIPTION - PAIN TYPE: TYPE: ACUTE PAIN

## 2024-11-21 ENCOUNTER — TELEPHONE (OUTPATIENT)
Dept: FAMILY MEDICINE CLINIC | Facility: CLINIC | Age: 84
End: 2024-11-21

## 2024-11-21 RX ORDER — FEXOFENADINE HYDROCHLORIDE 180 MG/1
180 TABLET ORAL DAILY
Qty: 90 TABLET | Refills: 0 | Status: SHIPPED | OUTPATIENT
Start: 2024-11-21

## 2024-11-21 NOTE — TELEPHONE ENCOUNTER
Spoke to private nurse, Rosa [phone]    Patient was in the ED yesterday 11/20 for back pain and spasms, morphine was given to patient while in the ED, per patient it helped her with the pain. No prescription was sent home to patient.     Back pain came back 9/10,   per nurse,  and was sweating earlier but now, improved a little bit. Patient lives in [facility] living. Patient unable to ambulate independently.     Took Hydrocodone 5-325 at noon and orphenadrine ER with some relief. Routed to Dr. Amaro.     Please call Rosa or daughter, Charito for recommendations. Thank you.

## 2024-11-21 NOTE — TELEPHONE ENCOUNTER
Called the patient and private nurse back. Pain has improved. Pain level 2, in lower left back pain, took 1 tab Hydrocodone 10 mg/325 mg at 12 noon. Informed the patient and nurse of the patient of MD recommendations to go back to the ER.They verbalized understanding. Private nurse coordinating care of the pt with family and assisted living DON.

## 2024-11-21 NOTE — TELEPHONE ENCOUNTER
Requested Prescriptions     Pending Prescriptions Disp Refills    24HR ALLERGY RELIEF 180 MG Oral Tab [Pharmacy Med Name: ALLERGY RELIEF 180 MG TABLET] 90 tablet 0     Sig: take 1 tablet BY MOUTH EVERY DAY     Lasat refill 8/30/24 #90  LOV 10/4/24    Allergy Medication Protocol [id number]/16/2024 07:44 AM   Protocol Details In person appointment or virtual visit in the past 12 mos or appointment in next 3 mos        No future appointments.

## 2024-11-25 ENCOUNTER — HOSPITAL ENCOUNTER (EMERGENCY)
Age: 84
Discharge: HOME | End: 2024-11-25
Attending: STUDENT IN AN ORGANIZED HEALTH CARE EDUCATION/TRAINING PROGRAM

## 2024-11-25 ENCOUNTER — APPOINTMENT (OUTPATIENT)
Dept: GENERAL RADIOLOGY | Age: 84
End: 2024-11-25
Attending: STUDENT IN AN ORGANIZED HEALTH CARE EDUCATION/TRAINING PROGRAM

## 2024-11-25 VITALS
SYSTOLIC BLOOD PRESSURE: 146 MMHG | BODY MASS INDEX: 38.27 KG/M2 | WEIGHT: 216.05 LBS | DIASTOLIC BLOOD PRESSURE: 92 MMHG | TEMPERATURE: 98 F | OXYGEN SATURATION: 95 % | RESPIRATION RATE: 16 BRPM | HEART RATE: 82 BPM
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
## 2024-11-25 DIAGNOSIS — S80.01XA CONTUSION OF RIGHT KNEE, INITIAL ENCOUNTER: ICD-10-CM

## 2024-11-25 PROCEDURE — 73552 X-RAY EXAM OF FEMUR 2/>: CPT

## 2024-11-25 PROCEDURE — 73562 X-RAY EXAM OF KNEE 3: CPT

## 2024-11-25 PROCEDURE — 10002803 HB RX 637: Performed by: STUDENT IN AN ORGANIZED HEALTH CARE EDUCATION/TRAINING PROGRAM

## 2024-11-25 PROCEDURE — 99283 EMERGENCY DEPT VISIT LOW MDM: CPT

## 2024-11-25 RX ORDER — HYDROCODONE BITARTRATE AND ACETAMINOPHEN 5; 325 MG/1; MG/1
1 TABLET ORAL ONCE
Status: COMPLETED | OUTPATIENT
Start: 2024-11-25 | End: 2024-11-25

## 2024-11-25 RX ADMIN — HYDROCODONE BITARTRATE AND ACETAMINOPHEN 1 TABLET: 5; 325 TABLET ORAL at 09:49

## 2024-11-25 ASSESSMENT — ENCOUNTER SYMPTOMS
CHILLS: 0
SHORTNESS OF BREATH: 0
COUGH: 0
HEADACHES: 0
WOUND: 0
FEVER: 0

## 2024-11-28 ENCOUNTER — HOSPITAL ENCOUNTER (EMERGENCY)
Age: 84
Discharge: HOME | End: 2024-11-28
Attending: EMERGENCY MEDICINE

## 2024-11-28 VITALS
SYSTOLIC BLOOD PRESSURE: 127 MMHG | BODY MASS INDEX: 37.1 KG/M2 | WEIGHT: 209.44 LBS | HEART RATE: 68 BPM | DIASTOLIC BLOOD PRESSURE: 67 MMHG | OXYGEN SATURATION: 94 % | TEMPERATURE: 98.3 F | RESPIRATION RATE: 20 BRPM

## 2024-11-28 DIAGNOSIS — M54.50 CHRONIC LEFT-SIDED LOW BACK PAIN WITHOUT SCIATICA: Primary | ICD-10-CM

## 2024-11-28 DIAGNOSIS — G89.29 CHRONIC LEFT-SIDED LOW BACK PAIN WITHOUT SCIATICA: Primary | ICD-10-CM

## 2024-11-28 LAB
ANION GAP SERPL CALC-SCNC: 10 MMOL/L (ref 7–19)
BASOPHILS # BLD: 0.1 K/MCL (ref 0–0.3)
BASOPHILS NFR BLD: 1 %
BUN SERPL-MCNC: 18 MG/DL (ref 6–20)
BUN/CREAT SERPL: 12 (ref 7–25)
CALCIUM SERPL-MCNC: 10.6 MG/DL (ref 8.4–10.2)
CHLORIDE SERPL-SCNC: 103 MMOL/L (ref 97–110)
CO2 SERPL-SCNC: 30 MMOL/L (ref 21–32)
CREAT SERPL-MCNC: 1.47 MG/DL (ref 0.51–0.95)
DEPRECATED RDW RBC: 52 FL (ref 39–50)
EGFRCR SERPLBLD CKD-EPI 2021: 35 ML/MIN/{1.73_M2}
EOSINOPHIL # BLD: 0.1 K/MCL (ref 0–0.5)
EOSINOPHIL NFR BLD: 2 %
ERYTHROCYTE [DISTWIDTH] IN BLOOD: 14.4 % (ref 11–15)
FASTING DURATION TIME PATIENT: ABNORMAL H
GLUCOSE SERPL-MCNC: 110 MG/DL (ref 70–99)
HCT VFR BLD CALC: 35.3 % (ref 36–46.5)
HGB BLD-MCNC: 11.5 G/DL (ref 12–15.5)
IMM GRANULOCYTES # BLD AUTO: 0 K/MCL (ref 0–0.2)
IMM GRANULOCYTES # BLD: 0 %
LYMPHOCYTES # BLD: 1.6 K/MCL (ref 1–4)
LYMPHOCYTES NFR BLD: 24 %
MCH RBC QN AUTO: 31.9 PG (ref 26–34)
MCHC RBC AUTO-ENTMCNC: 32.6 G/DL (ref 32–36.5)
MCV RBC AUTO: 98.1 FL (ref 78–100)
MONOCYTES # BLD: 0.6 K/MCL (ref 0.3–0.9)
MONOCYTES NFR BLD: 9 %
NEUTROPHILS # BLD: 4.4 K/MCL (ref 1.8–7.7)
NEUTROPHILS NFR BLD: 64 %
NRBC BLD MANUAL-RTO: 0 /100 WBC
PLATELET # BLD AUTO: 237 K/MCL (ref 140–450)
POTASSIUM SERPL-SCNC: 3.7 MMOL/L (ref 3.4–5.1)
RBC # BLD: 3.6 MIL/MCL (ref 4–5.2)
SODIUM SERPL-SCNC: 139 MMOL/L (ref 135–145)
WBC # BLD: 6.8 K/MCL (ref 4.2–11)

## 2024-11-28 PROCEDURE — 80048 BASIC METABOLIC PNL TOTAL CA: CPT | Performed by: EMERGENCY MEDICINE

## 2024-11-28 PROCEDURE — 99284 EMERGENCY DEPT VISIT MOD MDM: CPT

## 2024-11-28 PROCEDURE — 85025 COMPLETE CBC W/AUTO DIFF WBC: CPT | Performed by: EMERGENCY MEDICINE

## 2024-11-28 PROCEDURE — 10002800 HB RX 250 W HCPCS: Performed by: EMERGENCY MEDICINE

## 2024-11-28 PROCEDURE — 96374 THER/PROPH/DIAG INJ IV PUSH: CPT

## 2024-11-28 RX ADMIN — MORPHINE SULFATE 8 MG: 2 INJECTION, SOLUTION INTRAMUSCULAR; INTRAVENOUS at 11:51

## 2024-11-28 ASSESSMENT — PAIN SCALES - GENERAL: PAINLEVEL_OUTOF10: 10

## 2024-11-29 ENCOUNTER — PATIENT MESSAGE (OUTPATIENT)
Dept: FAMILY MEDICINE CLINIC | Facility: CLINIC | Age: 84
End: 2024-11-29

## 2024-12-01 ENCOUNTER — HOSPITAL ENCOUNTER (EMERGENCY)
Age: 84
Discharge: HOME | End: 2024-12-01
Attending: EMERGENCY MEDICINE

## 2024-12-01 VITALS
DIASTOLIC BLOOD PRESSURE: 76 MMHG | SYSTOLIC BLOOD PRESSURE: 129 MMHG | TEMPERATURE: 98.4 F | OXYGEN SATURATION: 98 % | RESPIRATION RATE: 18 BRPM | HEART RATE: 78 BPM

## 2024-12-01 DIAGNOSIS — G89.29 CHRONIC LEFT-SIDED LOW BACK PAIN WITHOUT SCIATICA: Primary | ICD-10-CM

## 2024-12-01 DIAGNOSIS — M54.50 CHRONIC LEFT-SIDED LOW BACK PAIN WITHOUT SCIATICA: Primary | ICD-10-CM

## 2024-12-01 PROCEDURE — 10002803 HB RX 637: Performed by: EMERGENCY MEDICINE

## 2024-12-01 PROCEDURE — 96375 TX/PRO/DX INJ NEW DRUG ADDON: CPT

## 2024-12-01 PROCEDURE — 96374 THER/PROPH/DIAG INJ IV PUSH: CPT

## 2024-12-01 PROCEDURE — 99284 EMERGENCY DEPT VISIT MOD MDM: CPT

## 2024-12-01 PROCEDURE — 96372 THER/PROPH/DIAG INJ SC/IM: CPT | Performed by: EMERGENCY MEDICINE

## 2024-12-01 PROCEDURE — 10002800 HB RX 250 W HCPCS: Performed by: EMERGENCY MEDICINE

## 2024-12-01 RX ORDER — HYDROCODONE BITARTRATE AND ACETAMINOPHEN 5; 325 MG/1; MG/1
1 TABLET ORAL ONCE
Status: COMPLETED | OUTPATIENT
Start: 2024-12-01 | End: 2024-12-01

## 2024-12-01 RX ORDER — LIDOCAINE 4 G/G
1 PATCH TOPICAL ONCE
Status: DISCONTINUED | OUTPATIENT
Start: 2024-12-01 | End: 2024-12-01 | Stop reason: HOSPADM

## 2024-12-01 RX ORDER — ONDANSETRON 2 MG/ML
4 INJECTION INTRAMUSCULAR; INTRAVENOUS ONCE
Status: COMPLETED | OUTPATIENT
Start: 2024-12-01 | End: 2024-12-01

## 2024-12-01 RX ADMIN — MORPHINE SULFATE 4 MG: 4 INJECTION INTRAVENOUS at 20:16

## 2024-12-01 RX ADMIN — LIDOCAINE 1 PATCH: 4 PATCH TOPICAL at 18:25

## 2024-12-01 RX ADMIN — HYDROCODONE BITARTRATE AND ACETAMINOPHEN 1 TABLET: 5; 325 TABLET ORAL at 18:24

## 2024-12-01 RX ADMIN — ONDANSETRON 4 MG: 2 INJECTION INTRAMUSCULAR; INTRAVENOUS at 18:25

## 2024-12-01 RX ADMIN — MORPHINE SULFATE 4 MG: 4 INJECTION INTRAVENOUS at 18:25

## 2024-12-02 RX ORDER — ESCITALOPRAM OXALATE 10 MG/1
10 TABLET ORAL DAILY
Qty: 90 TABLET | Refills: 0 | Status: SHIPPED | OUTPATIENT
Start: 2024-12-02

## 2024-12-03 ENCOUNTER — TELEPHONE (OUTPATIENT)
Dept: NEUROSURGERY | Age: 84
End: 2024-12-03

## 2024-12-09 ENCOUNTER — APPOINTMENT (OUTPATIENT)
Dept: NEUROSURGERY | Age: 84
End: 2024-12-09

## 2024-12-09 VITALS — DIASTOLIC BLOOD PRESSURE: 58 MMHG | RESPIRATION RATE: 16 BRPM | SYSTOLIC BLOOD PRESSURE: 98 MMHG | HEART RATE: 77 BPM

## 2024-12-09 DIAGNOSIS — M54.50 ACUTE LEFT-SIDED LOW BACK PAIN WITHOUT SCIATICA: Primary | ICD-10-CM

## 2024-12-09 PROCEDURE — 99204 OFFICE O/P NEW MOD 45 MIN: CPT | Performed by: NURSE PRACTITIONER

## 2024-12-09 RX ORDER — METHYLPREDNISOLONE 4 MG/1
4 TABLET ORAL SEE ADMIN INSTRUCTIONS
Qty: 21 TABLET | Refills: 0 | Status: SHIPPED | OUTPATIENT
Start: 2024-12-09

## 2024-12-13 ENCOUNTER — OFFICE VISIT (OUTPATIENT)
Dept: PAIN CLINIC | Facility: CLINIC | Age: 84
End: 2024-12-13
Payer: MEDICARE

## 2024-12-13 VITALS
DIASTOLIC BLOOD PRESSURE: 86 MMHG | OXYGEN SATURATION: 97 % | SYSTOLIC BLOOD PRESSURE: 138 MMHG | WEIGHT: 211 LBS | BODY MASS INDEX: 37 KG/M2 | HEART RATE: 52 BPM

## 2024-12-13 DIAGNOSIS — M51.360 DEGENERATION OF INTERVERTEBRAL DISC OF LUMBAR REGION WITH DISCOGENIC BACK PAIN: ICD-10-CM

## 2024-12-13 DIAGNOSIS — M48.062 SPINAL STENOSIS OF LUMBAR REGION WITH NEUROGENIC CLAUDICATION: Primary | ICD-10-CM

## 2024-12-13 PROCEDURE — 1159F MED LIST DOCD IN RCRD: CPT | Performed by: ANESTHESIOLOGY

## 2024-12-13 PROCEDURE — 99203 OFFICE O/P NEW LOW 30 MIN: CPT | Performed by: ANESTHESIOLOGY

## 2024-12-13 NOTE — PROGRESS NOTES
Subjective:   Patient ID: Joni Christiansen is a 84 year old female.    Consult        History/Other:   Review of Systems  Current Outpatient Medications   Medication Sig Dispense Refill    methylPREDNISolone 4 MG Oral Tablet Therapy Pack Take 1 tablet (4 mg total) by mouth.      metoprolol succinate ER 25 MG Oral Tablet 24 Hr Take 1 tablet (25 mg total) by mouth daily.      diazePAM (VALIUM) 2 MG Oral Tab Take one tablet 60 minutes prior to procedure, may take and additional one tablet in 30 minutes if needed due to incomplete response. 2 tablet 0    triamcinolone 0.1 % External Cream Apply topically 2 (two) times daily as needed. Use for 5-10 days at a time, then take a week break in between application cycles 453.6 g 0    escitalopram 10 MG Oral Tab Take 1 tablet (10 mg total) by mouth daily. 90 tablet 0    24HR ALLERGY RELIEF 180 MG Oral Tab take 1 tablet BY MOUTH EVERY DAY 90 tablet 0    montelukast 10 MG Oral Tab Take 1 tablet (10 mg total) by mouth every evening. 90 tablet 1    LIDOCAINE PAIN RELIEF 4 % External Patch APPLY ONE PATCH TOPICALLY ONCE A DAY FOR 10 DAYS - 12 HOURS ON AND 12 HOURS OFF]      orphenadrine  MG Oral Tablet 12 Hr Take 100 mg by mouth 2 (two) times daily as needed. 90 tablet 0    CALCIUM CARBONATE 1500 (600 Ca) MG Oral Tab TAKE 1 TABLET BY MOUTH DAILY 90 tablet 0    Irbesartan 150 MG Oral Tab Take 1 tablet (150 mg total) by mouth daily. 90 tablet 1    levothyroxine 75 MCG Oral Tab Take 1 tablet (75 mcg total) by mouth every morning. 90 tablet 1    HYDROcodone-acetaminophen 5-325 MG Oral Tab Take 1 tablet by mouth every 4 to 6 hours as needed for Pain. 60 tablet 0    Pravastatin Sodium 80 MG Oral Tab Take 1 tablet (80 mg total) by mouth every evening. 90 tablet 1    CHOLECALCIFEROL 50 MCG (2000 UT) Oral Cap TAKE 1 CAPSULE BY MOUTH DAILY 90 capsule 1    ELIQUIS 5 MG Oral Tab TAKE 1 TABLET BY MOUTH TWICE DAILY 180 tablet 1    SPIRONOLACTONE 25 MG Oral Tab TAKE 1/2 TABLET BY MOUTH  DAILY 45 tablet 1    TORSEMIDE 10 MG Oral Tab TAKE 1 TABLET BY MOUTH DAILY 90 tablet 1    B-12 1000 MCG Oral Tab CR TAKE 1 TABLET BY MOUTH DAILY 90 tablet 1    albuterol 108 (90 Base) MCG/ACT Inhalation Aero Soln Inhale 2 puffs into the lungs every 4 to 6 hours as needed for Wheezing or Shortness of Breath. 1 each 3    triamcinolone 0.1 % External Cream Apply topically 2 (two) times daily as needed. Can use on legs and hands 80 g 2    lidocaine 5 % External Patch Place 1 patch onto the skin daily as needed. 30 each 2     Allergies:Allergies[1]    Objective:   Physical Exam  Constitutional:              Assessment & Plan:   No diagnosis found.    No orders of the defined types were placed in this encounter.      Meds This Visit:  Requested Prescriptions      No prescriptions requested or ordered in this encounter       Imaging & Referrals:  None    Location of Pain: Lumbar    Date Pain Began: 1960s          Work Related:   No        Receiving Work Comp/Disability:   No    Numeric Rating Scale:  Pain at Present:  0                                                                                                            (No Pain) 0  to  10 (Worst Pain)                 Minimum Pain:   0  Maximum Pain  10    Distribution of Pain:    left    Quality of Pain:   sharp/stabbing    Origin of Pain:    Lifting, Repetitive motion, and Degenerative    Aggravating Factors:    Cold, Sitting, Standing, and Walking    Past Treatments for Current Pain Condition:   Other Mprphine, Dulaudin, heating pad    Prior diagnostic testing for your pain:  CT, XRays         [1]   Allergies  Allergen Reactions    Penicillins OTHER (SEE COMMENTS) and UNKNOWN     Other reaction(s): Unknown    Was told as a child it was an allergy    Other reaction(s): Unknown  Was told as a child it was an allergy      Was told as a child it was an allergy

## 2024-12-13 NOTE — H&P
Name: Joni Christiansen   : 1940   DOS: 2024     Chief complaint: Low back pain    History of present illness:  Joni Christiansen is a 84 year old female with hypertension, and previous laminectomy L3-4 who presents today for evaluation of worsening back pain.  The patient complains of axial back pain which is focal and nonradicular.  This is rated 8 out of 10 with exacerbation to 10 out of 10.  Pain is made worse by cold, sitting, standing, walking.    Past Medical History:    (HFpEF) heart failure with preserved ejection fraction (HCC)    Atrial fibrillation (HCC)    Back pain    CKD (chronic kidney disease)    Colon cancer (HCC)    Congestive heart disease (HCC)    Congestive heart failure (HCC)    Congestive heart failure, unspecified HF chronicity, unspecified heart failure type (HCC)    Deep vein thrombosis (HCC)    Diabetes (HCC)    Essential hypertension    History of blood clots    HTN (hypertension)    Hyperlipidemia    Hypothyroidism    Obesity    Pulmonary embolism (HCC)    Sleep apnea    TIA (transient ischemic attack)    Visual impairment      Current Outpatient Medications   Medication Sig Dispense Refill    methylPREDNISolone 4 MG Oral Tablet Therapy Pack Take 1 tablet (4 mg total) by mouth.      metoprolol succinate ER 25 MG Oral Tablet 24 Hr Take 1 tablet (25 mg total) by mouth daily.      diazePAM (VALIUM) 2 MG Oral Tab Take one tablet 60 minutes prior to procedure, may take and additional one tablet in 30 minutes if needed due to incomplete response. 2 tablet 0    triamcinolone 0.1 % External Cream Apply topically 2 (two) times daily as needed. Use for 5-10 days at a time, then take a week break in between application cycles 453.6 g 0    escitalopram 10 MG Oral Tab Take 1 tablet (10 mg total) by mouth daily. 90 tablet 0    24HR ALLERGY RELIEF 180 MG Oral Tab take 1 tablet BY MOUTH EVERY DAY 90 tablet 0    montelukast 10 MG Oral Tab Take 1 tablet (10 mg total) by mouth every evening. 90  tablet 1    LIDOCAINE PAIN RELIEF 4 % External Patch APPLY ONE PATCH TOPICALLY ONCE A DAY FOR 10 DAYS - 12 HOURS ON AND 12 HOURS OFF]      orphenadrine  MG Oral Tablet 12 Hr Take 100 mg by mouth 2 (two) times daily as needed. 90 tablet 0    CALCIUM CARBONATE 1500 (600 Ca) MG Oral Tab TAKE 1 TABLET BY MOUTH DAILY 90 tablet 0    Irbesartan 150 MG Oral Tab Take 1 tablet (150 mg total) by mouth daily. 90 tablet 1    levothyroxine 75 MCG Oral Tab Take 1 tablet (75 mcg total) by mouth every morning. 90 tablet 1    HYDROcodone-acetaminophen 5-325 MG Oral Tab Take 1 tablet by mouth every 4 to 6 hours as needed for Pain. 60 tablet 0    Pravastatin Sodium 80 MG Oral Tab Take 1 tablet (80 mg total) by mouth every evening. 90 tablet 1    CHOLECALCIFEROL 50 MCG (2000 UT) Oral Cap TAKE 1 CAPSULE BY MOUTH DAILY 90 capsule 1    ELIQUIS 5 MG Oral Tab TAKE 1 TABLET BY MOUTH TWICE DAILY 180 tablet 1    SPIRONOLACTONE 25 MG Oral Tab TAKE 1/2 TABLET BY MOUTH DAILY 45 tablet 1    TORSEMIDE 10 MG Oral Tab TAKE 1 TABLET BY MOUTH DAILY 90 tablet 1    B-12 1000 MCG Oral Tab CR TAKE 1 TABLET BY MOUTH DAILY 90 tablet 1    albuterol 108 (90 Base) MCG/ACT Inhalation Aero Soln Inhale 2 puffs into the lungs every 4 to 6 hours as needed for Wheezing or Shortness of Breath. 1 each 3    triamcinolone 0.1 % External Cream Apply topically 2 (two) times daily as needed. Can use on legs and hands 80 g 2    lidocaine 5 % External Patch Place 1 patch onto the skin daily as needed. 30 each 2     Past Surgical History:   Procedure Laterality Date    Back surgery      Colectomy      Colonoscopy      Excis lumbar disk,one level      Femur fracture surgery Right 10/19/2024    Hip replacement surgery      Knee replacement surgery            Total hip replacement      Total knee replacement Bilateral       Family History   Problem Relation Age of Onset    Other (other) Father         congestive heart failure    Hypertension Maternal Grandmother       Social History     Socioeconomic History    Marital status:    Tobacco Use    Smoking status: Never     Passive exposure: Never    Smokeless tobacco: Never   Vaping Use    Vaping status: Never Used   Substance and Sexual Activity    Alcohol use: Not Currently    Drug use: Never   Social History Narrative    Retired  for >30 years.  Carlos. Daughter Charito lives in [place].      Social Drivers of Health     Financial Resource Strain: Low Risk  (10/19/2024)    Received from [facility]    Financial Resource Strain     In the past year, have you or any family members you live with been unable to get any of the following when it was really needed? Check all that apply.: None   Food Insecurity: Low Risk  (10/19/2024)    Received from [facility]    Food Insecurity     Within the past 12 months, you worried that your food would run out before you got money to buy more.  : Never true     Within the past 12 months, the food you bought just didn't last and you didn't have money to get more. : Never true   Transportation Needs: Not At Risk (10/19/2024)    Received from [facility]    Transportation Needs     In the past 12 months, has lack of reliable transportation kept you from medical appointments, meetings, work or from getting things needed for daily living? : No   Social Connections: Low Risk  (10/19/2024)    Received from [facility]    Social Connections     How often do you see or talk to people that you care about and feel close to? (For example: talking to friends on the phone, visiting friends or family, going to [employer] or club meetings): 5 or more times a week   Housing Stability: Low Risk  (4/20/2024)    Housing Stability     Housing Instability: No       Review of  other systems:  ROS otherwise negative  Physical examination: Joni is a 84 year old female not in acute distress  /86   Pulse 52   Wt 211 lb (95.7 kg)    SpO2 97%   BMI 37.38 kg/m²    The patient is awake, alert, oriented and corporative. She has a normal affect. The patient ambulates with normal gait.  HEENT: No gross lesion noted. PEERL. No icterus.  Neck and Upper Extremity: Supple. No thyromegaly or lymphadenopathy.  Bilateral upper EXTR and range of motion intact.  Fine and gross motor intact  Cardiovascular system: No peripheral edema  Respiratory system: Speech is nonlabored   abdomen: Soft, nontender   Neurologic:  Cranial nerves II through XII are grossly intact.       Examination of the lower back:   Motor Examination:   (R)   (L)   Hip Abduction:   5    5   Hip Flexion:    5    5   Knee Extension:   5    5   Knee Flexion:    5    5   Ant. Tibialis:    5    5  Extensor Hallucis Longus:   5    5  Peroneals:     5    5  Gastrocsoleus:     5    5     Radiology diagnostic studies:   Outside CT report reviewed with evidence of previous decompression at L3-4.  There is diffuse disc bulge and facet hypertrophy    Assessment:  Encounter Diagnoses   Name Primary?    Spinal stenosis of lumbar region with neurogenic claudication Yes    Degeneration of intervertebral disc of lumbar region with discogenic back pain    .      Plan:     The patient is a 84-year-old female who presents today for evaluation of low back pain.  The pain is 100% axial and nonradicular.  Patient does have previous L3-4 decompression and history of degenerative changes seen on outside CT scan.  Discussed role for medial branch block and radiofrequency ablation.  She will follow-up after completion of lumbar MRI.      Johny Snider MD MPH  Pain Management

## 2024-12-18 ENCOUNTER — TELEPHONE (OUTPATIENT)
Dept: FAMILY MEDICINE CLINIC | Facility: CLINIC | Age: 84
End: 2024-12-18

## 2024-12-18 DIAGNOSIS — M41.86 LEVOSCOLIOSIS OF LUMBAR SPINE: ICD-10-CM

## 2024-12-18 DIAGNOSIS — M51.46: ICD-10-CM

## 2024-12-18 DIAGNOSIS — M51.360 DEGENERATION OF INTERVERTEBRAL DISC OF LUMBAR REGION WITH DISCOGENIC BACK PAIN: Primary | ICD-10-CM

## 2024-12-18 DIAGNOSIS — Z98.890 HISTORY OF LUMBAR LAMINECTOMY FOR SPINAL CORD DECOMPRESSION: ICD-10-CM

## 2024-12-18 NOTE — TELEPHONE ENCOUNTER
Regarding DME order for standard walker    Received notification in [place], dated 12/13, patient's daughter requested a rollator with a seat instead of a standard walker. Pending signature for approval.     Routed to Dr. Amaro.

## 2024-12-18 NOTE — TELEPHONE ENCOUNTER
It's through [email or web address]. The notification shows update requested via fax 12/13 and reminded via fax on 12/18.    We can also do electronic signature so it goes directly to [facility]. Signed. Thank you.

## 2024-12-18 NOTE — TELEPHONE ENCOUNTER
Discussed with Dr. Amaro in office. Paper work request for rollator with a seat signed and faxed to [phone] [facility].

## 2024-12-23 ENCOUNTER — HOSPITAL ENCOUNTER (EMERGENCY)
Facility: HOSPITAL | Age: 84
Discharge: HOME | End: 2024-12-24
Attending: EMERGENCY MEDICINE
Payer: MEDICARE

## 2024-12-23 DIAGNOSIS — S39.012A STRAIN OF LUMBAR REGION, INITIAL ENCOUNTER: Primary | ICD-10-CM

## 2024-12-23 LAB
ALBUMIN SERPL-MCNC: 3.7 G/DL (ref 3.2–4.8)
ALBUMIN/GLOB SERPL: 1.2 {RATIO} (ref 1–2)
ALP LIVER SERPL-CCNC: 113 U/L
ALT SERPL-CCNC: 10 U/L
ANION GAP SERPL CALC-SCNC: 6 MMOL/L (ref 0–18)
AST SERPL-CCNC: 17 U/L (ref ?–34)
BASOPHILS # BLD AUTO: 0.06 X10(3) UL (ref 0–0.2)
BASOPHILS NFR BLD AUTO: 0.5 %
BILIRUB SERPL-MCNC: 0.4 MG/DL (ref 0.2–1.1)
BUN BLD-MCNC: 26 MG/DL (ref 9–23)
CALCIUM BLD-MCNC: 10.5 MG/DL (ref 8.7–10.4)
CHLORIDE SERPL-SCNC: 107 MMOL/L (ref 98–112)
CO2 SERPL-SCNC: 27 MMOL/L (ref 21–32)
CREAT BLD-MCNC: 1.33 MG/DL
EGFRCR SERPLBLD CKD-EPI 2021: 40 ML/MIN/1.73M2 (ref 60–?)
EOSINOPHIL # BLD AUTO: 0.33 X10(3) UL (ref 0–0.7)
EOSINOPHIL NFR BLD AUTO: 3 %
ERYTHROCYTE [DISTWIDTH] IN BLOOD BY AUTOMATED COUNT: 13.3 %
GLOBULIN PLAS-MCNC: 3.1 G/DL (ref 2–3.5)
GLUCOSE BLD-MCNC: 92 MG/DL (ref 70–99)
HCT VFR BLD AUTO: 37.3 %
HGB BLD-MCNC: 12.3 G/DL
IMM GRANULOCYTES # BLD AUTO: 0.04 X10(3) UL (ref 0–1)
IMM GRANULOCYTES NFR BLD: 0.4 %
LYMPHOCYTES # BLD AUTO: 2.38 X10(3) UL (ref 1–4)
LYMPHOCYTES NFR BLD AUTO: 21.5 %
MCH RBC QN AUTO: 31.5 PG (ref 26–34)
MCHC RBC AUTO-ENTMCNC: 33 G/DL (ref 31–37)
MCV RBC AUTO: 95.6 FL
MONOCYTES # BLD AUTO: 0.73 X10(3) UL (ref 0.1–1)
MONOCYTES NFR BLD AUTO: 6.6 %
NEUTROPHILS # BLD AUTO: 7.53 X10 (3) UL (ref 1.5–7.7)
NEUTROPHILS # BLD AUTO: 7.53 X10(3) UL (ref 1.5–7.7)
NEUTROPHILS NFR BLD AUTO: 68 %
OSMOLALITY SERPL CALC.SUM OF ELEC: 294 MOSM/KG (ref 275–295)
PLATELET # BLD AUTO: 178 10(3)UL (ref 150–450)
POTASSIUM SERPL-SCNC: 4.2 MMOL/L (ref 3.5–5.1)
PROT SERPL-MCNC: 6.8 G/DL (ref 5.7–8.2)
RBC # BLD AUTO: 3.9 X10(6)UL
SODIUM SERPL-SCNC: 140 MMOL/L (ref 136–145)
WBC # BLD AUTO: 11.1 X10(3) UL (ref 4–11)

## 2024-12-23 PROCEDURE — 96374 THER/PROPH/DIAG INJ IV PUSH: CPT

## 2024-12-23 PROCEDURE — 99284 EMERGENCY DEPT VISIT MOD MDM: CPT

## 2024-12-23 PROCEDURE — 96372 THER/PROPH/DIAG INJ SC/IM: CPT

## 2024-12-23 PROCEDURE — 85025 COMPLETE CBC W/AUTO DIFF WBC: CPT | Performed by: EMERGENCY MEDICINE

## 2024-12-23 PROCEDURE — 99285 EMERGENCY DEPT VISIT HI MDM: CPT

## 2024-12-23 PROCEDURE — 96375 TX/PRO/DX INJ NEW DRUG ADDON: CPT

## 2024-12-23 PROCEDURE — 80053 COMPREHEN METABOLIC PANEL: CPT | Performed by: EMERGENCY MEDICINE

## 2024-12-23 RX ORDER — ONDANSETRON 2 MG/ML
4 INJECTION INTRAMUSCULAR; INTRAVENOUS ONCE
Status: COMPLETED | OUTPATIENT
Start: 2024-12-23 | End: 2024-12-23

## 2024-12-23 RX ORDER — KETOROLAC TROMETHAMINE 30 MG/ML
30 INJECTION, SOLUTION INTRAMUSCULAR; INTRAVENOUS ONCE
Status: COMPLETED | OUTPATIENT
Start: 2024-12-23 | End: 2024-12-23

## 2024-12-23 RX ORDER — MORPHINE SULFATE 4 MG/ML
4 INJECTION, SOLUTION INTRAMUSCULAR; INTRAVENOUS EVERY 30 MIN PRN
Status: DISCONTINUED | OUTPATIENT
Start: 2024-12-23 | End: 2024-12-24

## 2024-12-23 RX ORDER — ACETAMINOPHEN 500 MG
1000 TABLET ORAL ONCE
Status: COMPLETED | OUTPATIENT
Start: 2024-12-23 | End: 2024-12-23

## 2024-12-24 ENCOUNTER — APPOINTMENT (OUTPATIENT)
Dept: CT IMAGING | Facility: HOSPITAL | Age: 84
End: 2024-12-24
Attending: EMERGENCY MEDICINE
Payer: MEDICARE

## 2024-12-24 VITALS
HEIGHT: 62 IN | SYSTOLIC BLOOD PRESSURE: 154 MMHG | RESPIRATION RATE: 18 BRPM | DIASTOLIC BLOOD PRESSURE: 94 MMHG | TEMPERATURE: 97 F | WEIGHT: 220 LBS | HEART RATE: 96 BPM | OXYGEN SATURATION: 94 % | BODY MASS INDEX: 40.48 KG/M2

## 2024-12-24 LAB
BILIRUB UR QL STRIP.AUTO: NEGATIVE
CLARITY UR REFRACT.AUTO: CLEAR
GLUCOSE UR STRIP.AUTO-MCNC: NORMAL MG/DL
KETONES UR STRIP.AUTO-MCNC: NEGATIVE MG/DL
LEUKOCYTE ESTERASE UR QL STRIP.AUTO: 250
NITRITE UR QL STRIP.AUTO: NEGATIVE
PH UR STRIP.AUTO: 5.5 [PH] (ref 5–8)
PROT UR STRIP.AUTO-MCNC: NEGATIVE MG/DL
RBC UR QL AUTO: NEGATIVE
SP GR UR STRIP.AUTO: 1.02 (ref 1–1.03)
UROBILINOGEN UR STRIP.AUTO-MCNC: NORMAL MG/DL

## 2024-12-24 PROCEDURE — 87086 URINE CULTURE/COLONY COUNT: CPT | Performed by: EMERGENCY MEDICINE

## 2024-12-24 PROCEDURE — 81001 URINALYSIS AUTO W/SCOPE: CPT | Performed by: EMERGENCY MEDICINE

## 2024-12-24 PROCEDURE — 74176 CT ABD & PELVIS W/O CONTRAST: CPT | Performed by: EMERGENCY MEDICINE

## 2024-12-24 RX ORDER — NITROFURANTOIN 25; 75 MG/1; MG/1
100 CAPSULE ORAL ONCE
Status: DISCONTINUED | OUTPATIENT
Start: 2024-12-24 | End: 2024-12-24

## 2024-12-24 RX ORDER — CEPHALEXIN 500 MG/1
500 CAPSULE ORAL 2 TIMES DAILY
Qty: 14 CAPSULE | Refills: 0 | Status: SHIPPED | OUTPATIENT
Start: 2024-12-24 | End: 2024-12-30 | Stop reason: ALTCHOICE

## 2024-12-24 RX ORDER — CEPHALEXIN 500 MG/1
500 CAPSULE ORAL ONCE
Status: COMPLETED | OUTPATIENT
Start: 2024-12-24 | End: 2024-12-24

## 2024-12-24 RX ORDER — NITROFURANTOIN 25; 75 MG/1; MG/1
100 CAPSULE ORAL 2 TIMES DAILY
Qty: 14 CAPSULE | Refills: 0 | Status: SHIPPED | OUTPATIENT
Start: 2024-12-24 | End: 2024-12-24

## 2024-12-24 NOTE — DISCHARGE INSTRUCTIONS
May take Norco and muscle relaxer at home for pain    May take Motrin for pain    Begin cephalexin for urinary tract infection

## 2024-12-24 NOTE — ED QUICK NOTES
Patient reports she is still having pain and doesn't feel like she can discharge home at this time at the current pain level. MD notified.

## 2024-12-24 NOTE — ED INITIAL ASSESSMENT (HPI)
Patient here with c/o left lower back pain.  Patient sees a spine specialist.  Patient reports at home medication not helping today.  Denies injury

## 2024-12-24 NOTE — ED PROVIDER NOTES
Patient Seen in: [facility] Emergency Department      History     Chief Complaint   Patient presents with    Back Pain    Spasms     Stated Complaint: Back Spasm    Subjective:   HPI      Patient is an 84-year-old female presents to ED for evaluation of left lower back pain.  Patient has a long history of lower back pain for the last 30 years.  She complains of pain to her left lower back that started today after walking an excessive amount.  She is scheduled for an MRI next month.  Is been many years since she has had an MRI but gets this pain frequently.  Last time she had pain was about a month ago.  Denies fever.  No radiation down her legs.  No abdominal pain.  No nausea or vomiting.  No urinary symptoms.  Patient has Norco on a muscle relaxer at home but did not take it because she not go home yet today.  They decided to bring her here.  She was given Tylenol prior to my evaluation currently with improvement in symptoms.    Objective:     Past Medical History:    (HFpEF) heart failure with preserved ejection fraction (HCC)    Atrial fibrillation (HCC)    Back pain    CKD (chronic kidney disease)    Colon cancer (HCC)    Congestive heart disease (HCC)    Congestive heart failure (HCC)    Congestive heart failure, unspecified HF chronicity, unspecified heart failure type (HCC)    Deep vein thrombosis (HCC)    Diabetes (HCC)    Essential hypertension    History of blood clots    HTN (hypertension)    Hyperlipidemia    Hypothyroidism    Obesity    Pulmonary embolism (HCC)    Sleep apnea    TIA (transient ischemic attack)    Visual impairment              Past Surgical History:   Procedure Laterality Date    Back surgery      Colectomy      Colonoscopy      Excis lumbar disk,one level      Femur fracture surgery Right 10/19/2024    Hip replacement surgery      Knee replacement surgery            Total hip replacement      Total knee replacement Bilateral                 Social History     Socioeconomic  History    Marital status:    Tobacco Use    Smoking status: Never     Passive exposure: Never    Smokeless tobacco: Never   Vaping Use    Vaping status: Never Used   Substance and Sexual Activity    Alcohol use: Not Currently    Drug use: Never   Social History Narrative    Retired  for >30 years.  Carlos. Daughter Charito lives in [place].      Social Drivers of Health     Financial Resource Strain: Low Risk  (10/19/2024)    Received from [facility]    Financial Resource Strain     In the past year, have you or any family members you live with been unable to get any of the following when it was really needed? Check all that apply.: None   Food Insecurity: Low Risk  (10/19/2024)    Received from [facility]    Food Insecurity     Within the past 12 months, you worried that your food would run out before you got money to buy more.  : Never true     Within the past 12 months, the food you bought just didn't last and you didn't have money to get more. : Never true   Transportation Needs: Not At Risk (10/19/2024)    Received from [facility]    Transportation Needs     In the past 12 months, has lack of reliable transportation kept you from medical appointments, meetings, work or from getting things needed for daily living? : No   Social Connections: Low Risk  (10/19/2024)    Received from [facility]    Social Connections     How often do you see or talk to people that you care about and feel close to? (For example: talking to friends on the phone, visiting friends or family, going to [employer] or club meetings): 5 or more times a week   Housing Stability: Low Risk  (4/20/2024)    Housing Stability     Housing Instability: No                  Physical Exam     ED Triage Vitals [12/23/24 1935]   /86   Pulse 90   Resp 18   Temp 97.1 °F (36.2 °C)   Temp src    SpO2 94 %   O2 Device None (Room air)       Current Vitals:   Vital Signs  BP:  129/86  Pulse: 90  Resp: 18  Temp: 97.1 °F (36.2 °C)    Oxygen Therapy  SpO2: 94 %  O2 Device: None (Room air)        Physical Exam  CONSTITUTIONAL: Well appearing, in no acute distress  LUNGS: Clear to auscultation bilaterally  CARDIOVASCULAR: Regular rate and rhythm, normal S1-S2  ABDOMINAL: Soft, nondistended, nontender  SKIN: Warm and dry  Back: No midline lumbar thoracic tenderness.  Patient has no reproducible paralumbar tenderness.  Area of pain was over the left sacroiliac region but no pain currently.  Neurological: Bilateral hip flexor strength 5/5. Bilateral Knee flexion/extension strength 5/5. Bilateral Foot dorsiflexion and plantar flexion 5/5 bilaterally. Great toe dorsi flexion 5/5. Normal Sensation.    ED Course   Labs Reviewed - No data to display         Medications   ketorolac (Toradol) 30 MG/ML injection 30 mg (has no administration in time range)   acetaminophen (Tylenol Extra Strength) tab 1,000 mg (1,000 mg Oral Given 12/23/24 2127)          MDM      Patient is an 84-year-old female presents to ED for evaluation of low back pain.  Differential includes lumbar strain or spasm or lumbar radiculopathy.  Patient has no risk factors for epidural abscess. Patient is afebrile. Patient is not an IV drug user. Patient is not immunocompromised. Patient has no clinical evidence for cauda equina syndrome or spinal cord compression. Patient has a Normal neurologic exam. Considered MRI but MRI imaging is not acutely indicated.  Patient has no reproducible symptoms.  No abdominal pain.  Pain currently better after Tylenol.  Patient was given a shot of Toradol.  MRI imaging not acutely indicated.  She can be discharged home.  She has Norco and a muscle relaxer at home.  May take Motrin as well.    Patient was screened and evaluated during this visit.   As a treating physician attending to the patient, I determined, within reasonable clinical confidence and prior to discharge, that an emergency medical  condition was not or was no longer present.  There was no indication for further evaluation, treatment or admission on an emergency basis.  Comprehensive verbal and written discharge and follow-up instructions were provided to help prevent relapse or worsening.  Patient was instructed to follow-up with her primary care provider for further evaluation and treatment, but to return immediately to the ER for worsening, concerning, new, changing or persisting symptoms.  I discussed the case with the patient and they had no questions, complaints, or concerns.  Patient felt comfortable going home.      External and old record review was performed.  I reviewed CT abdomen pelvis June 2023 showing degenerative changes in the lumbar spine          Medical Decision Making      Disposition and Plan     Clinical Impression:  1. Strain of lumbar region, initial encounter         Disposition:  Discharge  12/23/2024 10:19 pm    Follow-up:  Amy Amaro MD  [address] [place] [id number]  [phone]    Follow up  As needed          Medications Prescribed:  Current Discharge Medication List              Supplementary Documentation:

## 2024-12-27 ENCOUNTER — HOSPITAL ENCOUNTER (OUTPATIENT)
Age: 84
Discharge: HOME | End: 2024-12-27
Attending: EMERGENCY MEDICINE
Payer: MEDICARE

## 2024-12-27 VITALS
RESPIRATION RATE: 22 BRPM | HEIGHT: 62 IN | TEMPERATURE: 98 F | OXYGEN SATURATION: 96 % | WEIGHT: 220 LBS | SYSTOLIC BLOOD PRESSURE: 164 MMHG | DIASTOLIC BLOOD PRESSURE: 93 MMHG | HEART RATE: 94 BPM | BODY MASS INDEX: 40.48 KG/M2

## 2024-12-27 DIAGNOSIS — J06.9 VIRAL URI WITH COUGH: Primary | ICD-10-CM

## 2024-12-27 LAB
POCT INFLUENZA A: NEGATIVE
POCT INFLUENZA B: NEGATIVE
SARS-COV-2 RNA RESP QL NAA+PROBE: NOT DETECTED

## 2024-12-27 PROCEDURE — 99213 OFFICE O/P EST LOW 20 MIN: CPT

## 2024-12-27 PROCEDURE — 87502 INFLUENZA DNA AMP PROBE: CPT | Performed by: EMERGENCY MEDICINE

## 2024-12-27 PROCEDURE — 99212 OFFICE O/P EST SF 10 MIN: CPT

## 2024-12-27 RX ORDER — BENZONATATE 100 MG/1
100 CAPSULE ORAL 3 TIMES DAILY PRN
Qty: 20 CAPSULE | Refills: 0 | Status: SHIPPED | OUTPATIENT
Start: 2024-12-27

## 2024-12-27 NOTE — DISCHARGE INSTRUCTIONS
Follow-up with your primary care doctor  Take benzonatate cough medicine 3 times a day as needed  Continue your home medication  Return if any worsening symptoms or new concern

## 2024-12-27 NOTE — ED PROVIDER NOTES
Patient Seen in: [facility]      History     Chief Complaint   Patient presents with    Cough/URI     Stated Complaint: Cough    Subjective:   HPI    84-year-old female with a history of heart failure, atrial fibrillation, chronic kidney disease colon cancer congestive heart failure hypertension hyperlipidemia sleep apnea pulm embolism presents to the immediate care for complaints of rhinorrhea congestion cough.  Denies fevers or chills.  Denies chest pain shortness of breath.  Denies any myalgias.  She denies any other exacerbating leaving factors.    Objective:     Past Medical History:    (HFpEF) heart failure with preserved ejection fraction (HCC)    Atrial fibrillation (HCC)    Back pain    CKD (chronic kidney disease)    Colon cancer (HCC)    Congestive heart disease (HCC)    Congestive heart failure (HCC)    Congestive heart failure, unspecified HF chronicity, unspecified heart failure type (HCC)    Deep vein thrombosis (HCC)    Diabetes (HCC)    Essential hypertension    History of blood clots    HTN (hypertension)    Hyperlipidemia    Hypothyroidism    Obesity    Pulmonary embolism (HCC)    Sleep apnea    TIA (transient ischemic attack)    Visual impairment              Past Surgical History:   Procedure Laterality Date    Back surgery      Colectomy      Colonoscopy      Excis lumbar disk,one level      Femur fracture surgery Right 10/19/2024    Hip replacement surgery      Knee replacement surgery            Total hip replacement      Total knee replacement Bilateral                 Social History     Socioeconomic History    Marital status:    Tobacco Use    Smoking status: Never     Passive exposure: Never    Smokeless tobacco: Never   Vaping Use    Vaping status: Never Used   Substance and Sexual Activity    Alcohol use: Not Currently    Drug use: Never   Social History Narrative    Retired  for >30 years.  Carlos. Daughter Charito lives in  [place].      Social Drivers of Health     Financial Resource Strain: Low Risk  (10/19/2024)    Received from [facility]    Financial Resource Strain     In the past year, have you or any family members you live with been unable to get any of the following when it was really needed? Check all that apply.: None   Food Insecurity: Low Risk  (10/19/2024)    Received from [facility]    Food Insecurity     Within the past 12 months, you worried that your food would run out before you got money to buy more.  : Never true     Within the past 12 months, the food you bought just didn't last and you didn't have money to get more. : Never true   Transportation Needs: Not At Risk (10/19/2024)    Received from [facility]    Transportation Needs     In the past 12 months, has lack of reliable transportation kept you from medical appointments, meetings, work or from getting things needed for daily living? : No   Social Connections: Low Risk  (10/19/2024)    Received from [facility]    Social Connections     How often do you see or talk to people that you care about and feel close to? (For example: talking to friends on the phone, visiting friends or family, going to [employer] or club meetings): 5 or more times a week   Housing Stability: Low Risk  (4/20/2024)    Housing Stability     Housing Instability: No              Review of Systems    Positive for stated complaint: Cough  Other systems are as noted in HPI.  Constitutional and vital signs reviewed.      All other systems reviewed and negative except as noted above.    Physical Exam     ED Triage Vitals [12/27/24 1342]   BP (!) 164/93   Pulse 94   Resp 22   Temp 98.3 °F (36.8 °C)   Temp src Oral   SpO2 96 %   O2 Device None (Room air)       Current Vitals:   Vital Signs  BP: (!) 164/93  Pulse: 94  Resp: 22  Temp: 98.3 °F (36.8 °C)  Temp src: Oral    Oxygen Therapy  SpO2: 96 %  O2 Device: None (Room air)        Physical  Exam  General: Alert and oriented. No acute distress.  HEENT: Normocephalic. No evidence of trauma. Extraocular movements are intact.  Cardiovascular exam: Regular rate and rhythm  Lungs: Clear to auscultation bilaterally.  Abdomen: Soft, nondistended, nontender.  Extremities: No evidence of deformity. No clubbing or cyanosis.  Neuro: No focal deficit is noted.    ED Course     Labs Reviewed   POCT FLU TEST - Normal    Narrative:     This assay is a rapid molecular in vitro test utilizing nucleic acid amplification of influenza A and B viral RNA.   RAPID SARS-COV-2 BY PCR - Normal   Differential diagnosis includes a viral upper respiratory infection including but not limited to COVID versus flu.  Clinically not consistent with pneumonia.  She has clear breath sounds bilaterally.  She is afebrile without any purulent sputum production.    Patient tested negative for COVID and flu.  Patient will be discharged home with supportive care management.  She will be prescribed benzonatate cough medicine.     MDM   Patient was screened and evaluated during this visit.   As a treating physician attending to the patient, I determined, within reasonable clinical confidence and prior to discharge, that an emergency medical condition was not or was no longer present.  There was no indication for further evaluation, treatment or admission on an emergency basis.  Comprehensive verbal and written discharge and follow-up instructions were provided to help prevent relapse or worsening.  Patient was instructed to follow-up with her primary care provider for further evaluation and treatment, but to return immediately to the ER for worsening, concerning, new, changing or persisting symptoms.  I discussed the case with the patient and they had no questions, complaints, or concerns.  Patient felt comfortable going home.    ^^Please note that this report has been produced using speech recognition software and may contain errors related to  that system including, but not limited to, errors in grammar, punctuation, and spelling, as well as words and phrases that possibly may have been recognized inappropriately.  If there are any questions or concerns, contact the dictating provider for clarification        Medical Decision Making      Disposition and Plan     Clinical Impression:  1. Viral URI with cough         Disposition:  Discharge  12/27/2024  2:43 pm    Follow-up:  Amy Amaro MD  [address] [place] [address]  [phone]    Call   As needed, If symptoms worsen          Medications Prescribed:  Current Discharge Medication List        START taking these medications    Details   benzonatate 100 MG Oral Cap Take 1 capsule (100 mg total) by mouth 3 (three) times daily as needed for cough.  Qty: 20 capsule, Refills: 0                 Supplementary Documentation:

## 2024-12-30 ENCOUNTER — TELEPHONE (OUTPATIENT)
Dept: FAMILY MEDICINE CLINIC | Facility: CLINIC | Age: 84
End: 2024-12-30

## 2024-12-30 ENCOUNTER — TELEPHONE (OUTPATIENT)
Dept: ORTHOPEDICS | Age: 84
End: 2024-12-30

## 2024-12-30 ENCOUNTER — PATIENT MESSAGE (OUTPATIENT)
Dept: FAMILY MEDICINE CLINIC | Facility: CLINIC | Age: 84
End: 2024-12-30

## 2024-12-30 ENCOUNTER — OFFICE VISIT (OUTPATIENT)
Dept: FAMILY MEDICINE CLINIC | Facility: CLINIC | Age: 84
End: 2024-12-30
Payer: MEDICARE

## 2024-12-30 VITALS
DIASTOLIC BLOOD PRESSURE: 68 MMHG | TEMPERATURE: 97 F | SYSTOLIC BLOOD PRESSURE: 100 MMHG | HEART RATE: 59 BPM | BODY MASS INDEX: 40.48 KG/M2 | WEIGHT: 220 LBS | RESPIRATION RATE: 16 BRPM | HEIGHT: 62 IN

## 2024-12-30 DIAGNOSIS — M51.360 DEGENERATION OF INTERVERTEBRAL DISC OF LUMBAR REGION WITH DISCOGENIC BACK PAIN: ICD-10-CM

## 2024-12-30 DIAGNOSIS — M41.86 LEVOSCOLIOSIS OF LUMBAR SPINE: ICD-10-CM

## 2024-12-30 DIAGNOSIS — M54.50 SEVERE LOW BACK PAIN: Primary | ICD-10-CM

## 2024-12-30 DIAGNOSIS — Z98.890 HISTORY OF LUMBAR LAMINECTOMY FOR SPINAL CORD DECOMPRESSION: ICD-10-CM

## 2024-12-30 DIAGNOSIS — M54.50 CHRONIC BILATERAL LOW BACK PAIN WITHOUT SCIATICA: ICD-10-CM

## 2024-12-30 DIAGNOSIS — G89.29 CHRONIC BILATERAL LOW BACK PAIN WITHOUT SCIATICA: ICD-10-CM

## 2024-12-30 DIAGNOSIS — H66.93 ACUTE OTITIS MEDIA, BILATERAL: Primary | ICD-10-CM

## 2024-12-30 DIAGNOSIS — M51.46: ICD-10-CM

## 2024-12-30 PROCEDURE — 99214 OFFICE O/P EST MOD 30 MIN: CPT | Performed by: STUDENT IN AN ORGANIZED HEALTH CARE EDUCATION/TRAINING PROGRAM

## 2024-12-30 PROCEDURE — 1160F RVW MEDS BY RX/DR IN RCRD: CPT | Performed by: STUDENT IN AN ORGANIZED HEALTH CARE EDUCATION/TRAINING PROGRAM

## 2024-12-30 PROCEDURE — G2211 COMPLEX E/M VISIT ADD ON: HCPCS | Performed by: STUDENT IN AN ORGANIZED HEALTH CARE EDUCATION/TRAINING PROGRAM

## 2024-12-30 PROCEDURE — 1159F MED LIST DOCD IN RCRD: CPT | Performed by: STUDENT IN AN ORGANIZED HEALTH CARE EDUCATION/TRAINING PROGRAM

## 2024-12-30 PROCEDURE — 1170F FXNL STATUS ASSESSED: CPT | Performed by: STUDENT IN AN ORGANIZED HEALTH CARE EDUCATION/TRAINING PROGRAM

## 2024-12-30 RX ORDER — CEFDINIR 300 MG/1
300 CAPSULE ORAL 2 TIMES DAILY
Qty: 20 CAPSULE | Refills: 0 | Status: SHIPPED | OUTPATIENT
Start: 2024-12-30

## 2024-12-30 NOTE — PROGRESS NOTES
Chief Complaint   Patient presents with    Cough    Sore Throat     Runny nose     HPI:   Joni Christiansen is a 84 year old female who presents for upper respiratory symptoms for  5  days. Patient reports sore throat, congestion, dry cough.    No known sick contacts.    No chest pain or shortness of breath.    Current Outpatient Medications   Medication Sig Dispense Refill    benzonatate 100 MG Oral Cap Take 1 capsule (100 mg total) by mouth 3 (three) times daily as needed for cough. 20 capsule 0    methylPREDNISolone 4 MG Oral Tablet Therapy Pack Take 1 tablet (4 mg total) by mouth.      diazePAM (VALIUM) 2 MG Oral Tab Take one tablet 60 minutes prior to procedure, may take and additional one tablet in 30 minutes if needed due to incomplete response. 2 tablet 0    escitalopram 10 MG Oral Tab Take 1 tablet (10 mg total) by mouth daily. 90 tablet 0    24HR ALLERGY RELIEF 180 MG Oral Tab take 1 tablet BY MOUTH EVERY DAY 90 tablet 0    montelukast 10 MG Oral Tab Take 1 tablet (10 mg total) by mouth every evening. 90 tablet 1    LIDOCAINE PAIN RELIEF 4 % External Patch APPLY ONE PATCH TOPICALLY ONCE A DAY FOR 10 DAYS - 12 HOURS ON AND 12 HOURS OFF]      orphenadrine  MG Oral Tablet 12 Hr Take 100 mg by mouth 2 (two) times daily as needed. 90 tablet 0    CALCIUM CARBONATE 1500 (600 Ca) MG Oral Tab TAKE 1 TABLET BY MOUTH DAILY 90 tablet 0    Irbesartan 150 MG Oral Tab Take 1 tablet (150 mg total) by mouth daily. 90 tablet 1    levothyroxine 75 MCG Oral Tab Take 1 tablet (75 mcg total) by mouth every morning. 90 tablet 1    HYDROcodone-acetaminophen 5-325 MG Oral Tab Take 1 tablet by mouth every 4 to 6 hours as needed for Pain. 60 tablet 0    Pravastatin Sodium 80 MG Oral Tab Take 1 tablet (80 mg total) by mouth every evening. 90 tablet 1    CHOLECALCIFEROL 50 MCG (2000 UT) Oral Cap TAKE 1 CAPSULE BY MOUTH DAILY 90 capsule 1    ELIQUIS 5 MG Oral Tab TAKE 1 TABLET BY MOUTH TWICE DAILY 180 tablet 1    SPIRONOLACTONE 25  MG Oral Tab TAKE 1/2 TABLET BY MOUTH DAILY 45 tablet 1    TORSEMIDE 10 MG Oral Tab TAKE 1 TABLET BY MOUTH DAILY 90 tablet 1    B-12 1000 MCG Oral Tab CR TAKE 1 TABLET BY MOUTH DAILY 90 tablet 1    albuterol 108 (90 Base) MCG/ACT Inhalation Aero Soln Inhale 2 puffs into the lungs every 4 to 6 hours as needed for Wheezing or Shortness of Breath. 1 each 3    triamcinolone 0.1 % External Cream Apply topically 2 (two) times daily as needed. Can use on legs and hands 80 g 2    lidocaine 5 % External Patch Place 1 patch onto the skin daily as needed. 30 each 2      Past Medical History:    (HFpEF) heart failure with preserved ejection fraction (HCC)    Atrial fibrillation (HCC)    Back pain    CKD (chronic kidney disease)    Colon cancer (HCC)    Congestive heart disease (HCC)    Congestive heart failure (HCC)    Congestive heart failure, unspecified HF chronicity, unspecified heart failure type (HCC)    Deep vein thrombosis (HCC)    Diabetes (HCC)    Essential hypertension    History of blood clots    HTN (hypertension)    Hyperlipidemia    Hypothyroidism    Obesity    Pulmonary embolism (HCC)    Sleep apnea    TIA (transient ischemic attack)    Visual impairment      Past Surgical History:   Procedure Laterality Date    Back surgery      Colectomy      Colonoscopy      Excis lumbar disk,one level      Femur fracture surgery Right 10/19/2024    Hip replacement surgery      Knee replacement surgery            Total hip replacement      Total knee replacement Bilateral       Family History   Problem Relation Age of Onset    Other (other) Father         congestive heart failure    Hypertension Maternal Grandmother       Social History     Socioeconomic History    Marital status:    Tobacco Use    Smoking status: Never     Passive exposure: Never    Smokeless tobacco: Never   Vaping Use    Vaping status: Never Used   Substance and Sexual Activity    Alcohol use: Not Currently    Drug use: Never   Social History  Narrative    Retired  for >30 years.  Carlos. Daughter Charito lives in [place].      Social Drivers of Health     Financial Resource Strain: Low Risk  (10/19/2024)    Received from [facility]    Financial Resource Strain     In the past year, have you or any family members you live with been unable to get any of the following when it was really needed? Check all that apply.: None   Food Insecurity: Low Risk  (10/19/2024)    Received from [facility]    Food Insecurity     Within the past 12 months, you worried that your food would run out before you got money to buy more.  : Never true     Within the past 12 months, the food you bought just didn't last and you didn't have money to get more. : Never true   Transportation Needs: Not At Risk (10/19/2024)    Received from [facility]    Transportation Needs     In the past 12 months, has lack of reliable transportation kept you from medical appointments, meetings, work or from getting things needed for daily living? : No   Social Connections: Low Risk  (10/19/2024)    Received from [facility]    Social Connections     How often do you see or talk to people that you care about and feel close to? (For example: talking to friends on the phone, visiting friends or family, going to [employer] or club meetings): 5 or more times a week   Housing Stability: Low Risk  (4/20/2024)    Housing Stability     Housing Instability: No         REVIEW OF SYSTEMS:   GENERAL: feels well otherwise  SKIN: no rashes  EYES:denies blurred vision or double vision  HEENT: congested; sore throat, ear pain, facial pain  LUNGS: denies shortness of breath with exertion; cough  CARDIOVASCULAR: denies chest pain on exertion  GI: no nausea or abdominal pain  NEURO: denies headaches    EXAM:   /68   Pulse 59   Temp 97 °F (36.1 °C) (Temporal)   Resp 16   Ht 5' 2\" (1.575 m)   Wt 220 lb (99.8 kg)   BMI 40.24 kg/m²   GENERAL:  well developed, well nourished,in no apparent distress  SKIN: no rashes,no suspicious lesions  EYES: PERRLA, EOMI,conjunctiva are clear  HEENT: atraumatic, normocephalic,ears erythema and bulging R>L, throat +posterior rhinorrhea; no sinus tenderness  NECK: supple,+ adenopathy  LUNGS: clear to auscultation  CARDIO: RRR without murmur  NEURO: ambulation with walker    ASSESSMENT AND PLAN:   Joni Christiansen is a 84 year old female who presents with Otitis Media. PLAN:  cefdinir 300mg BID (has previously tolerated keflex despite listed PCN allergy) .  May continue symptomatic treatment as needed unless otherwise contraindicated including but not limited to:   Mucinex dm or Robitussin DM as needed, nasal saline rinse such as Ocean, Tylenol or Motrin as needed. Antihistamine oral such as Zyrtec/Allegra/Xyzal or nasal Astepro. Nasal steroid such as Flonase or Nasacort.  Continue to maintain adequate hydration.  Probiotics or yogurt for gut health.  Take antibiotics with food.  Side effects discussed.  The patient indicates understanding of these issues and agrees to the plan.    No follow-ups on file.        Amy Amaro MD  [facility]  12/30/24      Please note that portions of this note may have been completed with a voice recognition program. Efforts were made to edit the dictations but occasionally words are mis-transcribed. Thank you for your understanding.    The 21st Century Cures Act makes medical notes like these available to patients in the interest of transparency. Please be advised this is a medical document. Medical documents are intended to carry relevant information, facts as evident, and the clinical opinion of the practitioner. The medical note is intended as peer to peer communication and may appear blunt or direct. It is written in medical language and may contain abbreviations or verbiage that are unfamiliar. If there are any questions or concerns please contact  the provider for clarification.

## 2024-12-30 NOTE — TELEPHONE ENCOUNTER
I called and spoke to the patients daughter, Charito. Joni was seen in the urgent care 3 days ago. She was prescribed benzonatate 100 mg 1 three times daily as needed. Her cough is better but the nasal congestion is much worse. She has a sore throat. No fever. She has been sick for 5 days. She would like to know what else you recommend?

## 2024-12-30 NOTE — TELEPHONE ENCOUNTER
1. What are your symptoms?    Cough, sore throat, runny nose, congestion    2. How long have you been having these symptoms?    2 days    3. Have you done anything already to treat your symptoms?     Prescription cough medicine, tylenol    ADDITIONAL INFO:     Patient wants to know what to take. Was seen in UC     Klisyri Pregnancy And Lactation Text: It is unknown if this medication can harm a developing fetus or if it is excreted in breast milk.

## 2024-12-30 NOTE — TELEPHONE ENCOUNTER
Spoke to patient's daughter, Charito. Agreed to come in today at 5:15 pm. Appointment scheduled.

## 2024-12-31 ENCOUNTER — APPOINTMENT (OUTPATIENT)
Dept: ORTHOPEDICS | Age: 84
End: 2024-12-31

## 2024-12-31 DIAGNOSIS — Z98.890 S/P ORIF (OPEN REDUCTION INTERNAL FIXATION) FRACTURE: Primary | ICD-10-CM

## 2024-12-31 DIAGNOSIS — Z87.81 S/P ORIF (OPEN REDUCTION INTERNAL FIXATION) FRACTURE: Primary | ICD-10-CM

## 2024-12-31 DIAGNOSIS — S72.91XD CLOSED FRACTURE OF RIGHT FEMUR WITH ROUTINE HEALING, UNSPECIFIED FRACTURE MORPHOLOGY, UNSPECIFIED PORTION OF FEMUR, SUBSEQUENT ENCOUNTER: ICD-10-CM

## 2025-01-02 ENCOUNTER — TELEPHONE (OUTPATIENT)
Dept: CASE MANAGEMENT | Facility: HOSPITAL | Age: 85
End: 2025-01-02

## 2025-01-02 ENCOUNTER — TELEPHONE (OUTPATIENT)
Dept: FAMILY MEDICINE CLINIC | Facility: CLINIC | Age: 85
End: 2025-01-02

## 2025-01-02 NOTE — TELEPHONE ENCOUNTER
Rosa, private LPN that visits patient at her house 1-2 times a week, asking if patient can take her Cefdinir given by Dr Amaro and Cephalexin 500MG BID for 7 days given from ED for UTI.     Patient did not start Cephalexin from 12/24/24. She was unsure if she needed to take this medication.    Patient needs reassurance that she can take both. Patient requesting we call her daughter, Charito, back to inform her the recommendations.

## 2025-01-02 NOTE — TELEPHONE ENCOUNTER
Patient's daughter, Charito informed of Dr. Amaro's note below. Daughter verbalized understanding and agreed with plan.

## 2025-01-02 NOTE — CM/SW NOTE
Patient asking why she was prescribed Cephalexin.  Per chart, Cephalexin was prescribed for UTI.  Patient verbalized understanding.

## 2025-01-03 ENCOUNTER — TELEPHONE (OUTPATIENT)
Dept: ORTHOPEDICS | Age: 85
End: 2025-01-03

## 2025-01-06 ENCOUNTER — MED REC SCAN ONLY (OUTPATIENT)
Dept: FAMILY MEDICINE CLINIC | Facility: CLINIC | Age: 85
End: 2025-01-06

## 2025-01-14 ENCOUNTER — APPOINTMENT (OUTPATIENT)
Dept: ORTHOPEDICS | Age: 85
End: 2025-01-14

## 2025-01-17 ENCOUNTER — HOSPITAL ENCOUNTER (OUTPATIENT)
Dept: MRI IMAGING | Facility: HOSPITAL | Age: 85
Discharge: HOME | End: 2025-01-17
Attending: STUDENT IN AN ORGANIZED HEALTH CARE EDUCATION/TRAINING PROGRAM
Payer: MEDICARE

## 2025-01-17 DIAGNOSIS — G89.29 CHRONIC BILATERAL LOW BACK PAIN WITHOUT SCIATICA: ICD-10-CM

## 2025-01-17 DIAGNOSIS — Z98.890 HISTORY OF LUMBAR LAMINECTOMY FOR SPINAL CORD DECOMPRESSION: ICD-10-CM

## 2025-01-17 DIAGNOSIS — M54.50 CHRONIC BILATERAL LOW BACK PAIN WITHOUT SCIATICA: ICD-10-CM

## 2025-01-17 DIAGNOSIS — M51.360 DEGENERATION OF INTERVERTEBRAL DISC OF LUMBAR REGION WITH DISCOGENIC BACK PAIN: ICD-10-CM

## 2025-01-17 DIAGNOSIS — M54.50 SEVERE LOW BACK PAIN: ICD-10-CM

## 2025-01-17 PROCEDURE — 72148 MRI LUMBAR SPINE W/O DYE: CPT | Performed by: STUDENT IN AN ORGANIZED HEALTH CARE EDUCATION/TRAINING PROGRAM

## 2025-01-20 ENCOUNTER — PATIENT MESSAGE (OUTPATIENT)
Dept: FAMILY MEDICINE CLINIC | Facility: CLINIC | Age: 85
End: 2025-01-20

## 2025-01-20 DIAGNOSIS — M51.46: ICD-10-CM

## 2025-01-20 DIAGNOSIS — M41.86 LEVOSCOLIOSIS OF LUMBAR SPINE: ICD-10-CM

## 2025-01-20 DIAGNOSIS — M48.061 NEUROFORAMINAL STENOSIS OF LUMBAR SPINE: ICD-10-CM

## 2025-01-20 DIAGNOSIS — Z98.890 HISTORY OF LUMBAR LAMINECTOMY FOR SPINAL CORD DECOMPRESSION: ICD-10-CM

## 2025-01-20 DIAGNOSIS — M51.360 DEGENERATION OF INTERVERTEBRAL DISC OF LUMBAR REGION WITH DISCOGENIC BACK PAIN: Primary | ICD-10-CM

## 2025-01-20 DIAGNOSIS — M48.061 SPINAL STENOSIS AT L4-L5 LEVEL: ICD-10-CM

## 2025-01-20 DIAGNOSIS — M54.50 SEVERE LOW BACK PAIN: ICD-10-CM

## 2025-01-21 ENCOUNTER — PATIENT MESSAGE (OUTPATIENT)
Facility: CLINIC | Age: 85
End: 2025-01-21

## 2025-01-21 RX ORDER — ALBUTEROL SULFATE 90 UG/1
2 INHALANT RESPIRATORY (INHALATION) EVERY 4 HOURS PRN
Qty: 1 EACH | Refills: 1 | Status: SHIPPED | OUTPATIENT
Start: 2025-01-21

## 2025-01-21 NOTE — TELEPHONE ENCOUNTER
Spoke with patient's daughter Charito. Per patient's daughter, patient worked with physical therapy last week and noticed patient was having heavier breathing. Had difficulty breathing last night. Had 2 (two) puffs of albuterol and it seemed to help. Per daughter, patient may have harder time breathing due to colder weather. Would like a daily maintenance inhaler as well as a refill on albuterol. Per daughter, no fever, does have a cough once in a while, per patient feels like a 'tickle.\" Patient's daughter made aware I will discuss with Dr. Zambrano and contact back with physician recommendation.       Discussed with Dr. Zambrano. Per physician, can order albuterol inhaler 2 puffs every 4 hours as needed for shortness of breath. However, would want to meet with patient before ordering a maintenance inhaler. Patient's daughter called to inform, did not answer. Left detailed message to call if would like to schedule follow up appointment to discuss maintenance inhaler or if has questions.     [employer] message sent to patient and daughter, inquiring if able to  albuterol inhaler and advised to call to schedule follow up appointment with Dr. Zambrano if still would like a maintenance inhaler prescribed as Dr. Zambrano would have to further assess if patient would benefit from one.

## 2025-01-22 DIAGNOSIS — E03.9 PRIMARY HYPOTHYROIDISM: ICD-10-CM

## 2025-01-22 RX ORDER — LEVOTHYROXINE SODIUM 75 UG/1
75 TABLET ORAL EVERY MORNING
Qty: 90 TABLET | Refills: 1 | Status: SHIPPED | OUTPATIENT
Start: 2025-01-22

## 2025-01-22 RX ORDER — LEVOTHYROXINE SODIUM 75 UG/1
75 TABLET ORAL EVERY MORNING
Qty: 90 TABLET | Refills: 1 | Status: CANCELLED | OUTPATIENT
Start: 2025-01-22

## 2025-01-28 ENCOUNTER — IMAGING SERVICES (OUTPATIENT)
Dept: GENERAL RADIOLOGY | Age: 85
End: 2025-01-28

## 2025-01-28 ENCOUNTER — OFFICE VISIT (OUTPATIENT)
Dept: SURGERY | Facility: CLINIC | Age: 85
End: 2025-01-28
Payer: MEDICARE

## 2025-01-28 ENCOUNTER — APPOINTMENT (OUTPATIENT)
Dept: ORTHOPEDICS | Age: 85
End: 2025-01-28

## 2025-01-28 VITALS
SYSTOLIC BLOOD PRESSURE: 106 MMHG | DIASTOLIC BLOOD PRESSURE: 60 MMHG | HEART RATE: 89 BPM | BODY MASS INDEX: 40.48 KG/M2 | WEIGHT: 220 LBS | HEIGHT: 62 IN

## 2025-01-28 DIAGNOSIS — Z98.890 S/P ORIF (OPEN REDUCTION INTERNAL FIXATION) FRACTURE: Primary | ICD-10-CM

## 2025-01-28 DIAGNOSIS — S72.91XD CLOSED FRACTURE OF RIGHT FEMUR WITH ROUTINE HEALING, UNSPECIFIED FRACTURE MORPHOLOGY, UNSPECIFIED PORTION OF FEMUR, SUBSEQUENT ENCOUNTER: ICD-10-CM

## 2025-01-28 DIAGNOSIS — Z87.81 S/P ORIF (OPEN REDUCTION INTERNAL FIXATION) FRACTURE: ICD-10-CM

## 2025-01-28 DIAGNOSIS — Z98.890 S/P ORIF (OPEN REDUCTION INTERNAL FIXATION) FRACTURE: ICD-10-CM

## 2025-01-28 DIAGNOSIS — Z87.81 S/P ORIF (OPEN REDUCTION INTERNAL FIXATION) FRACTURE: Primary | ICD-10-CM

## 2025-01-28 DIAGNOSIS — M54.50 CHRONIC LOW BACK PAIN WITHOUT SCIATICA, UNSPECIFIED BACK PAIN LATERALITY: Primary | ICD-10-CM

## 2025-01-28 DIAGNOSIS — G89.29 CHRONIC LOW BACK PAIN WITHOUT SCIATICA, UNSPECIFIED BACK PAIN LATERALITY: Primary | ICD-10-CM

## 2025-01-28 PROCEDURE — 99213 OFFICE O/P EST LOW 20 MIN: CPT | Performed by: ORTHOPAEDIC SURGERY

## 2025-01-28 PROCEDURE — 1159F MED LIST DOCD IN RCRD: CPT | Performed by: NEUROLOGICAL SURGERY

## 2025-01-28 PROCEDURE — 1160F RVW MEDS BY RX/DR IN RCRD: CPT | Performed by: NEUROLOGICAL SURGERY

## 2025-01-28 PROCEDURE — 99204 OFFICE O/P NEW MOD 45 MIN: CPT | Performed by: NEUROLOGICAL SURGERY

## 2025-01-28 PROCEDURE — 73552 X-RAY EXAM OF FEMUR 2/>: CPT | Performed by: RADIOLOGY

## 2025-01-28 NOTE — PROGRESS NOTES
New patient - Low back pain, instability, HX laminectomy 20+ years ago    -- referred by:  Amy Amaro MD    - 1/17/2025 MRI Spine Lumbar    -- PT ordered in December- has not started yet    -- - Medrol pk, Diazepam 2 mg, Norco 5-325mg with minimal short term relief

## 2025-01-28 NOTE — PATIENT INSTRUCTIONS
Refill policies:    Allow 2-3 business days for refills; controlled substances may take longer.  Contact your pharmacy at least 5 days prior to running out of medication and have them send an electronic request or submit request through the “request refill” option in your [facility] account.  Refills are not addressed on weekends; covering physicians do not authorize routine medications on weekends.  No narcotics or controlled substances are refilled after noon on Fridays or by on call physicians.  By law, narcotics must be electronically prescribed.  A 30 day supply with no refills is the maximum allowed.  If your prescription is due for a refill, you may be due for a follow up appointment.  To best provide you care, patients receiving routine medications need to be seen at least once a year.  Patients receiving narcotic/controlled substance medications need to be seen at least once every 3 months.  In the event that your preferred pharmacy does not have the requested medication in stock (e.g. Backordered), it is your responsibility to find another pharmacy that has the requested medication available.  We will gladly send a new prescription to that pharmacy at your request.    Scheduling Tests:    If your physician has ordered radiology tests such as MRI or CT scans, please contact Central Scheduling at [phone] right away to schedule the test.  Once scheduled, the [facility] Centralized Referral Team will work with your insurance carrier to obtain pre-certification or prior authorization.  Depending on your insurance carrier, approval may take 3-10 days.  It is highly recommended patients assure they have received an authorization before having a test performed.  If test is done without insurance authorization, patient may be responsible for the entire amount billed.      Precertification and Prior Authorizations:  If your physician has recommended that you have a procedure or additional testing performed the [facility]  Centralized Referral Team will contact your insurance carrier to obtain pre-certification or prior authorization.    You are strongly encouraged to contact your insurance carrier to verify that your procedure/test has been approved and is a COVERED benefit.  Although the [facility] Centralized Referral Team does its due diligence, the insurance carrier gives the disclaimer that \"Although the procedure is authorized, this does not guarantee payment.\"    Ultimately the patient is responsible for payment.   Thank you for your understanding in this matter.  Paperwork Completion:  If you require FMLA or disability paperwork for your recovery, please make sure to either drop it off or have it faxed to our office at [phone]. Be sure the form has your name and date of birth on it.  The form will be faxed to our Forms Department and they will complete it for you.  There is a 25$ fee for all forms that need to be filled out.  Please be aware there is a 10-14 day turnaround time.  You will need to sign a release of information (SUDARSHAN) form if your paperwork does not come with one.  You may call the Forms Department with any questions at [phone].  Their fax number is [phone].

## 2025-01-28 NOTE — H&P
Neurosurgery Clinic Visit  2025    Joni Christiansen PCP:  Amy Amaro MD    1940 MRN [id number]       CHIEF COMPLAINT:  Chief Complaint   Patient presents with    New Patient     referred by:  Amy Amaro MD      Low Back Pain       HISTORY OF PRESENT ILLNESS:  Joni Christiansen is a(n) 84 year old female presents for evaluation of back pain.  Patient states she has had chronic back pain.  She has had over 10 years.  She does not know what is causing it.  When she does get its feels like she is being stabbed in the lips or off her seat.  She has nothing down her legs.  She is gone the ER multiple times which is given her Valium and morphine.  She goes about 2-3 times a month.  She has a cocktail at home of Tylenol followed by Norco followed by muscle laxer if that does not work she goes to the ER.  She does get some relief at times.  She points to the lateral left flank where she is having the pain.  She doing physical therapy at [facility].  They do PT and OT.  They started with her knee but just recently started doing her back.  She recent had a femur fracture that was needed surgery.  She has been to a pain clinic and a spine clinic.  She saw her PCP.  She got her MRI.  She notes she had surgery 20 years ago for some pain in her back.  She has had no recent injections.  As she states still is better.  Of note sometimes worse with some pressure or heating pad or hot shower those do help.  She is here for evaluation.    REVIEW OF SYSTEMS:  The 12 point checklist was reviewed and was negative except: See above    ALLERGIES:  Allergies[1]    MEDICATIONS:  Current Outpatient Medications   Medication Sig Dispense Refill    levothyroxine 75 MCG Oral Tab Take 1 tablet (75 mcg total) by mouth every morning. 90 tablet 1    albuterol 108 (90 Base) MCG/ACT Inhalation Aero Soln Inhale 2 puffs into the lungs every 4 (four) hours as needed for Wheezing or Shortness of Breath. 1 each 1    cefdinir  300 MG Oral Cap Take 1 capsule (300 mg total) by mouth 2 (two) times daily. (Patient not taking: Reported on 1/28/2025) 20 capsule 0    benzonatate 100 MG Oral Cap Take 1 capsule (100 mg total) by mouth 3 (three) times daily as needed for cough. 20 capsule 0    methylPREDNISolone 4 MG Oral Tablet Therapy Pack Take 1 tablet (4 mg total) by mouth. (Patient not taking: Reported on 1/28/2025)      diazePAM (VALIUM) 2 MG Oral Tab Take one tablet 60 minutes prior to procedure, may take and additional one tablet in 30 minutes if needed due to incomplete response. 2 tablet 0    escitalopram 10 MG Oral Tab Take 1 tablet (10 mg total) by mouth daily. 90 tablet 0    24HR ALLERGY RELIEF 180 MG Oral Tab take 1 tablet BY MOUTH EVERY DAY 90 tablet 0    montelukast 10 MG Oral Tab Take 1 tablet (10 mg total) by mouth every evening. 90 tablet 1    LIDOCAINE PAIN RELIEF 4 % External Patch APPLY ONE PATCH TOPICALLY ONCE A DAY FOR 10 DAYS - 12 HOURS ON AND 12 HOURS OFF]      orphenadrine  MG Oral Tablet 12 Hr Take 100 mg by mouth 2 (two) times daily as needed. 90 tablet 0    CALCIUM CARBONATE 1500 (600 Ca) MG Oral Tab TAKE 1 TABLET BY MOUTH DAILY 90 tablet 0    Irbesartan 150 MG Oral Tab Take 1 tablet (150 mg total) by mouth daily. 90 tablet 1    HYDROcodone-acetaminophen 5-325 MG Oral Tab Take 1 tablet by mouth every 4 to 6 hours as needed for Pain. 60 tablet 0    Pravastatin Sodium 80 MG Oral Tab Take 1 tablet (80 mg total) by mouth every evening. 90 tablet 1    CHOLECALCIFEROL 50 MCG (2000 UT) Oral Cap TAKE 1 CAPSULE BY MOUTH DAILY 90 capsule 1    ELIQUIS 5 MG Oral Tab TAKE 1 TABLET BY MOUTH TWICE DAILY 180 tablet 1    SPIRONOLACTONE 25 MG Oral Tab TAKE 1/2 TABLET BY MOUTH DAILY 45 tablet 1    TORSEMIDE 10 MG Oral Tab TAKE 1 TABLET BY MOUTH DAILY 90 tablet 1    B-12 1000 MCG Oral Tab CR TAKE 1 TABLET BY MOUTH DAILY 90 tablet 1    albuterol 108 (90 Base) MCG/ACT Inhalation Aero Soln Inhale 2 puffs into the lungs every 4 to 6  hours as needed for Wheezing or Shortness of Breath. 1 each 3    triamcinolone 0.1 % External Cream Apply topically 2 (two) times daily as needed. Can use on legs and hands 80 g 2    lidocaine 5 % External Patch Place 1 patch onto the skin daily as needed. 30 each 2       HISTORY:  Past Medical History:    (HFpEF) heart failure with preserved ejection fraction (HCC)    Atrial fibrillation (HCC)    Back pain    CKD (chronic kidney disease)    Colon cancer (HCC)    Congestive heart disease (HCC)    Congestive heart failure (HCC)    Congestive heart failure, unspecified HF chronicity, unspecified heart failure type (HCC)    Deep vein thrombosis (HCC)    Diabetes (HCC)    Essential hypertension    History of blood clots    HTN (hypertension)    Hyperlipidemia    Hypothyroidism    Obesity    Pulmonary embolism (HCC)    Sleep apnea    TIA (transient ischemic attack)    Visual impairment     Past Surgical History:   Procedure Laterality Date    Back surgery      Colectomy      Colonoscopy      Excis lumbar disk,one level      Femur fracture surgery Right 10/19/2024    Hip replacement surgery      Knee replacement surgery            Total hip replacement      Total knee replacement Bilateral      Family History   Problem Relation Age of Onset    Other (other) Father         congestive heart failure    Hypertension Maternal Grandmother      Joni  reports that she has never smoked. She has never been exposed to tobacco smoke. She has never used smokeless tobacco. She reports that she does not currently use alcohol. She reports that she does not use drugs.    PHYSICAL EXAMINATION:  Vital Signs:  /60   Pulse 89   Ht 62\"   Wt 220 lb (99.8 kg)   BMI 40.24 kg/m²   General: The patient is in no acute distress.  HEENT: The head is atraumatic and normocephalic.  The eyes, ears, nose and throat are clear. The pupils are equal, round, and reactive to light.  Hearing is intact and symmetric.  Muscular:  Exam reveals  normal bulk and tone.  Neurologic Exam: The patient is oriented to time, person and place.  Memory, attention span, language findings, and knowledge and insight into the problem appear intact and appropriate.  Cranial nerve Exam:  Visual fields are full.  The pupils are equal, round, and reactive to light.  The extraocular movements are intact.  Facial sensation in intact.  Facial symmetry and movement of the face is intact.  Hearing appears to be intact and symmetric.  Elevation of the palate appears symmetric as well.  Shoulder shrug is intact and symmetric.  The tongue is in the midline.    Strength:     Biceps Triceps Deltoids Wrist Extension Hand Intrinsics   Left 5/5 5/5 5/5 5/5 5/5   Right 5/5 5/5 5/5 5/5 5/5      Hip Flexion Hip Adduction Hip Abduction Knee Flexion Knee Extension Plantar- flexion Dorsi- flexion EHL   Left 5/5 5/5 5/5 5/5 5/5 5/5 5/5 5/5   Right 5/5 5/5 5/5 5/5 5/5 5/5 5/5 5/5     DTRs:   Biceps Triceps Brachio Patella Ankle   Left 2+ 2+ 2+ 2+ 2+   Right 2+ 2+ 2+ 2+ 2+     Clonus:  Absent.  Babinski: Deferred  Dale's:  Absent.  Romberg:  Negative.  Pronator drift: Absent.  Sensation:  Intact to light touch in all tested dermatomes in the upper and lower extremities.    Coordination:  Appears to be intact on finger-to-nose testing   Gait and station: She favors her right leg  Spine: Lhermitte's negative, Spurling's negative, nontender to palpation along the midline, she is tender left paraspinal approximate L2-3 at a pinpoint area there is a palpable knot at that level    REVIEW OF STUDIES:  MRI reviewed which shows severe stenosis at L4-5 as well as moderate stenosis at L1/2, previous 3-4 laminectomy, degenerative scoliosis      ASSESSMENT AND PLAN:  84-year-old female with left low back pain  This does not correlate to her stenosis  She is no neurogenic claudication  She has no symptoms below her severe stenosis  Her symptoms higher  This appears to be muscular  There is a palpable knot  on examination  I called Dr. Snider who she is seen in the past  I recommend some trigger point injections at area and possible Botox if that is possible  He was talk about doing facet injections like, I think is reasonable as well but I do think this is muscular  I will see her back in a couple months and see how she is doing  Reviewed her films in detail  All questions were answered  Patient and daughter appreciative        Time spent on counseling/coordination of care:  45 Minutes    Total time spent with patient:  45 Minutes      Stalin Fowler MD   [facility]  1/28/2025  11:54 AM  Dictated but not proofread       [1]   Allergies  Allergen Reactions    Penicillins OTHER (SEE COMMENTS) and UNKNOWN     Other reaction(s): Unknown    Was told as a child it was an allergy    Other reaction(s): Unknown  Was told as a child it was an allergy      Was told as a child it was an allergy

## 2025-01-30 ENCOUNTER — PATIENT MESSAGE (OUTPATIENT)
Dept: FAMILY MEDICINE CLINIC | Facility: CLINIC | Age: 85
End: 2025-01-30

## 2025-01-30 NOTE — TELEPHONE ENCOUNTER
Dr. Christine Flanagan is pain management as well but she is in [place], she might be able to help. PM&R might be another option, Dr. Brewer. Or a different pain clinic such as Dr. Mendez Driver, or in [place] there is Dr. Weldon. Would have to check with insurance and see if any of these providers would be able to see patient for pain injections. Thank you.

## 2025-01-31 NOTE — TELEPHONE ENCOUNTER
Dr. Christine Flanagan is pain management as well but she is in [place], she might be able to help. PM&R might be another option, Dr. Brewer. Or a different pain clinic such as Dr. Mendez Driver, or in [place] there is Dr. Weldon. Would have to check with insurance and see if any of these providers would be able to see patient for pain injections. Thank you.

## 2025-02-14 ENCOUNTER — PATIENT MESSAGE (OUTPATIENT)
Dept: SURGERY | Facility: CLINIC | Age: 85
End: 2025-02-14

## 2025-02-14 DIAGNOSIS — G89.29 CHRONIC LOW BACK PAIN WITHOUT SCIATICA, UNSPECIFIED BACK PAIN LATERALITY: Primary | ICD-10-CM

## 2025-02-14 DIAGNOSIS — M54.50 CHRONIC LOW BACK PAIN WITHOUT SCIATICA, UNSPECIFIED BACK PAIN LATERALITY: Primary | ICD-10-CM

## 2025-02-14 NOTE — TELEPHONE ENCOUNTER
Message below noted.    Patient requesting new referral for pain clinic to see Dr. Christine Flanagan at [place] as it is closer to her. Referral in chart is for Dr. Snider.    LOV 1/28/25  \"ASSESSMENT AND PLAN:  84-year-old female with left low back pain  This does not correlate to her stenosis  She is no neurogenic claudication  She has no symptoms below her severe stenosis  Her symptoms higher  This appears to be muscular  There is a palpable knot on examination  I called Dr. Snider who she is seen in the past  I recommend some trigger point injections at area and possible Botox if that is possible  He was talk about doing facet injections like, I think is reasonable as well but I do think this is muscular  I will see her back in a couple months and see how she is doing  Reviewed her films in detail  All questions were answered  Patient and daughter appreciative\"    Routed to Provider.

## 2025-02-20 ENCOUNTER — TELEPHONE (OUTPATIENT)
Dept: FAMILY MEDICINE CLINIC | Facility: CLINIC | Age: 85
End: 2025-02-20

## 2025-02-20 NOTE — TELEPHONE ENCOUNTER
Daughter informed of Dr. Amaro's note below. She verbalized understanding.     Daughter is requesting if we can assist in getting a sooner appointment for patient with pain clinic. Patient is currently scheduled on 3/6/25.    I called Pain clinic, spoke to Anroldo. An earlier appointment is available with Dr. Flanagan on 2/27 at 7:30am. Daughter agreed to reschedule.   Appointment rescheduled.      Patient called back and reported she is going today for additional imaging.

## 2025-02-20 NOTE — TELEPHONE ENCOUNTER
The total amount of acetaminophen for the day must not excess 3000mg, so if taking norco 5-325 twice a day that can be fine. Thank you.

## 2025-02-20 NOTE — TELEPHONE ENCOUNTER
Patient daughter would like to speak to nurse about patient back. States her back is \"out\" and wants to know what to do. Pain at a 10 when moving and 0-1 when not moving

## 2025-02-20 NOTE — TELEPHONE ENCOUNTER
Spoke to patient's daughter, Charito--HIPAA verified    Patient is having chronic back pain and they're currently following with pain clinic and neurosurgery.   Patient's next appointment with pain clinic is on 3/6/25.  When patient is laying down, no pain. When standing up or walking, severe pain 10/10. Advised daughter, for severe pain, go to ER. Daughter verbalized understanding.     Daughter wants to know if patient can continue taking tylenol 1000mg twice a day. Patient is also prescribed with norco 5-325 for pain, per daughter, norco makes the patient groggy that's why she prefers tylenol.  Please advise. Thank you.

## 2025-02-25 ENCOUNTER — MED REC SCAN ONLY (OUTPATIENT)
Dept: FAMILY MEDICINE CLINIC | Facility: CLINIC | Age: 85
End: 2025-02-25

## 2025-02-27 ENCOUNTER — OFFICE VISIT (OUTPATIENT)
Dept: PAIN CLINIC | Facility: HOSPITAL | Age: 85
End: 2025-02-27
Attending: ANESTHESIOLOGY
Payer: MEDICARE

## 2025-02-27 ENCOUNTER — HOSPITAL ENCOUNTER (EMERGENCY)
Age: 85
Discharge: HOME | End: 2025-02-27
Attending: EMERGENCY MEDICINE

## 2025-02-27 VITALS
OXYGEN SATURATION: 95 % | BODY MASS INDEX: 39.47 KG/M2 | HEIGHT: 62 IN | DIASTOLIC BLOOD PRESSURE: 103 MMHG | HEART RATE: 89 BPM | RESPIRATION RATE: 20 BRPM | WEIGHT: 214.51 LBS | TEMPERATURE: 97.7 F | SYSTOLIC BLOOD PRESSURE: 166 MMHG

## 2025-02-27 VITALS
HEART RATE: 97 BPM | DIASTOLIC BLOOD PRESSURE: 81 MMHG | SYSTOLIC BLOOD PRESSURE: 124 MMHG | OXYGEN SATURATION: 93 % | BODY MASS INDEX: 40 KG/M2 | WEIGHT: 220 LBS

## 2025-02-27 DIAGNOSIS — G89.29 EXACERBATION OF CHRONIC BACK PAIN: Primary | ICD-10-CM

## 2025-02-27 DIAGNOSIS — M51.360 DEGENERATION OF INTERVERTEBRAL DISC OF LUMBAR REGION WITH DISCOGENIC BACK PAIN: ICD-10-CM

## 2025-02-27 DIAGNOSIS — M47.816 LUMBAR SPONDYLOSIS: ICD-10-CM

## 2025-02-27 DIAGNOSIS — M96.1 LUMBAR POST-LAMINECTOMY SYNDROME: ICD-10-CM

## 2025-02-27 DIAGNOSIS — M41.86 LEVOSCOLIOSIS OF LUMBAR SPINE: ICD-10-CM

## 2025-02-27 DIAGNOSIS — G89.29 CHRONIC BILATERAL LOW BACK PAIN WITHOUT SCIATICA: ICD-10-CM

## 2025-02-27 DIAGNOSIS — M54.50 CHRONIC BILATERAL LOW BACK PAIN WITHOUT SCIATICA: ICD-10-CM

## 2025-02-27 DIAGNOSIS — M48.062 SPINAL STENOSIS OF LUMBAR REGION WITH NEUROGENIC CLAUDICATION: Primary | ICD-10-CM

## 2025-02-27 DIAGNOSIS — M54.9 EXACERBATION OF CHRONIC BACK PAIN: Primary | ICD-10-CM

## 2025-02-27 DIAGNOSIS — M54.50 ACUTE LEFT-SIDED LOW BACK PAIN WITHOUT SCIATICA: ICD-10-CM

## 2025-02-27 PROCEDURE — 10002803 HB RX 637: Performed by: EMERGENCY MEDICINE

## 2025-02-27 PROCEDURE — 96372 THER/PROPH/DIAG INJ SC/IM: CPT | Performed by: EMERGENCY MEDICINE

## 2025-02-27 PROCEDURE — 10002800 HB RX 250 W HCPCS: Performed by: EMERGENCY MEDICINE

## 2025-02-27 PROCEDURE — 10003627 HB COUNTER ED NO SERVICE

## 2025-02-27 RX ORDER — ONDANSETRON 4 MG/1
4 TABLET, ORALLY DISINTEGRATING ORAL ONCE
Status: COMPLETED | OUTPATIENT
Start: 2025-02-27 | End: 2025-02-27

## 2025-02-27 RX ORDER — ONDANSETRON 4 MG/1
4 TABLET, ORALLY DISINTEGRATING ORAL EVERY 6 HOURS
Qty: 10 TABLET | Refills: 0 | Status: SHIPPED | OUTPATIENT
Start: 2025-02-27

## 2025-02-27 RX ORDER — HYDROCODONE BITARTRATE AND ACETAMINOPHEN 5; 325 MG/1; MG/1
2 TABLET ORAL EVERY 6 HOURS PRN
Qty: 20 TABLET | Refills: 0 | Status: SHIPPED | OUTPATIENT
Start: 2025-02-27

## 2025-02-27 RX ADMIN — ONDANSETRON 4 MG: 4 TABLET, ORALLY DISINTEGRATING ORAL at 09:50

## 2025-02-27 RX ADMIN — MORPHINE SULFATE 4 MG: 4 INJECTION INTRAVENOUS at 09:49

## 2025-02-27 ASSESSMENT — PAIN SCALES - GENERAL
PAINLEVEL_OUTOF10: 10
PAINLEVEL_OUTOF10: 8
PAINLEVEL_OUTOF10: 10
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
## 2025-02-27 NOTE — PATIENT INSTRUCTIONS
Refill policies:    Allow 2-3 business days for refills; controlled substances may take longer.  Contact your pharmacy at least 5 days prior to running out of medication and have them send an electronic request or submit request through the “request refill” option in your [employer] account.  Refills are not addressed on weekends; covering physicians do not authorize routine medications on weekends.  No narcotics or controlled substances are refilled after noon on Fridays or by on call physicians.  By law, narcotics must be electronically prescribed.  A 30 day supply with no refills is the maximum allowed.  If your prescription is due for a refill, you may be due for a follow up appointment.  To best provide you care, patients receiving routine medications need to be seen at least once a year.  Patients receiving narcotic/controlled substance medications need to be seen at least once every 3 months.  In the event that your preferred pharmacy does not have the requested medication in stock (e.g. Backordered), it is your responsibility to find another pharmacy that has the requested medication available.  We will gladly send a new prescription to that pharmacy at your request.    Scheduling Tests:    If your physician has ordered radiology tests such as MRI or CT scans, please contact Central Scheduling at [phone] right away to schedule the test.  Once scheduled, the [facility] Centralized Referral Team will work with your insurance carrier to obtain pre-certification or prior authorization.  Depending on your insurance carrier, approval may take 3-10 days.  It is highly recommended patients assure they have received an authorization before having a test performed.  If test is done without insurance authorization, patient may be responsible for the entire amount billed.      Precertification and Prior Authorizations:  If your physician has recommended that you have a procedure or additional testing performed the [facility]  Centralized Referral Team will contact your insurance carrier to obtain pre-certification or prior authorization.    You are strongly encouraged to contact your insurance carrier to verify that your procedure/test has been approved and is a COVERED benefit.  Although the [facility] Centralized Referral Team does its due diligence, the insurance carrier gives the disclaimer that \"Although the procedure is authorized, this does not guarantee payment.\"    Ultimately the patient is responsible for payment.   Thank you for your understanding in this matter.  Paperwork Completion:  If you require FMLA or disability paperwork for your recovery, please make sure to either drop it off or have it faxed to our office at [phone]. Be sure the form has your name and date of birth on it.  The form will be faxed to our Forms Department and they will complete it for you.  There is a 25$ fee for all forms that need to be filled out.  Please be aware there is a 10-14 day turnaround time.  You will need to sign a release of information (SUDARSHAN) form if your paperwork does not come with one.  You may call the Forms Department with any questions at [phone].  Their fax number is [phone].

## 2025-02-28 ENCOUNTER — TELEPHONE (OUTPATIENT)
Dept: PAIN CLINIC | Facility: HOSPITAL | Age: 85
End: 2025-02-28

## 2025-02-28 NOTE — TELEPHONE ENCOUNTER
Patient's daughter states Dr. Shaka Cruz prescribes Eliquis .  Office phone is [phone] and fax is [phone].

## 2025-02-28 NOTE — TELEPHONE ENCOUNTER
** waiting for  OV 02/27/25 to be completed, then will submit Prior Auth.    Order Questions    Question Answer Comment   Anesthesia Type Local    Provider Masha    Location [facility]    Procedure Lumbar WALI L5-S1   CPT (Hit enter after each entry) NJX INTERLAMINAR LMBR/SAC    C-ARM Yes    Medications to hold eliquiz    Medical clearance requested (will send to Pain Navigator) Yes    Patient has Medicare coverage? No

## 2025-02-28 NOTE — TELEPHONE ENCOUNTER
Last Office Visit: 12/12/2024    Last Refill:   Medication Quantity Refills Start End   HYDROcodone-acetaminophen 5-325 MG Oral Tab 60 tablet 0 8/30/2024 --     Return to Clinic: dawson appt 03/27/2025    Protocol: ***   No bruits; no thyromegaly or nodules

## 2025-03-05 NOTE — TELEPHONE ENCOUNTER
Prior authorization request completed for: Caudal WALI   Authorization #[id number]  Authorization dates: 3/5/2025 thru 9/1/2025  CPT codes approved: 32837  Number of visits/dates of service approved: 1  Physician: Masha  Location: [facility]    Patient can be scheduled. Routed to Navigator.

## 2025-03-05 NOTE — TELEPHONE ENCOUNTER
Called [facility] to check status of Medical clearance for Patient, Nurse let me know that patient ha not been in the office in quite some time but she has a appt to come in 3/6/2025 and they will get if faxed over if she is cleared.

## 2025-03-06 NOTE — TELEPHONE ENCOUNTER
Patient's daughter Charito called stating that patient would like to go ahead and schedule the procedure. Please call Charito back at: [phone]

## 2025-03-07 NOTE — TELEPHONE ENCOUNTER
Called Patient Daughter Kristie)[phone] informed her that we received the Medial Clearance back form [facility] and that her mom was not  cleared they had already given her a call and let her know. Informed her that I would sent it over to Dr. Flanagan to see what the next steps will be daughter VU.

## 2025-03-07 NOTE — TELEPHONE ENCOUNTER
Returned Patient Daughter Charito call about scheduling her Mom appt. Stated she was going to call me today and let me know that her Cardiologist is NOT ok with taking her off the Eliquis for 3 day because of her stroke risk. I let her know that we need to receive the letter back. She will call and let them know.

## 2025-03-07 NOTE — TELEPHONE ENCOUNTER
Received Medical Clearance from [facility] patient is not cleared to hold Eliquis for 3 days will forward to  informing her of this.

## 2025-03-11 ASSESSMENT — ENCOUNTER SYMPTOMS
POLYDIPSIA: 0
COUGH: 0
ABDOMINAL PAIN: 0
ACTIVITY CHANGE: 0
APPETITE CHANGE: 0
RHINORRHEA: 0
BLOOD IN STOOL: 0
LIGHT-HEADEDNESS: 0
DIZZINESS: 0
HEADACHES: 0
CHEST TIGHTNESS: 0
VOMITING: 0
SORE THROAT: 0
CONFUSION: 0
SHORTNESS OF BREATH: 0
ABDOMINAL DISTENTION: 0
DIARRHEA: 0
STRIDOR: 0
CHILLS: 0
NAUSEA: 0
EYE DISCHARGE: 0
SEIZURES: 0
WHEEZING: 0
FATIGUE: 0
FEVER: 0
ADENOPATHY: 0
NERVOUS/ANXIOUS: 0
CONSTIPATION: 0
WEAKNESS: 0
BACK PAIN: 1

## 2025-03-20 ENCOUNTER — HOSPITAL ENCOUNTER (EMERGENCY)
Facility: HOSPITAL | Age: 85
Discharge: HOME | End: 2025-03-20
Attending: EMERGENCY MEDICINE
Payer: MEDICARE

## 2025-03-20 ENCOUNTER — APPOINTMENT (OUTPATIENT)
Dept: GENERAL RADIOLOGY | Facility: HOSPITAL | Age: 85
End: 2025-03-20
Payer: MEDICARE

## 2025-03-20 VITALS
DIASTOLIC BLOOD PRESSURE: 68 MMHG | TEMPERATURE: 99 F | HEART RATE: 71 BPM | BODY MASS INDEX: 40.48 KG/M2 | WEIGHT: 220 LBS | RESPIRATION RATE: 14 BRPM | OXYGEN SATURATION: 94 % | HEIGHT: 62 IN | SYSTOLIC BLOOD PRESSURE: 131 MMHG

## 2025-03-20 DIAGNOSIS — M54.50 ACUTE LEFT-SIDED LOW BACK PAIN WITHOUT SCIATICA: Primary | ICD-10-CM

## 2025-03-20 DIAGNOSIS — J40 BRONCHITIS: ICD-10-CM

## 2025-03-20 LAB
ALBUMIN SERPL-MCNC: 4.5 G/DL (ref 3.2–4.8)
ALBUMIN/GLOB SERPL: 1.7 {RATIO} (ref 1–2)
ALP LIVER SERPL-CCNC: 134 U/L
ALT SERPL-CCNC: 13 U/L
ANION GAP SERPL CALC-SCNC: 7 MMOL/L (ref 0–18)
APTT PPP: 32.3 SECONDS (ref 23–36)
AST SERPL-CCNC: 27 U/L (ref ?–34)
BASOPHILS # BLD AUTO: 0.09 X10(3) UL (ref 0–0.2)
BASOPHILS NFR BLD AUTO: 1.1 %
BILIRUB SERPL-MCNC: 0.8 MG/DL (ref 0.2–1.1)
BUN BLD-MCNC: 28 MG/DL (ref 9–23)
CALCIUM BLD-MCNC: 10.9 MG/DL (ref 8.7–10.6)
CHLORIDE SERPL-SCNC: 107 MMOL/L (ref 98–112)
CO2 SERPL-SCNC: 29 MMOL/L (ref 21–32)
CREAT BLD-MCNC: 1.58 MG/DL
EGFRCR SERPLBLD CKD-EPI 2021: 32 ML/MIN/1.73M2 (ref 60–?)
EOSINOPHIL # BLD AUTO: 0.16 X10(3) UL (ref 0–0.7)
EOSINOPHIL NFR BLD AUTO: 2 %
ERYTHROCYTE [DISTWIDTH] IN BLOOD BY AUTOMATED COUNT: 14 %
GLOBULIN PLAS-MCNC: 2.7 G/DL (ref 2–3.5)
GLUCOSE BLD-MCNC: 105 MG/DL (ref 70–99)
HCT VFR BLD AUTO: 39.1 %
HGB BLD-MCNC: 13.1 G/DL
IMM GRANULOCYTES # BLD AUTO: 0.02 X10(3) UL (ref 0–1)
IMM GRANULOCYTES NFR BLD: 0.2 %
INR BLD: 1.22 (ref 0.8–1.2)
LYMPHOCYTES # BLD AUTO: 2.64 X10(3) UL (ref 1–4)
LYMPHOCYTES NFR BLD AUTO: 32.2 %
MCH RBC QN AUTO: 31.8 PG (ref 26–34)
MCHC RBC AUTO-ENTMCNC: 33.5 G/DL (ref 31–37)
MCV RBC AUTO: 94.9 FL
MONOCYTES # BLD AUTO: 0.78 X10(3) UL (ref 0.1–1)
MONOCYTES NFR BLD AUTO: 9.5 %
NEUTROPHILS # BLD AUTO: 4.5 X10 (3) UL (ref 1.5–7.7)
NEUTROPHILS # BLD AUTO: 4.5 X10(3) UL (ref 1.5–7.7)
NEUTROPHILS NFR BLD AUTO: 55 %
NT-PROBNP SERPL-MCNC: 1499 PG/ML (ref ?–450)
OSMOLALITY SERPL CALC.SUM OF ELEC: 302 MOSM/KG (ref 275–295)
PLATELET # BLD AUTO: 192 10(3)UL (ref 150–450)
POTASSIUM SERPL-SCNC: 4.2 MMOL/L (ref 3.5–5.1)
PROT SERPL-MCNC: 7.2 G/DL (ref 5.7–8.2)
PROTHROMBIN TIME: 15.5 SECONDS (ref 11.6–14.8)
Q-T INTERVAL: 328 MS
QRS DURATION: 92 MS
QTC CALCULATION (BEZET): 403 MS
R AXIS: 21 DEGREES
RBC # BLD AUTO: 4.12 X10(6)UL
SODIUM SERPL-SCNC: 143 MMOL/L (ref 136–145)
T AXIS: 46 DEGREES
TROPONIN I SERPL HS-MCNC: 14 NG/L
VENTRICULAR RATE: 91 BPM
WBC # BLD AUTO: 8.2 X10(3) UL (ref 4–11)

## 2025-03-20 PROCEDURE — 96375 TX/PRO/DX INJ NEW DRUG ADDON: CPT

## 2025-03-20 PROCEDURE — 94640 AIRWAY INHALATION TREATMENT: CPT

## 2025-03-20 PROCEDURE — 96374 THER/PROPH/DIAG INJ IV PUSH: CPT

## 2025-03-20 PROCEDURE — 84484 ASSAY OF TROPONIN QUANT: CPT | Performed by: EMERGENCY MEDICINE

## 2025-03-20 PROCEDURE — 83880 ASSAY OF NATRIURETIC PEPTIDE: CPT | Performed by: EMERGENCY MEDICINE

## 2025-03-20 PROCEDURE — 85730 THROMBOPLASTIN TIME PARTIAL: CPT | Performed by: EMERGENCY MEDICINE

## 2025-03-20 PROCEDURE — 85610 PROTHROMBIN TIME: CPT | Performed by: EMERGENCY MEDICINE

## 2025-03-20 PROCEDURE — 93010 ELECTROCARDIOGRAM REPORT: CPT

## 2025-03-20 PROCEDURE — 99285 EMERGENCY DEPT VISIT HI MDM: CPT

## 2025-03-20 PROCEDURE — 85025 COMPLETE CBC W/AUTO DIFF WBC: CPT | Performed by: EMERGENCY MEDICINE

## 2025-03-20 PROCEDURE — 71045 X-RAY EXAM CHEST 1 VIEW: CPT | Performed by: EMERGENCY MEDICINE

## 2025-03-20 PROCEDURE — 80053 COMPREHEN METABOLIC PANEL: CPT | Performed by: EMERGENCY MEDICINE

## 2025-03-20 PROCEDURE — 93005 ELECTROCARDIOGRAM TRACING: CPT

## 2025-03-20 RX ORDER — HYDROCODONE BITARTRATE AND ACETAMINOPHEN 5; 325 MG/1; MG/1
1-2 TABLET ORAL EVERY 6 HOURS PRN
Qty: 10 TABLET | Refills: 0 | Status: SHIPPED | OUTPATIENT
Start: 2025-03-20 | End: 2025-03-25

## 2025-03-20 RX ORDER — ALBUTEROL SULFATE 90 UG/1
8 INHALANT RESPIRATORY (INHALATION) ONCE
Status: COMPLETED | OUTPATIENT
Start: 2025-03-20 | End: 2025-03-20

## 2025-03-20 RX ORDER — ALBUTEROL SULFATE 90 UG/1
2 INHALANT RESPIRATORY (INHALATION) EVERY 4 HOURS PRN
Qty: 1 EACH | Refills: 0 | Status: SHIPPED | OUTPATIENT
Start: 2025-03-20 | End: 2025-04-19

## 2025-03-20 RX ORDER — MORPHINE SULFATE 4 MG/ML
4 INJECTION, SOLUTION INTRAMUSCULAR; INTRAVENOUS ONCE
Status: COMPLETED | OUTPATIENT
Start: 2025-03-20 | End: 2025-03-20

## 2025-03-20 RX ORDER — METHYLPREDNISOLONE 4 MG/1
TABLET ORAL
Qty: 1 EACH | Refills: 0 | Status: SHIPPED | OUTPATIENT
Start: 2025-03-20 | End: 2025-03-27 | Stop reason: ALTCHOICE

## 2025-03-20 RX ORDER — LIDOCAINE 50 MG/G
1 PATCH TOPICAL EVERY 24 HOURS
Qty: 10 PATCH | Refills: 0 | Status: SHIPPED | OUTPATIENT
Start: 2025-03-20 | End: 2025-03-30

## 2025-03-20 RX ORDER — FUROSEMIDE 10 MG/ML
20 INJECTION INTRAMUSCULAR; INTRAVENOUS ONCE
Status: COMPLETED | OUTPATIENT
Start: 2025-03-20 | End: 2025-03-20

## 2025-03-20 NOTE — ED INITIAL ASSESSMENT (HPI)
Pt in from home. Pt reports left side of her back started having spasms today along with increased shortness of breath at rest and upon exertion. Pt has hx of HTN, A-fib. Pt has hx of back spasms.

## 2025-03-20 NOTE — ED PROVIDER NOTES
Patient Seen in: [facility] Emergency Department      History     Chief Complaint   Patient presents with    Difficulty Breathing    Abdomen/Flank Pain     Stated Complaint: SOB and back pain    Subjective:   HPI    84-year-old female presents to ED history of A-fib on Eliquis, CKD, CHF, chronic low back pain presents to ED with complaint of left-sided low back pain worse than usual today.  Her daughter is at bedside and gives history also, she states that she is concerned as patient was breathing harder than normal and did not know if it was due to her being in worse pain today.  Denies bowel bladder incontinence, new leg weakness, numbness tingling.  She states she has been taking the Eliquis compliantly.  Denies fevers, chills, chest pain, urinary symptoms.      Objective:     Past Medical History:    (HFpEF) heart failure with preserved ejection fraction (HCC)    Atrial fibrillation (HCC)    Back pain    Chronic atrial fibrillation (HCC)    CKD (chronic kidney disease)    Colon cancer (HCC)    Congestive heart disease (HCC)    Congestive heart failure (HCC)    Congestive heart failure, unspecified HF chronicity, unspecified heart failure type (HCC)    Deep vein thrombosis (HCC)    Diabetes (HCC)    Essential hypertension    History of blood clots    HTN (hypertension)    Hyperlipidemia    Hypothyroidism    Obesity    Pulmonary embolism (HCC)    Sleep apnea    TIA (transient ischemic attack)    Visual impairment              Past Surgical History:   Procedure Laterality Date    Back surgery      Colectomy      Colonoscopy      Excis lumbar disk,one level      Femur fracture surgery Right 10/19/2024    Hip replacement surgery      Knee replacement surgery            Total hip replacement      Total knee replacement Bilateral                 Social History     Socioeconomic History    Marital status:    Tobacco Use    Smoking status: Never     Passive exposure: Never    Smokeless tobacco: Never    Vaping Use    Vaping status: Never Used   Substance and Sexual Activity    Alcohol use: Not Currently    Drug use: Never   Other Topics Concern    Caffeine Concern No    Exercise Yes   Social History Narrative    Retired  for >30 years.  [employer]. Daughter Charito lives in [place].      Social Drivers of Health     Food Insecurity: Low Risk  (10/19/2024)    Received from [employer]    Food Insecurity     Within the past 12 months, you worried that your food would run out before you got money to buy more.  : Never true     Within the past 12 months, the food you bought just didn't last and you didn't have money to get more. : Never true   Transportation Needs: Not At Risk (10/19/2024)    Received from [employer]    Transportation Needs     In the past 12 months, has lack of reliable transportation kept you from medical appointments, meetings, work or from getting things needed for daily living? : No   Housing Stability: Low Risk  (4/20/2024)    Housing Stability     Housing Instability: No                  Physical Exam     ED Triage Vitals [03/20/25 1527]   /76   Pulse 92   Resp 26   Temp 98.5 °F (36.9 °C)   Temp src Oral   SpO2 94 %   O2 Device None (Room air)       Current Vitals:   No data recorded      Physical Exam  Vital signs reviewed.  Nursing note reviewed.  Constitutional: Alert, well-appearing  Head: Normocephalic, atraumatic  Mouth: Moist  Eyes: Extraocular muscles intact, pupils equal  Cardiovascular: Regular rate and rhythm  Pulmonary: Effort normal, breath sounds normal  Abdomen: Soft, nontender nondistended  Skin: Warm and dry  Musculoskeletal left low  to palpation, increased pain provoked with movement transitionally from supine to sit and sit to stand, range of motion grossly normal all extremities  Neuro: Alert, at baseline, no focal neuro deficit.  Moves all extremities against gravity  Psych: Mood normal          ED Course      Labs Reviewed   COMP METABOLIC PANEL (14) - Abnormal; Notable for the following components:       Result Value    Glucose 105 (*)     BUN 28 (*)     Creatinine 1.58 (*)     Calcium, Total 10.9 (*)     Calculated Osmolality 302 (*)     eGFR-Cr 32 (*)     All other components within normal limits   PRO BETA NATRIURETIC PEPTIDE - Abnormal; Notable for the following components:    Pro-Beta Natriuretic Peptide 1,499 (*)     All other components within normal limits   PROTHROMBIN TIME (PT) - Abnormal; Notable for the following components:    PT 15.5 (*)     INR 1.22 (*)     All other components within normal limits   TROPONIN I HIGH SENSITIVITY - Normal   PTT, ACTIVATED - Normal   CBC WITH DIFFERENTIAL WITH PLATELET   RAINBOW DRAW LAVENDER   RAINBOW DRAW LIGHT GREEN   RAINBOW DRAW BLUE     EKG    Rate, intervals and axes as noted on EKG Report.  Rate: 91  Rhythm: Atrial Fibrillation  Reading: No new ST-T wave abnormality compared with old EKG                XR CHEST AP PORTABLE  (CPT=71045)    Result Date: 3/20/2025  PROCEDURE:  XR CHEST AP PORTABLE  (CPT=71045)  TECHNIQUE:  AP chest radiograph was obtained.  COMPARISON:  [facility] , XR, XR CHEST AP PORTABLE  (CPT=71045), 4/20/2024, 12:33 PM.  INDICATIONS:  SOB and back pain  PATIENT STATED HISTORY: (As transcribed by Technologist)  Patient offered no additional history at this time.    FINDINGS:  Perihilar streaky opacity peribronchial cuffing is new since previous study and likely represents mild interstitial edema versus infectious pneumonitis.  There is cardiomegaly.  There is no pleural effusion.  Mild degenerative changes of the spine.            CONCLUSION:  Cardiomegaly with perihilar streaky opacity and peribronchial cuffing could be related to an infectious bronchitis with interstitial edema in the differential.   LOCATION:  [facility]      Dictated by (CST): Josse Fan MD on 3/20/2025 at 4:13 PM     Finalized by (CST): Josse Fan MD on 3/20/2025 at 4:13 PM              [facility]      Medical Decision Making:    Differential diagnosis before testing includes degenerative disc disease, congestive heart failure potential life threatening diagnosis which is a medical condition that poses a threat to life/function.     Comorbidities that add complexity to management: See above    I reviewed prior external ED notes including reviewed notes from Dr. Gomes, pulmonology, was given inhaler and being worked up for her acute on chronic dyspnea, was to get sleep study and CT chest no contrast and follow-up in 4 months, that was almost a year ago she has not followed up yet    Social determinants of health care: Daughter at bedside    I personally reviewed the radiographs and my independent interpretation includes bronchitis versus edema    Shared decision making:    Patient with chronic degenerative low back disc disease, sees pain clinic at [place], last there 2/27/2025 was doing physical therapy, had caudal WALI, history of laminectomy, considering RFA.  Will refill her Norco, lidocaine patches and Medrol Dosepak.  She was told to make a follow-up appointment with pulmonology as she is overdue.  She is compliant with Eliquis, troponin negative, no new EKG changes, BNP less than normal.  Doubt acute life-threatening etiology to shortness of breath.  She had finding of bronchitis on chest x-ray today, and she has had this finding previously.  She forgot to use inhaler when she felt short of breath today and she supposed to use that as needed.  Reminded her to use inhaler as needed.  Make appointment with Dr. Zambrano, pulmonology.    Medical Decision Making      Disposition and Plan     Clinical Impression:  1. Acute left-sided low back pain without sciatica    2. Bronchitis         Disposition:  Discharge  3/20/2025  5:55 pm    Follow-up:  Sofya Zambrano MD  [facility] [place] [address] [place] [id number]  [phone]    Follow up in 1 day(s)      Sofya Zambrano MD  [facility] [place] [facility]  [address]  [facility] [address]  [phone]          Amy Amaro MD  [address] [facility] [address]  [facility] [address]  [phone]    Follow up in 1 day(s)      Spine center    Go to  your appt at [place] [facility]          Medications Prescribed:  Discharge Medication List as of 3/20/2025  7:12 PM        START taking these medications    Details   !! HYDROcodone-acetaminophen 5-325 MG Oral Tab Take 1-2 tablets by mouth every 6 (six) hours as needed for Pain., Normal, Disp-10 tablet, R-0      !! albuterol 108 (90 Base) MCG/ACT Inhalation Aero Soln Inhale 2 puffs into the lungs every 4 (four) hours as needed for Wheezing., Normal, Disp-1 each, R-0      !! methylPREDNISolone (MEDROL) 4 MG Oral Tablet Therapy Pack Dosepack: take as directed, Normal, Disp-1 each, R-0      !! lidocaine 5 % External Patch Place 1 patch onto the skin daily for 10 days., Normal, Disp-10 patch, R-0       !! - Potential duplicate medications found. Please discuss with provider.              Supplementary Documentation:

## 2025-03-20 NOTE — ED QUICK NOTES
Pt able to ambulate to bathroom with steady gait. When asked how the pt feels walking the pt states \"much better thank you. My back feels good\".   Pt ambulated back to ED stretcher. Stretcher down and locked, side rails up, call light within reach.

## 2025-03-25 RX ORDER — HYDROCODONE BITARTRATE AND ACETAMINOPHEN 5; 325 MG/1; MG/1
1 TABLET ORAL
Qty: 60 TABLET | Refills: 0 | OUTPATIENT
Start: 2025-03-25

## 2025-03-27 ENCOUNTER — OFFICE VISIT (OUTPATIENT)
Dept: FAMILY MEDICINE CLINIC | Facility: CLINIC | Age: 85
End: 2025-03-27
Payer: MEDICARE

## 2025-03-27 VITALS
SYSTOLIC BLOOD PRESSURE: 112 MMHG | RESPIRATION RATE: 18 BRPM | DIASTOLIC BLOOD PRESSURE: 64 MMHG | BODY MASS INDEX: 39.38 KG/M2 | HEIGHT: 62 IN | TEMPERATURE: 97 F | WEIGHT: 214 LBS | HEART RATE: 64 BPM

## 2025-03-27 DIAGNOSIS — E03.9 PRIMARY HYPOTHYROIDISM: ICD-10-CM

## 2025-03-27 DIAGNOSIS — L65.9 HAIR LOSS: ICD-10-CM

## 2025-03-27 DIAGNOSIS — E11.22 TYPE 2 DIABETES MELLITUS WITH STAGE 3A CHRONIC KIDNEY DISEASE, WITHOUT LONG-TERM CURRENT USE OF INSULIN (HCC): Primary | ICD-10-CM

## 2025-03-27 DIAGNOSIS — Z02.89 ENCOUNTER FOR COMPLETION OF FORM WITH PATIENT: ICD-10-CM

## 2025-03-27 DIAGNOSIS — N18.31 TYPE 2 DIABETES MELLITUS WITH STAGE 3A CHRONIC KIDNEY DISEASE, WITHOUT LONG-TERM CURRENT USE OF INSULIN (HCC): Primary | ICD-10-CM

## 2025-03-27 DIAGNOSIS — I50.22 CHRONIC SYSTOLIC CONGESTIVE HEART FAILURE (HCC): ICD-10-CM

## 2025-03-27 DIAGNOSIS — L29.9 PRURITUS: ICD-10-CM

## 2025-03-27 DIAGNOSIS — N18.31 CHRONIC RENAL IMPAIRMENT, STAGE 3A (HCC): ICD-10-CM

## 2025-03-27 DIAGNOSIS — I10 HYPERTENSION, ESSENTIAL, BENIGN: ICD-10-CM

## 2025-03-27 DIAGNOSIS — F41.9 ANXIETY: ICD-10-CM

## 2025-03-27 DIAGNOSIS — E78.00 HYPERCHOLESTEROLEMIA: ICD-10-CM

## 2025-03-27 PROCEDURE — 1170F FXNL STATUS ASSESSED: CPT | Performed by: STUDENT IN AN ORGANIZED HEALTH CARE EDUCATION/TRAINING PROGRAM

## 2025-03-27 PROCEDURE — 99214 OFFICE O/P EST MOD 30 MIN: CPT | Performed by: STUDENT IN AN ORGANIZED HEALTH CARE EDUCATION/TRAINING PROGRAM

## 2025-03-27 PROCEDURE — G2211 COMPLEX E/M VISIT ADD ON: HCPCS | Performed by: STUDENT IN AN ORGANIZED HEALTH CARE EDUCATION/TRAINING PROGRAM

## 2025-03-27 RX ORDER — MONTELUKAST SODIUM 10 MG/1
10 TABLET ORAL EVERY EVENING
Qty: 90 TABLET | Refills: 1 | Status: SHIPPED | OUTPATIENT
Start: 2025-03-27

## 2025-03-27 RX ORDER — SPIRONOLACTONE 25 MG/1
12.5 TABLET ORAL DAILY
Qty: 45 TABLET | Refills: 1 | Status: SHIPPED | OUTPATIENT
Start: 2025-03-27

## 2025-03-27 RX ORDER — IRBESARTAN 150 MG/1
150 TABLET ORAL DAILY
Qty: 90 TABLET | Refills: 1 | Status: SHIPPED | OUTPATIENT
Start: 2025-03-27

## 2025-03-27 RX ORDER — ESCITALOPRAM OXALATE 10 MG/1
15 TABLET ORAL DAILY
Qty: 135 TABLET | Refills: 1 | Status: SHIPPED | OUTPATIENT
Start: 2025-03-27

## 2025-03-27 RX ORDER — PRAVASTATIN SODIUM 80 MG/1
80 TABLET ORAL EVERY EVENING
Qty: 90 TABLET | Refills: 1 | Status: SHIPPED | OUTPATIENT
Start: 2025-03-27

## 2025-03-27 RX ORDER — IBUPROFEN 600 MG/1
TABLET, FILM COATED ORAL
COMMUNITY
Start: 2024-09-27

## 2025-03-27 RX ORDER — LEVOTHYROXINE SODIUM 75 UG/1
75 TABLET ORAL EVERY MORNING
Qty: 90 TABLET | Refills: 1 | Status: SHIPPED | OUTPATIENT
Start: 2025-03-27

## 2025-03-27 RX ORDER — TORSEMIDE 10 MG/1
10 TABLET ORAL DAILY
Qty: 90 TABLET | Refills: 1 | Status: SHIPPED | OUTPATIENT
Start: 2025-03-27

## 2025-03-27 RX ORDER — ONDANSETRON 4 MG/1
4 TABLET, ORALLY DISINTEGRATING ORAL EVERY 6 HOURS
COMMUNITY
Start: 2025-02-27

## 2025-03-27 NOTE — PROGRESS NOTES
[facility] Note  03/27/25    Chief Complaint   Patient presents with    Medication Follow-Up    Test Results     Sleep study done 8/12/24     HPI:   Joni Christiansen is a 84 year old female who presents for follow up.    Went to pain clinic discussed epidural but couldn't do this due to having to be off of eliquis for three days. Was in the ER for pain flare up. Was using TENS unit. Using lidocaine patch and tylenol but not helping as much. Waiting to hear back form pain management.    Will need handicapped placard.    Using breathe right strips.    Patient is on lexapro 10mg daily. Would like to try a slightly higher dose. Feeling more anxious with back issues.    Taking hydrocodone 5mg for severe pain.     Would like to see someone for hair loss.    Patient presents for diabetes follow up. Diet controlled. Working on diet and exercise as tolterated. No low blood sugars noted.   Not currently on Diabetic meds.Meds : Kept the same and No medications needed at this time      Lab Results   Component Value Date    A1C 4.5 11/11/2024    A1C 5.6 03/21/2024    A1C 5.5 08/26/2023    A1C 5.4 04/22/2023      Last Diabetic Eye Exam:  No data recorded  No data recorded    Patient presents for recheck of her hypertension. Pt has been taking medications as instructed, no medication side effects.  BP Meds: Irbesartan Tabs - 150 MG; spironolactone Tabs - 25 MG; torsemide Tabs - 10 MG     Last Cr was 1.58 done on 3/20/2025.Last eGFR was 32 on 3/20/2025.    Patient presents for follow up of hyperlipidemia. Patient has been taking medications as prescribed, no muscle aches noted. Working on diet and exercise.   Cholesterol Meds: Pravastatin Sodium Tabs - 80 MG   Cholesterol: 114, done on 1/17/2024.  HDL Cholesterol: 45, done on 1/17/2024.  LDL Cholesterol: 48, done on 1/17/2024.  TriGlycerides 116, done on 1/17/2024.        Sleep study 8/20/24:  At the time of the study, the patient was prescribed  the following medications: Carvedilol, HYDROcodone-acetaminophen, Eliquis, cetrizine, B-12, escitalopram, Irbesartan, Levothyroxine, Pravastatin Sodium, spironolactone, Torsemide, cholecalciferol, calcium carbonate, gabapentin.   The patient was studied from 11:17:22 PM to 05:43:12 AM, with a total recording time of 385.8, total sleep time of 350.5 and a sleep efficiency of 90.8%. Wake after sleep onset was 30.0 minutes. The percentage of total sleep time by sleep stage is as follows: Stage 1 10.8%, Stage 2 78.3%, Stage 3 0.0% and Stage REM 10.8%.   Respiratory Analysis: The Apnea-Hypopnea Index (AHI) was 15.4 events per hour. REM related AHI was 17.4 events per hour. Supine related AHI was 0. Lateral related AHI was 15.5. The Central Apnea Index was 0. The oxygen saturation kenyatta was 80.2% and patient spent 9.8% with saturations less than 88%.   Snoring Profile: Snoring was mild.   Cardiac Profile: Electrocardiogram demonstrated atrial fibrillation. PVC's.   EEG Profile: Based on the limited recording montage, there were no significant EEG abnormalities noted. There were 82 arousals, with a total arousal index of 14.0.   Periodic Limb Movements: PLM index of 2.9. PLM arousal index of 0.5.   IMPRESSIONS: This study demonstrates obstructive sleep apnea with desaturations. Moderate degree overall.   Diagnosis: Obstructive Sleep Apnea G47.33   RECOMMENDATIONS:   1. It is recommended to proceed with CPAP titration.   2. The patient should avoid alcohol and sedative medications, as these may worsen severity of symptoms.   3. The patient should avoid drowsy driving.   4. Patient to follow up with a sleep specialist in clinic.     Thank you for allowing me to participate in this patient's care. Please do not hesitate to contact me with any additional questions.   Dictated by: Dr. Whitehead   Electronically signed by Colton Whitehead MD   (Electronic Signature)   Board Certified in Sleep Medicine     Wt Readings from Last 6  Encounters:   25 214 lb (97.1 kg)   25 220 lb (99.8 kg)   25 220 lb (99.8 kg)   25 220 lb (99.8 kg)   24 220 lb (99.8 kg)   24 220 lb (99.8 kg)       Past Medical History:    (HFpEF) heart failure with preserved ejection fraction (HCC)    Atrial fibrillation (HCC)    Back pain    Chronic atrial fibrillation (HCC)    CKD (chronic kidney disease)    Colon cancer (HCC)    Congestive heart disease (HCC)    Congestive heart failure (HCC)    Congestive heart failure, unspecified HF chronicity, unspecified heart failure type (HCC)    Deep vein thrombosis (HCC)    Diabetes (HCC)    Essential hypertension    History of blood clots    HTN (hypertension)    Hyperlipidemia    Hypothyroidism    Obesity    Pulmonary embolism (HCC)    Sleep apnea    TIA (transient ischemic attack)    Visual impairment     Past Surgical History:   Procedure Laterality Date    Back surgery      Colectomy      Colonoscopy      Excis lumbar disk,one level      Femur fracture surgery Right 10/19/2024    Hip replacement surgery      Knee replacement surgery            Total hip replacement      Total knee replacement Bilateral      Allergies[1]   ondansetron 4 MG Oral Tablet Dispersible Place 1 tablet (4 mg total) under the tongue every 6 (six) hours.      ibuprofen 600 MG Oral Tab       albuterol 108 (90 Base) MCG/ACT Inhalation Aero Soln Inhale 2 puffs into the lungs every 4 (four) hours as needed for Wheezing. 1 each 0    lidocaine 5 % External Patch Place 1 patch onto the skin daily for 10 days. 10 patch 0    levothyroxine 75 MCG Oral Tab Take 1 tablet (75 mcg total) by mouth every morning. 90 tablet 1    albuterol 108 (90 Base) MCG/ACT Inhalation Aero Soln Inhale 2 puffs into the lungs every 4 (four) hours as needed for Wheezing or Shortness of Breath. 1 each 1    escitalopram 10 MG Oral Tab Take 1 tablet (10 mg total) by mouth daily. 90 tablet 0    LIDOCAINE PAIN RELIEF 4 % External Patch APPLY ONE PATCH  TOPICALLY ONCE A DAY FOR 10 DAYS - 12 HOURS ON AND 12 HOURS OFF]      Irbesartan 150 MG Oral Tab Take 1 tablet (150 mg total) by mouth daily. 90 tablet 1    HYDROcodone-acetaminophen 5-325 MG Oral Tab Take 1 tablet by mouth every 4 to 6 hours as needed for Pain. 60 tablet 0    Pravastatin Sodium 80 MG Oral Tab Take 1 tablet (80 mg total) by mouth every evening. 90 tablet 1    CHOLECALCIFEROL 50 MCG (2000 UT) Oral Cap TAKE 1 CAPSULE BY MOUTH DAILY 90 capsule 1    ELIQUIS 5 MG Oral Tab TAKE 1 TABLET BY MOUTH TWICE DAILY 180 tablet 1    SPIRONOLACTONE 25 MG Oral Tab TAKE 1/2 TABLET BY MOUTH DAILY 45 tablet 1    TORSEMIDE 10 MG Oral Tab TAKE 1 TABLET BY MOUTH DAILY 90 tablet 1    B-12 1000 MCG Oral Tab CR TAKE 1 TABLET BY MOUTH DAILY 90 tablet 1    albuterol 108 (90 Base) MCG/ACT Inhalation Aero Soln Inhale 2 puffs into the lungs every 4 to 6 hours as needed for Wheezing or Shortness of Breath. 1 each 3    triamcinolone 0.1 % External Cream Apply topically 2 (two) times daily as needed. Can use on legs and hands 80 g 2    lidocaine 5 % External Patch Place 1 patch onto the skin daily as needed. 30 each 2     Social History     Socioeconomic History    Marital status:    Tobacco Use    Smoking status: Never     Passive exposure: Never    Smokeless tobacco: Never   Vaping Use    Vaping status: Never Used   Substance and Sexual Activity    Alcohol use: Not Currently    Drug use: Never   Other Topics Concern    Caffeine Concern No    Exercise Yes   Social History Narrative    Retired  for >30 years.  [employer]. Daughter Charito lives in [place].      Social Drivers of Health     Food Insecurity: Low Risk  (10/19/2024)    Received from [facility]    Food Insecurity     Within the past 12 months, you worried that your food would run out before you got money to buy more.  : Never true     Within the past 12 months, the food you bought just didn't last and you didn't have money to get  more. : Never true   Transportation Needs: Not At Risk (10/19/2024)    [facility]    Transportation Needs     In the past 12 months, has lack of reliable transportation kept you from medical appointments, meetings, work or from getting things needed for daily living? : No   Housing Stability: Low Risk  (4/20/2024)    Housing Stability     Housing Instability: No     Counseling given: Not Answered    Family History   Problem Relation Age of Onset    Other (other) Father         congestive heart failure    Hypertension Maternal Grandmother      Family Status   Relation Status    Fa (Not Specified)    MGMA (Not Specified)        REVIEW OF SYSTEMS:   See HPI    EXAM:   /64   Pulse 64   Temp 97 °F (36.1 °C) (Temporal)   Resp 18   Ht 5' 2\" (1.575 m)   Wt 214 lb (97.1 kg)   BMI 39.14 kg/m²  Estimated body mass index is 39.14 kg/m² as calculated from the following:    Height as of this encounter: 5' 2\" (1.575 m).    Weight as of this encounter: 214 lb (97.1 kg).   Vital signs reviewed. Appears stated age, well groomed.  Physical Exam:  GEN:  Patient is alert and oriented x3, no apparent distress  HEAD:  Normocephalic, atraumatic  HEENT:  no scleral icterus, conjunctivae clear bilaterally, EOMI, PERRLA, OP clear  LUNGS: clear to auscultation bilaterally, no rales/rhonchi/wheezing  HEART:  Regular rate and rhythm, normal S1/S2, no murmurs, rubs or gallops  EXTREMITIES:  Moves all extremities well  NEURO:  CN 2 - 12 grossly intact      ASSESSMENT AND PLAN:   1. Type 2 diabetes mellitus with stage 3a chronic kidney disease, without long-term current use of insulin (HCC)  Patient presents for follow up  - doing well diet controlled  - will order diabetic retinopathy screening  - continue cholesterol management  - Pravastatin Sodium 80 MG Oral Tab; Take 1 tablet (80 mg total) by mouth every evening.  Dispense: 90 tablet; Refill: 1  - Diabetic Retinopathy Exam  OU - Both Eyes; Future    2.  Primary hypothyroidism  Patient presents for follow up of hypothyroidism  - doing well  - will continue current regimen  - levothyroxine 75 MCG Oral Tab; Take 1 tablet (75 mcg total) by mouth every morning.  Dispense: 90 tablet; Refill: 1    3. Anxiety  Patient would like to try higher dose, will start with lexapro 15mg daily.  Monitor response to treatment.  Follow up 3-6mo pending response  - escitalopram 10 MG Oral Tab; Take 1.5 tablets (15 mg total) by mouth daily.  Dispense: 135 tablet; Refill: 1    4. Pruritus  Stable on current regimen, will continue  - montelukast 10 MG Oral Tab; Take 1 tablet (10 mg total) by mouth every evening.  Dispense: 90 tablet; Refill: 1    5. Hypercholesterolemia  Patient presents for follow up of HLD  - no side effects of medications  - will continue current regimen  - low fat diet and exercise  - follow up in 6mo or sooner if needed  - Pravastatin Sodium 80 MG Oral Tab; Take 1 tablet (80 mg total) by mouth every evening.  Dispense: 90 tablet; Refill: 1    6. Chronic renal impairment, stage 3a (HCC)  Continue current regimen, will follow on labs  - spironolactone 25 MG Oral Tab; Take 0.5 tablets (12.5 mg total) by mouth daily.  Dispense: 45 tablet; Refill: 1    7. Chronic systolic congestive heart failure (HCC)  Doing well, keep follow up with cardiology, appreciate evaluation and recommendations  - spironolactone 25 MG Oral Tab; Take 0.5 tablets (12.5 mg total) by mouth daily.  Dispense: 45 tablet; Refill: 1  - torsemide 10 MG Oral Tab; Take 1 tablet (10 mg total) by mouth daily.  Dispense: 90 tablet; Refill: 1    8. Hypertension, essential, benign  Pt presents for follow up of HTN  - no red flag symptoms  - BP controlled  - continue current regimen  - RTC 6mo or sooner if needed  - spironolactone 25 MG Oral Tab; Take 0.5 tablets (12.5 mg total) by mouth daily.  Dispense: 45 tablet; Refill: 1  - torsemide 10 MG Oral Tab; Take 1 tablet (10 mg total) by mouth daily.  Dispense: 90  tablet; Refill: 1  - Irbesartan 150 MG Oral Tab; Take 1 tablet (150 mg total) by mouth daily.  Dispense: 90 tablet; Refill: 1    9. Hair loss  Will refer to derm, appreciate evaluation and recommendations  - DERM - INTERNAL    10. Encounter for completion of form with patient  Parking placard filled out         Meds & Refills for this Visit:  Requested Prescriptions      No prescriptions requested or ordered in this encounter       Stop Taking                benzonatate 100 MG Oral Cap    Take 1 capsule (100 mg total) by mouth 3 (three) times daily as needed for cough.     CALCIUM CARBONATE 1500 (600 Ca) MG Oral Tab    TAKE 1 TABLET BY MOUTH DAILY     cefdinir 300 MG Oral Cap    Take 1 capsule (300 mg total) by mouth 2 (two) times daily.     diazePAM (VALIUM) 2 MG Oral Tab    Take one tablet 60 minutes prior to procedure, may take and additional one tablet in 30 minutes if needed due to incomplete response.     methylPREDNISolone (MEDROL) 4 MG Oral Tablet Therapy Pack    Dosepack: take as directed     methylPREDNISolone 4 MG Oral Tablet Therapy Pack    Take 1 tablet (4 mg total) by mouth.     orphenadrine  MG Oral Tablet 12 Hr    Take 100 mg by mouth 2 (two) times daily as needed.            Health Maintenance:  Health Maintenance Due   Topic Date Due    Diabetes Care Dilated Eye Exam  Never done    COVID-19 Vaccine (10 - 2024-25 season) 09/01/2024    Annual Well Visit  01/01/2025    Annual Depression Screening  01/01/2025    Diabetes Care: Foot Exam (Annual)  01/01/2025    Diabetes Care: Microalb/Creat Ratio (Annual)  01/01/2025       Patient/Caregiver Education: There are no barriers to learning. Medical education done.   Outcome: Patient verbalizes understanding. Patient is notified to call with any questions, complications, allergies, or worsening or changing symptoms.  Patient is to call with any side effects or complications from the treatments as a result of today.     Problem List:  Patient Active  Problem List   Diagnosis    Hypoxia    Dyspnea, unspecified type    Diabetes ([facility])    Essential hypertension    Hypercholesterolemia    Age-related osteoporosis without current pathological fracture    Allergic rhinitis    Atrophy of vagina    Chronic renal insufficiency, stage III (moderate) ([facility])    Chronic right shoulder pain    Gallstones    Hypercalcemia    Hyperuricuria    Low HDL (under 40)    Lumbar degenerative disc disease    Lymphedema    Nontraumatic incomplete tear of right rotator cuff    Obesity    Onychomycosis    ELY (obstructive sleep apnea)    Atrial fibrillation ([facility])    Postmenopausal bleeding    Primary hypothyroidism    Primary osteoarthritis of right hip    Secondary hyperparathyroidism ([facility])    Senile tremor    Spinal stenosis at L4-L5 level    Spinal stenosis of lumbar region with neurogenic claudication    Vitreous floaters of right eye    Diet-controlled diabetes mellitus ([facility])    Hypertension, essential, benign    History of TIA (transient ischemic attack)    History of DVT (deep vein thrombosis)    History of pulmonary embolus (PE)    Type 2 diabetes mellitus with diabetic chronic kidney disease ([facility])    Morbid (severe) obesity due to excess calories ([facility])    Body mass index (BMI) 40.0-44.9, adult ([facility])    Osteopenia of neck of left femur    Acute on chronic heart failure ([facility])    Hypotension    Chronic diastolic heart failure ([facility])    Acute renal insufficiency    Hypotension, unspecified hypotension type    General weakness    Frequent falls    Levoscoliosis of lumbar spine    Thrombocytopenia    CKD (chronic kidney disease) stage 4, GFR 15-29 ml/min ([facility])    Acute and chronic respiratory failure with hypercapnia ([facility])    AC joint arthropathy    Osteophyte of left shoulder    Osteoarthritis of left glenohumeral joint    CHF (congestive heart failure) ([facility])    Acute on chronic congestive heart failure, unspecified heart failure type ([facility])    Acute on chronic heart failure with preserved  ejection fraction (HCC)    Lumbar spondylosis       Return in about 6 months (around 9/27/2025).      Amy Amaro MD  [facility]  03/27/25      Please note that portions of this note may have been completed with a voice recognition program. Efforts were made to edit the dictations but occasionally words are mis-transcribed. Thank you for your understanding.    The 21st Century Cures Act makes medical notes like these available to patients in the interest of transparency. Please be advised this is a medical document. Medical documents are intended to carry relevant information, facts as evident, and the clinical opinion of the practitioner. The medical note is intended as peer to peer communication and may appear blunt or direct. It is written in medical language and may contain abbreviations or verbiage that are unfamiliar. If there are any questions or concerns please contact the provider for clarification.              [1]   Allergies  Allergen Reactions    Penicillins OTHER (SEE COMMENTS) and UNKNOWN     Other reaction(s): Unknown    Was told as a child it was an allergy    Other reaction(s): Unknown  Was told as a child it was an allergy      Was told as a child it was an allergy

## 2025-03-28 ENCOUNTER — OFFICE VISIT (OUTPATIENT)
Facility: CLINIC | Age: 85
End: 2025-03-28
Payer: MEDICARE

## 2025-03-28 VITALS
BODY MASS INDEX: 39.56 KG/M2 | OXYGEN SATURATION: 93 % | HEIGHT: 62 IN | RESPIRATION RATE: 16 BRPM | WEIGHT: 215 LBS | HEART RATE: 72 BPM

## 2025-03-28 DIAGNOSIS — R91.8 PULMONARY INFILTRATES: ICD-10-CM

## 2025-03-28 DIAGNOSIS — J96.22 ACUTE AND CHRONIC RESPIRATORY FAILURE WITH HYPERCAPNIA (HCC): ICD-10-CM

## 2025-03-28 DIAGNOSIS — G47.33 OSA (OBSTRUCTIVE SLEEP APNEA): Primary | ICD-10-CM

## 2025-03-28 DIAGNOSIS — I50.22 CHRONIC SYSTOLIC CONGESTIVE HEART FAILURE (HCC): ICD-10-CM

## 2025-03-28 PROCEDURE — 99213 OFFICE O/P EST LOW 20 MIN: CPT

## 2025-03-28 PROCEDURE — 1159F MED LIST DOCD IN RCRD: CPT

## 2025-03-28 PROCEDURE — 1160F RVW MEDS BY RX/DR IN RCRD: CPT

## 2025-03-28 NOTE — PROGRESS NOTES
[facility] General Pulmonary Progress Note    History of Present Illness:  Joni Christiansen is a 84 year old female with significant PMH afib, CKD, CHF, chronich back pain, ELY who presents today for follow up of ER visit on 3/20. Her with daughter. Saw Dr Zambrano last in 6/2024. Was suppose to follow up in 10/2024 but broke her femur. Also follows sleep medicine, had a sleep study in 8/2024 but was unable to follow up. On 3/20 went to ER for SOB and back pain. Was sent home with pain meds and lidocaine patch and told to follow up with pulmonary due to possible bronchitis on her CXR. Her SOB was likely related to her back pain.     Previously 6/2024 Dr Zambrano  Joni Christiansen is a 83 year old female presenting to pulmonary clinic today for hypercapnic resp failure   Told ELY- 20yrs - never wore mask-- legs heavy and weak with some pain on both thighs-presented to the ER yesterday without specific findings and plans to follow-up with primary  With lethargy and fell asleep while talking to daughter - - -no confusion noted-patient denies specific issues-states able to walk  No hx asthma /copd-- never smoked - no inhalers  Winded with exertion- using walker- stairs - without stopping-   No cp   No coughing   Did not get new study -   Patient was admitted 6/29/2023 with increasing falls lethargy generalized weakness and reports of shortness of breath.  Saw cardiology suspected HFpEF-deconditioning  Saw pulmonary-suspected HF PEF-recommended outpatient sleep study  ABG 7.38 PCO2 54 PO2 95     9.23- remains with dyspnea with walking - ok in grocery store with cart-   Walking down to room - end of bustamante - occasionally  has to stop - not every time   No further lethargy - no falling asleep and no confusion -   No coughing - no cp -   Occasionally  with left upper chest - daggar going through - somes and goes -   Neg stress test--   Leg weakness and heaviness is gone   Was getting cortisone shot - in hip - none x 7 months      12.23- struggled  with mask-   Was on 4 hours - then now with heated circuit fault - with alarm - - prior to - - not using now   Due for repeat titiration --    Would awakening gasping for air - very scary   Fine during the day -   Remains with dyspnea- walks dog 5 times a day - 1/2 mile maybe -- some dyspnea and wants to sit down when she gets in      - was in hosp with volume overload - saw kike and told heart is fine-   Had a bad virus --   Remains in afib - on eliqius -   Sats were 95%   Remains on same meds - rtc in oct   Back top better now -   Had to cancel sleep study -   Past Medical History:   Past Medical History:    (HFpEF) heart failure with preserved ejection fraction (HCC)    Atrial fibrillation (HCC)    Back pain    Chronic atrial fibrillation (HCC)    CKD (chronic kidney disease)    Colon cancer (HCC)    Congestive heart disease (HCC)    Congestive heart failure (HCC)    Congestive heart failure, unspecified HF chronicity, unspecified heart failure type (HCC)    Deep vein thrombosis (HCC)    Diabetes (HCC)    Essential hypertension    History of blood clots    HTN (hypertension)    Hyperlipidemia    Hypothyroidism    Obesity    Pulmonary embolism (HCC)    Sleep apnea    TIA (transient ischemic attack)    Visual impairment        Past Surgical History:   Past Surgical History:   Procedure Laterality Date    Back surgery      Colectomy      Colonoscopy      Excis lumbar disk,one level      Femur fracture surgery Right 10/19/2024    Hip replacement surgery      Knee replacement surgery            Total hip replacement      Total knee replacement Bilateral        Family Medical History:   Family History   Problem Relation Age of Onset    Other (other) Father         congestive heart failure    Hypertension Maternal Grandmother         Social History:   Social History     Socioeconomic History    Marital status:      Spouse name: Not on file    Number of children: Not on file    Years of  education: Not on file    Highest education level: Not on file   Occupational History    Not on file   Tobacco Use    Smoking status: Never     Passive exposure: Never    Smokeless tobacco: Never   Vaping Use    Vaping status: Never Used   Substance and Sexual Activity    Alcohol use: Not Currently    Drug use: Never    Sexual activity: Not on file   Other Topics Concern     Service Not Asked    Blood Transfusions Not Asked    Caffeine Concern No    Occupational Exposure Not Asked    Hobby Hazards Not Asked    Sleep Concern Not Asked    Stress Concern Not Asked    Weight Concern Not Asked    Special Diet Not Asked    Back Care Not Asked    Exercise Yes    Bike Helmet Not Asked    Seat Belt Not Asked    Self-Exams Not Asked   Social History Narrative    Retired  for >30 years.  [employer]os. Daughter Charito lives in [place].      Social Drivers of Health     Food Insecurity: Low Risk  (10/19/2024)    Received from [facility]    Food Insecurity     Within the past 12 months, you worried that your food would run out before you got money to buy more.  : Never true     Within the past 12 months, the food you bought just didn't last and you didn't have money to get more. : Never true   Transportation Needs: Not At Risk (10/19/2024)    Received from [facility]    Transportation Needs     In the past 12 months, has lack of reliable transportation kept you from medical appointments, meetings, work or from getting things needed for daily living? : No   Stress: Not on file   Housing Stability: Low Risk  (4/20/2024)    Housing Stability     Housing Instability: No     Housing Instability Emergency: Not on file     Crib or Bassinette: Not on file        Medications:   Current Outpatient Medications   Medication Sig Dispense Refill    ondansetron 4 MG Oral Tablet Dispersible Place 1 tablet (4 mg total) under the tongue every 6 (six) hours.      ibuprofen 600 MG Oral Tab        escitalopram 10 MG Oral Tab Take 1.5 tablets (15 mg total) by mouth daily. 135 tablet 1    montelukast 10 MG Oral Tab Take 1 tablet (10 mg total) by mouth every evening. 90 tablet 1    Pravastatin Sodium 80 MG Oral Tab Take 1 tablet (80 mg total) by mouth every evening. 90 tablet 1    spironolactone 25 MG Oral Tab Take 0.5 tablets (12.5 mg total) by mouth daily. 45 tablet 1    torsemide 10 MG Oral Tab Take 1 tablet (10 mg total) by mouth daily. 90 tablet 1    Irbesartan 150 MG Oral Tab Take 1 tablet (150 mg total) by mouth daily. 90 tablet 1    levothyroxine 75 MCG Oral Tab Take 1 tablet (75 mcg total) by mouth every morning. 90 tablet 1    albuterol 108 (90 Base) MCG/ACT Inhalation Aero Soln Inhale 2 puffs into the lungs every 4 (four) hours as needed for Wheezing. 1 each 0    lidocaine 5 % External Patch Place 1 patch onto the skin daily for 10 days. 10 patch 0    albuterol 108 (90 Base) MCG/ACT Inhalation Aero Soln Inhale 2 puffs into the lungs every 4 (four) hours as needed for Wheezing or Shortness of Breath. 1 each 1    24HR ALLERGY RELIEF 180 MG Oral Tab take 1 tablet BY MOUTH EVERY DAY 90 tablet 0    LIDOCAINE PAIN RELIEF 4 % External Patch APPLY ONE PATCH TOPICALLY ONCE A DAY FOR 10 DAYS - 12 HOURS ON AND 12 HOURS OFF]      HYDROcodone-acetaminophen 5-325 MG Oral Tab Take 1 tablet by mouth every 4 to 6 hours as needed for Pain. 60 tablet 0    CHOLECALCIFEROL 50 MCG (2000 UT) Oral Cap TAKE 1 CAPSULE BY MOUTH DAILY 90 capsule 1    ELIQUIS 5 MG Oral Tab TAKE 1 TABLET BY MOUTH TWICE DAILY 180 tablet 1    B-12 1000 MCG Oral Tab CR TAKE 1 TABLET BY MOUTH DAILY 90 tablet 1    albuterol 108 (90 Base) MCG/ACT Inhalation Aero Soln Inhale 2 puffs into the lungs every 4 to 6 hours as needed for Wheezing or Shortness of Breath. 1 each 3    triamcinolone 0.1 % External Cream Apply topically 2 (two) times daily as needed. Can use on legs and hands 80 g 2    lidocaine 5 % External Patch Place 1 patch onto the skin daily as  needed. 30 each 2       Review of Systems: Review of Systems   Constitutional: Negative.    HENT: Negative.     Respiratory: Negative.  Negative for shortness of breath.    Cardiovascular: Negative.    Gastrointestinal: Negative.    Musculoskeletal:  Positive for back pain.        Physical Exam:  Pulse 72   Resp 16   Ht 5' 2\" (1.575 m)   Wt 215 lb (97.5 kg)   SpO2 93%   BMI 39.32 kg/m²      Constitutional: alert, cooperative. No acute distress.  HEENT: Head NC/AT.   Cardio: Regular rate and rhythm. Normal S1 and S2. No murmurs.   Respiratory: Thorax symmetrical with no labored breathing. Clear to ausculation bilaterally with symmetrical breath sounds. No wheezing, rhonchi, rales, or crackles.   Extremities: No clubbing or cyanosis. No BLE edema.    Neurologic: A&Ox3. No gross motor deficits.  Skin: Warm, dry  Psych: Calm, cooperative. Pleasant affect.    Results:  Personally reviewed    WBC: 8.2, done on 3/20/2025.  HGB: 13.1, done on 3/20/2025.  PLT: 192, done on 3/20/2025.     Glucose: 105, done on 3/20/2025.  Cr: 1.58, done on 3/20/2025.  Last eGFR was 32 on 3/20/2025.  CA: 10.9, done on 3/20/2025.  Na: 143, done on 3/20/2025.  K: 4.2, done on 3/20/2025.  Cl: 107, done on 3/20/2025.  CO2: 29, done on 3/20/2025.  Last ALB was 4.5% done on 3/20/2025.     XR CHEST AP PORTABLE  (CPT=71045)    Result Date: 3/20/2025  CONCLUSION:  Cardiomegaly with perihilar streaky opacity and peribronchial cuffing could be related to an infectious bronchitis with interstitial edema in the differential.   LOCATION:  [facility]      Dictated by (CST): Josse Fan MD on 3/20/2025 at 4:13 PM     Finalized by (CST): Josse Fan MD on 3/20/2025 at 4:13 PM       MRI SPINE LUMBAR (CPT=72148)    Result Date: 1/17/2025  CONCLUSION:   Degenerative and postoperative changes of the lumbar spine.  This notably results in severe central canal stenosis at L4-5, additionally with multilevel foraminal stenosis including severe stenosis at the  L1-2 and L4-5 levels, as detailed above.   LOCATION:  [place] [id number]   Dictated by (CST): Makayla Luis MD on 1/17/2025 at 10:08 PM     Finalized by (CST): Makayla Luis MD on 1/17/2025 at 10:16 PM         Assessment/Plan:  #1 Hypercapnic respiratory failure  Suspect OHS/untreated ELY  BiPAP deemed insufficient due to OHS and multiple respiratory readmissions prescribing NIV for home use patient will use and benefit  9/23-struggling with NIV though trying--plan to adjust pressures and respiratory rate and follow carefully-denies any lethargy or confusion  12/23 continues to struggle-had seen Dr. Whitehead with plans for repeat titration    #2 ELY  Reports diagnosis on testing at least 20 years ago.  Never tried mask or other interventions.  Has not required O2--- has been hesitant to proceed  12/23 as above plans for NIPPV titration  Needs sleep study    #3 HFpEF   6/24--admitted with URI symptoms and evidence of volume overload including chest x-ray with interstitial thickening--echo with EF 50 to 55% unable to measure--remains with edema     #4 Acute on chronic dyspnea  8/2025 HRCT cardiac enlargement, no effusions, mild LAD calcifications. ground-glass opacities resulting in a mosaic attenuation throughout the lungs, chronic bronchitis, nodules stable  ER follow up  3/2025 CXR infectious bronchitis   Currently no symptoms  Continue albuterol PRN  If symptoms persist can start ICS/LABA  Pain control to help with SOB    Anabel Farley [facility]  Pulmonary Medicine   3/28/2025

## 2025-03-28 NOTE — PATIENT INSTRUCTIONS
Call office with any questions or concerns  Call office if develop any new or worsening respiratory symptoms.   See sleep medicine  See Dr Zambrano in [place]

## 2025-04-04 ENCOUNTER — MED REC SCAN ONLY (OUTPATIENT)
Dept: FAMILY MEDICINE CLINIC | Facility: CLINIC | Age: 85
End: 2025-04-04

## 2025-04-16 ENCOUNTER — TELEPHONE (OUTPATIENT)
Facility: CLINIC | Age: 85
End: 2025-04-16

## 2025-04-16 NOTE — TELEPHONE ENCOUNTER
YECENIAM to discuss patient's sleep care needs. Follows Dr. Zambrano and was given orders for CPAP Titration in 2024. Appears pt may need treatment for multiple concerns. If pt agreeable to next test would assist in scheduling with the sleep lab. If pt declines, would confirm appointment with Dr. Zambrano 6- to discuss further. Sleep team intervention available after testing or appointment.

## 2025-04-17 ENCOUNTER — TELEPHONE (OUTPATIENT)
Dept: FAMILY MEDICINE CLINIC | Facility: CLINIC | Age: 85
End: 2025-04-17

## 2025-04-17 DIAGNOSIS — G89.29 CHRONIC BILATERAL LOW BACK PAIN WITHOUT SCIATICA: ICD-10-CM

## 2025-04-17 DIAGNOSIS — M54.50 CHRONIC BILATERAL LOW BACK PAIN WITHOUT SCIATICA: ICD-10-CM

## 2025-04-17 RX ORDER — HYDROCODONE BITARTRATE AND ACETAMINOPHEN 5; 325 MG/1; MG/1
1-2 TABLET ORAL EVERY 6 HOURS PRN
Qty: 60 TABLET | Refills: 0 | Status: SHIPPED | OUTPATIENT
Start: 2025-04-17

## 2025-04-17 NOTE — TELEPHONE ENCOUNTER
Patient daughter states patient has been in a lot of pain from her back for over a week. States that the hydrocodone is not helping and that she has to keep going to the ED for pain meds. Patient daughter requesting something stronger for the pain.

## 2025-04-17 NOTE — TELEPHONE ENCOUNTER
Can take norco 1-2 tablets every 6 hours as needed. If severe pain should go to the ER. Thank you.

## 2025-04-17 NOTE — TELEPHONE ENCOUNTER
Daughter informed of Rx sent and Dr. Amaro's note below. She verbalized understanding and agreed with plan.

## 2025-04-18 DIAGNOSIS — G89.29 CHRONIC BILATERAL LOW BACK PAIN WITHOUT SCIATICA: ICD-10-CM

## 2025-04-18 DIAGNOSIS — M54.50 CHRONIC BILATERAL LOW BACK PAIN WITHOUT SCIATICA: ICD-10-CM

## 2025-04-18 RX ORDER — HYDROCODONE BITARTRATE AND ACETAMINOPHEN 5; 325 MG/1; MG/1
1-2 TABLET ORAL EVERY 6 HOURS PRN
Qty: 60 TABLET | Refills: 0 | OUTPATIENT
Start: 2025-04-18

## 2025-04-22 ENCOUNTER — OFFICE VISIT (OUTPATIENT)
Dept: PAIN CLINIC | Facility: HOSPITAL | Age: 85
End: 2025-04-22
Attending: ANESTHESIOLOGY
Payer: MEDICARE

## 2025-04-22 VITALS
WEIGHT: 220 LBS | HEART RATE: 86 BPM | OXYGEN SATURATION: 93 % | BODY MASS INDEX: 40 KG/M2 | DIASTOLIC BLOOD PRESSURE: 82 MMHG | SYSTOLIC BLOOD PRESSURE: 124 MMHG

## 2025-04-22 DIAGNOSIS — M47.816 LUMBAR SPONDYLOSIS: ICD-10-CM

## 2025-04-22 DIAGNOSIS — M48.061 SPINAL STENOSIS AT L4-L5 LEVEL: ICD-10-CM

## 2025-04-22 DIAGNOSIS — M79.18 MYOFASCIAL PAIN: Primary | ICD-10-CM

## 2025-04-22 DIAGNOSIS — M41.86 LEVOSCOLIOSIS OF LUMBAR SPINE: ICD-10-CM

## 2025-04-22 DIAGNOSIS — M54.50 CHRONIC LEFT-SIDED LOW BACK PAIN WITHOUT SCIATICA: ICD-10-CM

## 2025-04-22 DIAGNOSIS — G89.29 CHRONIC LEFT-SIDED LOW BACK PAIN WITHOUT SCIATICA: ICD-10-CM

## 2025-04-22 PROCEDURE — 20552 NJX 1/MLT TRIGGER POINT 1/2: CPT | Performed by: ANESTHESIOLOGY

## 2025-04-22 NOTE — PATIENT INSTRUCTIONS
Refill policies:    Allow 2-3 business days for refills; controlled substances may take longer.  Contact your pharmacy at least 5 days prior to running out of medication and have them send an electronic request or submit request through the “request refill” option in your [employer] account.  Refills are not addressed on weekends; covering physicians do not authorize routine medications on weekends.  No narcotics or controlled substances are refilled after noon on Fridays or by on call physicians.  By law, narcotics must be electronically prescribed.  A 30 day supply with no refills is the maximum allowed.  If your prescription is due for a refill, you may be due for a follow up appointment.  To best provide you care, patients receiving routine medications need to be seen at least once a year.  Patients receiving narcotic/controlled substance medications need to be seen at least once every 3 months.  In the event that your preferred pharmacy does not have the requested medication in stock (e.g. Backordered), it is your responsibility to find another pharmacy that has the requested medication available.  We will gladly send a new prescription to that pharmacy at your request.    Scheduling Tests:    If your physician has ordered radiology tests such as MRI or CT scans, please contact Central Scheduling at [phone] right away to schedule the test.  Once scheduled, the [facility] Centralized Referral Team will work with your insurance carrier to obtain pre-certification or prior authorization.  Depending on your insurance carrier, approval may take 3-10 days.  It is highly recommended patients assure they have received an authorization before having a test performed.  If test is done without insurance authorization, patient may be responsible for the entire amount billed.      Precertification and Prior Authorizations:  If your physician has recommended that you have a procedure or additional testing performed the [facility]  Centralized Referral Team will contact your insurance carrier to obtain pre-certification or prior authorization.    You are strongly encouraged to contact your insurance carrier to verify that your procedure/test has been approved and is a COVERED benefit.  Although the [facility] Centralized Referral Team does its due diligence, the insurance carrier gives the disclaimer that \"Although the procedure is authorized, this does not guarantee payment.\"    Ultimately the patient is responsible for payment.   Thank you for your understanding in this matter.  Paperwork Completion:  If you require FMLA or disability paperwork for your recovery, please make sure to either drop it off or have it faxed to our office at [phone]. Be sure the form has your name and date of birth on it.  The form will be faxed to our Forms Department and they will complete it for you.  There is a 25$ fee for all forms that need to be filled out.  Please be aware there is a 10-14 day turnaround time.  You will need to sign a release of information (SUDARSHAN) form if your paperwork does not come with one.  You may call the Forms Department with any questions at [phone].  Their fax number is [phone].
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
## 2025-04-22 NOTE — TELEPHONE ENCOUNTER
Pt reports the cpap tx test was denied. Awaiting info from the sleep lab and we'll proceed from there.
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
## 2025-04-22 NOTE — TELEPHONE ENCOUNTER
Msg from Sleep Lab insurance team:  \"Approved for [id number] 4/22 to 10/19.\"      Called to pt to schedule sleep study then callback for follow-up appt 12 weeks from the scheduled date of the test with Dr. Zambrano.

## 2025-04-29 ENCOUNTER — APPOINTMENT (OUTPATIENT)
Dept: ORTHOPEDICS | Age: 85
End: 2025-04-29

## 2025-04-29 ENCOUNTER — IMAGING SERVICES (OUTPATIENT)
Dept: GENERAL RADIOLOGY | Age: 85
End: 2025-04-29
Attending: ORTHOPAEDIC SURGERY

## 2025-04-29 DIAGNOSIS — Z98.890 S/P ORIF (OPEN REDUCTION INTERNAL FIXATION) FRACTURE: ICD-10-CM

## 2025-04-29 DIAGNOSIS — Z98.890 S/P ORIF (OPEN REDUCTION INTERNAL FIXATION) FRACTURE: Primary | ICD-10-CM

## 2025-04-29 DIAGNOSIS — Z87.81 S/P ORIF (OPEN REDUCTION INTERNAL FIXATION) FRACTURE: Primary | ICD-10-CM

## 2025-04-29 DIAGNOSIS — S72.91XD CLOSED FRACTURE OF RIGHT FEMUR WITH ROUTINE HEALING, UNSPECIFIED FRACTURE MORPHOLOGY, UNSPECIFIED PORTION OF FEMUR, SUBSEQUENT ENCOUNTER: ICD-10-CM

## 2025-04-29 DIAGNOSIS — Z87.81 S/P ORIF (OPEN REDUCTION INTERNAL FIXATION) FRACTURE: ICD-10-CM

## 2025-04-29 PROCEDURE — 99214 OFFICE O/P EST MOD 30 MIN: CPT | Performed by: ORTHOPAEDIC SURGERY

## 2025-04-29 PROCEDURE — 73552 X-RAY EXAM OF FEMUR 2/>: CPT | Performed by: RADIOLOGY

## 2025-05-05 NOTE — TELEPHONE ENCOUNTER
LVM again to assist in scheduling sleep study. Results will determine if an appointment with Dr. Whitehead should be maintained or not.

## 2025-05-08 PROBLEM — M79.18 MYOFASCIAL PAIN: Status: ACTIVE | Noted: 2025-05-08

## 2025-05-13 NOTE — TELEPHONE ENCOUNTER
After speaking to pt and dghtr, and confirming with RT, pt will hold off on additional testing at this time, consult with sleep&pulm combo doc 6-17-25 to discuss hx of failed pap/NIV use etc. to determine next steps.

## 2025-05-14 ENCOUNTER — HOSPITAL ENCOUNTER (OUTPATIENT)
Age: 85
Discharge: HOME | End: 2025-05-14
Attending: EMERGENCY MEDICINE
Payer: MEDICARE

## 2025-05-14 ENCOUNTER — APPOINTMENT (OUTPATIENT)
Dept: GENERAL RADIOLOGY | Age: 85
End: 2025-05-14
Attending: EMERGENCY MEDICINE
Payer: MEDICARE

## 2025-05-14 VITALS
SYSTOLIC BLOOD PRESSURE: 129 MMHG | HEART RATE: 68 BPM | RESPIRATION RATE: 20 BRPM | TEMPERATURE: 98 F | OXYGEN SATURATION: 95 % | DIASTOLIC BLOOD PRESSURE: 73 MMHG

## 2025-05-14 DIAGNOSIS — S93.402A MILD SPRAIN OF LEFT ANKLE, INITIAL ENCOUNTER: Primary | ICD-10-CM

## 2025-05-14 PROCEDURE — 99213 OFFICE O/P EST LOW 20 MIN: CPT

## 2025-05-14 PROCEDURE — 99214 OFFICE O/P EST MOD 30 MIN: CPT

## 2025-05-14 PROCEDURE — 73610 X-RAY EXAM OF ANKLE: CPT | Performed by: EMERGENCY MEDICINE

## 2025-05-14 NOTE — ED PROVIDER NOTES
Patient Seen in: [facility]      History     Chief Complaint   Patient presents with    Leg or Foot Injury     Stated Complaint: left ankle sprain    Subjective:   HPI  History of Present Illness            Patient is a 84-year-old female who states yesterday she stepped into the mud in the grass with a slip on shoe.  Patient states she backed up shoe came off and she slipped and twisted her left ankle.  Patient states she landed on her buttocks and shoulder.  Patient denies hitting her head, no loss of conscious, no neck pain, chest pain, shortness of breath.  Patient does complain of some pain to the medial aspect of the left ankle.  Patient has been ambulatory.  Patient is on Eliquis.  No headaches, remainder of review of systems negative.      Objective:     Past Medical History:    (HFpEF) heart failure with preserved ejection fraction (HCC)    Atrial fibrillation (HCC)    Back pain    Chronic atrial fibrillation (HCC)    CKD (chronic kidney disease)    Colon cancer (HCC)    Congestive heart disease (HCC)    Congestive heart failure (HCC)    Congestive heart failure, unspecified HF chronicity, unspecified heart failure type (HCC)    Deep vein thrombosis (HCC)    Diabetes (HCC)    Essential hypertension    History of blood clots    HTN (hypertension)    Hyperlipidemia    Hypothyroidism    Obesity    Pulmonary embolism (HCC)    Sleep apnea    TIA (transient ischemic attack)    Visual impairment              Past Surgical History:   Procedure Laterality Date    Back surgery      Colectomy      Colonoscopy      Excis lumbar disk,one level      Femur fracture surgery Right 10/19/2024    Hip replacement surgery      Knee replacement surgery            Total hip replacement      Total knee replacement Bilateral                 Social History     Socioeconomic History    Marital status:    Tobacco Use    Smoking status: Never     Passive exposure: Never    Smokeless tobacco: Never   Vaping  Use    Vaping status: Never Used   Substance and Sexual Activity    Alcohol use: Not Currently    Drug use: Never   Other Topics Concern    Caffeine Concern No    Exercise Yes   Social History Narrative    Retired  for >30 years.  Carlos. Daughter Charito lives in [place].      Social Drivers of Health     Food Insecurity: Low Risk  (10/19/2024)    Received from [facility]    Food Insecurity     Within the past 12 months, you worried that your food would run out before you got money to buy more.  : Never true     Within the past 12 months, the food you bought just didn't last and you didn't have money to get more. : Never true   Transportation Needs: Not At Risk (10/19/2024)    Received from [facility]    Transportation Needs     In the past 12 months, has lack of reliable transportation kept you from medical appointments, meetings, work or from getting things needed for daily living? : No   Housing Stability: Low Risk  (4/20/2024)    Housing Stability     Housing Instability: No              Review of Systems    Positive for stated complaint: left ankle sprain  Other systems are as noted in HPI.  Constitutional and vital signs reviewed.      All other systems reviewed and negative except as noted above.                  Physical Exam     ED Triage Vitals [05/14/25 1715]   /73   Pulse 68   Resp 20   Temp 97.8 °F (36.6 °C)   Temp src Oral   SpO2 95 %   O2 Device None (Room air)       Current Vitals:   Vital Signs  BP: 129/73  Pulse: 68  Resp: 20  Temp: 97.8 °F (36.6 °C)  Temp src: Oral    Oxygen Therapy  SpO2: 95 %  O2 Device: None (Room air)          Physical Exam   GENERAL: Patient resting comfortably on the cart in no acute distress.  HEENT: Extraocular muscles intact, pupils equal round reactive to light and accommodation.  Mouth normal, neck supple, no meningismus.  Neck nontender, head atraumatic  LUNGS: Lungs clear to auscultation  bilaterally.  CARDIOVASCULAR: + S1-S2, regular rate and rhythm, no murmurs.  BACK: No CVA tenderness, no midline bony tenderness.    EXTREMITIES: Left lower extremity, mild tenderness medial aspect of the ankle.  Able to dorsiflex plantarflex.  No lateral tenderness.  No deformity.  Good dorsalis pedis pulse.  Remainder of foot nontender.  Sensation tact light touch.  SKIN: No rash, good turgor.  NEURO: Patient answers questions appropriately.  No focal deficits appreciated.              ED Course   Labs Reviewed - No data to display       Results            Left ankle x-ray negative for fracture independent viewed by myself, no fracture                  MDM      Patient was ambulatory without difficulty to the bathroom.  X-ray negative for fracture.  Did consider ankle fracture, ankle sprain.  Follow-up for further with primary physician.  Return if new or worse symptoms.  Ice, elevate Tylenol.  Ace wrap as needed.        Medical Decision Making        Disposition and Plan     Clinical Impression:  1. Mild sprain of left ankle, initial encounter         Disposition:  Discharge  5/14/2025  5:38 pm    Follow-up:  Amy Amaro MD  [facility] [place] [id number]  [phone]    In 1 week            Medications Prescribed:  Current Discharge Medication List                Supplementary Documentation:

## 2025-05-14 NOTE — DISCHARGE INSTRUCTIONS
Follow-up for further evaluation primary physician.  Return if new or worse symptoms.  Ice, elevate.  Ace wrap.  Tylenol as needed.

## 2025-05-15 ENCOUNTER — TELEPHONE (OUTPATIENT)
Dept: FAMILY MEDICINE CLINIC | Facility: CLINIC | Age: 85
End: 2025-05-15

## 2025-05-15 NOTE — TELEPHONE ENCOUNTER
Patient daughter has questions regarding medication. Patient skipped dose this morning/last night.

## 2025-05-15 NOTE — TELEPHONE ENCOUNTER
Spoke to patient's daughter. States that patient accidentally took her evening medications this morning- pravastatin 80mg and montelukast 10mg. Daughter is asking if patient should take her morning medications. Instructed daughter to have patient take her morning medications as prescribed and resume night medications tomorrow night as scheduled. Daughter verbalized understanding.

## 2025-05-16 ENCOUNTER — OFFICE VISIT (OUTPATIENT)
Dept: FAMILY MEDICINE CLINIC | Facility: CLINIC | Age: 85
End: 2025-05-16
Payer: MEDICARE

## 2025-05-16 VITALS
WEIGHT: 220 LBS | HEIGHT: 62 IN | HEART RATE: 84 BPM | TEMPERATURE: 97 F | RESPIRATION RATE: 18 BRPM | BODY MASS INDEX: 40.48 KG/M2 | DIASTOLIC BLOOD PRESSURE: 66 MMHG | SYSTOLIC BLOOD PRESSURE: 116 MMHG

## 2025-05-16 DIAGNOSIS — F41.9 ANXIETY: ICD-10-CM

## 2025-05-16 DIAGNOSIS — Z12.31 ENCOUNTER FOR SCREENING MAMMOGRAM FOR MALIGNANT NEOPLASM OF BREAST: ICD-10-CM

## 2025-05-16 DIAGNOSIS — E03.9 PRIMARY HYPOTHYROIDISM: ICD-10-CM

## 2025-05-16 DIAGNOSIS — L29.9 PRURITUS: ICD-10-CM

## 2025-05-16 DIAGNOSIS — G89.29 CHRONIC BILATERAL LOW BACK PAIN WITHOUT SCIATICA: ICD-10-CM

## 2025-05-16 DIAGNOSIS — M54.50 CHRONIC BILATERAL LOW BACK PAIN WITHOUT SCIATICA: ICD-10-CM

## 2025-05-16 DIAGNOSIS — E11.22 TYPE 2 DIABETES MELLITUS WITH STAGE 3A CHRONIC KIDNEY DISEASE, WITHOUT LONG-TERM CURRENT USE OF INSULIN (HCC): Primary | ICD-10-CM

## 2025-05-16 DIAGNOSIS — E78.00 HYPERCHOLESTEROLEMIA: ICD-10-CM

## 2025-05-16 DIAGNOSIS — N18.31 TYPE 2 DIABETES MELLITUS WITH STAGE 3A CHRONIC KIDNEY DISEASE, WITHOUT LONG-TERM CURRENT USE OF INSULIN (HCC): Primary | ICD-10-CM

## 2025-05-16 PROCEDURE — 99214 OFFICE O/P EST MOD 30 MIN: CPT | Performed by: STUDENT IN AN ORGANIZED HEALTH CARE EDUCATION/TRAINING PROGRAM

## 2025-05-16 PROCEDURE — G2211 COMPLEX E/M VISIT ADD ON: HCPCS | Performed by: STUDENT IN AN ORGANIZED HEALTH CARE EDUCATION/TRAINING PROGRAM

## 2025-05-16 PROCEDURE — 1170F FXNL STATUS ASSESSED: CPT | Performed by: STUDENT IN AN ORGANIZED HEALTH CARE EDUCATION/TRAINING PROGRAM

## 2025-05-16 PROCEDURE — 1159F MED LIST DOCD IN RCRD: CPT | Performed by: STUDENT IN AN ORGANIZED HEALTH CARE EDUCATION/TRAINING PROGRAM

## 2025-05-16 RX ORDER — ENOXAPARIN SODIUM 100 MG/ML
INJECTION SUBCUTANEOUS
COMMUNITY
Start: 2025-05-09

## 2025-05-16 NOTE — ASSESSMENT & PLAN NOTE
Diabetes: A1c is 4.5 done 11/11/2024 which shows excellent glucose control. No meds indicated if A1c remains <6  Last External A1c was 4.8.   DM Meds:    Diabetic Complications: CKD 3b (GFR 32).  Dyslipidemia  Diabetes is : stable Continue current treatment regimen. Reassess Diabetes in : in 6 months    Orders:    CMP Now; Future    Lipids Now; Future    Hemoglobin A1C Now; Future    Microalb/Creat Ratio, Random Urine Now; Future    TSH W Reflex To Free T4; Future

## 2025-05-16 NOTE — ASSESSMENT & PLAN NOTE
Patient presents for follow up of HLD  - no side effects of medications  - will continue current regimen  - low fat diet and exercise  - follow up in 6mo or sooner if needed

## 2025-05-16 NOTE — ASSESSMENT & PLAN NOTE
Patient presents for follow up of hypothyroidism  - doing well  - will continue current regimen

## 2025-05-16 NOTE — PROGRESS NOTES
[facility] Family Medicine Note  05/16/25    Chief Complaint   Patient presents with    Medication Follow-Up     HPI:   Joni Christiansen is a 84 year old female who presents for medication follow up.    Had pain injections. Doing physical therapy for back twice a week. Using TENS unit.    Twisted ankle, not broken.    Saw gastroenterology. Was going to have endoscopy but there was discussion on Eliquis holding. Going to see another GI.     To see Dr. Cruz in June 2025.    Found hearing aids, needs them checked.    Saw pulmonology to see pulmo and sleep medicine to explore other options for ELY.    Patient presents for diabetes follow up. Patient has been taking medications as prescribed. Working on diet and exercise. No low blood sugars noted.   Not currently on Diabetic meds.Meds : Kept the same    Lab Results   Component Value Date    A1C 4.5 11/11/2024    A1C 5.6 03/21/2024    A1C 5.5 08/26/2023    A1C 5.4 04/22/2023      Last Diabetic Eye Exam: coming up this year   No data recorded  No data recorded    Patient presents for recheck of her hypertension. Pt has been taking medications as instructed, no medication side effects.   BP Meds: Irbesartan Tabs - 150 MG; spironolactone Tabs - 25 MG; torsemide Tabs - 10 MG     Last Cr was 1.58 done on 3/20/2025.Last eGFR was 32 on 3/20/2025.    Patient presents for follow up of hyperlipidemia. Patient has been taking medications as prescribed, no muscle aches noted. Working on diet and exercise.   Cholesterol Meds: Pravastatin Sodium Tabs - 80 MG   Cholesterol: 114, done on 1/17/2024.  HDL Cholesterol: 45, done on 1/17/2024.  LDL Cholesterol: 48, done on 1/17/2024.  TriGlycerides 116, done on 1/17/2024.      Wt Readings from Last 6 Encounters:   05/16/25 220 lb (99.8 kg)   04/22/25 220 lb (99.8 kg)   03/28/25 215 lb (97.5 kg)   03/27/25 214 lb (97.1 kg)   03/20/25 220 lb (99.8 kg)   02/27/25 220 lb (99.8 kg)       Past Medical History[1]  Past  Surgical History[2]  Allergies[3]  Current Medications[4]  Short Social Hx on File[5]  Counseling given: Not Answered    Family History[6]  Family Status   Relation Status    Fa (Not Specified)    MGMA (Not Specified)        REVIEW OF SYSTEMS:   See HPI    EXAM:   /66   Pulse 84   Temp 97.3 °F (36.3 °C) (Temporal)   Resp 18   Ht 5' 2\" (1.575 m)   Wt 220 lb (99.8 kg)   BMI 40.24 kg/m²  Estimated body mass index is 40.24 kg/m² as calculated from the following:    Height as of this encounter: 5' 2\" (1.575 m).    Weight as of this encounter: 220 lb (99.8 kg).   Vital signs reviewed. Appears stated age, well groomed.  Physical Exam:  GEN:  Patient is alert and oriented x3, no apparent distress  HEAD:  Normocephalic, atraumatic  HEENT:  no scleral icterus, conjunctivae clear bilaterally, EOMI, PERRLA, OP clear  LUNGS: clear to auscultation bilaterally, no rales/rhonchi/wheezing  HEART:  Regular rate and rhythm, normal S1/S2, no murmurs, rubs or gallops  EXTREMITIES:  Moves all extremities well  NEURO:  CN 2 - 12 grossly intact, gait normal        ASSESSMENT AND PLAN:     Assessment & Plan  Type 2 diabetes mellitus with stage 3a chronic kidney disease, without long-term current use of insulin (HCC)  Diabetes: A1c is 4.5 done 11/11/2024 which shows excellent glucose control. No meds indicated if A1c remains <6  Last External A1c was 4.8.   DM Meds:    Diabetic Complications: CKD 3b (GFR 32).  Dyslipidemia  Diabetes is : stable Continue current treatment regimen. Reassess Diabetes in : in 6 months    Orders:    CMP Now; Future    Lipids Now; Future    Hemoglobin A1C Now; Future    Microalb/Creat Ratio, Random Urine Now; Future    TSH W Reflex To Free T4; Future    Encounter for screening mammogram for malignant neoplasm of breast    Orders:    [facility] BLANCA 2D+3D SCREENING BILAT (CPT=77067/52800); Future    Primary hypothyroidism  Patient presents for follow up of hypothyroidism  - doing well  - will continue current  regimen       Chronic bilateral low back pain without sciatica  Following with pain management for injections, appreciate evaluation and recommendations  - continue norco 5mg 1-2 tablets as needed for pain  - severe pain should be evaluated in ER       Anxiety  Doing well on lexapro 5mg daily will continue current regimen       Hypercholesterolemia  Patient presents for follow up of HLD  - no side effects of medications  - will continue current regimen  - low fat diet and exercise  - follow up in 6mo or sooner if needed       Pruritus  Continue singulair 10mg daily and allegra              Meds & Refills for this Visit:  Requested Prescriptions      No prescriptions requested or ordered in this encounter       Stop Taking                ibuprofen 600 MG Oral Tab                Health Maintenance:  Health Maintenance Due   Topic Date Due    Diabetes Care Dilated Eye Exam  Never done    COVID-19 Vaccine (10 - 2024-25 season) 09/01/2024    Annual Well Visit  01/01/2025    Annual Depression Screening  01/01/2025    Diabetes Care: Microalb/Creat Ratio (Annual)  01/01/2025    Diabetes Care A1C  05/11/2025       Patient/Caregiver Education: There are no barriers to learning. Medical education done.   Outcome: Patient verbalizes understanding. Patient is notified to call with any questions, complications, allergies, or worsening or changing symptoms.  Patient is to call with any side effects or complications from the treatments as a result of today.     Problem List:  Problem List[7]    Return in about 6 months (around 11/16/2025) for Medicare Annual Wellness Visit, med check.      Amy Amaro MD  [facility]  05/16/25      Please note that portions of this note may have been completed with a voice recognition program. Efforts were made to edit the dictations but occasionally words are mis-transcribed. Thank you for your understanding.    The 21st Century Cures Act makes medical notes  like these available to patients in the interest of transparency. Please be advised this is a medical document. Medical documents are intended to carry relevant information, facts as evident, and the clinical opinion of the practitioner. The medical note is intended as peer to peer communication and may appear blunt or direct. It is written in medical language and may contain abbreviations or verbiage that are unfamiliar. If there are any questions or concerns please contact the provider for clarification.              [1]   Past Medical History:   (HFpEF) heart failure with preserved ejection fraction (HCC)    Atrial fibrillation (HCC)    Back pain    Chronic atrial fibrillation (HCC)    CKD (chronic kidney disease)    Colon cancer (HCC)    Congestive heart disease (HCC)    Congestive heart failure (HCC)    Congestive heart failure, unspecified HF chronicity, unspecified heart failure type (HCC)    Deep vein thrombosis (HCC)    Diabetes (HCC)    Essential hypertension    History of blood clots    HTN (hypertension)    Hyperlipidemia    Hypothyroidism    Obesity    Pulmonary embolism (HCC)    Sleep apnea    TIA (transient ischemic attack)    Visual impairment   [2]   Past Surgical History:  Procedure Laterality Date    Back surgery      Colectomy      Colonoscopy      Excis lumbar disk,one level      Femur fracture surgery Right 10/19/2024    Hip replacement surgery      Knee replacement surgery            Total hip replacement      Total knee replacement Bilateral    [3]   Allergies  Allergen Reactions    Penicillins OTHER (SEE COMMENTS) and UNKNOWN     Other reaction(s): Unknown    Was told as a child it was an allergy    Other reaction(s): Unknown  Was told as a child it was an allergy      Was told as a child it was an allergy   [4]    enoxaparin 100 MG/ML Injection Solution Prefilled Syringe       Calcium Carbonate 1500 (600 Ca) MG Oral Tab Take 1 tablet by mouth daily.      HYDROcodone-acetaminophen 5-325  MG Oral Tab Take 1-2 tablets by mouth every 6 (six) hours as needed for Pain. 60 tablet 0    ondansetron 4 MG Oral Tablet Dispersible Place 1 tablet (4 mg total) under the tongue every 6 (six) hours.      escitalopram 10 MG Oral Tab Take 1.5 tablets (15 mg total) by mouth daily. 135 tablet 1    montelukast 10 MG Oral Tab Take 1 tablet (10 mg total) by mouth every evening. 90 tablet 1    Pravastatin Sodium 80 MG Oral Tab Take 1 tablet (80 mg total) by mouth every evening. 90 tablet 1    spironolactone 25 MG Oral Tab Take 0.5 tablets (12.5 mg total) by mouth daily. 45 tablet 1    torsemide 10 MG Oral Tab Take 1 tablet (10 mg total) by mouth daily. 90 tablet 1    Irbesartan 150 MG Oral Tab Take 1 tablet (150 mg total) by mouth daily. 90 tablet 1    levothyroxine 75 MCG Oral Tab Take 1 tablet (75 mcg total) by mouth every morning. 90 tablet 1    albuterol 108 (90 Base) MCG/ACT Inhalation Aero Soln Inhale 2 puffs into the lungs every 4 (four) hours as needed for Wheezing or Shortness of Breath. 1 each 1    24HR ALLERGY RELIEF 180 MG Oral Tab take 1 tablet BY MOUTH EVERY DAY 90 tablet 0    LIDOCAINE PAIN RELIEF 4 % External Patch       CHOLECALCIFEROL 50 MCG (2000 UT) Oral Cap TAKE 1 CAPSULE BY MOUTH DAILY 90 capsule 1    ELIQUIS 5 MG Oral Tab TAKE 1 TABLET BY MOUTH TWICE DAILY 180 tablet 1    B-12 1000 MCG Oral Tab CR TAKE 1 TABLET BY MOUTH DAILY 90 tablet 1    albuterol 108 (90 Base) MCG/ACT Inhalation Aero Soln Inhale 2 puffs into the lungs every 4 to 6 hours as needed for Wheezing or Shortness of Breath. 1 each 3    triamcinolone 0.1 % External Cream Apply topically 2 (two) times daily as needed. Can use on legs and hands 80 g 2    lidocaine 5 % External Patch Place 1 patch onto the skin daily as needed. 30 each 2   [5]   Social History  Socioeconomic History    Marital status:    Tobacco Use    Smoking status: Never     Passive exposure: Never    Smokeless tobacco: Never   Vaping Use    Vaping status: Never  Used   Substance and Sexual Activity    Alcohol use: Not Currently    Drug use: Never   Other Topics Concern    Caffeine Concern No    Exercise Yes   Social History Narrative    Retired  for >30 years.  [employer]. Daughter Charito lives in [place].      Social Drivers of Health     Food Insecurity: Low Risk  (10/19/2024)    Received from [facility]    Food Insecurity     Within the past 12 months, you worried that your food would run out before you got money to buy more.  : Never true     Within the past 12 months, the food you bought just didn't last and you didn't have money to get more. : Never true   Transportation Needs: Not At Risk (10/19/2024)    Received from [facility]    Transportation Needs     In the past 12 months, has lack of reliable transportation kept you from medical appointments, meetings, work or from getting things needed for daily living? : No   Housing Stability: Low Risk  (4/20/2024)    Housing Stability     Housing Instability: No   [6]   Family History  Problem Relation Age of Onset    Other (other) Father         congestive heart failure    Hypertension Maternal Grandmother    [7]   Patient Active Problem List  Diagnosis    Hypoxia    Dyspnea, unspecified type    Diabetes (HCC)    Essential hypertension    Hypercholesterolemia    Age-related osteoporosis without current pathological fracture    Allergic rhinitis    Atrophy of vagina    Chronic renal insufficiency, stage III (moderate) (HCC)    Chronic right shoulder pain    Gallstones    Hypercalcemia    Hyperuricuria    Low HDL (under 40)    Lumbar degenerative disc disease    Lymphedema    Nontraumatic incomplete tear of right rotator cuff    Obesity    Onychomycosis    ELY (obstructive sleep apnea)    Atrial fibrillation (HCC)    Postmenopausal bleeding    Primary hypothyroidism    Primary osteoarthritis of right hip    Secondary hyperparathyroidism (HCC)    Senile tremor    Spinal  stenosis at L4-L5 level    Spinal stenosis of lumbar region with neurogenic claudication    Vitreous floaters of right eye    Diet-controlled diabetes mellitus ([facility])    Hypertension, essential, benign    History of TIA (transient ischemic attack)    History of DVT (deep vein thrombosis)    History of pulmonary embolus (PE)    Type 2 diabetes mellitus with diabetic chronic kidney disease ([facility])    Morbid (severe) obesity due to excess calories ([facility])    Body mass index (BMI) 40.0-44.9, adult ([facility])    Osteopenia of neck of left femur    Acute on chronic heart failure ([facility])    Hypotension    Chronic diastolic heart failure ([facility])    Acute renal insufficiency    Hypotension, unspecified hypotension type    General weakness    Frequent falls    Levoscoliosis of lumbar spine    Thrombocytopenia    CKD (chronic kidney disease) stage 4, GFR 15-29 ml/min ([facility])    Acute and chronic respiratory failure with hypercapnia ([facility])    AC joint arthropathy    Osteophyte of left shoulder    Osteoarthritis of left glenohumeral joint    CHF (congestive heart failure) ([facility])    Acute on chronic congestive heart failure, unspecified heart failure type ([facility])    Acute on chronic heart failure with preserved ejection fraction ([facility])    Lumbar spondylosis    Myofascial pain

## 2025-05-16 NOTE — PATIENT INSTRUCTIONS
Refill policies:      Allow 3 business days for refills; controlled substances may take longer.  Contact your pharmacy at least 5-7 business days prior to running out of medication and have them send an electronic request or submit through the \"request refill\" option thru your [employer] account. No need to do both, as multiple requests will create an automated [employer] message to notify of a denial for one of the duplicated requests, causing you undue confusion.   Refills are NOT addressed on weekends; covering physicians do not authorize routine medications on weekends.  No narcotics or controlled substances are refilled after noon on Fridays or by on call physicians.  By law, narcotics cannot be faxed or phoned into your pharmacy.  If your prescription is due for a refill, you may be due for a follow up appointment. Please call our office at [phone] to make an appointment or schedule an appointment via [facility] [employer].  To best provide you care, patients receiving routine medications need to be seen at least twice a year. Patients receiving narcotic/controlled substance medications need to be seen at least once every 3 months.  In the event that your preferred pharmacy does not have the requested medication in stock (ie Backordered), it is your responsibility to find another pharmacy that has the requested medication available. We will gladly send a new prescription to that pharmacy at your request.  controlled substances may not be able to be filled out of state due to license restrictions.  If you have a planned trip, it's best to call your pharmacy at least 5-7 business days to prevent any delays in your medication refill.    Scheduling Tests:    If your physician has ordered radiology tests such as MRI or CT scans, please contact Central Scheduling at [phone] right away to schedule the test.  Once scheduled, the [facility] Centralized Referral Team will work with your insurance carrier to obtain pre-certification or  prior authorization.  Depending on your insurance carrier, approval may take 3-10 days.  It is highly recommended patients assure they have received an authorization before having a test performed.  If test is done without insurance authorization, patient may be responsible for the entire amount billed.      Precertification and Prior Authorizations:  If your physician has recommended that you have a procedure or additional testing performed the [facility] Centralized Referral Team will contact your insurance carrier to obtain pre-certification or prior authorization.    You are strongly encouraged to contact your insurance carrier to verify that your procedure/test has been approved and is a COVERED benefit.  Although the [facility] Centralized Referral Team does its due diligence, the insurance carrier gives the disclaimer that \"Although the procedure is authorized, this does not guarantee payment.\"    Ultimately the patient is responsible for payment.   Thank you for your understanding in this matter.  Paperwork Completion:  If you require FMLA or disability paperwork for your recovery, please make sure to either drop it off or have it faxed to our office at [phone]. Be sure the form has your name and date of birth on it.  The form will be faxed to our Forms Department and they will complete it for you.  There is a 25$ fee for all forms that need to be filled out.  Please be aware there is a 10-14 day turnaround time.  You will need to sign a release of information (SUDARSHAN) form if your paperwork does not come with one.  You may call the Forms Department with any questions at [phone].  Their fax number is [phone].

## 2025-05-21 ENCOUNTER — TELEPHONE (OUTPATIENT)
Dept: PAIN CLINIC | Facility: CLINIC | Age: 85
End: 2025-05-21

## 2025-05-24 DIAGNOSIS — G89.29 CHRONIC BILATERAL LOW BACK PAIN WITHOUT SCIATICA: ICD-10-CM

## 2025-05-24 DIAGNOSIS — M54.50 CHRONIC BILATERAL LOW BACK PAIN WITHOUT SCIATICA: ICD-10-CM

## 2025-05-27 ENCOUNTER — OFFICE VISIT (OUTPATIENT)
Dept: PAIN CLINIC | Facility: HOSPITAL | Age: 85
End: 2025-05-27
Attending: ANESTHESIOLOGY
Payer: MEDICARE

## 2025-05-27 DIAGNOSIS — M41.86 LEVOSCOLIOSIS OF LUMBAR SPINE: ICD-10-CM

## 2025-05-27 DIAGNOSIS — M79.18 MYOFASCIAL PAIN: Primary | ICD-10-CM

## 2025-05-27 DIAGNOSIS — M48.061 SPINAL STENOSIS AT L4-L5 LEVEL: ICD-10-CM

## 2025-05-27 DIAGNOSIS — G89.29 CHRONIC LEFT-SIDED LOW BACK PAIN WITHOUT SCIATICA: ICD-10-CM

## 2025-05-27 DIAGNOSIS — M54.50 CHRONIC LEFT-SIDED LOW BACK PAIN WITHOUT SCIATICA: ICD-10-CM

## 2025-05-27 DIAGNOSIS — M47.816 LUMBAR SPONDYLOSIS: ICD-10-CM

## 2025-05-27 PROCEDURE — 20552 NJX 1/MLT TRIGGER POINT 1/2: CPT | Performed by: ANESTHESIOLOGY

## 2025-05-27 NOTE — PROGRESS NOTES
Patient presents in office today with reported pain in lower back    Current pain level reported = 0/10    Last reported dose of norco 10 this AM
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
## 2025-05-28 RX ORDER — HYDROCODONE BITARTRATE AND ACETAMINOPHEN 5; 325 MG/1; MG/1
1-2 TABLET ORAL EVERY 6 HOURS PRN
Qty: 60 TABLET | Refills: 0 | Status: SHIPPED | OUTPATIENT
Start: 2025-05-28

## 2025-05-28 NOTE — TELEPHONE ENCOUNTER
Last Office Visit: 05/16/2025    Last Refill:   Medication Quantity Refills Start End   HYDROcodone-acetaminophen 5-325 MG Oral Tab 60 tablet 0 4/17/2025 --     Return to Clinic: 11/16/2025    Protocol: n/a    Refill pended. Please approve if okay. Thank you.

## 2025-06-09 ENCOUNTER — MED REC SCAN ONLY (OUTPATIENT)
Dept: FAMILY MEDICINE CLINIC | Facility: CLINIC | Age: 85
End: 2025-06-09

## 2025-06-13 ENCOUNTER — LAB ENCOUNTER (OUTPATIENT)
Dept: LAB | Facility: HOSPITAL | Age: 85
End: 2025-06-13
Attending: STUDENT IN AN ORGANIZED HEALTH CARE EDUCATION/TRAINING PROGRAM
Payer: MEDICARE

## 2025-06-13 DIAGNOSIS — N18.31 TYPE 2 DIABETES MELLITUS WITH STAGE 3A CHRONIC KIDNEY DISEASE, WITHOUT LONG-TERM CURRENT USE OF INSULIN (HCC): ICD-10-CM

## 2025-06-13 DIAGNOSIS — E11.22 TYPE 2 DIABETES MELLITUS WITH STAGE 3A CHRONIC KIDNEY DISEASE, WITHOUT LONG-TERM CURRENT USE OF INSULIN (HCC): ICD-10-CM

## 2025-06-13 DIAGNOSIS — R06.09 DYSPNEA ON EXERTION: Primary | ICD-10-CM

## 2025-06-13 LAB
ALBUMIN SERPL-MCNC: 4.4 G/DL (ref 3.2–4.8)
ALBUMIN/GLOB SERPL: 1.6 {RATIO} (ref 1–2)
ALP LIVER SERPL-CCNC: 111 U/L (ref 55–142)
ALT SERPL-CCNC: 16 U/L (ref 10–49)
ANION GAP SERPL CALC-SCNC: 9 MMOL/L (ref 0–18)
AST SERPL-CCNC: 25 U/L (ref ?–34)
BILIRUB SERPL-MCNC: 0.7 MG/DL (ref 0.2–1.1)
BUN BLD-MCNC: 35 MG/DL (ref 9–23)
CALCIUM BLD-MCNC: 11.2 MG/DL (ref 8.7–10.6)
CHLORIDE SERPL-SCNC: 102 MMOL/L (ref 98–112)
CHOLEST SERPL-MCNC: 135 MG/DL (ref ?–200)
CO2 SERPL-SCNC: 29 MMOL/L (ref 21–32)
CREAT BLD-MCNC: 1.61 MG/DL (ref 0.55–1.02)
CREAT UR-SCNC: 51.1 MG/DL
EGFRCR SERPLBLD CKD-EPI 2021: 32 ML/MIN/1.73M2 (ref 60–?)
EST. AVERAGE GLUCOSE BLD GHB EST-MCNC: 111 MG/DL (ref 68–126)
FASTING PATIENT LIPID ANSWER: NO
FASTING STATUS PATIENT QL REPORTED: NO
GLOBULIN PLAS-MCNC: 2.8 G/DL (ref 2–3.5)
GLUCOSE BLD-MCNC: 79 MG/DL (ref 70–99)
HBA1C MFR BLD: 5.5 % (ref ?–5.7)
HDLC SERPL-MCNC: 55 MG/DL (ref 40–59)
LDLC SERPL CALC-MCNC: 63 MG/DL (ref ?–100)
MICROALBUMIN UR-MCNC: 1.2 MG/DL
MICROALBUMIN/CREAT 24H UR-RTO: 23.5 UG/MG (ref ?–30)
NONHDLC SERPL-MCNC: 80 MG/DL (ref ?–130)
OSMOLALITY SERPL CALC.SUM OF ELEC: 297 MOSM/KG (ref 275–295)
POTASSIUM SERPL-SCNC: 4.6 MMOL/L (ref 3.5–5.1)
PROT SERPL-MCNC: 7.2 G/DL (ref 5.7–8.2)
SODIUM SERPL-SCNC: 140 MMOL/L (ref 136–145)
TRIGL SERPL-MCNC: 89 MG/DL (ref 30–149)
TSI SER-ACNC: 3.63 UIU/ML (ref 0.55–4.78)
VLDLC SERPL CALC-MCNC: 13 MG/DL (ref 0–30)

## 2025-06-13 PROCEDURE — 82570 ASSAY OF URINE CREATININE: CPT

## 2025-06-13 PROCEDURE — 80061 LIPID PANEL: CPT

## 2025-06-13 PROCEDURE — 36415 COLL VENOUS BLD VENIPUNCTURE: CPT

## 2025-06-13 PROCEDURE — 84443 ASSAY THYROID STIM HORMONE: CPT

## 2025-06-13 PROCEDURE — 80053 COMPREHEN METABOLIC PANEL: CPT

## 2025-06-13 PROCEDURE — 83036 HEMOGLOBIN GLYCOSYLATED A1C: CPT

## 2025-06-13 PROCEDURE — 82043 UR ALBUMIN QUANTITATIVE: CPT

## 2025-06-17 ENCOUNTER — LAB ENCOUNTER (OUTPATIENT)
Dept: LAB | Facility: HOSPITAL | Age: 85
End: 2025-06-17
Attending: FAMILY MEDICINE
Payer: MEDICARE

## 2025-06-17 DIAGNOSIS — E83.52 HYPERCALCEMIA: ICD-10-CM

## 2025-06-17 LAB — PTH-INTACT SERPL-MCNC: 198.8 PG/ML (ref 18.5–88)

## 2025-06-17 PROCEDURE — 36415 COLL VENOUS BLD VENIPUNCTURE: CPT

## 2025-06-17 PROCEDURE — 83970 ASSAY OF PARATHORMONE: CPT

## 2025-06-19 ENCOUNTER — TELEPHONE (OUTPATIENT)
Dept: FAMILY MEDICINE CLINIC | Facility: CLINIC | Age: 85
End: 2025-06-19

## 2025-06-19 NOTE — TELEPHONE ENCOUNTER
Received from patient's daughter regarding elevated PTH and Calcium levels. Patient's daughter is scheduling an appointment with endocrinology for patient, but wants to know if she should stop her calcium supplement in the meantime. Advised to hold calcium for now and try to get into endocrinology. If the wait is longer than 2 weeks, should come see one of us in the office. Daughter verbalized understanding.

## 2025-06-20 NOTE — TELEPHONE ENCOUNTER
Patient's daughter, Charito called back. States that the earliest they can get an appointment with endocrinology is on 7/24. Patient scheduled with [facility] on Monday, 6/23 for follow up. Advised daughter to keep appointment with endocrinology as scheduled. Daughter verbalized understanding and agreed with plan.

## 2025-06-23 ENCOUNTER — OFFICE VISIT (OUTPATIENT)
Dept: FAMILY MEDICINE CLINIC | Facility: CLINIC | Age: 85
End: 2025-06-23
Payer: MEDICARE

## 2025-06-23 VITALS
DIASTOLIC BLOOD PRESSURE: 64 MMHG | SYSTOLIC BLOOD PRESSURE: 112 MMHG | HEART RATE: 84 BPM | RESPIRATION RATE: 16 BRPM | TEMPERATURE: 98 F | WEIGHT: 220 LBS | HEIGHT: 62 IN | BODY MASS INDEX: 40.48 KG/M2

## 2025-06-23 DIAGNOSIS — N28.9 FUNCTION KIDNEY DECREASED: ICD-10-CM

## 2025-06-23 DIAGNOSIS — R79.89 ELEVATED PTHRP LEVEL: Primary | ICD-10-CM

## 2025-06-23 PROCEDURE — G2211 COMPLEX E/M VISIT ADD ON: HCPCS

## 2025-06-23 PROCEDURE — 1159F MED LIST DOCD IN RCRD: CPT

## 2025-06-23 PROCEDURE — 1160F RVW MEDS BY RX/DR IN RCRD: CPT

## 2025-06-23 PROCEDURE — 99214 OFFICE O/P EST MOD 30 MIN: CPT

## 2025-06-23 RX ORDER — METOPROLOL SUCCINATE 25 MG/1
TABLET, EXTENDED RELEASE ORAL
COMMUNITY
Start: 2025-06-13

## 2025-06-23 RX ORDER — ORPHENADRINE CITRATE 100 MG/1
100 TABLET ORAL
Refills: 0 | Status: CANCELLED | OUTPATIENT
Start: 2025-06-23

## 2025-06-23 RX ORDER — ORPHENADRINE CITRATE 100 MG/1
TABLET ORAL
COMMUNITY
Start: 2024-10-18

## 2025-06-23 NOTE — PROGRESS NOTES
[facility] Family Medicine Office Note  Chief Complaint:   Chief Complaint   Patient presents with    Lab Results       HPI:   This is a 84 year old female coming in to discuss her elevated calcium and subsequent PTH.     Patient denies any complaints. Denies muscle spasms or tremors.    Does state that she is trying to schedule an EGD for the foreign  body sensation while swallowing for years.  Patient is on Eliquis for intractable A-fib.      Past Medical History[1]  Past Surgical History[2]  Social History:  Short Social Hx on File[3]  Family History:  Family History[4]  Allergies:  Allergies[5]  Current Meds:  Current Medications[6]   Counseling given: Not Answered       REVIEW OF SYSTEMS:   ROS:  CONSTITUTIONAL:  Denies any unusual weight gain/loss, fever, chills, weakness or fatigue.  HEENT:  Eyes:  Denies visual loss, blurred vision, double vision or yellow sclerae.  SKIN:  No rash or itching.  CARDIOVASCULAR:  Denies chest pain, chest pressure or chest discomfort. No palpitations or edema.  Intractable A-fib. Denies any dyspnea on exertion or at rest  RESPIRATORY:  Denies shortness of breath, cough  GASTROINTESTINAL:  Denies any abdominal pain, nausea, vomiting, constipation, diarrhea, or blood in stool.  Occasional difficulty when swallowing.  NEUROLOGICAL:  Denies headache, dizziness, syncope, numbness or tingling in the extremities.  MUSCULOSKELETAL:  Denies muscle, back pain, joint pain or stiffness.      EXAM:   /64   Pulse 84   Temp 97.7 °F (36.5 °C) (Temporal)   Resp 16   Ht 5' 2\" (1.575 m)   Wt 220 lb (99.8 kg)   BMI 40.24 kg/m²  Estimated body mass index is 40.24 kg/m² as calculated from the following:    Height as of this encounter: 5' 2\" (1.575 m).    Weight as of this encounter: 220 lb (99.8 kg).   Vital signs reviewed.Appears stated age, well groomed.  Physical Exam:  GEN:  Patient is alert and oriented x3, no apparent distress  HEAD:  Normocephalic, atraumatic  HEENT:   Eyes: EOMI, PERRLA, no scleral icterus, conjunctivae clear bilaterally.  Throat:  No tonsillar erythema or exudate.  Mouth:  No oral lesions or ulcerations, no dental abnormalities noted.  LUNGS: clear to auscultation bilaterally, no rales/rhonchi/wheezing  HEART:  Regular rate and rhythm, no murmurs, rubs or gallops  NECK: Supple, no lymphadenopathy, thyroid not enlarged on palpation.  EXTREMITIES:  Strength intact with 5/5 bilaterally upper and lower extremities, no edema noted  NEURO:  CN 2 - 12 grossly intact     ASSESSMENT AND PLAN:   1. Elevated PTHrP level  Would like to do a CT soft tissue of the neck, but due to patient's decreased GFR, I will send her to ENT to see if they can do an ultrasound with fine-needle aspiration.  Also advised to stop calcium until we get results from ENT.  Also advised to keep appointment with endocrinology scheduled for the end of July.  - ENT Referral - In Network    2. Function kidney decreased  Due to decreased kidney function, will repeat CMP in 1 month.  Advised patient to try to hydrate the day before the exam.  - Comp Metabolic Panel (14) [E]; Future      Meds & Refills for this Visit:  Requested Prescriptions      No prescriptions requested or ordered in this encounter       Health Maintenance:  Health Maintenance Due   Topic Date Due    Diabetes Care Dilated Eye Exam  Never done    COVID-19 Vaccine (10 - 2024-25 season) 09/01/2024    Annual Well Visit  01/01/2025    Annual Depression Screening  01/01/2025       Patient/Caregiver Education: Patient/Caregiver Education: There are no barriers to learning. Medical education done.   Outcome: Patient verbalizes understanding. Patient is notified to call with any questions, complications, allergies, or worsening or changing symptoms.  Patient is to call with any side effects or complications from the treatments as a result of today.     Problem List:  Problem List[7]    Amy Rangel, SONIA    Please note that portions of this  note may have been completed with a voice recognition program. Efforts were made to edit the dictations but occasionally words are mis-transcribed.         [1]   Past Medical History:   (HFpEF) heart failure with preserved ejection fraction (HCC)    Atrial fibrillation (HCC)    Back pain    Chronic atrial fibrillation (HCC)    CKD (chronic kidney disease)    Colon cancer (HCC)    Congestive heart disease (HCC)    Congestive heart failure (HCC)    Congestive heart failure, unspecified HF chronicity, unspecified heart failure type (HCC)    Deep vein thrombosis (HCC)    Diabetes (HCC)    Essential hypertension    History of blood clots    HTN (hypertension)    Hyperlipidemia    Hypothyroidism    Obesity    Pulmonary embolism (HCC)    Sleep apnea    TIA (transient ischemic attack)    Visual impairment   [2]   Past Surgical History:  Procedure Laterality Date    Back surgery      Colectomy      Colonoscopy      Excis lumbar disk,one level      Femur fracture surgery Right 10/19/2024    Hip replacement surgery      Knee replacement surgery            Total hip replacement      Total knee replacement Bilateral    [3]   Social History  Socioeconomic History    Marital status:    Tobacco Use    Smoking status: Never     Passive exposure: Never    Smokeless tobacco: Never   Vaping Use    Vaping status: Never Used   Substance and Sexual Activity    Alcohol use: Not Currently    Drug use: Never   Other Topics Concern    Caffeine Concern No    Exercise Yes   Social History Narrative    Retired  for >30 years.  Carlos. Daughter Charito lives in [place].      Social Drivers of Health     Food Insecurity: Low Risk  (10/19/2024)    Received from [facility]    Food Insecurity     Within the past 12 months, you worried that your food would run out before you got money to buy more.  : Never true     Within the past 12 months, the food you bought just didn't last and you didn't have  money to get more. : Never true   Transportation Needs: Not At Risk (10/19/2024)    Received from [facility]    Transportation Needs     In the past 12 months, has lack of reliable transportation kept you from medical appointments, meetings, work or from getting things needed for daily living? : No   Housing Stability: Low Risk  (4/20/2024)    Housing Stability     Housing Instability: No   [4]   Family History  Problem Relation Age of Onset    Other (other) Father         congestive heart failure    Hypertension Maternal Grandmother    [5]   Allergies  Allergen Reactions    Penicillins OTHER (SEE COMMENTS) and UNKNOWN     Other reaction(s): Unknown    Was told as a child it was an allergy    Other reaction(s): Unknown  Was told as a child it was an allergy      Was told as a child it was an allergy   [6]   Current Outpatient Medications   Medication Sig Dispense Refill    orphenadrine  MG Oral Tablet 12 Hr orphenadrine citrate  mg tablet,extended release, [RxNorm: 896416]      metoprolol succinate ER 25 MG Oral Tablet 24 Hr metoprolol succinate ER 25 mg tablet,extended release 24 hr, [RxNorm: 356970]      HYDROCODONE-ACETAMINOPHEN 5-325 MG Oral Tab take 1-2 tablets by mouth every 6 (six) hours as needed for pain. 60 tablet 0    enoxaparin 100 MG/ML Injection Solution Prefilled Syringe       ondansetron 4 MG Oral Tablet Dispersible Place 1 tablet (4 mg total) under the tongue every 6 (six) hours.      escitalopram 10 MG Oral Tab Take 1.5 tablets (15 mg total) by mouth daily. 135 tablet 1    montelukast 10 MG Oral Tab Take 1 tablet (10 mg total) by mouth every evening. 90 tablet 1    Pravastatin Sodium 80 MG Oral Tab Take 1 tablet (80 mg total) by mouth every evening. 90 tablet 1    spironolactone 25 MG Oral Tab Take 0.5 tablets (12.5 mg total) by mouth daily. 45 tablet 1    torsemide 10 MG Oral Tab Take 1 tablet (10 mg total) by mouth daily. 90 tablet 1    Irbesartan 150 MG Oral Tab Take 1  tablet (150 mg total) by mouth daily. 90 tablet 1    levothyroxine 75 MCG Oral Tab Take 1 tablet (75 mcg total) by mouth every morning. 90 tablet 1    albuterol 108 (90 Base) MCG/ACT Inhalation Aero Soln Inhale 2 puffs into the lungs every 4 (four) hours as needed for Wheezing or Shortness of Breath. 1 each 1    24HR ALLERGY RELIEF 180 MG Oral Tab take 1 tablet BY MOUTH EVERY DAY 90 tablet 0    LIDOCAINE PAIN RELIEF 4 % External Patch       CHOLECALCIFEROL 50 MCG (2000 UT) Oral Cap TAKE 1 CAPSULE BY MOUTH DAILY 90 capsule 1    ELIQUIS 5 MG Oral Tab TAKE 1 TABLET BY MOUTH TWICE DAILY 180 tablet 1    B-12 1000 MCG Oral Tab CR TAKE 1 TABLET BY MOUTH DAILY 90 tablet 1    albuterol 108 (90 Base) MCG/ACT Inhalation Aero Soln Inhale 2 puffs into the lungs every 4 to 6 hours as needed for Wheezing or Shortness of Breath. 1 each 3    triamcinolone 0.1 % External Cream Apply topically 2 (two) times daily as needed. Can use on legs and hands 80 g 2    lidocaine 5 % External Patch Place 1 patch onto the skin daily as needed. 30 each 2    Calcium Carbonate 1500 (600 Ca) MG Oral Tab Take 1 tablet by mouth daily. (Patient not taking: Reported on 6/23/2025)     [7]   Patient Active Problem List  Diagnosis    Hypoxia    Dyspnea, unspecified type    Diabetes (HCC)    Essential hypertension    Hypercholesterolemia    Age-related osteoporosis without current pathological fracture    Allergic rhinitis    Atrophy of vagina    Chronic renal insufficiency, stage III (moderate) ([facility])    Chronic right shoulder pain    Gallstones    Hypercalcemia    Hyperuricuria    Low HDL (under 40)    Lumbar degenerative disc disease    Lymphedema    Nontraumatic incomplete tear of right rotator cuff    Obesity    Onychomycosis    ELY (obstructive sleep apnea)    Atrial fibrillation (HCC)    Postmenopausal bleeding    Primary hypothyroidism    Primary osteoarthritis of right hip    Secondary hyperparathyroidism (HCC)    Senile tremor    Spinal stenosis at  L4-L5 level    Spinal stenosis of lumbar region with neurogenic claudication    Vitreous floaters of right eye    Diet-controlled diabetes mellitus ([facility])    Hypertension, essential, benign    History of TIA (transient ischemic attack)    History of DVT (deep vein thrombosis)    History of pulmonary embolus (PE)    Type 2 diabetes mellitus with diabetic chronic kidney disease ([facility])    Morbid (severe) obesity due to excess calories ([facility])    Body mass index (BMI) 40.0-44.9, adult ([facility])    Osteopenia of neck of left femur    Acute on chronic heart failure ([facility])    Hypotension    Chronic diastolic heart failure ([facility])    Acute renal insufficiency    Hypotension, unspecified hypotension type    General weakness    Frequent falls    Levoscoliosis of lumbar spine    Thrombocytopenia    CKD (chronic kidney disease) stage 4, GFR 15-29 ml/min ([facility])    Acute and chronic respiratory failure with hypercapnia ([facility])    AC joint arthropathy    Osteophyte of left shoulder    Osteoarthritis of left glenohumeral joint    CHF (congestive heart failure) ([facility])    Acute on chronic congestive heart failure, unspecified heart failure type ([facility])    Acute on chronic heart failure with preserved ejection fraction ([facility])    Lumbar spondylosis    Myofascial pain    Chronic left-sided low back pain without sciatica

## 2025-06-28 ENCOUNTER — MED REC SCAN ONLY (OUTPATIENT)
Dept: FAMILY MEDICINE CLINIC | Facility: CLINIC | Age: 85
End: 2025-06-28

## 2025-07-01 ENCOUNTER — TELEPHONE (OUTPATIENT)
Dept: PAIN CLINIC | Facility: HOSPITAL | Age: 85
End: 2025-07-01

## 2025-07-01 ENCOUNTER — HOSPITAL ENCOUNTER (EMERGENCY)
Facility: HOSPITAL | Age: 85
Discharge: HOME | End: 2025-07-01
Attending: EMERGENCY MEDICINE
Payer: MEDICARE

## 2025-07-01 ENCOUNTER — TELEPHONE (OUTPATIENT)
Dept: FAMILY MEDICINE CLINIC | Facility: CLINIC | Age: 85
End: 2025-07-01

## 2025-07-01 ENCOUNTER — HOSPITAL ENCOUNTER (OUTPATIENT)
Age: 85
Discharge: ANOTHER HOSPITAL | End: 2025-07-01
Payer: MEDICARE

## 2025-07-01 VITALS
RESPIRATION RATE: 16 BRPM | DIASTOLIC BLOOD PRESSURE: 87 MMHG | HEART RATE: 83 BPM | SYSTOLIC BLOOD PRESSURE: 105 MMHG | TEMPERATURE: 97 F | OXYGEN SATURATION: 94 %

## 2025-07-01 VITALS
SYSTOLIC BLOOD PRESSURE: 110 MMHG | WEIGHT: 220 LBS | HEART RATE: 71 BPM | HEIGHT: 62 IN | RESPIRATION RATE: 18 BRPM | DIASTOLIC BLOOD PRESSURE: 64 MMHG | OXYGEN SATURATION: 65 % | BODY MASS INDEX: 40.48 KG/M2 | TEMPERATURE: 98 F

## 2025-07-01 DIAGNOSIS — M54.50 BACK PAIN, LUMBOSACRAL: Primary | ICD-10-CM

## 2025-07-01 DIAGNOSIS — M54.9 INTRACTABLE BACK PAIN: Primary | ICD-10-CM

## 2025-07-01 PROCEDURE — 99284 EMERGENCY DEPT VISIT MOD MDM: CPT

## 2025-07-01 PROCEDURE — 99213 OFFICE O/P EST LOW 20 MIN: CPT

## 2025-07-01 PROCEDURE — 96372 THER/PROPH/DIAG INJ SC/IM: CPT

## 2025-07-01 RX ORDER — MORPHINE SULFATE 4 MG/ML
4 INJECTION, SOLUTION INTRAMUSCULAR; INTRAVENOUS ONCE
Status: COMPLETED | OUTPATIENT
Start: 2025-07-01 | End: 2025-07-01

## 2025-07-01 NOTE — TELEPHONE ENCOUNTER
Patients daughter called stating patient is in a lot of pain and is requesting additional medication for today until her appointment tomorrow which was moved up.  Please call patient daughter.

## 2025-07-01 NOTE — ED PROVIDER NOTES
Patient Seen in: [facility]        History  Chief Complaint   Patient presents with    Back Pain     Stated Complaint: toradol shot    Subjective:   HPI            This is an 84-year-old female with a history of diabetes hypertension DVT on blood thinners CKD CHF TIA hyperlipidemia PE chronic back pain presenting for possible Toradol shot.  Patient's daughter at bedside aiding in history states she has an appointment tomorrow with the pain specialist but she is having really severe back pain and she is taking Norco at home which is not helping with the pain she has done injections twice which has not helped with the pain so contacted her doctor about what could be done for pain management and was told to come to the immediate care center for possible Toradol shot.  Patient's daughter states she is on blood thinner.  No injury no trauma no bowel or bladder incontinence no saddle paresthesia no urinary symptoms no difficulty ambulating but pain with movement.      Objective:     No pertinent past medical history.            No pertinent past surgical history.              No pertinent social history.            Review of Systems    Positive for stated complaint: toradol shot  Other systems are as noted in HPI.  Constitutional and vital signs reviewed.      All other systems reviewed and negative except as noted above.                  Physical Exam    ED Triage Vitals [07/01/25 1407]   /87   Pulse 83   Resp 16   Temp 97.4 °F (36.3 °C)   Temp src Oral   SpO2 94 %   O2 Device None (Room air)       Current Vitals:   Vital Signs  BP: 105/87  Pulse: 83  Resp: 16  Temp: 97.4 °F (36.3 °C)  Temp src: Oral    Oxygen Therapy  SpO2: 94 %  O2 Device: None (Room air)            Physical Exam  Vitals and nursing note reviewed.   Constitutional:       Appearance: Normal appearance.   HENT:      Right Ear: External ear normal.      Left Ear: External ear normal.      Nose: Nose normal.      Mouth/Throat:       Mouth: Mucous membranes are moist.      Pharynx: Oropharynx is clear.   Eyes:      Conjunctiva/sclera: Conjunctivae normal.   Cardiovascular:      Rate and Rhythm: Normal rate.   Pulmonary:      Effort: Pulmonary effort is normal.   Abdominal:      Palpations: Abdomen is soft.      Tenderness: There is no abdominal tenderness. There is no right CVA tenderness or left CVA tenderness.   Musculoskeletal:         General: Normal range of motion.      Cervical back: Normal range of motion.      Comments: Cervical thoracic lumbar spine no midline TTP or step-offs but complaint of pain in the lower back with movement and slight reproducible TTP bilateral lumbar paraspinal region.   Skin:     General: Skin is warm.      Capillary Refill: Capillary refill takes less than 2 seconds.   Neurological:      General: No focal deficit present.      Mental Status: She is alert and oriented to person, place, and time.               ED Course  Labs Reviewed - No data to display                      MDM        Medical Decision Making  84-year-old female who appears to be in pain with history of chronic back pain has an appointment with pain specialist the pain is pretty severe today was instructed by her provider to come to the immediate care center for possible Toradol shot.  DDx chronic back pain versus intractable back pain versus lumbar strain versus compression fracture versus nephrolithiasis versus pyelonephritis.  Patient is currently on blood thinners and has CKD is not a candidate for Toradol shot which is an NSAID.  This was explained to the patient and daughter because of her being on blood thinner and her kidney function Toradol would not be indicated and discussed the limitations here at the [facility] for pain management discussed pain management options would be Tylenol and Norco which Norco she is already taking at home which does not help with her pain discussed if her pain is so severe she would need to be transferred to the  emergency department for evaluation and possible pain management per the patient she has been to the ER before and has received morphine which has helped.  Patient states that she will go to [facility] emergency department.  Patient's daughter will drive her there.      Amount and/or Complexity of Data Reviewed  Discussion of management or test interpretation with external provider(s): This patient was discussed with my attending Dr. Lake who agrees with this provider's management and plan of care.         Disposition and Plan     Clinical Impression:  1. Intractable back pain         Disposition:  Ic to ed  7/1/2025  2:19 pm    Follow-up:  No follow-up provider specified.        Medications Prescribed:  Discharge Medication List as of 7/1/2025  2:19 PM                Supplementary Documentation:

## 2025-07-01 NOTE — TELEPHONE ENCOUNTER
Patients daughter called back to clarify Walk in Clinic versus Immediate Care.     Locations and hours of Immediate Care was discussed.

## 2025-07-01 NOTE — TELEPHONE ENCOUNTER
I called and spoke to the patients daughter, Charito. I informed her Apurva NP said if Joni is in a lot of pain and can not wait until tomorrow when she sees the pain clinic to go to the urgent care for evaluation and possible injection of Toradol. Daughter verbalized understanding.

## 2025-07-01 NOTE — ED PROVIDER NOTES
Patient Seen in: [facility] Emergency Department        History  Chief Complaint   Patient presents with    Back Pain     Stated Complaint: uncontrolled back pain, deneis fall. chronic back pain. denies loss of b/b    Subjective:   HPI            84-year-old female comes to the hospital complaint of having difficulty with pain in the area of her lower back.  She had chronic pain for 30 years.  Had a fusion in her back as well.  She has been trying Norco as well as muscle laxer's at home without relief.  She is to see the pain specialist tomorrow.  She is hoping for some relief at this time.  She denies any significant radiation of pain.  She has no bladder or bowel incontinence.  She denies any fevers or chills.  She denies any trauma.  Pain is worse with movement.  She is no other complaints at this time.      Objective:     Past Medical History:    (HFpEF) heart failure with preserved ejection fraction (HCC)    Atrial fibrillation (HCC)    Back pain    Chronic atrial fibrillation (HCC)    CKD (chronic kidney disease)    Colon cancer (HCC)    Congestive heart disease (HCC)    Congestive heart failure (HCC)    Congestive heart failure, unspecified HF chronicity, unspecified heart failure type (HCC)    Deep vein thrombosis (HCC)    Diabetes (HCC)    Essential hypertension    History of blood clots    HTN (hypertension)    Hyperlipidemia    Hypothyroidism    Obesity    Pulmonary embolism (HCC)    Sleep apnea    TIA (transient ischemic attack)    Visual impairment              Past Surgical History:   Procedure Laterality Date    Back surgery      Colectomy      Colonoscopy      Excis lumbar disk,one level      Femur fracture surgery Right 10/19/2024    Hip replacement surgery      Knee replacement surgery            Total hip replacement      Total knee replacement Bilateral                 Social History     Socioeconomic History    Marital status:    Tobacco Use    Smoking status: Never     Passive  exposure: Never    Smokeless tobacco: Never   Vaping Use    Vaping status: Never Used   Substance and Sexual Activity    Alcohol use: Not Currently    Drug use: Never   Other Topics Concern    Caffeine Concern No    Exercise Yes   Social History Narrative    Retired  for >30 years.  [employer]os. Daughter Charito lives in [place].      Social Drivers of Health     Food Insecurity: Low Risk  (10/19/2024)    Received from [facility]    Food Insecurity     Within the past 12 months, you worried that your food would run out before you got money to buy more.  : Never true     Within the past 12 months, the food you bought just didn't last and you didn't have money to get more. : Never true   Transportation Needs: Not At Risk (10/19/2024)    Received from [facility]    Transportation Needs     In the past 12 months, has lack of reliable transportation kept you from medical appointments, meetings, work or from getting things needed for daily living? : No   Housing Stability: Low Risk  (4/20/2024)    Housing Stability     Housing Instability: No                                Physical Exam    ED Triage Vitals [07/01/25 1508]   /59   Pulse 74   Resp 18   Temp 98 °F (36.7 °C)   Temp src Temporal   SpO2 94 %   O2 Device None (Room air)       Current Vitals:   Vital Signs  BP: 108/59  Pulse: 74  Resp: 18  Temp: 98 °F (36.7 °C)  Temp src: Temporal    Oxygen Therapy  SpO2: 94 %  O2 Device: None (Room air)            Physical Exam  HEENT : NCAT, EOMI, PEERL,  neck supple, no JVD, trachea midline, No LAD  Heart: S1S2 normal. No murmurs, regular rate and rhythm  Lungs: Clear to auscultation bilaterally  Abdomen: Soft nontender nondistended normal active bowel sounds without rebound, guarding or masses noted  Back reproducible tenderness noted over the area of the lower back over the musculature this worse with movement palpation.  Extremity no clubbing, cyanosis or edema noted.   Full range of motion noted without tenderness  Neuro: No focal deficits noted    All measures to prevent infection transmission during my interaction with the patient were taken.  The patient was already wearing droplet mask on my arrival to the room.  Personal protective equipment including a droplet mask as well as gloves were worn throughout the duration of my exam.  Hand washing was performed prior to and after the exam.  Stethoscope and equipment used during my examination was cleaned with a super Sani cloth germicidal wipe following the exam.        ED Course  Labs Reviewed - No data to display    ED Course as of 07/01/25 1707  ------------------------------------------------------------  Time: 07/01 1705  Comment: While here the patient was given morphine for discomfort.     Medications   morphINE PF 4 MG/ML injection 4 mg (4 mg Intramuscular Given 7/1/25 1703)                       MDM     Differential diagnosis includes sciatica, chronic back pain but not limited to these.  She has chronic back pain and this is an acute exacerbation.  She is scheduled for injections by her pain specialist tomorrow.  This time patient was given medications for pain we will follow-up.    Patient was screened and evaluated during this visit.   As a treating physician attending to the patient, I determined, within reasonable clinical confidence and prior to discharge, that an emergency medical condition was not or was no longer present.  There was no indication for further evaluation, treatment or admission on an emergency basis.       The usual and customary discharge instuctions were discussed given the patient's ER course.  We discussed signs and symptoms that should prompt the patient's immediate return to the emergency department.   Reasonable over the counter and prescription treatment options and Physician follow up plan was discussed.       The patient is discharged in good condition.     This note was prepared using  Dragon Medical voice recognition dictation software.  As a result errors may occur.  When identified to these areas have been corrected.  While every attempt is made to correct errors during dictation discrepancies may still exist.  Please contact if there are any errors.        Medical Decision Making      Disposition and Plan     Clinical Impression:  1. Back pain, lumbosacral         Disposition:  Discharge  7/1/2025  5:07 pm    Follow-up:  Amy Amaro MD  [address] [place] [id number]  [phone]    Schedule an appointment as soon as possible for a visit in 2 day(s)            Medications Prescribed:  Current Discharge Medication List                Supplementary Documentation:

## 2025-07-01 NOTE — TELEPHONE ENCOUNTER
Patient has chronic degenerative low back disc disease. Went to ER last 3/27 for pain flare up.   Per OV note 5/16, patient is taking norco 5mg 1-2 tablets as needed for chronic bilateral low back pain. Please get more information regarding symptoms.   Left message for daughter to call back.

## 2025-07-01 NOTE — TELEPHONE ENCOUNTER
Patients daughter called back. Her mother is in a lot of pain and she reached out to the pain clinic because the Norco is not working for the pain. They advised her to call her PCP  office for other options for pain. Her mother does have an appointment tomorrow at the pain clinic but until then she is very uncomfortable. Her daughter us asking if she can be seen in the office today for an injection of Toradol. Please advise. I did tell her they may have to take her to the IC or the ER to get that injection.

## 2025-07-01 NOTE — ED INITIAL ASSESSMENT (HPI)
PATIENT SAID SHE HAS CHRONIC BACK PAIN  INCREASED SINCE MORNING . TAKING HYDROCODONE   AND MUSCLE RELAXANT  NOT HELPING . SEEN IN IMMEDIATE CARE TODAY SENT HERE FOR PAIN MANAGEMENT

## 2025-07-01 NOTE — TELEPHONE ENCOUNTER
Patient's daughter called requesting a call back from a nurse due to the HYDROCODONE-ACETAMINOPHEN 5-325 MG Oral Tab not working for her mother. Daughter would like to know if something else can be given.    Please advise

## 2025-07-01 NOTE — ED INITIAL ASSESSMENT (HPI)
Patient with chronic back pain   PCP unable to see patient today  Patient here for Toradol injection

## 2025-07-01 NOTE — TELEPHONE ENCOUNTER
LOV Note Dated 5/27/25 w/ Dr. Flanagan:    PLAN:  RECOMMENDATIONS:  1)  Medical Modalities: may continue norco as prescribed by PCP  2)  Interventional Modalities:   - repeat Trigger Point Injections today in the office   - caudal WALI should be considered if pain persists or symptoms warrant   - consider diagnostic LMBBs at L3/4, L4/5 and L5/S1 followed by RFA for pain due to lumbar spondylosis  3)  Continue physical therapy/home exercise         Pt will return to clinic for follow up in 4-8 wks or as needed         Time spent: 27 minutes     Contacted Charito, pt's daughter, to advise that medications are currently managed by PCP and any questions pertaining to meds should be directed to PCP. Charito HA.

## 2025-07-02 ENCOUNTER — OFFICE VISIT (OUTPATIENT)
Dept: PAIN CLINIC | Facility: HOSPITAL | Age: 85
End: 2025-07-02
Attending: ANESTHESIOLOGY
Payer: MEDICARE

## 2025-07-02 VITALS — DIASTOLIC BLOOD PRESSURE: 67 MMHG | HEART RATE: 59 BPM | SYSTOLIC BLOOD PRESSURE: 101 MMHG

## 2025-07-02 DIAGNOSIS — M47.816 FACET ARTHRITIS OF LUMBAR REGION: Primary | ICD-10-CM

## 2025-07-02 PROCEDURE — 99214 OFFICE O/P EST MOD 30 MIN: CPT | Performed by: ANESTHESIOLOGY

## 2025-07-02 NOTE — PROGRESS NOTES
Patient presents in office today with reported pain in lower back    Current pain level reported = 8/10
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
## 2025-07-03 ENCOUNTER — TELEPHONE (OUTPATIENT)
Dept: PAIN CLINIC | Facility: HOSPITAL | Age: 85
End: 2025-07-03

## 2025-07-03 ENCOUNTER — TELEPHONE (OUTPATIENT)
Dept: FAMILY MEDICINE CLINIC | Facility: CLINIC | Age: 85
End: 2025-07-03

## 2025-07-03 NOTE — TELEPHONE ENCOUNTER
Prior authorization request completed for: Left MBB   Authorization #[id number]  Authorization dates: 7/3/25-12/30/25  CPT codes approved: 21890, 72849 *ONLY 2 LEVELS APPROVED*    Number of visits/dates of service approved: 1  Physician: Masha  Location: [facility]    Message routed to provider to determine levels.     Medical clearance letter sent to Dr. Amy Amaro via [employer] and faxed to  [phone].

## 2025-07-03 NOTE — TELEPHONE ENCOUNTER
Received a fax requesting pre-op clearance for surgery Left lumber branch block surgery date: TBD with Dr. Flanagan at [facility].  No labs are being requested, please order labs if appropriate.     Thank you!

## 2025-07-04 ENCOUNTER — HOSPITAL ENCOUNTER (EMERGENCY)
Facility: HOSPITAL | Age: 85
Discharge: HOME | End: 2025-07-04
Attending: EMERGENCY MEDICINE
Payer: MEDICARE

## 2025-07-04 ENCOUNTER — APPOINTMENT (OUTPATIENT)
Dept: GENERAL RADIOLOGY | Facility: HOSPITAL | Age: 85
End: 2025-07-04
Attending: EMERGENCY MEDICINE
Payer: MEDICARE

## 2025-07-04 VITALS
BODY MASS INDEX: 40.48 KG/M2 | HEART RATE: 79 BPM | SYSTOLIC BLOOD PRESSURE: 142 MMHG | OXYGEN SATURATION: 95 % | DIASTOLIC BLOOD PRESSURE: 115 MMHG | RESPIRATION RATE: 18 BRPM | WEIGHT: 220 LBS | HEIGHT: 62 IN

## 2025-07-04 DIAGNOSIS — G89.29 CHRONIC BILATERAL LOW BACK PAIN WITHOUT SCIATICA: Primary | ICD-10-CM

## 2025-07-04 DIAGNOSIS — M54.50 CHRONIC BILATERAL LOW BACK PAIN WITHOUT SCIATICA: Primary | ICD-10-CM

## 2025-07-04 PROCEDURE — 99284 EMERGENCY DEPT VISIT MOD MDM: CPT

## 2025-07-04 PROCEDURE — 96372 THER/PROPH/DIAG INJ SC/IM: CPT

## 2025-07-04 PROCEDURE — 99283 EMERGENCY DEPT VISIT LOW MDM: CPT

## 2025-07-04 PROCEDURE — 72110 X-RAY EXAM L-2 SPINE 4/>VWS: CPT | Performed by: EMERGENCY MEDICINE

## 2025-07-04 RX ORDER — MORPHINE SULFATE 4 MG/ML
4 INJECTION, SOLUTION INTRAMUSCULAR; INTRAVENOUS ONCE
Status: COMPLETED | OUTPATIENT
Start: 2025-07-04 | End: 2025-07-04

## 2025-07-04 NOTE — ED PROVIDER NOTES
Patient Seen in: [facility] Emergency Department        History  Chief Complaint   Patient presents with    Back Pain     Stated Complaint: back pain    Subjective:   HPI            85-year-old female with history of chronic back pain presents reporting uncontrolled back pain today.  She reports she had to come to the ER couple of days ago and they gave her some intramuscular morphine which worked very well.  She is actively following with pain specialty at [facility].  Awaiting approval for ablation.  Denies any acute neurologic symptoms.  No trauma.  No infectious symptoms.  She says she typically takes Norco on a daily basis for this pain but did not want to take it today because she has stuff to do.  She tried taking Tylenol which did not help.      Objective:     Past Medical History:    (HFpEF) heart failure with preserved ejection fraction (HCC)    Atrial fibrillation (HCC)    Back pain    Chronic atrial fibrillation (HCC)    CKD (chronic kidney disease)    Colon cancer (HCC)    Congestive heart disease (HCC)    Congestive heart failure (HCC)    Congestive heart failure, unspecified HF chronicity, unspecified heart failure type (HCC)    Deep vein thrombosis (HCC)    Diabetes (HCC)    Essential hypertension    History of blood clots    HTN (hypertension)    Hyperlipidemia    Hypothyroidism    Obesity    Pulmonary embolism (HCC)    Sleep apnea    TIA (transient ischemic attack)    Visual impairment              Past Surgical History:   Procedure Laterality Date    Back surgery      Colectomy      Colonoscopy      Excis lumbar disk,one level      Femur fracture surgery Right 10/19/2024    Hip replacement surgery      Knee replacement surgery            Total hip replacement      Total knee replacement Bilateral                 Social History     Socioeconomic History    Marital status:    Tobacco Use    Smoking status: Never     Passive exposure: Never    Smokeless tobacco: Never    Vaping Use    Vaping status: Never Used   Substance and Sexual Activity    Alcohol use: Not Currently    Drug use: Never   Other Topics Concern    Caffeine Concern No    Exercise Yes   Social History Narrative    Retired  for >30 years.  [employer]. Daughter Charito lives in [place].      Social Drivers of Health     Food Insecurity: Low Risk  (10/19/2024)    Received from [employer]    Food Insecurity     Within the past 12 months, you worried that your food would run out before you got money to buy more.  : Never true     Within the past 12 months, the food you bought just didn't last and you didn't have money to get more. : Never true   Transportation Needs: Not At Risk (10/19/2024)    Received from [employer]    Transportation Needs     In the past 12 months, has lack of reliable transportation kept you from medical appointments, meetings, work or from getting things needed for daily living? : No   Housing Stability: Low Risk  (4/20/2024)    Housing Stability     Housing Instability: No                                Physical Exam    ED Triage Vitals [07/04/25 1141]   /90   Pulse 74   Resp 20   Temp    Temp src    SpO2 96 %   O2 Device None (Room air)       Current Vitals:   Vital Signs  BP: (!) 142/115  Pulse: 79  Resp: 18    Oxygen Therapy  SpO2: 95 %  O2 Device: None (Room air)            Physical Exam  General:  Vitals as listed.  No acute distress   Back: Pain with bending and twisting  Neuro: Alert oriented and nonfocal   Skin: no rashes or nodules        ED Course  Labs Reviewed - No data to display       XR LUMBAR SPINE (MIN 4 VIEWS) (CPT=72110)  Result Date: 7/4/2025  PROCEDURE: XR LUMBAR SPINE (MIN 4 VIEWS) (CPT=72110) INDICATIONS: back pain PATIENT STATED HISTORY: Chronic left sided lower back pain with no radiculopathy. COMPARISON: There are no comparisons for this exam. FINDINGS: There is mild to moderate levoscoliosis of the thoracolumbar  spine. There is moderate to severe degenerative changes throughout the entire lumbar spine at all levels with disc base narrowing and osteophyte formation. This finding is stable since CT from 12/20/2024. There is moderate facet joint degenerative changes at L4-5 and L5-S1. OTHER: Calcified gallstone     CONCLUSION: Severe multilevel degenerative changes of the lumbar spine not significant change since previous study. Electronically Verified and Signed by Attending Radiologist: Josse Fan MD 7/4/2025 1:35 PM Workstation: [id number]                      [facility]     85-year-old female presents with chronic low back pain.  Pain is increased from its baseline.  Similar episode 3 days ago and presented to ER.  No neurologic or infectious symptoms.    Additional history obtained by family at the bedside reports that they were here 2 days ago when she got a morphine shot that helped quite a bit with her pain    Differential includes but is not limited to chronic back pain, a life/function threat.    Morphine 4 mg IM ordered.  Patient received shot.  Feeling improved.  Like to go home.  Discharge instructed to follow-up with her pain specialist.  Recommend they call Monday and notify them of the need for to ER visits this week.  Should return with any concerns.            Medical Decision Making      Disposition and Plan     Clinical Impression:  1. Chronic bilateral low back pain without sciatica         Disposition:  Discharge  7/4/2025  2:23 pm    Follow-up:  Amy Amaro MD  [address] [place] [id number]  [phone]    Follow up            Medications Prescribed:  Discharge Medication List as of 7/4/2025  2:28 PM                Supplementary Documentation:

## 2025-07-04 NOTE — ED INITIAL ASSESSMENT (HPI)
Pt reports waking up this AM w/ back pain/spasms. \"Feel like a knife is being wrenched into my back\". Pt reports ongoing issue for past 30 years. Pt reports bending makes pain worse. 6/10 pain reported pt took tylenol at home around 0900 with little relief

## 2025-07-07 NOTE — TELEPHONE ENCOUNTER
Daughter states medical clearance was faxed over this morning.  Please contact daughter if clearance was not received.

## 2025-07-09 ENCOUNTER — MED REC SCAN ONLY (OUTPATIENT)
Dept: FAMILY MEDICINE CLINIC | Facility: CLINIC | Age: 85
End: 2025-07-09

## 2025-07-24 ENCOUNTER — OFFICE VISIT (OUTPATIENT)
Facility: CLINIC | Age: 85
End: 2025-07-24
Payer: MEDICARE

## 2025-07-24 VITALS
BODY MASS INDEX: 40 KG/M2 | HEIGHT: 62 IN | HEART RATE: 94 BPM | DIASTOLIC BLOOD PRESSURE: 78 MMHG | SYSTOLIC BLOOD PRESSURE: 124 MMHG | OXYGEN SATURATION: 94 %

## 2025-07-24 DIAGNOSIS — E83.52 HYPERCALCEMIA: ICD-10-CM

## 2025-07-24 DIAGNOSIS — M85.852 OSTEOPENIA OF NECK OF LEFT FEMUR: Primary | ICD-10-CM

## 2025-07-24 PROCEDURE — 1160F RVW MEDS BY RX/DR IN RCRD: CPT | Performed by: STUDENT IN AN ORGANIZED HEALTH CARE EDUCATION/TRAINING PROGRAM

## 2025-07-24 PROCEDURE — 1159F MED LIST DOCD IN RCRD: CPT | Performed by: STUDENT IN AN ORGANIZED HEALTH CARE EDUCATION/TRAINING PROGRAM

## 2025-07-24 PROCEDURE — 99214 OFFICE O/P EST MOD 30 MIN: CPT | Performed by: STUDENT IN AN ORGANIZED HEALTH CARE EDUCATION/TRAINING PROGRAM

## 2025-07-24 NOTE — H&P
[facility] Endocrinology Clinic Note    Name: Joni Christiansen    Date: 7/24/2025    Chief complaint: Follow - Up (Elevated PTH )       Subjective:   History of Present Illness  Joni Christiansen is an 85 year old female who presents for evaluation of elevated calcium and parathyroid hormone levels.    She has experienced elevated calcium levels since at least June 2023. No symptoms typically associated with hypercalcemia, such as polyuria, nausea, anorexia, or unintentional weight loss. No history of nephrolithiasis.    In October 2024, she sustained a right femur fracture following a fall, requiring surgical intervention with aria placement. She has a history of hip and knee arthroplasties, with the hip replacement occurring over 20 years ago. Despite using a walker for stability, she is able to ambulate her dog five times a day without it inside her home. No other fractures aside from the recent femur fracture.    She has been taking calcium supplements for a long time but discontinued them last month due to her high calcium results. She continues to take vitamin D regularly. Her diet includes a moderate amount of calcium from sources such as milk, cheese, and leafy greens.    There is no family history of hypercalcemia or parathyroid issues. She has not been on medications like bisphosphonates or denosumab for bone health, nor has she taken hydrochlorothiazide or steroids. She had a bone density test approximately 12 years ago in [place], [place].    She has a history of scoliosis and underwent spinal surgery about 20 years ago for pain management.          Thryoid Meds: levothyroxine Tabs - 75 MCG    Recent Labs     04/24/23  0618 01/17/24  1002 06/13/25  1703   T4F 1.1 1.2  --    TSH 0.352* 1.580 3.628        History/Other:    Allergies, PMH, SocHx and FHx reviewed and updated as appropriate in Epic on Current Medications[1]  Allergies[2]  Current Medications[3]  Past Medical History[4]  Family History[5]  Social  history: Reviewed.    ROS/Exam    REVIEW OF SYSTEMS: Ten point review of systems has been performed and is otherwise negative and/or non-contributory, except as described above.     VITALS  Vitals:    07/24/25 1333   BP: 124/78   Pulse: 94   SpO2: 94%   Height: 5' 2\" (1.575 m)       Body mass index is 40.24 kg/m².  Wt Readings from Last 6 Encounters:   07/04/25 220 lb (99.8 kg)   07/01/25 220 lb (99.8 kg)   06/23/25 220 lb (99.8 kg)   05/16/25 220 lb (99.8 kg)   04/22/25 220 lb (99.8 kg)   03/28/25 215 lb (97.5 kg)       PHYSICAL EXAM  CONSTITUTIONAL:  awake, alert, cooperative, no apparent distress, and appears stated age    PSYCH: normal affect  LUNGS: breathing comfortably  CARDIOVASCULAR:  regular rate   NECK:  no palpable thyroid nodules      Labs/Imaging:    Lab Results   Component Value Date    AST 25 06/13/2025    ALT 16 06/13/2025       Thyroid labs:  Recent Labs     04/24/23  0618 01/17/24  1002 06/13/25  1703   T4F 1.1 1.2  --    TSH 0.352* 1.580 3.628        Thyroid ultrasound results:  No results found.      Assessment & Plan:   Overview:   1. Osteopenia of neck of left femur (Primary)  -     XR DEXA BONE DENSITOMETRY (CPT=77080); Future; Expected date: 07/24/2025  -     C-Telopeptide ([employer]); Future; Expected date: 07/24/2025  -     Alkaline Phosphatase, Bone Specific; Future; Expected date: 07/24/2025  2. Hypercalcemia  -     Vitamin D, 25-Hydroxy; Future; Expected date: 07/24/2025  -     C-Telopeptide ([employer]); Future; Expected date: 07/24/2025  -     Alkaline Phosphatase, Bone Specific; Future; Expected date: 07/24/2025  -     Calcium Ionized-Out Patient; Future; Expected date: 07/24/2025      Assessment & Plan  Hypercalcemia  Chronic hypercalcemia with elevated parathyroid hormone levels, suggesting possible primary hyperparathyroidism. Differential diagnosis includes primary hyperparathyroidism vs. Secondary causes such as renal impairment affecting PTH levels. Calcium  levels have been elevated since at least June 2023. No acute symptoms such as polyuria, nausea, or confusion.   - Order secondary work up including BTM and ionized calcium   - Discuss potential surgical and medical treatment options if primary hyperparathyroidism is confirmed    Osteoporosis  Osteoporosis with recent femur fracture after a fall October 2024. No recent bone density scan; last scan was 12 years ago. Risk factors include age and post-menopausal status.  - Order bone density scan to assess current bone health  - Discuss potential treatment options for osteoporosis at follow-up  - Will confirm calcium supplement dosing  - Continue vitamin D supplements           Return in about 6 weeks (around 9/4/2025).    Donna Monroe DO  [facility] Endocrinology, Diabetes & Metabolism   7/24/2025    Note to patient: The 21 Century Cures Act makes medical notes like these available to patients in the interest of transparency. However, be advised this is a medical document. It is intended as peer to peer communication. It is written in medical language and may contain abbreviations or verbiage that are unfamiliar. It may appear blunt or direct. Medical documents are intended to carry relevant information, facts as evident, and the clinical opinion of the practitioner.          [1]    orphenadrine  MG Oral Tablet 12 Hr orphenadrine citrate  mg tablet,extended release, [RxNorm: 498942]      metoprolol succinate ER 25 MG Oral Tablet 24 Hr metoprolol succinate ER 25 mg tablet,extended release 24 hr, [RxNorm: 157417]      HYDROCODONE-ACETAMINOPHEN 5-325 MG Oral Tab take 1-2 tablets by mouth every 6 (six) hours as needed for pain. 60 tablet 0    enoxaparin 100 MG/ML Injection Solution Prefilled Syringe       escitalopram 10 MG Oral Tab Take 1.5 tablets (15 mg total) by mouth daily. 135 tablet 1    montelukast 10 MG Oral Tab Take 1 tablet (10 mg total) by mouth every evening. 90 tablet 1    Pravastatin  Sodium 80 MG Oral Tab Take 1 tablet (80 mg total) by mouth every evening. 90 tablet 1    spironolactone 25 MG Oral Tab Take 0.5 tablets (12.5 mg total) by mouth daily. 45 tablet 1    torsemide 10 MG Oral Tab Take 1 tablet (10 mg total) by mouth daily. 90 tablet 1    Irbesartan 150 MG Oral Tab Take 1 tablet (150 mg total) by mouth daily. 90 tablet 1    levothyroxine 75 MCG Oral Tab Take 1 tablet (75 mcg total) by mouth every morning. 90 tablet 1    albuterol 108 (90 Base) MCG/ACT Inhalation Aero Soln Inhale 2 puffs into the lungs every 4 (four) hours as needed for Wheezing or Shortness of Breath. 1 each 1    24HR ALLERGY RELIEF 180 MG Oral Tab take 1 tablet BY MOUTH EVERY DAY 90 tablet 0    LIDOCAINE PAIN RELIEF 4 % External Patch       CHOLECALCIFEROL 50 MCG (2000 UT) Oral Cap TAKE 1 CAPSULE BY MOUTH DAILY 90 capsule 1    ELIQUIS 5 MG Oral Tab TAKE 1 TABLET BY MOUTH TWICE DAILY 180 tablet 1    B-12 1000 MCG Oral Tab CR TAKE 1 TABLET BY MOUTH DAILY 90 tablet 1    albuterol 108 (90 Base) MCG/ACT Inhalation Aero Soln Inhale 2 puffs into the lungs every 4 to 6 hours as needed for Wheezing or Shortness of Breath. 1 each 3    triamcinolone 0.1 % External Cream Apply topically 2 (two) times daily as needed. Can use on legs and hands 80 g 2    lidocaine 5 % External Patch Place 1 patch onto the skin daily as needed. 30 each 2   [2]   Allergies  Allergen Reactions    Penicillins OTHER (SEE COMMENTS) and UNKNOWN     Other reaction(s): Unknown    Was told as a child it was an allergy    Other reaction(s): Unknown  Was told as a child it was an allergy      Was told as a child it was an allergy   [3]   Current Outpatient Medications   Medication Sig Dispense Refill    orphenadrine  MG Oral Tablet 12 Hr orphenadrine citrate  mg tablet,extended release, [RxNorm: 041725]      metoprolol succinate ER 25 MG Oral Tablet 24 Hr metoprolol succinate ER 25 mg tablet,extended release 24 hr, [RxNorm: 052940]       HYDROCODONE-ACETAMINOPHEN 5-325 MG Oral Tab take 1-2 tablets by mouth every 6 (six) hours as needed for pain. 60 tablet 0    enoxaparin 100 MG/ML Injection Solution Prefilled Syringe       escitalopram 10 MG Oral Tab Take 1.5 tablets (15 mg total) by mouth daily. 135 tablet 1    montelukast 10 MG Oral Tab Take 1 tablet (10 mg total) by mouth every evening. 90 tablet 1    Pravastatin Sodium 80 MG Oral Tab Take 1 tablet (80 mg total) by mouth every evening. 90 tablet 1    spironolactone 25 MG Oral Tab Take 0.5 tablets (12.5 mg total) by mouth daily. 45 tablet 1    torsemide 10 MG Oral Tab Take 1 tablet (10 mg total) by mouth daily. 90 tablet 1    Irbesartan 150 MG Oral Tab Take 1 tablet (150 mg total) by mouth daily. 90 tablet 1    levothyroxine 75 MCG Oral Tab Take 1 tablet (75 mcg total) by mouth every morning. 90 tablet 1    albuterol 108 (90 Base) MCG/ACT Inhalation Aero Soln Inhale 2 puffs into the lungs every 4 (four) hours as needed for Wheezing or Shortness of Breath. 1 each 1    24HR ALLERGY RELIEF 180 MG Oral Tab take 1 tablet BY MOUTH EVERY DAY 90 tablet 0    LIDOCAINE PAIN RELIEF 4 % External Patch       CHOLECALCIFEROL 50 MCG (2000 UT) Oral Cap TAKE 1 CAPSULE BY MOUTH DAILY 90 capsule 1    ELIQUIS 5 MG Oral Tab TAKE 1 TABLET BY MOUTH TWICE DAILY 180 tablet 1    B-12 1000 MCG Oral Tab CR TAKE 1 TABLET BY MOUTH DAILY 90 tablet 1    albuterol 108 (90 Base) MCG/ACT Inhalation Aero Soln Inhale 2 puffs into the lungs every 4 to 6 hours as needed for Wheezing or Shortness of Breath. 1 each 3    triamcinolone 0.1 % External Cream Apply topically 2 (two) times daily as needed. Can use on legs and hands 80 g 2    lidocaine 5 % External Patch Place 1 patch onto the skin daily as needed. 30 each 2    ondansetron 4 MG Oral Tablet Dispersible Place 1 tablet (4 mg total) under the tongue every 6 (six) hours. (Patient not taking: Reported on 7/24/2025)     [4]   Past Medical History:   (HFpEF) heart failure with  preserved ejection fraction (HCC)    Atrial fibrillation (HCC)    Back pain    Chronic atrial fibrillation (HCC)    CKD (chronic kidney disease)    Colon cancer (HCC)    Congestive heart disease (HCC)    Congestive heart failure (HCC)    Congestive heart failure, unspecified HF chronicity, unspecified heart failure type (HCC)    Deep vein thrombosis (HCC)    Diabetes (HCC)    Essential hypertension    History of blood clots    HTN (hypertension)    Hyperlipidemia    Hypothyroidism    Obesity    Pulmonary embolism (HCC)    Sleep apnea    TIA (transient ischemic attack)    Visual impairment   [5]   Family History  Problem Relation Age of Onset    Other (other) Father         congestive heart failure    Hypertension Maternal Grandmother

## 2025-07-24 NOTE — PATIENT INSTRUCTIONS
Visit Summary  Please complete labs and bone density prior to our follow up visit     General follow up information:  Please let us know if you require any refills at least 1 week prior to your medication running out. If you do run out of medication, please call our office ASAP to request refills (do not wait until your follow up).  Please call us if you experience any problems with insurance coverage of medication, lab work, or imaging.   Lab results and imaging will typically be reviewed at follow up appointments, or within 3-5 business days of ALL results being in if you do not have an appointment scheduled in the near future. Our office will contact you for any abnormal results requiring more urgent follow up or action.  The on-call pager is for urgent matters only. If you are a type 1 diabetic and run out of insulin after business hours 8AM-4PM, you may call the on-call pager for a refill to a 24 hour pharmacy. If you have adrenal insufficiency and run out of steroids, you may call the on-call pager for a refill to a 24 hours pharmacy. All other refill requests should be requested during business hours.    Return Visit   [x] Dr. Monroe in 6 weeks   [] Virtual visit  [] No follow up appointment needed  [] Follow up to be scheduled pending      []  Fasting/8AM labs  [x]  Central scheduling # for ultrasound/nuclear med/CT/MRI/DXA/IR  []  Provide flyer for:  [] ENT   [] Weight Management  [] Infertility/Reproductive Endocrinology  [] Transgender care  []  Directions to 1st floor lab  [] Collect urine collection jug  [] Collect salivary cortisol tubes  []  Dental clearance form  []  Will need authorization for outside records

## 2025-07-24 NOTE — PROGRESS NOTES
The following individual(s) verbally consented to be recorded using ambient AI listening technology and understand that they can each withdraw their consent to this listening technology at any point by asking the clinician to turn off or pause the recording:    Patient name: Christinecorine Leanne Kuldip  Additional names Therese

## 2025-07-31 ENCOUNTER — TELEPHONE (OUTPATIENT)
Dept: FAMILY MEDICINE CLINIC | Facility: CLINIC | Age: 85
End: 2025-07-31

## 2025-07-31 ENCOUNTER — TELEPHONE (OUTPATIENT)
Dept: PAIN CLINIC | Facility: HOSPITAL | Age: 85
End: 2025-07-31

## 2025-08-08 DIAGNOSIS — M54.50 CHRONIC BILATERAL LOW BACK PAIN WITHOUT SCIATICA: ICD-10-CM

## 2025-08-08 DIAGNOSIS — G89.29 CHRONIC BILATERAL LOW BACK PAIN WITHOUT SCIATICA: ICD-10-CM

## 2025-08-11 RX ORDER — HYDROCODONE BITARTRATE AND ACETAMINOPHEN 5; 325 MG/1; MG/1
1-2 TABLET ORAL EVERY 6 HOURS PRN
Qty: 30 TABLET | Refills: 0 | Status: SHIPPED | OUTPATIENT
Start: 2025-08-11

## 2025-08-29 ENCOUNTER — HOSPITAL ENCOUNTER (OUTPATIENT)
Dept: GENERAL RADIOLOGY | Facility: HOSPITAL | Age: 85
Discharge: HOME | End: 2025-08-29
Attending: OTOLARYNGOLOGY

## 2025-08-29 ENCOUNTER — LAB ENCOUNTER (OUTPATIENT)
Dept: LAB | Facility: REFERENCE LAB | Age: 85
End: 2025-08-29
Attending: STUDENT IN AN ORGANIZED HEALTH CARE EDUCATION/TRAINING PROGRAM

## 2025-08-29 DIAGNOSIS — R09.A2 GLOBUS PHARYNGEUS: ICD-10-CM

## 2025-08-29 DIAGNOSIS — E83.52 HYPERCALCEMIA: ICD-10-CM

## 2025-08-29 DIAGNOSIS — M85.852 OSTEOPENIA OF NECK OF LEFT FEMUR: ICD-10-CM

## 2025-08-29 LAB — VIT D+METAB SERPL-MCNC: 39.4 NG/ML (ref 30–100)

## 2025-08-29 PROCEDURE — 92611 MOTION FLUOROSCOPY/SWALLOW: CPT

## 2025-08-29 PROCEDURE — 36415 COLL VENOUS BLD VENIPUNCTURE: CPT

## 2025-08-29 PROCEDURE — 82330 ASSAY OF CALCIUM: CPT

## 2025-08-29 PROCEDURE — 82523 COLLAGEN CROSSLINKS: CPT

## 2025-08-29 PROCEDURE — 84080 ASSAY ALKALINE PHOSPHATASES: CPT

## 2025-08-29 PROCEDURE — 74230 X-RAY XM SWLNG FUNCJ C+: CPT | Performed by: OTOLARYNGOLOGY

## 2025-08-29 PROCEDURE — 82306 VITAMIN D 25 HYDROXY: CPT

## 2025-08-30 ENCOUNTER — HOSPITAL ENCOUNTER (EMERGENCY)
Facility: HOSPITAL | Age: 85
Discharge: HOME | End: 2025-08-30
Attending: EMERGENCY MEDICINE

## 2025-08-30 VITALS
TEMPERATURE: 98 F | HEIGHT: 62 IN | RESPIRATION RATE: 16 BRPM | BODY MASS INDEX: 40.48 KG/M2 | WEIGHT: 220 LBS | HEART RATE: 64 BPM | SYSTOLIC BLOOD PRESSURE: 130 MMHG | OXYGEN SATURATION: 97 % | DIASTOLIC BLOOD PRESSURE: 68 MMHG

## 2025-08-30 DIAGNOSIS — M54.16 LUMBAR RADICULOPATHY: Primary | ICD-10-CM

## 2025-08-30 LAB — LC CALCIUM, IONIZED: 5.7 MG/DL

## 2025-08-30 PROCEDURE — 99283 EMERGENCY DEPT VISIT LOW MDM: CPT

## 2025-08-30 PROCEDURE — 96372 THER/PROPH/DIAG INJ SC/IM: CPT

## 2025-08-30 PROCEDURE — 99285 EMERGENCY DEPT VISIT HI MDM: CPT

## 2025-08-30 RX ORDER — MORPHINE SULFATE 4 MG/ML
8 INJECTION, SOLUTION INTRAMUSCULAR; INTRAVENOUS ONCE
Status: COMPLETED | OUTPATIENT
Start: 2025-08-30 | End: 2025-08-30

## 2025-08-30 RX ORDER — HYDROCODONE BITARTRATE AND ACETAMINOPHEN 10; 325 MG/1; MG/1
1 TABLET ORAL EVERY 6 HOURS PRN
Qty: 15 TABLET | Refills: 0 | Status: SHIPPED | OUTPATIENT
Start: 2025-08-30 | End: 2025-09-04

## (undated) DEVICE — GOWN SURG LG L3 NONREINFORCE SET IN SLV STRL LF DISP BLUE

## (undated) DEVICE — DRAPE HAND REINFORCE FENESTRATE CORD HOLD TAB ELASTIC 146X77

## (undated) DEVICE — DRESSING PETRO 9X5IN NADH OCL IMPREGNATE CRD XEROFORM CTN

## (undated) DEVICE — STAPLER SKIN 3.9X6.9MM WIDE 35 CNT FX HEAD RCHT STRL LF

## (undated) DEVICE — BLADE SURG 10 INDIV FOIL WRAP STD SCPL HNDL STRL PRSNA + SS

## (undated) DEVICE — DRAPE REINFORCE SPLIT ABS TUBE HLDR UNV 120X77IN SURG CNVRT

## (undated) DEVICE — REAMER SURG MOD TRNKL SHAFT IM 510MM BIXCUT STRL

## (undated) DEVICE — WET SKIN PREP TRAY 1 EA THREE COMPARTMENT TRAY 2

## (undated) DEVICE — GOWN SURG XL L4 RAGLAN SLV BRTHBL STRL LF DISP SMARTGOWN

## (undated) DEVICE — HANDLE SCT MDVC FRZR 12FR CNTRL VENT STRL LF DISP

## (undated) DEVICE — CATHETER BARD 16FR FOLEY RND TIP INTMT AP RBR URTH STRL LTX

## (undated) DEVICE — SUTURE VICRYL 0 CT1 27IN BRAID COAT ABS UNDYED J260H

## (undated) DEVICE — DRESSING TRANS 4.75X4IN ADH HPOAL WTPRF TEGADERM PU STD STRL

## (undated) DEVICE — BLADE SURG 15 STRL PRSNA + PLMR

## (undated) DEVICE — TOWEL OR BLU 16X26IN 4PK

## (undated) DEVICE — GLOVE SURG 7.5 PROTEXIS LF CRM PF SMTH BEAD CUFF STRL

## (undated) DEVICE — SPONGE GAUZE 4X4IN CTN 12 PLY STRL CURITY

## (undated) DEVICE — WIRE FX 3MM 285MM KIRSCHNER T2 SS STRL

## (undated) DEVICE — SOLUTION PREP 70% ISO ALC 4OZ LF

## (undated) DEVICE — GLOVE SURG 6 PROTEXIS LF CRM PF BEAD CUFF STRL PLISPRN 11.5

## (undated) DEVICE — SUTURE ETHILON MTPS 3-0 PS1 18IN MONO NABSB BLK 1663H

## (undated) DEVICE — SOLUTION IRR 1000ML 0.9% NACL PLASTIC POUR BTL ISTNC N-PYRG

## (undated) DEVICE — GLOVE SURG 7 PROTEXIS LF CRM PF BEAD CUFF STRL PLISPRN 12IN

## (undated) DEVICE — DRILL SURG 360MM 4.2MM LOCK STRL

## (undated) DEVICE — DRAPE ADH STRIP SM TWL MATTE FNSH 17X11IN SURG STRDRP STRL

## (undated) DEVICE — WIRE FX 2MM 234MM KIRSCHNER DRILL TIP NS

## (undated) DEVICE — GLOVE SURG 8 PROTEXIS LF BLUE PF SMTH BEAD CUFF INTLK STRL

## (undated) DEVICE — DRAPE .75 SHT FNFLD 76X52IN SURG CNVRT STRL LF DISP TIBURON

## (undated) DEVICE — GLOVE SURG 6.5 PROTEXIS LF BLUE PF SMTH BEAD CUFF INTLK STRL

## (undated) DEVICE — Device

## (undated) DEVICE — DRAPE U STRIP IMPRV ADH SPLIT 72X60IN 21X6IN SURG CNVRT STRL

## (undated) DEVICE — GLOVE SURG 8 PROTEXIS LF CRM PF BEAD CUFF STRL PLISPRN 12IN

## (undated) DEVICE — DRAPE FTSWTCH CVR X RAY TUBE FLAT PNL OEC MBL 33323 31CM C

## (undated) DEVICE — BIT DRILL 4.3MM 216MM SHORT LOCK NS

## (undated) DEVICE — GLOVE SURG 6.5 PROTEXIS LF CRM PF BEAD CUFF STRL PLISPRN

## (undated) DEVICE — IMPLANTABLE DEVICE
Type: IMPLANTABLE DEVICE | Site: LEG UPPER | Status: NON-FUNCTIONAL
Removed: 2024-10-20

## (undated) DEVICE — BANDAGE CMPR ESMK 12FTX4IN ELASTIC STRL LF BLUE

## (undated) DEVICE — POUCH INST 11X7IN 2 ADH STRIP 2 CMPRT STRL STRDRP PLASTIC

## (undated) DEVICE — GLOVE SURG 7.5 PROTEXIS LF BLUE PF SMTH BEAD CUFF INTLK STRL

## (undated) DEVICE — GUIDEWIRE T2 3MM 1000MM ORTHO BALL TIP TI STRL FEM NL SYS

## (undated) DEVICE — ADHESIVE DRMBND ADV .7ML LIQUID APL MCBL BRR FLXB 2 OCTYL

## (undated) DEVICE — SUTURE VICRYL 2-0 CT1 27IN BRAID COAT ABS UNDYED J259H

## (undated) DEVICE — BANDAGE GAUZE BLK2 4.1YDX4.5IN 6 PLY OPEN WEAVE TIGHT FNSH

## (undated) DEVICE — SPONGE LAPAROTOMY 18X18IN STERILE 5 PK

## (undated) DEVICE — 2% CHLORHEXIDINE SKIN PREP ORANGE 26ML

## (undated) DEVICE — WRAP CMPR 5YDX4IN COBAN POR SLFADH ELASTIC LTWT HAND TEAR LF

## (undated) DEVICE — DRAPE EXPAND CLPSBL C ARM FLRSCP EQUIPMENT C-ARMOR STRL

## (undated) DEVICE — DRAPE ADH 51X47IN SURG STRDRP STRL LF DISP CLR

## (undated) DEVICE — BIT DRILL 3.2MM 216MM SHORT NONLOCK AXSOS 3 TI

## (undated) NOTE — LETTER
AUTHORIZATION FOR SURGICAL OPERATION OR OTHER PROCEDURE    1. I hereby authorize Dr. Flanagan, and [facility] staff assigned to my case to perform the following operation and/or procedure at the [facility] site:    _______________________________________________________________________________________________                                                                                   Trigger Point Injections   _______________________________________________________________________________________________    2.  My physician has explained the nature and purpose of the operation or other procedure, possible alternative methods of treatment, the risks involved, and the possibility of complication to me.  I acknowledge that no guarantee has been made as to the result that may be obtained.  3.  I recognize that, during the course of this operation, or other procedure, unforseen conditions may necessitate additional or different procedure than those listed above.  I, therefore, further authorize and request that the above named physician, his/her physician assistants or designees perform such procedures as are, in his/her professional opinion, necessary and desirable.  4.  Any tissue or organs removed in the operation or other procedure may be disposed of by and at the discretion of the [facility] and [facility].  5.  I understand that in the event of a medical emergency, I will be transported by local paramedics to [facility] or other hospital emergency department.  6.  I certify that I have read and fully understand the above consent to operation and/or other procedure.    7.  I acknowledge that my physician has explained sedation/analgesia administration to me including the risks and benefits.  I consent to the administration of sedation/analgesia as may be necessary or desirable in the judgement of my physician.    Witness signature:  ___________________________________________________ Date:  ______/______/_____                    Time:  ________ A.M.  P.M.       Patient Name:  ______________________________________________________  (please print)      Patient signature:  ___________________________________________________             Relationship to Patient:           []  Parent    Responsible person                          []  Spouse  In case of minor or                    [] Other  _____________   Incompetent name:  __________________________________________________                               (please print)      _____________      Responsible person  In case of minor or  Incompetent signature:  _______________________________________________    Statement of Physician  My signature below affirms that prior to the time of the procedure, I have explained to the patient and/or his/her guardian, the risks and benefits involved in the proposed treatment and any reasonable alternative to the proposed treatment.  I have also explained the risks and benefits involved in the refusal of the proposed treatment and have answered the patient's questions.                        Date:  ______/______/_______  Provider                      Signature:  __________________________________________________________       Time:  ___________ TOSHIA JUAREZ

## (undated) NOTE — LETTER
07/03/25    Dear Dr. Amaro    Your patient is being scheduled for a pain management procedure at [facility] OR [facility].    Procedure:  Left Lumbar Medial Branch Block  Date of Procedure: TBD pending clearance  Physician: - Anesthesiologist     Your patient is currently taking Eliquis.  usually recommends the medication be held for 3 days prior to injection.     Please verify patient is cleared to proceed with pain management procedures.      Clearance Approved   ____________    Clearance Denied       ____________      Comments: ______________________________________________________    Signature: ________________________________  Date: _________________       If you have any questions please feel free to contact our office at [phone]    Please fax this clearance request to our office at fax # [phone] or send electronically.       Thank you,      [facility]

## (undated) NOTE — LETTER
AUTHORIZATION FOR SURGICAL OPERATION OR OTHER PROCEDURE    1. I hereby authorize Dr. Flanagan, and [facility] staff assigned to my case to perform the following operation and/or procedure at the [facility] site:    _Trigger Point Injections __________________________________________________________________      _______________________________________________________________________________________________    2.  My physician has explained the nature and purpose of the operation or other procedure, possible alternative methods of treatment, the risks involved, and the possibility of complication to me.  I acknowledge that no guarantee has been made as to the result that may be obtained.  3.  I recognize that, during the course of this operation, or other procedure, unforseen conditions may necessitate additional or different procedure than those listed above.  I, therefore, further authorize and request that the above named physician, his/her physician assistants or designees perform such procedures as are, in his/her professional opinion, necessary and desirable.  4.  Any tissue or organs removed in the operation or other procedure may be disposed of by and at the discretion of the [facility] and [facility].  5.  I understand that in the event of a medical emergency, I will be transported by local paramedics to [facility] or other hospital emergency department.  6.  I certify that I have read and fully understand the above consent to operation and/or other procedure.    7.  I acknowledge that my physician has explained sedation/analgesia administration to me including the risks and benefits.  I consent to the administration of sedation/analgesia as may be necessary or desirable in the judgement of my physician.    Witness signature: ___________________________________________________ Date:  ______/______/_____                    Time:  ________ A.M.   P.M.       Patient Name:  ______________________________________________________  (please print)      Patient signature:  ___________________________________________________             Relationship to Patient:           []  Parent    Responsible person                          []  Spouse  In case of minor or                    [] Other  _____________   Incompetent name:  __________________________________________________                               (please print)      _____________      Responsible person  In case of minor or  Incompetent signature:  _______________________________________________    Statement of Physician  My signature below affirms that prior to the time of the procedure, I have explained to the patient and/or his/her guardian, the risks and benefits involved in the proposed treatment and any reasonable alternative to the proposed treatment.  I have also explained the risks and benefits involved in the refusal of the proposed treatment and have answered the patient's questions.                        Date:  ______/______/_______  Provider                      Signature:  __________________________________________________________       Time:  ___________ A.M    P.M.

## (undated) NOTE — LETTER
03/05/24      To whom It May Concern:         Joni Christiansen is a patient of mine. She no longer is in need of the ventilator. I give authorization for [facility] to go and  the machine as it has been discontinued.         Thank you,        Amy Amaro MD

## (undated) NOTE — LETTER
September 23, 2024    Joni Christiansen  [address], [id number]  [place] [address]    Dear Joni:  It was a pleasure speaking with you over the phone recently. To follow up, I wanted to send you some contact information to utilize when you have a question and or need some assistance in deciding to enroll. If you were to enroll in this program your own dedicated care manager will be available to provide you with the support and education needed to keep you healthy.     Together you and your care manager will work on:  Connecting you with resources, education, and support regarding your health   Medications: review, educate, and discuss any concerns or issues you may have  Teaching disease specific skills and promoting behavior change to meet your own health goals.    What you need to know:  As we discussed, cost sharing applies to these services. If your deductible has been met you may only see an approximate $12 remainder. However, if you have a secondary insurance this is usually picked up.   As needed, we will share your health information electronically only with others involved in your care. Please rest assured that we continue to comply with all laws related to the privacy and security of your health information  Our goal is to provide you with the best possible care.Together with your physician, these services, among others, will help you take control of your health and provide you with optimal quality care.     We know your time and your health is valuable and we hope you will consider to participate in this program.    We will be reaching out to follow up with you in the next couple of weeks. Please don't hesitate to call with any questions or concerns in the meantime.            Giovana Del Rio [facility]   Health /Care Manager   Population Health    [email or web address]    [phone] work      [address] [place] [address], [address] [place] [address]

## (undated) NOTE — LETTER
07/31/2023    To whom it may concern:      Joni Christiansen is a patient of mine. She reported having adverse effects after taking the alendronate 35 mg once weekly. I instructed her to stop the medication and see how she felt after not taking it. Pt reports feeling better since stopping the medication. I would like her to take Vitamin D 2,000 international units daily with 400 mg of calcium. If you have any further questions please let me know.        Sincerely,           Dr. Wu Cancer